# Patient Record
Sex: FEMALE | Race: WHITE | NOT HISPANIC OR LATINO | Employment: FULL TIME | URBAN - METROPOLITAN AREA
[De-identification: names, ages, dates, MRNs, and addresses within clinical notes are randomized per-mention and may not be internally consistent; named-entity substitution may affect disease eponyms.]

---

## 2017-01-16 ENCOUNTER — OFFICE VISIT - HEALTHEAST (OUTPATIENT)
Dept: FAMILY MEDICINE | Facility: CLINIC | Age: 32
End: 2017-01-16

## 2017-01-16 DIAGNOSIS — Z30.41 ENCOUNTER FOR SURVEILLANCE OF CONTRACEPTIVE PILLS: ICD-10-CM

## 2017-01-16 DIAGNOSIS — L73.9 FOLLICULITIS: ICD-10-CM

## 2017-01-16 DIAGNOSIS — B36.0 TINEA VERSICOLOR: ICD-10-CM

## 2017-01-16 ASSESSMENT — MIFFLIN-ST. JEOR: SCORE: 1550.2

## 2017-02-14 ENCOUNTER — COMMUNICATION - HEALTHEAST (OUTPATIENT)
Dept: FAMILY MEDICINE | Facility: CLINIC | Age: 32
End: 2017-02-14

## 2017-02-14 DIAGNOSIS — F41.9 ANXIETY: ICD-10-CM

## 2017-02-16 ENCOUNTER — OFFICE VISIT - HEALTHEAST (OUTPATIENT)
Dept: FAMILY MEDICINE | Facility: CLINIC | Age: 32
End: 2017-02-16

## 2017-02-16 DIAGNOSIS — R59.0 LYMPHADENOPATHY, AXILLARY: ICD-10-CM

## 2017-02-16 ASSESSMENT — MIFFLIN-ST. JEOR: SCORE: 1512.55

## 2017-03-30 ENCOUNTER — COMMUNICATION - HEALTHEAST (OUTPATIENT)
Dept: FAMILY MEDICINE | Facility: CLINIC | Age: 32
End: 2017-03-30

## 2017-03-30 DIAGNOSIS — F41.9 ANXIETY: ICD-10-CM

## 2017-04-10 ENCOUNTER — OFFICE VISIT - HEALTHEAST (OUTPATIENT)
Dept: FAMILY MEDICINE | Facility: CLINIC | Age: 32
End: 2017-04-10

## 2017-04-10 DIAGNOSIS — M26.609 TEMPOROMANDIBULAR DYSFUNCTION SYNDROME: ICD-10-CM

## 2017-04-10 DIAGNOSIS — G47.9 RESTLESS SLEEPER: ICD-10-CM

## 2017-04-18 ENCOUNTER — OFFICE VISIT - HEALTHEAST (OUTPATIENT)
Dept: PHYSICAL THERAPY | Facility: REHABILITATION | Age: 32
End: 2017-04-18

## 2017-04-18 DIAGNOSIS — M26.609 TMJ (TEMPOROMANDIBULAR JOINT DISORDER): ICD-10-CM

## 2017-04-18 DIAGNOSIS — R29.3 POOR POSTURE: ICD-10-CM

## 2017-04-18 DIAGNOSIS — E66.3 OVERWEIGHT (BMI 25.0-29.9): ICD-10-CM

## 2017-04-18 DIAGNOSIS — F32.89 OTHER DEPRESSION: ICD-10-CM

## 2017-04-28 ENCOUNTER — OFFICE VISIT - HEALTHEAST (OUTPATIENT)
Dept: PHYSICAL THERAPY | Facility: REHABILITATION | Age: 32
End: 2017-04-28

## 2017-04-28 DIAGNOSIS — R29.3 POOR POSTURE: ICD-10-CM

## 2017-04-28 DIAGNOSIS — M26.609 TMJ (TEMPOROMANDIBULAR JOINT DISORDER): ICD-10-CM

## 2017-05-12 ENCOUNTER — OFFICE VISIT - HEALTHEAST (OUTPATIENT)
Dept: FAMILY MEDICINE | Facility: CLINIC | Age: 32
End: 2017-05-12

## 2017-05-12 DIAGNOSIS — M54.2 NECK AND SHOULDER PAIN: ICD-10-CM

## 2017-05-12 DIAGNOSIS — M25.519 NECK AND SHOULDER PAIN: ICD-10-CM

## 2017-05-16 ENCOUNTER — OFFICE VISIT - HEALTHEAST (OUTPATIENT)
Dept: PHYSICAL THERAPY | Facility: REHABILITATION | Age: 32
End: 2017-05-16

## 2017-05-16 DIAGNOSIS — M26.609 TMJ (TEMPOROMANDIBULAR JOINT DISORDER): ICD-10-CM

## 2017-05-16 DIAGNOSIS — R29.3 POOR POSTURE: ICD-10-CM

## 2017-05-18 ENCOUNTER — COMMUNICATION - HEALTHEAST (OUTPATIENT)
Dept: FAMILY MEDICINE | Facility: CLINIC | Age: 32
End: 2017-05-18

## 2017-05-18 DIAGNOSIS — F41.9 ANXIETY: ICD-10-CM

## 2017-07-19 ENCOUNTER — OFFICE VISIT - HEALTHEAST (OUTPATIENT)
Dept: FAMILY MEDICINE | Facility: CLINIC | Age: 32
End: 2017-07-19

## 2017-07-19 DIAGNOSIS — G43.909 MIGRAINES: ICD-10-CM

## 2017-07-19 DIAGNOSIS — J45.30 RAD (REACTIVE AIRWAY DISEASE), MILD PERSISTENT, UNCOMPLICATED: ICD-10-CM

## 2017-07-19 ASSESSMENT — MIFFLIN-ST. JEOR: SCORE: 1542.04

## 2017-07-31 ENCOUNTER — COMMUNICATION - HEALTHEAST (OUTPATIENT)
Dept: FAMILY MEDICINE | Facility: CLINIC | Age: 32
End: 2017-07-31

## 2017-07-31 DIAGNOSIS — G43.909 MIGRAINES: ICD-10-CM

## 2017-10-11 ENCOUNTER — COMMUNICATION - HEALTHEAST (OUTPATIENT)
Dept: FAMILY MEDICINE | Facility: CLINIC | Age: 32
End: 2017-10-11

## 2017-10-11 DIAGNOSIS — F32.89 OTHER DEPRESSION: ICD-10-CM

## 2017-10-13 ENCOUNTER — OFFICE VISIT - HEALTHEAST (OUTPATIENT)
Dept: FAMILY MEDICINE | Facility: CLINIC | Age: 32
End: 2017-10-13

## 2017-10-13 DIAGNOSIS — J01.90 ACUTE SINUSITIS: ICD-10-CM

## 2017-10-13 DIAGNOSIS — J30.9 ALLERGIC RHINITIS: ICD-10-CM

## 2017-10-13 ASSESSMENT — MIFFLIN-ST. JEOR: SCORE: 1560.18

## 2018-01-04 ENCOUNTER — OFFICE VISIT (OUTPATIENT)
Dept: FAMILY MEDICINE | Facility: CLINIC | Age: 33
End: 2018-01-04
Payer: COMMERCIAL

## 2018-01-04 VITALS
TEMPERATURE: 98.6 F | HEIGHT: 66 IN | WEIGHT: 193.1 LBS | RESPIRATION RATE: 16 BRPM | SYSTOLIC BLOOD PRESSURE: 110 MMHG | DIASTOLIC BLOOD PRESSURE: 76 MMHG | BODY MASS INDEX: 31.03 KG/M2 | HEART RATE: 70 BPM | OXYGEN SATURATION: 99 %

## 2018-01-04 DIAGNOSIS — F41.1 GENERALIZED ANXIETY DISORDER: ICD-10-CM

## 2018-01-04 DIAGNOSIS — F32.5 MAJOR DEPRESSIVE DISORDER WITH SINGLE EPISODE, IN FULL REMISSION (H): Primary | ICD-10-CM

## 2018-01-04 PROCEDURE — 99204 OFFICE O/P NEW MOD 45 MIN: CPT | Performed by: FAMILY MEDICINE

## 2018-01-04 RX ORDER — BUSPIRONE HYDROCHLORIDE 5 MG/1
TABLET ORAL
Qty: 120 TABLET | Refills: 0 | Status: SHIPPED | OUTPATIENT
Start: 2018-01-04 | End: 2018-01-31

## 2018-01-04 RX ORDER — ESCITALOPRAM OXALATE 20 MG/1
20 TABLET ORAL DAILY
Qty: 90 TABLET | Refills: 1 | Status: SHIPPED | OUTPATIENT
Start: 2018-01-04 | End: 2018-04-05

## 2018-01-04 RX ORDER — ESCITALOPRAM OXALATE 5 MG/5ML
20 SOLUTION ORAL DAILY
COMMUNITY
End: 2018-01-24

## 2018-01-04 ASSESSMENT — PAIN SCALES - GENERAL: PAINLEVEL: NO PAIN (0)

## 2018-01-04 NOTE — PATIENT INSTRUCTIONS
At [facility], we strive to deliver an exceptional experience to you, every time we see you.  If you receive a survey in the mail, please send us back your thoughts. We really do value your feedback.    Based on your medical history, these are the current health maintenance/preventive care services that you are due for (some may have been done at this visit.)  Health Maintenance Due   Topic Date Due     TETANUS IMMUNIZATION (SYSTEM ASSIGNED)  11/06/2001     DEPRESSION ACTION PLAN Q1 YR  11/06/2001     PHQ-9 Q6 MONTHS  11/06/2001     PAP SCREENING Q3 YR (SYSTEM ASSIGNED)  11/06/2004     INFLUENZA VACCINE (SYSTEM ASSIGNED)  09/01/2017         Suggested websites for health information:  Www.F?rsat Bu F?rsat.org : Up to date and easily searchable information on multiple topics.  Www.medlineplus.gov : medication info, interactive tutorials, watch real surgeries online  Www.familydoctor.org : good info from the Academy of Family Physicians  Www.cdc.gov : public health info, travel advisories, epidemics (H1N1)  Www.aap.org : children's health info, normal development, vaccinations  Www.health.[facility].[place].us : [place] dept of health, public health issues in [place], N1N1    Your care team:     Family Medicine   DARREN Pitt MD Emily Bunt, APRN CNP   S. MD Ciera Ribera MD Angela Wermerskirchen, MD         Clinic hours: Monday - Wednesday 7 am-7 pm   Thursdays and Fridays 7 am-5 pm.     [facility]: Monday - Friday 11 am-9 pm,   Saturday and Sunday 9 am-5 pm.    [facility]: Monday -Thursday 8 am-8 pm; Friday 8 am-6 pm; Saturday and Sunday 9 am-5 pm.     [facility]: Monday - Thursday 8 am - 7 pm; Friday 8 am - 6 pm    Clinic: [phone]   [facility]: [phone]   [facility]: [phone]

## 2018-01-04 NOTE — MR AVS SNAPSHOT
After Visit Summary   1/4/2018    Myrna Brothers    MRN: [id number]           Patient Information     Date Of Birth          1985        Visit Information        Provider Department      1/4/2018 4:00 PM Gayle Noble MD [facility]        Today's Diagnoses     Major depressive disorder with single episode, in full remission (H)    -  1    Generalized anxiety disorder          Care Instructions    At [facility], we strive to deliver an exceptional experience to you, every time we see you.  If you receive a survey in the mail, please send us back your thoughts. We really do value your feedback.    Based on your medical history, these are the current health maintenance/preventive care services that you are due for (some may have been done at this visit.)  Health Maintenance Due   Topic Date Due     TETANUS IMMUNIZATION (SYSTEM ASSIGNED)  11/06/2001     DEPRESSION ACTION PLAN Q1 YR  11/06/2001     PHQ-9 Q6 MONTHS  11/06/2001     PAP SCREENING Q3 YR (SYSTEM ASSIGNED)  11/06/2004     INFLUENZA VACCINE (SYSTEM ASSIGNED)  09/01/2017         Suggested websites for health information:  Www.[employer] : Up to date and easily searchable information on multiple topics.  Www.medlineplus.gov : medication info, interactive tutorials, watch real surgeries online  Www.familydoctor.org : good info from the Academy of Family Physicians  Www.cdc.gov : public health info, travel advisories, epidemics (H1N1)  Www.aap.org : children's health info, normal development, vaccinations  Www.health.state[place] : [place] dept of health, public health issues in [place], N1N1    Your care team:     Family Medicine   DARREN Pitt MD Emily Bunt, APRN MADI   S. MD Ciera Ribera MD Angela Wermerskirchen, MD         Clinic hours: Monday - Wednesday 7 am-7 pm   Thursdays and Fridays 7 am-5 pm.     [facility]: Monday -  "Friday 11 am-9 pm,   Saturday and  9 am-5 pm.    [facility]: Monday -Thursday 8 am-8 pm; Friday 8 am-6 pm; Saturday and  9 am-5 pm.     [facility]: Monday - Thursday 8 am - 7 pm; Friday 8 am - 6 pm    Clinic: [phone]   [facility]: [phone]   [facility]: [phone]              Follow-ups after your visit        Who to contact     If you have questions or need follow up information about today's clinic visit or your schedule please contact [facility] directly at [phone].  Normal or non-critical lab and imaging results will be communicated to you by MyChart, letter or phone within 4 business days after the clinic has received the results. If you do not hear from us within 7 days, please contact the clinic through MyChart or phone. If you have a critical or abnormal lab result, we will notify you by phone as soon as possible.  Submit refill requests through IntroMaps or call your pharmacy and they will forward the refill request to us. Please allow 3 business days for your refill to be completed.          Additional Information About Your Visit        IntroMaps Information     IntroMaps lets you send messages to your doctor, view your test results, renew your prescriptions, schedule appointments and more. To sign up, go to [email or web address] . Click on \"Log in\" on the left side of the screen, which will take you to the Welcome page. Then click on \"Sign up Now\" on the right side of the page.     You will be asked to enter the access code listed below, as well as some personal information. Please follow the directions to create your username and password.     Your access code is: [email or web address]  Expires: 2018  5:00 PM     Your access code will  in 90 days. If you need help or a new code, please call your [facility] or [phone].        Care EveryWhere ID     This is your Care EveryWhere ID. This " "could be used by other organizations to access your [place] medical records  [id number]        Your Vitals Were     Pulse Temperature Respirations Height Last Period Pulse Oximetry    70 98.6  F (37  C) (Oral) 16 1.664 m (5' 5.5\") 12/06/2017 99%    Breastfeeding? BMI (Body Mass Index)                No 31.64 kg/m2           Blood Pressure from Last 3 Encounters:   01/04/18 110/76    Weight from Last 3 Encounters:   01/04/18 87.6 kg (193 lb 1.6 oz)              Today, you had the following     No orders found for display         Today's Medication Changes          These changes are accurate as of: 1/4/18  5:00 PM.  If you have any questions, ask your nurse or doctor.               Start taking these medicines.        Dose/Directions    busPIRone 5 MG tablet   Commonly known as:  BUSPAR   Used for:  Generalized anxiety disorder   Started by:  Gayle Noble MD        Start at 5 mg twice daily for 3 days, then 7.5 mg (1.5 tabs) twice daily for 3 days, then 10 mg (2 tabs) twice daily thereafter.   Quantity:  120 tablet   Refills:  0         These medicines have changed or have updated prescriptions.        Dose/Directions    * escitalopram 5 MG/5ML solution   Commonly known as:  LEXAPRO   This may have changed:  Another medication with the same name was added. Make sure you understand how and when to take each.   Changed by:  Gayle Noble MD        Dose:  20 mg   Take 20 mg by mouth daily   Refills:  0       * escitalopram 20 MG tablet   Commonly known as:  LEXAPRO   This may have changed:  You were already taking a medication with the same name, and this prescription was added. Make sure you understand how and when to take each.   Used for:  Major depressive disorder with single episode, in full remission (H), Generalized anxiety disorder   Changed by:  Gayle Noble MD        Dose:  20 mg   Take 1 tablet (20 mg) by mouth daily   Quantity:  90 tablet   Refills:  " 1       * Notice:  This list has 2 medication(s) that are the same as other medications prescribed for you. Read the directions carefully, and ask your doctor or other care provider to review them with you.         Where to get your medicines      These medications were sent to [facility]/pharmacy #[id number] - [place], [place] - [address] [place] [facility]  [address] [facility] [address]     Phone:  [phone]     busPIRone 5 MG tablet    escitalopram 20 MG tablet                Primary Care Provider Office Phone # Fax #    [facility] [phone] [address] [address]        Equal Access to Services     MANNY OCONNOR : Aneesh kathleeno Sochaparro, waaxda luqadaha, qaybta kaalmada adeegyada, raquel miller. So [facility] [phone].    ATENCIÓN: Si habla español, tiene a flowers disposición servicios gratuitos de asistencia lingüística. Llame al [phone].    We comply with applicable federal civil rights laws and [place] laws. We do not discriminate on the basis of race, color, national origin, age, disability, sex, sexual orientation, or gender identity.            Thank you!     Thank you for choosing [facility]  for your care. Our goal is always to provide you with excellent care. Hearing back from our patients is one way we can continue to improve our services. Please take a few minutes to complete the written survey that you may receive in the mail after your visit with us. Thank you!             Your Updated Medication List - Protect others around you: Learn how to safely use, store and throw away your medicines at www.disposemymeds.org.          This list is accurate as of: 1/4/18  5:00 PM.  Always use your most recent med list.                   Brand Name Dispense Instructions for use Diagnosis    busPIRone 5 MG tablet    BUSPAR    120 tablet    Start at 5 mg twice daily for 3  days, then 7.5 mg (1.5 tabs) twice daily for 3 days, then 10 mg (2 tabs) twice daily thereafter.    Generalized anxiety disorder       * escitalopram 5 MG/5ML solution    LEXAPRO     Take 20 mg by mouth daily        * escitalopram 20 MG tablet    LEXAPRO    90 tablet    Take 1 tablet (20 mg) by mouth daily    Major depressive disorder with single episode, in full remission (H), Generalized anxiety disorder       PROPRANOLOL HCL PO      Take 10 mg by mouth as needed for high blood pressure        * Notice:  This list has 2 medication(s) that are the same as other medications prescribed for you. Read the directions carefully, and ask your doctor or other care provider to review them with you.

## 2018-01-04 NOTE — PROGRESS NOTES
SUBJECTIVE:   Myrna Brothers is a 32 year old female who presents to clinic today for the following health issues:      New Patient: [facility]      Depression and Anxiety Follow-Up    Status since last visit: Worsened anxiety    Other associated symptoms:dizziness    Complicating factors:     Significant life event: No     Current substance abuse: None    Previously treated at [facility] for 2 years and having more anxiety.      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    Previously treated with     No flowsheet data found.  No flowsheet data found.    PHQ-9  English  PHQ-9   Any Language  GAD7  Suicide Assessment Five-step Evaluation and Treatment (SAFE-T)      Problem list and histories reviewed & adjusted, as indicated.  Additional history: as documented    Patient Active Problem List   Diagnosis     Major depressive disorder with single episode, in full remission (H)     Generalized anxiety disorder     History reviewed. No pertinent surgical history.    Social History   Substance Use Topics     Smoking status: Never Smoker     Smokeless tobacco: Never Used     Alcohol use Yes      Comment: occasionally     Family History   Problem Relation Age of Onset     Breast Cancer Mother      DIABETES Father      HEART DISEASE Father              Reviewed and updated as needed this visit by clinical staffTobacco  Allergies  Meds  Problems  Med Hx  Surg Hx  Fam Hx  Soc Hx        Reviewed and updated as needed this visit by Provider  Tobacco  Allergies  Problems  Med Hx  Surg Hx  Fam Hx  Soc Hx        ROS:  Constitutional, HEENT, cardiovascular, pulmonary, GI, , musculoskeletal, neuro, skin, endocrine and psych systems are negative, except as otherwise noted.      OBJECTIVE:   /76 (BP Location: Right arm, Patient Position: Sitting, Cuff Size: Adult Regular)  Pulse 70  Temp  "98.6  F (37  C) (Oral)  Resp 16  Ht 1.664 m (5' 5.5\")  Wt 87.6 kg (193 lb 1.6 oz)  LMP 12/06/2017  SpO2 99%  Breastfeeding? No  BMI 31.64 kg/m2  Body mass index is 31.64 kg/(m^2).  GENERAL: healthy, alert and no distress  EYES: Eyes grossly normal to inspection, PERRL and conjunctivae and sclerae normal  NECK: no adenopathy, no asymmetry, masses, or scars and thyroid normal to palpation  RESP: lungs clear to auscultation - no rales, rhonchi or wheezes  CV: regular rate and rhythm, normal S1 S2, no S3 or S4, no murmur, click or rub, no peripheral edema and peripheral pulses strong  ABDOMEN: soft, nontender, no hepatosplenomegaly, no masses and bowel sounds normal  MS: no gross musculoskeletal defects noted, no edema  NEURO: Normal strength and tone, mentation intact and speech normal  PSYCH: mentation appears normal, affect normal/bright and eye blinking tics    Diagnostic Test Results:  none     ASSESSMENT/PLAN:     1. Major depressive disorder with single episode, in full remission (H)  Stable - continue lexapro  - escitalopram (LEXAPRO) 20 MG tablet; Take 1 tablet (20 mg) by mouth daily  Dispense: 90 tablet; Refill: 1    2. Generalized anxiety disorder  Not controlled - add buspar.  - escitalopram (LEXAPRO) 20 MG tablet; Take 1 tablet (20 mg) by mouth daily  Dispense: 90 tablet; Refill: 1  - busPIRone (BUSPAR) 5 MG tablet; Start at 5 mg twice daily for 3 days, then 7.5 mg (1.5 tabs) twice daily for 3 days, then 10 mg (2 tabs) twice daily thereafter.  Dispense: 120 tablet; Refill: 0    The uses and side effects, including black box warnings as appropriate, were discussed in detail.  All patient questions were answered.  The patient was instructed to call immediately if any side effects developed.     FUTURE APPOINTMENTS:       - Follow-up visit in 1 month.    Gayle Wellington MD  [facility]"

## 2018-01-04 NOTE — NURSING NOTE
"Chief Complaint   Patient presents with     New Patient     establish care     Recheck Medication     depression       Initial /76 (BP Location: Right arm, Patient Position: Sitting, Cuff Size: Adult Regular)  Pulse 70  Temp 98.6  F (37  C) (Oral)  Resp 16  Ht 1.664 m (5' 5.5\")  Wt 87.6 kg (193 lb 1.6 oz)  LMP 12/06/2017  SpO2 99%  Breastfeeding? No  BMI 31.64 kg/m2 Estimated body mass index is 31.64 kg/(m^2) as calculated from the following:    Height as of this encounter: 1.664 m (5' 5.5\").    Weight as of this encounter: 87.6 kg (193 lb 1.6 oz).  Medication Reconciliation: randolph Garrett      "

## 2018-01-07 ENCOUNTER — COMMUNICATION - HEALTHEAST (OUTPATIENT)
Dept: FAMILY MEDICINE | Facility: CLINIC | Age: 33
End: 2018-01-07

## 2018-01-07 DIAGNOSIS — F32.89 OTHER DEPRESSION: ICD-10-CM

## 2018-01-24 ENCOUNTER — OFFICE VISIT (OUTPATIENT)
Dept: FAMILY MEDICINE | Facility: CLINIC | Age: 33
End: 2018-01-24
Payer: COMMERCIAL

## 2018-01-24 VITALS
DIASTOLIC BLOOD PRESSURE: 70 MMHG | RESPIRATION RATE: 12 BRPM | WEIGHT: 196 LBS | HEART RATE: 122 BPM | TEMPERATURE: 99 F | BODY MASS INDEX: 31.5 KG/M2 | OXYGEN SATURATION: 95 % | SYSTOLIC BLOOD PRESSURE: 122 MMHG | HEIGHT: 66 IN

## 2018-01-24 DIAGNOSIS — J06.9 UPPER RESPIRATORY TRACT INFECTION, UNSPECIFIED TYPE: Primary | ICD-10-CM

## 2018-01-24 PROCEDURE — 99213 OFFICE O/P EST LOW 20 MIN: CPT | Performed by: FAMILY MEDICINE

## 2018-01-24 RX ORDER — AMOXICILLIN 875 MG
875 TABLET ORAL 2 TIMES DAILY
Qty: 20 TABLET | Refills: 0 | Status: SHIPPED | OUTPATIENT
Start: 2018-01-24 | End: 2018-02-27

## 2018-01-24 NOTE — PROGRESS NOTES
"  SUBJECTIVE:   Myrna Brothers is a 32 year old female who presents to clinic today for the following health issues:      Acute Illness   Acute illness concerns: URI  Onset: Saturday    Fever: no    Chills/Sweats: no    Headache (location?): YES    Sinus Pressure:YES    Conjunctivitis:  no    Ear Pain: yes bilateral    Rhinorrhea: YES    Congestion: YES    Sore Throat: YES     Cough: YES    Wheeze: YES    Decreased Appetite: no    Nausea: no    Vomiting: no    Diarrhea:  no    Dysuria/Freq.: no    Fatigue/Achiness: YES    Sick/Strep Exposure: YES     Therapies Tried and outcome: dayquil, nyquil and acetametaphin but it is not helping too much    SUBJECTIVE:  Here today for the above symptoms began about 4 days ago.  Patient works for [employer] and is around public quite a bit.  Does not know if specific exposures there have been a lot of folks sick at work.  Has had a low-grade temperature started today.  Primarily it has been a lot of sinus pressure, headache, postnasal drip, cough.  No GI symptoms.  Is prone to sinus infections about once a year per the patient.    Review of systems otherwise negative.  Past medical, family, and social history reviewed and updated in chart.    OBJECTIVE:  /70 (BP Location: Right arm, Patient Position: Sitting, Cuff Size: Adult Regular)  Pulse 122  Temp 99  F (37.2  C) (Oral)  Resp 12  Ht 1.664 m (5' 5.5\")  Wt 88.9 kg (196 lb)  LMP 12/06/2017  SpO2 95%  BMI 32.12 kg/m2  Alert, pleasant, upbeat, and in no apparent discomfort.  S1 and S2 normal, no murmurs, clicks, gallops or rubs. Regular rate and rhythm. Chest is clear; no wheezes or rales. No edema or JVD.  I note only benign skin findings. No unusual rashes or suspicious skin lesions noted. Nails appear normal.   The abdomen is soft without tenderness, guarding, mass, rebound or organomegaly. Bowel sounds are normal. No CVA tenderness or inguinal adenopathy noted.  Past labs reviewed with the " patient.     ASSESSMENT / PLAN:  (J06.9) Upper respiratory tract infection, unspecified type  (primary encounter diagnosis)  Comment: We discussed the possibility of this being the flu, which could certainly be tested for.  That being said she is beyond the treatment window and the answer would not necessarily change the outcome of the overall exposure.  I provided a prescription for some amoxicillin to deal with underlying sinus infection.  Few days off of work.  Plan: amoxicillin (AMOXIL) 875 MG tablet            Follow up as needed   S. Jp Rogers MD    (Chart documentation completed in part with Dragon voice-recognition software.  Even though reviewed some grammatical, spelling, and word errors may remain.)

## 2018-01-24 NOTE — MR AVS SNAPSHOT
"              After Visit Summary   2018    Myrna Brothers    MRN: [id number]           Patient Information     Date Of Birth          1985        Visit Information        Provider Department      2018 11:20 AM Alexa Rogers MD [facility]        Today's Diagnoses     Upper respiratory tract infection, unspecified type    -  1       Follow-ups after your visit        Follow-up notes from your care team     Return if symptoms worsen or fail to improve.      Who to contact     If you have questions or need follow up information about today's clinic visit or your schedule please contact [facility] directly at [phone].  Normal or non-critical lab and imaging results will be communicated to you by MyChart, letter or phone within 4 business days after the clinic has received the results. If you do not hear from us within 7 days, please contact the clinic through MyChart or phone. If you have a critical or abnormal lab result, we will notify you by phone as soon as possible.  Submit refill requests through Tiller or call your pharmacy and they will forward the refill request to us. Please allow 3 business days for your refill to be completed.          Additional Information About Your Visit        MyChart Information     Tiller lets you send messages to your doctor, view your test results, renew your prescriptions, schedule appointments and more. To sign up, go to [email or web address] . Click on \"Log in\" on the left side of the screen, which will take you to the Welcome page. Then click on \"Sign up Now\" on the right side of the page.     You will be asked to enter the access code listed below, as well as some personal information. Please follow the directions to create your username and password.     Your access code is: [email or web address]  Expires: 2018  5:00 PM     Your access code will  in 90 days. If you need help or a new code, please call your " "[facility] or [phone].        Care EveryWhere ID     This is your Care EveryWhere ID. This could be used by other organizations to access your [place] medical records  [id number]        Your Vitals Were     Pulse Temperature Respirations Height Last Period Pulse Oximetry    122 99  F (37.2  C) (Oral) 12 1.664 m (5' 5.5\") 12/06/2017 95%    BMI (Body Mass Index)                   32.12 kg/m2            Blood Pressure from Last 3 Encounters:   01/24/18 122/70   01/04/18 110/76    Weight from Last 3 Encounters:   01/24/18 88.9 kg (196 lb)   01/04/18 87.6 kg (193 lb 1.6 oz)              Today, you had the following     No orders found for display         Today's Medication Changes          These changes are accurate as of 1/24/18 11:40 AM.  If you have any questions, ask your nurse or doctor.               Start taking these medicines.        Dose/Directions    amoxicillin 875 MG tablet   Commonly known as:  AMOXIL   Used for:  Upper respiratory tract infection, unspecified type   Started by:  Alexa Rogers MD        Dose:  875 mg   Take 1 tablet (875 mg) by mouth 2 times daily   Quantity:  20 tablet   Refills:  0            Where to get your medicines      These medications were sent to [facility]/pharmacy #[id number] [address] AT [place] [address]     Phone:  [phone]     amoxicillin 875 MG tablet                Primary Care Provider Office Phone # Fax #    [facility] [phone]       [address] [place] [address]        Equal Access to Services     NOHELIA OCONNOR : Aneesh Bergman, mae braxton, raquel bangura. So [facility] [phone].    ATENCIÓN: Si habla español, tiene a flowers disposición servicios gratuitos de asistencia lingüística. Llame al [phone].    We comply with applicable federal civil rights " laws and [place] laws. We do not discriminate on the basis of race, color, national origin, age, disability, sex, sexual orientation, or gender identity.            Thank you!     Thank you for choosing [facility]  for your care. Our goal is always to provide you with excellent care. Hearing back from our patients is one way we can continue to improve our services. Please take a few minutes to complete the written survey that you may receive in the mail after your visit with us. Thank you!             Your Updated Medication List - Protect others around you: Learn how to safely use, store and throw away your medicines at www.disposemymeds.org.          This list is accurate as of 1/24/18 11:40 AM.  Always use your most recent med list.                   Brand Name Dispense Instructions for use Diagnosis    amoxicillin 875 MG tablet    AMOXIL    20 tablet    Take 1 tablet (875 mg) by mouth 2 times daily    Upper respiratory tract infection, unspecified type       busPIRone 5 MG tablet    BUSPAR    120 tablet    Start at 5 mg twice daily for 3 days, then 7.5 mg (1.5 tabs) twice daily for 3 days, then 10 mg (2 tabs) twice daily thereafter.    Generalized anxiety disorder       escitalopram 20 MG tablet    LEXAPRO    90 tablet    Take 1 tablet (20 mg) by mouth daily    Major depressive disorder with single episode, in full remission (H), Generalized anxiety disorder       PROPRANOLOL HCL PO      Take 10 mg by mouth as needed for high blood pressure

## 2018-01-24 NOTE — LETTER
January 24, 2018        Myrna Brothers  [address]  [facility] [address]          To whom it may concern:    RE: Myrna Brtohers    Patient was seen and treated today at our clinic.  Off work 1/24 - 1/25/17 due to illness.    May return to work early if symptoms improve.    Please contact me for questions or concerns.      Sincerely,        Alexa Rogers MD

## 2018-01-25 ENCOUNTER — TELEPHONE (OUTPATIENT)
Dept: FAMILY MEDICINE | Facility: CLINIC | Age: 33
End: 2018-01-25

## 2018-01-25 DIAGNOSIS — R05.9 COUGH: Primary | ICD-10-CM

## 2018-01-25 RX ORDER — CODEINE PHOSPHATE AND GUAIFENESIN 10; 100 MG/5ML; MG/5ML
1-2 SOLUTION ORAL EVERY 4 HOURS PRN
Qty: 118 ML | Refills: 0 | Status: SHIPPED | OUTPATIENT
Start: 2018-01-25 | End: 2018-02-27

## 2018-01-25 NOTE — TELEPHONE ENCOUNTER
.Reason for Call:  Other prescription    Detailed comments:Patient called and would like to talk to nurse as soon as possible. She need something for her cough and over the counter med is not helping. Please call patient in regards to this message, she's waiting for the call.    Phone Number Patient can be reached at: Cell number on file:    Telephone Information:   Mobile [phone]       Best Time: any    Can we leave a detailed message on this number? YES    Call taken on 1/25/2018 at 3:31 PM by Vince Torres

## 2018-01-25 NOTE — TELEPHONE ENCOUNTER
The cough syrup over the counter isn't helping patient  Can you call in something to help  Send to [facility]    Call patient to advise [phone]    Ok to delegate

## 2018-01-26 NOTE — TELEPHONE ENCOUNTER
Called patient and she is noting that there is an issue with how the message has been handled. She was given the supervisor number to give that information to.    Patient notes that she has a cough and is losing her voice. She would like something prescription for cough. She is to sign on Saturday and needs her voice.    Please advise TERESA Simms RN, [facility]

## 2018-01-31 DIAGNOSIS — F41.1 GENERALIZED ANXIETY DISORDER: ICD-10-CM

## 2018-01-31 NOTE — TELEPHONE ENCOUNTER
"Requested Prescriptions   Pending Prescriptions Disp Refills     busPIRone (BUSPAR) 5 MG tablet [Pharmacy Med Name: BUSPIRONE HCL 5 MG TABLET]    Last Written Prescription Date:  1/4/18  Last Fill Quantity: 120,  # refills: 0   Last Office Visit with [facility], [facility] or [facility] prescribing provider:  1/24/18   Future Office Visit:      120 tablet 0     Sig: START 5MG 2X DAILY FOR 3 DAYS, THEN 1 & 1/2 TAB 2X DAILY FOR 3 DAYS THEN 2 TAS 2X DAILY    Atypical Antidepressants Protocol Failed    1/31/2018  9:03 AM       Failed - Patient has PHQ-9 score less than 5 in past 6 months.    The PHQ-9 criteria is meant to fail. It requires a PHQ-9 score review         Passed - Recent or future visit with authorizing provider's specialty    Patient had office visit in the last year or has a visit in the next 30 days with authorizing provider.  See \"Patient Info\" tab in inbasket, or \"Choose Columns\" in Meds & Orders section of the refill encounter.            Passed - Patient is age 18 or older       Passed - No active pregnancy on record       Passed - No positive pregnancy test in past 12 mos       Passed - Recent (6 mo) or future visit with authorizing provider's specialty    Patient had office visit in the last 6 months or has a visit in the next 30 days with authorizing provider.  See \"Patient Info\" tab in inbasket, or \"Choose Columns\" in Meds & Orders section of the refill encounter.                  Faisal Faarax  [facility]  "

## 2018-02-05 NOTE — TELEPHONE ENCOUNTER
"Requested Prescriptions   Pending Prescriptions Disp Refills     busPIRone (BUSPAR) 5 MG tablet [Pharmacy Med Name: BUSPIRONE HCL 5 MG TABLET] 120 tablet 0     Sig: START 5MG 2X DAILY FOR 3 DAYS, THEN 1 & 1/2 TAB 2X DAILY FOR 3 DAYS THEN 2 TAS 2X DAILY    Atypical Antidepressants Protocol Failed    1/31/2018  9:06 AM       Failed - Patient has PHQ-9 score less than 5 in past 6 months.    The PHQ-9 criteria is meant to fail. It requires a PHQ-9 score review         Passed - Recent or future visit with authorizing provider's specialty    Patient had office visit in the last year or has a visit in the next 30 days with authorizing provider.  See \"Patient Info\" tab in inbasket, or \"Choose Columns\" in Meds & Orders section of the refill encounter.            Passed - Patient is age 18 or older       Passed - No active pregnancy on record       Passed - No positive pregnancy test in past 12 mos       Passed - Recent (6 mo) or future visit with authorizing provider's specialty    Patient had office visit in the last 6 months or has a visit in the next 30 days with authorizing provider.  See \"Patient Info\" tab in inbasket, or \"Choose Columns\" in Meds & Orders section of the refill encounter.            No flowsheet data found.  Last OV: 1/24/18  Routing refill request to provider for review/approval because:  No PHQ-9 on file for patient    Miracle Deluca RN, BSN          "

## 2018-02-06 RX ORDER — BUSPIRONE HYDROCHLORIDE 5 MG/1
TABLET ORAL
Qty: 60 TABLET | Refills: 0 | Status: SHIPPED | OUTPATIENT
Start: 2018-02-06 | End: 2018-02-27

## 2018-02-09 DIAGNOSIS — F41.1 GENERALIZED ANXIETY DISORDER: ICD-10-CM

## 2018-02-09 NOTE — TELEPHONE ENCOUNTER
"Requested Prescriptions   Pending Prescriptions Disp Refills     busPIRone (BUSPAR) 5 MG tablet [Pharmacy Med Name: BUSPIRONE HCL 5 MG TABLET]  Last Written Prescription Date:  02/06/18  Last Fill Quantity: 60,  # refills: 0   Last Office Visit with [facility], [facility] or [facility] prescribing provider:  01/24/18   Future Office Visit:    120 tablet 0     Sig: START 5MG 2X DAILY FOR 3 DAYS, THEN 1 & 1/2 TAB 2X DAILY FOR 3 DAYS THEN 2 TAS 2X DAILY    Atypical Antidepressants Protocol Failed    2/9/2018 10:05 AM       Failed - Patient has PHQ-9 score less than 5 in past 6 months.    The PHQ-9 criteria is meant to fail. It requires a PHQ-9 score review         Passed - Recent or future visit with authorizing provider's specialty    Patient had office visit in the last year or has a visit in the next 30 days with authorizing provider.  See \"Patient Info\" tab in inbasket, or \"Choose Columns\" in Meds & Orders section of the refill encounter.            Passed - Patient is age 18 or older       Passed - No active pregnancy on record       Passed - No positive pregnancy test in past 12 mos       Passed - Recent (6 mo) or future visit with authorizing provider's specialty    Patient had office visit in the last 6 months or has a visit in the next 30 days with authorizing provider.  See \"Patient Info\" tab in inbasket, or \"Choose Columns\" in Meds & Orders section of the refill encounter.                "

## 2018-02-14 RX ORDER — BUSPIRONE HYDROCHLORIDE 5 MG/1
TABLET ORAL
Qty: 120 TABLET | Refills: 0 | OUTPATIENT
Start: 2018-02-14

## 2018-02-14 NOTE — TELEPHONE ENCOUNTER
Medication refilled on 2/6/18 by Dr. Janette Wellington. This request is denied.    Jordana Mendez RN  [facility]

## 2018-02-20 DIAGNOSIS — F41.1 GENERALIZED ANXIETY DISORDER: ICD-10-CM

## 2018-02-20 NOTE — TELEPHONE ENCOUNTER
"Requested Prescriptions   Pending Prescriptions Disp Refills     busPIRone (BUSPAR) 5 MG tablet [Pharmacy Med Name: BUSPIRONE HCL 5 MG TABLET]    Last Written Prescription Date:  18  Last Fill Quantity: 60,  # refills: 0   Last Office Visit with [facility], [facility] or [facility] prescribing provider:  18   Future Office Visit:      60 tablet 0     Si TAB TWICE A DAY X3DAY THEN 1 AND 1/2 TWICE A DAY X3DAY THEN 2 TABS TWICE A DAY    Atypical Antidepressants Protocol Passed    2018 10:24 AM       Passed - Recent or future visit with authorizing provider's specialty    Patient had office visit in the last year or has a visit in the next 30 days with authorizing provider.  See \"Patient Info\" tab in inbasket, or \"Choose Columns\" in Meds & Orders section of the refill encounter.            Passed - Patient is age 18 or older       Passed - No active pregnancy on record       Passed - No positive pregnancy test in past 12 mos              Faisal Faarax  [facility]  "

## 2018-02-22 RX ORDER — BUSPIRONE HYDROCHLORIDE 5 MG/1
TABLET ORAL
Qty: 60 TABLET | Refills: 0 | OUTPATIENT
Start: 2018-02-22

## 2018-02-22 NOTE — TELEPHONE ENCOUNTER
"Requested Prescriptions   Pending Prescriptions Disp Refills     busPIRone (BUSPAR) 5 MG tablet [Pharmacy Med Name: BUSPIRONE HCL 5 MG TABLET] 60 tablet 0     Si TAB TWICE A DAY X3DAY THEN 1 AND 1/2 TWICE A DAY X3DAY THEN 2 TABS TWICE A DAY    Atypical Antidepressants Protocol Passed    2018 10:25 AM       Passed - Recent or future visit with authorizing provider's specialty    Patient had office visit in the last year or has a visit in the next 30 days with authorizing provider.  See \"Patient Info\" tab in inbasket, or \"Choose Columns\" in Meds & Orders section of the refill encounter.            Passed - Patient is age 18 or older       Passed - No active pregnancy on record       Passed - No positive pregnancy test in past 12 mos        Routing refill request to provider for review/approval because:  Patient needs to be seen because:  Per OV note on 18, patient was to follow up in 4 weeks.    Miracle Deluca RN, BSN    "

## 2018-02-23 ENCOUNTER — TELEPHONE (OUTPATIENT)
Dept: FAMILY MEDICINE | Facility: CLINIC | Age: 33
End: 2018-02-23

## 2018-02-23 NOTE — TELEPHONE ENCOUNTER
Called and spoke to patient and explained that she was to follow  Up in 1 month. Patient understands and I helped her schedule an  Appointment with [facility] for 2/26/18 at 5:00 pm.  Jovana Flowers MA/  For Teams Debra

## 2018-02-23 NOTE — TELEPHONE ENCOUNTER
Patient needs a recheck in clinic before refill can be granted. She was to follow up 4 weeks after her OV on 1/24/18. Please call patient to schedule recheck.     Miracle Deluca RN, BSN

## 2018-02-23 NOTE — TELEPHONE ENCOUNTER
Patient states she was declined her prescription for anxiety medication. She is wondering why that I and would like a call back,    Outreach ,  Rachel Fernandez

## 2018-02-27 ENCOUNTER — OFFICE VISIT (OUTPATIENT)
Dept: FAMILY MEDICINE | Facility: CLINIC | Age: 33
End: 2018-02-27
Payer: COMMERCIAL

## 2018-02-27 VITALS
SYSTOLIC BLOOD PRESSURE: 110 MMHG | BODY MASS INDEX: 31.34 KG/M2 | HEART RATE: 82 BPM | HEIGHT: 66 IN | TEMPERATURE: 98.6 F | DIASTOLIC BLOOD PRESSURE: 70 MMHG | WEIGHT: 195 LBS | OXYGEN SATURATION: 100 % | RESPIRATION RATE: 17 BRPM

## 2018-02-27 DIAGNOSIS — F32.5 MAJOR DEPRESSIVE DISORDER WITH SINGLE EPISODE, IN FULL REMISSION (H): Primary | ICD-10-CM

## 2018-02-27 DIAGNOSIS — F41.1 GENERALIZED ANXIETY DISORDER: ICD-10-CM

## 2018-02-27 PROCEDURE — 99213 OFFICE O/P EST LOW 20 MIN: CPT | Performed by: FAMILY MEDICINE

## 2018-02-27 RX ORDER — BUSPIRONE HYDROCHLORIDE 5 MG/1
5 TABLET ORAL 2 TIMES DAILY
Qty: 180 TABLET | Refills: 1 | Status: SHIPPED | OUTPATIENT
Start: 2018-02-27 | End: 2018-07-13

## 2018-02-27 ASSESSMENT — PAIN SCALES - GENERAL: PAINLEVEL: NO PAIN (0)

## 2018-02-27 NOTE — LETTER
My Depression Action Plan  Name: Myrna Brothers   Date of Birth 1985  Date: 2/27/2018    My doctor: [facility]   My clinic: [address] [address] [phone]          GREEN    ZONE   Good Control    What it looks like:     Things are going generally well. You have normal up s and down s. You may even feel depressed from time to time, but bad moods usually last less than a day.   What you need to do:  1. Continue to care for yourself (see self care plan)  2. Check your depression survival kit and update it as needed  3. Follow your physician s recommendations including any medication.  4. Do not stop taking medication unless you consult with your physician first.           YELLOW         ZONE Getting Worse    What it looks like:     Depression is starting to interfere with your life.     It may be hard to get out of bed; you may be starting to isolate yourself from others.    Symptoms of depression are starting to last most all day and this has happened for several days.     You may have suicidal thoughts but they are not constant.   What you need to do:     1. Call your care team, your response to treatment will improve if you keep your care team informed of your progress. Yellow periods are signs an adjustment may need to be made.     2. Continue your self-care, even if you have to fake it!    3. Talk to someone in your support network    4. Open up your depression survival kit           RED    ZONE Medical Alert - Get Help    What it looks like:     Depression is seriously interfering with your life.     You may experience these or other symptoms: You can t get out of bed most days, can t work or engage in other necessary activities, you have trouble taking care of basic hygiene, or basic responsibilities, thoughts of suicide or death that will not go away, self-injurious behavior.     What you need to do:  1. Call  your care team and request a same-day appointment. If they are not available (weekends or after hours) call your local crisis line, emergency room or 911.      Electronically signed, February 27, 2018    Depression Self Care Plan / Survival Kit    Self-Care for Depression  Here s the deal. Your body and mind are really not as separate as most people think.  What you do and think affects how you feel and how you feel influences what you do and think. This means if you do things that people who feel good do, it will help you feel better.  Sometimes this is all it takes.  There is also a place for medication and therapy depending on how severe your depression is, so be sure to consult with your medical provider and/ or Behavioral Health Consultant if your symptoms are worsening or not improving.     In order to better manage my stress, I will:    Exercise  Get some form of exercise, every day. This will help reduce pain and release endorphins, the  feel good  chemicals in your brain. This is almost as good as taking antidepressants!  This is not the same as joining a gym and then never going! (they count on that by the way ) It can be as simple as just going for a walk or doing some gardening, anything that will get you moving.      Hygiene   Maintain good hygiene (Get out of bed in the morning, Make your bed, Brush your teeth, Take a shower, and Get dressed like you were going to work, even if you are unemployed).  If your clothes don't fit try to get ones that do.    Diet  I will strive to eat foods that are good for me, drink plenty of water, and avoid excessive sugar, caffeine, alcohol, and other mood-altering substances.  Some foods that are helpful in depression are: complex carbohydrates, B vitamins, flaxseed, fish or fish oil, fresh fruits and vegetables.    Psychotherapy  I agree to participate in Individual Therapy (if recommended).    Medication  If prescribed medications, I agree to take them.  Missing  doses can result in serious side effects.  I understand that drinking alcohol, or other illicit drug use, may cause potential side effects.  I will not stop my medication abruptly without first discussing it with my provider.    Staying Connected With Others  I will stay in touch with my friends, family members, and my primary care provider/team.    Use your imagination  Be creative.  We all have a creative side; it doesn t matter if it s oil painting, sand castles, or mud pies! This will also kick up the endorphins.    Witness Beauty  (AKA stop and smell the roses) Take a look outside, even in mid-winter. Notice colors, textures. Watch the squirrels and birds.     Service to others  Be of service to others.  There is always someone else in need.  By helping others we can  get out of ourselves  and remember the really important things.  This also provides opportunities for practicing all the other parts of the program.    Humor  Laugh and be silly!  Adjust your TV habits for less news and crime-drama and more comedy.    Control your stress  Try breathing deep, massage therapy, biofeedback, and meditation. Find time to relax each day.     My support system    Clinic Contact:  Phone number:    Contact 1:  Phone number:    Contact 2:  Phone number:    Shinto/:  Phone number:    Therapist:  Phone number:    Local crisis center:    Phone number:    Other community support:  Phone number:

## 2018-02-27 NOTE — PROGRESS NOTES
"  SUBJECTIVE:   Myrna Brothers is a 32 year old female who presents to clinic today for the following health issues:      Depression and Anxiety Follow-Up    Status since last visit: Improved     Other associated symptoms:None    Complicating factors:     Significant life event: No     Current substance abuse: None    No flowsheet data found.  No flowsheet data found.    PHQ-9  English  PHQ-9   Any Language  OREN-7  Suicide Assessment Five-step Evaluation and Treatment (SAFE-T)        Amount of exercise or physical activity: None    Problems taking medications regularly: No    Medication side effects: none    Diet: regular (no restrictions)    SUBJECTIVE:  Here today for follow-up of depression and anxiety.  Is currently on Lexapro 20 mg daily as a baseline and this does seem to be working well.  Last month met with Dr. Janette Wellington and they added BuSpar to help control some of her anxiety symptoms.  Has increased this to 5 mg twice daily and is happy with the results.  No significant side effects.  Should like to continue.    Review of systems otherwise negative.  Past medical, family, and social history reviewed and updated in chart.    OBJECTIVE:  /70 (BP Location: Right arm, Patient Position: Chair, Cuff Size: Adult Large)  Pulse 82  Temp 98.6  F (37  C) (Oral)  Resp 17  Ht 1.664 m (5' 5.5\")  Wt 88.5 kg (195 lb)  LMP 02/03/2018 (Exact Date)  SpO2 100%  Breastfeeding? No  BMI 31.96 kg/m2  Psych: Alert and oriented times 3; coherent speech, normal   rate and volume, able to articulate logical thoughts, able   to abstract reason, no tangential thoughts, no hallucinations   or delusions  Her affect is upbeat and appropriate  S1 and S2 normal, no murmurs, clicks, gallops or rubs. Regular rate and rhythm. Chest is clear; no wheezes or rales. No edema or JVD.  Past labs reviewed with the patient.     ASSESSMENT / PLAN:  (F32.5) Major depressive disorder with single episode, in full remission (H)  " (primary encounter diagnosis)  Comment:   Plan: Continue Lexapro as prescribed    (F41.1) Generalized anxiety disorder  Comment: Continue BuSpar as prescribed  Plan: busPIRone (BUSPAR) 5 MG tablet            Follow up 6 months  SUZAN Rogers MD    (Chart documentation completed in part with Dragon voice-recognition software.  Even though reviewed some grammatical, spelling, and word errors may remain.)

## 2018-02-27 NOTE — NURSING NOTE
"Chief Complaint   Patient presents with     Depression       Initial /70 (BP Location: Right arm, Patient Position: Chair, Cuff Size: Adult Large)  Pulse 82  Temp 98.6  F (37  C) (Oral)  Resp 17  Ht 1.664 m (5' 5.5\")  Wt 88.5 kg (195 lb)  LMP 02/03/2018 (Exact Date)  SpO2 100%  Breastfeeding? No  BMI 31.96 kg/m2 Estimated body mass index is 31.96 kg/(m^2) as calculated from the following:    Height as of this encounter: 1.664 m (5' 5.5\").    Weight as of this encounter: 88.5 kg (195 lb).  Medication Reconciliation: complete     Triny Buchanan MA       "

## 2018-02-27 NOTE — MR AVS SNAPSHOT
After Visit Summary   2/27/2018    Myrna Brothers    MRN: [id number]           Patient Information     Date Of Birth          1985        Visit Information        Provider Department      2/27/2018 4:20 PM Alexa Rogers MD [facility]        Today's Diagnoses     Major depressive disorder with single episode, in full remission (H)    -  1    Generalized anxiety disorder          Care Instructions    At [facility], we strive to deliver an exceptional experience to you, every time we see you.  If you receive a survey in the mail, please send us back your thoughts. We really do value your feedback.      Suggested websites for health information:  Www.[employer] : Up to date and easily searchable information on multiple topics.  Www.medlineplus.gov : medication info, interactive tutorials, watch real surgeries online  Www.familydoctor.org : good info from the Academy of Family Physicians  Www.cdc.gov : public health info, travel advisories, epidemics (H1N1)  Www.aap.org : children's health info, normal development, vaccinations  Www.health.[facility].[place].us : [place] dept of health, public health issues in [place], N1N1    Your care team:     Family Medicine   DARREN Pitt MD Emily Bunt, DONY [facility]   S. MD Ciera Ribera MD Angela Wermerskirchen, MD         Clinic hours: Monday - Wednesday 7 am-7 pm   Thursdays and Fridays 7 am-5 pm.     [facility]: Monday - Friday 11 am-9 pm,   Saturday and Sunday 9 am-5 pm.    [facility]: Monday -Thursday 8 am-8 pm; Friday 8 am-6 pm; Saturday and Sunday 9 am-5 pm.     [facility]: Monday - Thursday 8 am - 7 pm; Friday 8 am - 6 pm    Clinic: [phone]   [facility]: [phone]   [facility]: [phone]                  Follow-ups after your visit        Follow-up notes from your care team      "Return in about 6 months (around 2018).      Who to contact     If you have questions or need follow up information about today's clinic visit or your schedule please contact [facility] directly at [phone].  Normal or non-critical lab and imaging results will be communicated to you by MyChart, letter or phone within 4 business days after the clinic has received the results. If you do not hear from us within 7 days, please contact the clinic through [employer] or phone. If you have a critical or abnormal lab result, we will notify you by phone as soon as possible.  Submit refill requests through [employer] or call your pharmacy and they will forward the refill request to us. Please allow 3 business days for your refill to be completed.          Additional Information About Your Visit        [employer] Information     [employer] lets you send messages to your doctor, view your test results, renew your prescriptions, schedule appointments and more. To sign up, go to [email or web address] . Click on \"Log in\" on the left side of the screen, which will take you to the Welcome page. Then click on \"Sign up Now\" on the right side of the page.     You will be asked to enter the access code listed below, as well as some personal information. Please follow the directions to create your username and password.     Your access code is: [email or web address]  Expires: 2018  5:00 PM     Your access code will  in 90 days. If you need help or a new code, please call your [facility] or [phone].        Care EveryWhere ID     This is your Care EveryWhere ID. This could be used by other organizations to access your [place] medical records  [id number]        Your Vitals Were     Pulse Temperature Respirations Height Last Period Pulse Oximetry    82 98.6  F (37  C) (Oral) 17 1.664 m (5' 5.5\") 2018 (Exact Date) 100%    Breastfeeding? BMI (Body Mass Index)                No 31.96 kg/m2           Blood " Pressure from Last 3 Encounters:   02/27/18 110/70   01/24/18 122/70   01/04/18 110/76    Weight from Last 3 Encounters:   02/27/18 88.5 kg (195 lb)   01/24/18 88.9 kg (196 lb)   01/04/18 87.6 kg (193 lb 1.6 oz)              We Performed the Following     DEPRESSION ACTION PLAN (DAP)          Today's Medication Changes          These changes are accurate as of 2/27/18  5:08 PM.  If you have any questions, ask your nurse or doctor.               These medicines have changed or have updated prescriptions.        Dose/Directions    busPIRone 5 MG tablet   Commonly known as:  BUSPAR   This may have changed:  See the new instructions.   Used for:  Generalized anxiety disorder   Changed by:  Alexa Rogers MD        Dose:  5 mg   Take 1 tablet (5 mg) by mouth 2 times daily   Quantity:  180 tablet   Refills:  1            Where to get your medicines      These medications were sent to [facility]/pharmacy #[id number] - [place] [address]     Phone:  [phone]     busPIRone 5 MG tablet                Primary Care Provider Office Phone # Fax #    [facility] [phone] [address] [place], [place] [id number]        Equal Access to Services     MANNY OCONNOR : Hadii tuan ku hadasho Soomaali, waaxda luqadaha, qaybta kaalmada adeegyada, raquel miller. So [facility] [phone].    ATENCIÓN: Si habla español, tiene a flowers disposición servicios gratuitos de asistencia lingüística. Cory al [phone].    We comply with applicable federal civil rights laws and [place] laws. We do not discriminate on the basis of race, color, national origin, age, disability, sex, sexual orientation, or gender identity.            Thank you!     Thank you for choosing [facility]  for your care. Our goal is always to provide you with excellent care. Hearing back from our  patients is one way we can continue to improve our services. Please take a few minutes to complete the written survey that you may receive in the mail after your visit with us. Thank you!             Your Updated Medication List - Protect others around you: Learn how to safely use, store and throw away your medicines at www.disposemymeds.org.          This list is accurate as of 2/27/18  5:08 PM.  Always use your most recent med list.                   Brand Name Dispense Instructions for use Diagnosis    busPIRone 5 MG tablet    BUSPAR    180 tablet    Take 1 tablet (5 mg) by mouth 2 times daily    Generalized anxiety disorder       escitalopram 20 MG tablet    LEXAPRO    90 tablet    Take 1 tablet (20 mg) by mouth daily    Major depressive disorder with single episode, in full remission (H), Generalized anxiety disorder       PROPRANOLOL HCL PO      Take 10 mg by mouth as needed for high blood pressure

## 2018-02-27 NOTE — PATIENT INSTRUCTIONS
At [facility], we strive to deliver an exceptional experience to you, every time we see you.  If you receive a survey in the mail, please send us back your thoughts. We really do value your feedback.      Suggested websites for health information:  Www.[facility].org : Up to date and easily searchable information on multiple topics.  Www.medlineplus.gov : medication info, interactive tutorials, watch real surgeries online  Www.familydoctor.org : good info from the Academy of Family Physicians  Www.cdc.gov : public health info, travel advisories, epidemics (H1N1)  Www.aap.org : children's health info, normal development, vaccinations  Www.health.[facility].[place].us : [place] dept of health, public health issues in [place], N1N1    Your care team:     Family Medicine   DARREN Pitt MD Emily Bunt, DONY BARRAZA   S. MD Ciera Ribera MD Angela Wermerskirchen, MD         Clinic hours: Monday - Wednesday 7 am-7 pm   Thursdays and Fridays 7 am-5 pm.     [facility]: Monday - Friday 11 am-9 pm,   Saturday and Sunday 9 am-5 pm.    [facility]: Monday -Thursday 8 am-8 pm; Friday 8 am-6 pm; Saturday and Sunday 9 am-5 pm.     [facility]: Monday Thursday 8 am   7 pm; Friday 8 am   6 pm    Clinic: [phone]   [facility]: [phone]   [facility]: [phone]

## 2018-02-28 ASSESSMENT — ANXIETY QUESTIONNAIRES
1. FEELING NERVOUS, ANXIOUS, OR ON EDGE: SEVERAL DAYS
IF YOU CHECKED OFF ANY PROBLEMS ON THIS QUESTIONNAIRE, HOW DIFFICULT HAVE THESE PROBLEMS MADE IT FOR YOU TO DO YOUR WORK, TAKE CARE OF THINGS AT HOME, OR GET ALONG WITH OTHER PEOPLE: SOMEWHAT DIFFICULT
5. BEING SO RESTLESS THAT IT IS HARD TO SIT STILL: NOT AT ALL
3. WORRYING TOO MUCH ABOUT DIFFERENT THINGS: SEVERAL DAYS
7. FEELING AFRAID AS IF SOMETHING AWFUL MIGHT HAPPEN: SEVERAL DAYS
2. NOT BEING ABLE TO STOP OR CONTROL WORRYING: NOT AT ALL
6. BECOMING EASILY ANNOYED OR IRRITABLE: SEVERAL DAYS
GAD7 TOTAL SCORE: 5

## 2018-02-28 ASSESSMENT — PATIENT HEALTH QUESTIONNAIRE - PHQ9: 5. POOR APPETITE OR OVEREATING: SEVERAL DAYS

## 2018-03-01 ASSESSMENT — ANXIETY QUESTIONNAIRES: GAD7 TOTAL SCORE: 5

## 2018-03-01 ASSESSMENT — PATIENT HEALTH QUESTIONNAIRE - PHQ9: SUM OF ALL RESPONSES TO PHQ QUESTIONS 1-9: 5

## 2018-03-05 ENCOUNTER — OFFICE VISIT (OUTPATIENT)
Dept: FAMILY MEDICINE | Facility: CLINIC | Age: 33
End: 2018-03-05
Payer: COMMERCIAL

## 2018-03-05 VITALS
OXYGEN SATURATION: 97 % | RESPIRATION RATE: 18 BRPM | HEIGHT: 66 IN | DIASTOLIC BLOOD PRESSURE: 68 MMHG | SYSTOLIC BLOOD PRESSURE: 112 MMHG | TEMPERATURE: 99.2 F | HEART RATE: 78 BPM | BODY MASS INDEX: 31.5 KG/M2 | WEIGHT: 196 LBS

## 2018-03-05 DIAGNOSIS — Z32.01 PREGNANCY TEST POSITIVE: Primary | ICD-10-CM

## 2018-03-05 DIAGNOSIS — N91.2 AMENORRHEA: ICD-10-CM

## 2018-03-05 DIAGNOSIS — E66.9 OBESITY WITHOUT SERIOUS COMORBIDITY, UNSPECIFIED CLASSIFICATION, UNSPECIFIED OBESITY TYPE: ICD-10-CM

## 2018-03-05 LAB — BETA HCG QUAL IFA URINE: POSITIVE

## 2018-03-05 PROCEDURE — 99213 OFFICE O/P EST LOW 20 MIN: CPT | Performed by: NURSE PRACTITIONER

## 2018-03-05 PROCEDURE — 84703 CHORIONIC GONADOTROPIN ASSAY: CPT | Performed by: NURSE PRACTITIONER

## 2018-03-05 ASSESSMENT — PAIN SCALES - GENERAL: PAINLEVEL: NO PAIN (0)

## 2018-03-05 NOTE — PATIENT INSTRUCTIONS
Daily prenatal and extra supplements include:   -Iron - 27 mg  ?Calcium - at least 250 mg (elemental calcium 1000 mg per day)  ?Folate - at least 0.4 mg (0.6 mg (400-600 mcg) in the second and third trimesters)  ?Iodine - 150 mcg-usually in prenatal  ?Vitamin D - 200 to 600 international units (exact amount is controversial)-usually in prenatal  Diet:  -No jane-side up eggs-must be well cooked, deli meats must be heated through and warm to hot but not advised to eat daily, only pasturized cheeses, careful with 'moldly' cheeses-goat/blue cheeses. Baked brie okay.   -No extra calories in 1 st trimester, increase calories in 2nd-3rd trimesters to 350-450 calories  -eat healthy choices, try to avoid too many sweets  -NO smoking and ZERO alcohol consumption is safe  -fish: 2-3x/week. Avoid kiersten mackeral, shark. Sushi is OK but make sure it is cooked.     Activity:   Okay to continue normal activity and exercise, avoid very rigorous activity.  Your OB will let you know if you need to cut back on exercise depending on how your pregnancy is going.  Keep HR around 150.   If your losing weight your OB will monitor baby to ensure he/she is still growing good.     Medications:  Discussed with OB and PCP what is appropriate for you to continue taking or start taking if needed.      At [facility], we strive to deliver an exceptional experience to you, every time we see you.  If you receive a survey in the mail, please send us back your thoughts. We really do value your feedback.    Suggested websites for health information:  Www.[employer].org : Up to date and easily searchable information on multiple topics.  Www.medlineplus.gov : medication info, interactive tutorials, watch real surgeries online  Www.familydoctor.org : good info from the Academy of Family Physicians  Www.cdc.gov : public health info, travel advisories, epidemics (H1N1)  Www.aap.org : children's health info, normal development,  vaccinations  Www.health.state[place] : [place] dept of health, public health issues in [place], N1N1    Your care team:     Family Medicine   DARREN Pitt MD Emily Bunt, APRN CNP S. Matthew Hockett, MD Pamela Kolacz, MD Angela Wermerskirchen, MD         Clinic hours: Monday - Wednesday 7 am-7 pm   Thursdays and Fridays 7 am-5 pm.     [facility]: Monday - Friday 11 am-9 pm,   Saturday and Sunday 9 am-5 pm.    [facility]: Monday -Thursday 8 am-8 pm; Friday 8 am-6 pm; Saturday and Sunday 9 am-5 pm.     [facility]: Monday - Thursday 8 am - 7 pm; Friday 8 am - 6 pm    Clinic: [phone]   [facility]: [phone]   [facility]: [phone]

## 2018-03-05 NOTE — NURSING NOTE
"Chief Complaint   Patient presents with     Confirmation Of Pregnancy       Initial /68 (BP Location: Right arm, Patient Position: Chair, Cuff Size: Adult Large)  Pulse 78  Temp 99.2  F (37.3  C) (Oral)  Resp 18  Ht 1.664 m (5' 5.5\")  Wt 88.9 kg (196 lb)  LMP 02/03/2018 (Exact Date)  SpO2 97%  Breastfeeding? No  BMI 32.12 kg/m2 Estimated body mass index is 32.12 kg/(m^2) as calculated from the following:    Height as of this encounter: 1.664 m (5' 5.5\").    Weight as of this encounter: 88.9 kg (196 lb).  Medication Reconciliation: complete   ]  Triny Buchanan MA       "

## 2018-03-05 NOTE — PROGRESS NOTES
SUBJECTIVE:   Myrna Brothers is a 32 year old female who presents to clinic today for the following health issues:      Confirmation of Pregnancy  -LMP 2-3-18  -fatigue, nausea, sensitivity to smells, slight dizziness at times, back pain(normally has but in different places now).   -Some cramping, no spotting.     Does not exercise consistently. Got the flu in January, has not worked out much since then. Before then going more routinely at the gym.     Her family has large history of miscarriages and still births.   Taking a prenatal and fish oil.    Drinks coffee coolers-one medium from [employer] daily.   Wondering about losing weight during pregnancy: ensure your eating healthy and intaking enough calories, but you do not need to increase calories during the first trimester. OB will ensure baby is growing well if you do end up losing some weight. Exercise is good for you and baby, avoid very vigorous activity. Monitor HR-stay around 150 beats per min.   Claritin is okay to take, Flonase if taking once in a while is okay also      Problem list and histories reviewed & adjusted, as indicated.  Additional history: as documented    Patient Active Problem List   Diagnosis     Major depressive disorder with single episode, in full remission (H)     Generalized anxiety disorder     History reviewed. No pertinent surgical history.    Social History   Substance Use Topics     Smoking status: Never Smoker     Smokeless tobacco: Never Used     Alcohol use Yes      Comment: occasionally     Family History   Problem Relation Age of Onset     Breast Cancer Mother      DIABETES Father      HEART DISEASE Father          Current Outpatient Prescriptions   Medication Sig Dispense Refill     busPIRone (BUSPAR) 5 MG tablet Take 1 tablet (5 mg) by mouth 2 times daily 180 tablet 1     PROPRANOLOL HCL PO Take 10 mg by mouth as needed for high blood pressure       escitalopram (LEXAPRO) 20 MG tablet Take 1 tablet (20 mg) by  "mouth daily 90 tablet 1     Allergies   Allergen Reactions     Xylocaine [Lidocaine] Anaphylaxis       Reviewed and updated as needed this visit by clinical staff  Tobacco  Allergies  Meds  Problems  Med Hx  Surg Hx  Fam Hx  Soc Hx        Reviewed and updated as needed this visit by Provider  Allergies  Meds  Problems         ROS:  Constitutional, HEENT, cardiovascular, pulmonary, gi - nausea, and gu systems are negative, except as otherwise noted.    OBJECTIVE:     /68 (BP Location: Right arm, Patient Position: Chair, Cuff Size: Adult Large)  Pulse 78  Temp 99.2  F (37.3  C) (Oral)  Resp 18  Ht 1.664 m (5' 5.5\")  Wt 88.9 kg (196 lb)  LMP 02/03/2018 (Exact Date)  SpO2 97%  Breastfeeding? No  BMI 32.12 kg/m2  Body mass index is 32.12 kg/(m^2).  GENERAL: healthy, alert and no distress  RESP: lungs clear to auscultation - no rales, rhonchi or wheezes  CV: regular rate and rhythm, normal S1 S2, no S3 or S4, no murmur, click or rub  MS: no gross musculoskeletal defects noted, no edema    Diagnostic Test Results:  Results for orders placed or performed in visit on 03/05/18 (from the past 24 hour(s))   Beta HCG Qual, Urine - [facility] and [place] ([id number])   Result Value Ref Range    Beta HCG Qual IFA Urine Positive (A) NEG^Negative          ASSESSMENT/PLAN:     1. Pregnancy test positive  Your medications are safe to stay on. If you wish to wean down the lexapro and trial increasing the buspar we can. Return for another visit to do this.     2. Amenorrhea  As above  - Beta HCG Qual, Urine - [facility] and [place] ([id number])    3. Obesity without serious comorbidity, unspecified classification, unspecified obesity type  Increase exercise, improve nutrition  - NUTRITION REFERRAL        FUTURE APPOINTMENTS:       - Follow-up visit in 3 months for depression    DONY Ornelas, NP-C  [facility]"

## 2018-03-05 NOTE — MR AVS SNAPSHOT
After Visit Summary   3/5/2018    Myrna Brothers    MRN: [id number]           Patient Information     Date Of Birth          1985        Visit Information        Provider Department      3/5/2018 2:20 PM Mary Beth Chaves NP [facility]        Today's Diagnoses     Pregnancy test positive    -  1    Amenorrhea        Obesity without serious comorbidity, unspecified classification, unspecified obesity type          Care Instructions      Daily prenatal and extra supplements include:   -Iron - 27 mg  ?Calcium - at least 250 mg (elemental calcium 1000 mg per day)  ?Folate - at least 0.4 mg (0.6 mg (400-600 mcg) in the second and third trimesters)  ?Iodine - 150 mcg-usually in prenatal  ?Vitamin D - 200 to 600 international units (exact amount is controversial)-usually in prenatal  Diet:  -No jane-side up eggs-must be well cooked, deli meats must be heated through and warm to hot but not advised to eat daily, only pasturized cheeses, careful with 'moldly' cheeses-goat/blue cheeses. Baked brie okay.   -No extra calories in 1 st trimester, increase calories in 2nd-3rd trimesters to 350-450 calories  -eat healthy choices, try to avoid too many sweets  -NO smoking and ZERO alcohol consumption is safe  -fish: 2-3x/week. Avoid kiersten mackeral, shark. Sushi is OK but make sure it is cooked.     Activity:   Okay to continue normal activity and exercise, avoid very rigorous activity.  Your OB will let you know if you need to cut back on exercise depending on how your pregnancy is going.  Keep HR around 150.   If your losing weight your OB will monitor baby to ensure he/she is still growing good.     Medications:  Discussed with OB and PCP what is appropriate for you to continue taking or start taking if needed.      At [facility], we strive to deliver an exceptional experience to you, every time we see you.  If you receive a survey in the mail, please send us back your thoughts.  We really do value your feedback.    Suggested websites for health information:  Www.[employer].org : Up to date and easily searchable information on multiple topics.  Www.medlineplus.gov : medication info, interactive tutorials, watch real surgeries online  Www.familydoctor.org : good info from the Academy of Family Physicians  Www.cdc.gov : public health info, travel advisories, epidemics (H1N1)  Www.aap.org : children's health info, normal development, vaccinations  Www.health.[facility].[place].us : [place] dept of health, public health issues in [place], N1N1    Your care team:     Family Medicine   DARREN Pitt MD Emily Bunt, APRN CNP   S. MD Ciera Ribera MD Angela Wermerskirchen, MD         Clinic hours: Monday - Wednesday 7 am-7 pm   Thursdays and Fridays 7 am-5 pm.     [facility]: Monday - Friday 11 am-9 pm,   Saturday and Sunday 9 am-5 pm.    [facility]: Monday -Thursday 8 am-8 pm; Friday 8 am-6 pm; Saturday and Sunday 9 am-5 pm.     [facility]: Monday - Thursday 8 am - 7 pm; Friday 8 am - 6 pm    Clinic: [phone]   [facility]: [phone]   [facility]: [phone]                    Follow-ups after your visit        Additional Services     NUTRITION REFERRAL       Your provider has referred you to: [facility]: [facility] [phone]   [email or web address]    Please be aware that coverage of these services is subject to the terms and limitations of your health insurance plan.  Call member services at your health plan with any benefit or coverage questions.      Please bring the following with you to your appointment:    (1) This referral request  (2) Any documents given to you regarding this referral  (3) Any specific questions you have about diet and/or food choices            OB/GYN REFERRAL       Your provider has referred  "you to:  [facility]: [facility] [phone]   [email or web address]/    Please be aware that coverage of these services is subject to the terms and limitations of your health insurance plan.  Call member services at your health plan with any benefit or coverage questions.      Please bring the following with you to your appointment:    (1) Any X-Rays, CTs or MRIs which have been performed.  Contact the facility where they were done to arrange for  prior to your scheduled appointment.   (2) List of current medications   (3) This referral request   (4) Any documents/labs given to you for this referral                  Who to contact     If you have questions or need follow up information about today's clinic visit or your schedule please contact [facility] directly at [phone].  Normal or non-critical lab and imaging results will be communicated to you by MyChart, letter or phone within 4 business days after the clinic has received the results. If you do not hear from us within 7 days, please contact the clinic through MyChart or phone. If you have a critical or abnormal lab result, we will notify you by phone as soon as possible.  Submit refill requests through [employer] or call your pharmacy and they will forward the refill request to us. Please allow 3 business days for your refill to be completed.          Additional Information About Your Visit        MyChart Information     [employer] lets you send messages to your doctor, view your test results, renew your prescriptions, schedule appointments and more. To sign up, go to [email or web address] . Click on \"Log in\" on the left side of the screen, which will take you to the Welcome page. Then click on \"Sign up Now\" on the right side of the page.     You will be asked to enter the access code listed below, as well as some personal information. Please follow the directions to create your " "username and password.     Your access code is: [email or web address]  Expires: 2018  5:00 PM     Your access code will  in 90 days. If you need help or a new code, please call your [facility] or [phone].        Care EveryWhere ID     This is your Care EveryWhere ID. This could be used by other organizations to access your [place] medical records  [id number]        Your Vitals Were     Pulse Temperature Respirations Height Last Period Pulse Oximetry    78 99.2  F (37.3  C) (Oral) 18 1.664 m (5' 5.5\") 2018 (Exact Date) 97%    Breastfeeding? BMI (Body Mass Index)                No 32.12 kg/m2           Blood Pressure from Last 3 Encounters:   18 112/68   18 110/70   18 122/70    Weight from Last 3 Encounters:   18 88.9 kg (196 lb)   18 88.5 kg (195 lb)   18 88.9 kg (196 lb)              We Performed the Following     Beta HCG Qual, Urine - [facility] and [place] ([id number])     NUTRITION REFERRAL     OB/GYN REFERRAL        Primary Care Provider Office Phone # Fax #    [facility] [phone] [address] [facility] [address]        Equal Access to Services     MANNY OCONNOR : Hadii tuan ku hadasho Soomaali, waaxda luqadaha, qaybta kaalmada trueyarobert, raquel miller. So [facility] [phone].    ATENCIÓN: Si habla español, tiene a flowers disposición servicios gratuitos de asistencia lingüística. Cory al [phone].    We comply with applicable federal civil rights laws and [place] laws. We do not discriminate on the basis of race, color, national origin, age, disability, sex, sexual orientation, or gender identity.            Thank you!     Thank you for choosing [facility]  for your care. Our goal is always to provide you with excellent care. Hearing back from our patients is one way we can continue to improve our services. Please take a few minutes to complete the written survey that you " may receive in the mail after your visit with us. Thank you!             Your Updated Medication List - Protect others around you: Learn how to safely use, store and throw away your medicines at www.disposemymeds.org.          This list is accurate as of 3/5/18  3:14 PM.  Always use your most recent med list.                   Brand Name Dispense Instructions for use Diagnosis    busPIRone 5 MG tablet    BUSPAR    180 tablet    Take 1 tablet (5 mg) by mouth 2 times daily    Generalized anxiety disorder       escitalopram 20 MG tablet    LEXAPRO    90 tablet    Take 1 tablet (20 mg) by mouth daily    Major depressive disorder with single episode, in full remission (H), Generalized anxiety disorder       PROPRANOLOL HCL PO      Take 10 mg by mouth as needed for high blood pressure

## 2018-03-29 ENCOUNTER — TELEPHONE (OUTPATIENT)
Dept: OBGYN | Facility: CLINIC | Age: 33
End: 2018-03-29

## 2018-03-29 DIAGNOSIS — Z36.87 UNSURE OF LMP (LAST MENSTRUAL PERIOD) AS REASON FOR ULTRASOUND SCAN: Primary | ICD-10-CM

## 2018-03-29 NOTE — TELEPHONE ENCOUNTER
Patient is coming in on 3/30/18 for a first OB visit.  Patient will only be 7w 6d.  Left message for patient as we usually don't do 1st ob's until you are 10-12 weeks pregnant.  I am wondering if patient is having any problems or concerns.  Claudia Mendez, [facility]  March 29, 2018 5:27 PM

## 2018-03-30 ENCOUNTER — PRENATAL OFFICE VISIT (OUTPATIENT)
Dept: OBGYN | Facility: CLINIC | Age: 33
End: 2018-03-30
Attending: NURSE PRACTITIONER
Payer: COMMERCIAL

## 2018-03-30 ENCOUNTER — RADIANT APPOINTMENT (OUTPATIENT)
Dept: ULTRASOUND IMAGING | Facility: CLINIC | Age: 33
End: 2018-03-30
Attending: OBSTETRICS & GYNECOLOGY
Payer: COMMERCIAL

## 2018-03-30 VITALS
HEART RATE: 72 BPM | WEIGHT: 194.2 LBS | OXYGEN SATURATION: 97 % | DIASTOLIC BLOOD PRESSURE: 65 MMHG | SYSTOLIC BLOOD PRESSURE: 99 MMHG | BODY MASS INDEX: 31.21 KG/M2 | HEIGHT: 66 IN

## 2018-03-30 DIAGNOSIS — Z34.00 SUPERVISION OF NORMAL FIRST PREGNANCY, ANTEPARTUM: Primary | ICD-10-CM

## 2018-03-30 DIAGNOSIS — Z36.87 UNSURE OF LMP (LAST MENSTRUAL PERIOD) AS REASON FOR ULTRASOUND SCAN: ICD-10-CM

## 2018-03-30 DIAGNOSIS — Z00.00 ROUTINE GENERAL MEDICAL EXAMINATION AT A HEALTH CARE FACILITY: ICD-10-CM

## 2018-03-30 LAB
BASOPHILS # BLD AUTO: 0.1 10E9/L (ref 0–0.2)
BASOPHILS NFR BLD AUTO: 0.9 %
DIFFERENTIAL METHOD BLD: NORMAL
EOSINOPHIL # BLD AUTO: 0.1 10E9/L (ref 0–0.7)
EOSINOPHIL NFR BLD AUTO: 1.4 %
ERYTHROCYTE [DISTWIDTH] IN BLOOD BY AUTOMATED COUNT: 13.2 % (ref 10–15)
HCT VFR BLD AUTO: 42.2 % (ref 35–47)
HGB BLD-MCNC: 13.6 G/DL (ref 11.7–15.7)
IMM GRANULOCYTES # BLD: 0 10E9/L (ref 0–0.4)
IMM GRANULOCYTES NFR BLD: 0.2 %
LYMPHOCYTES # BLD AUTO: 2.7 10E9/L (ref 0.8–5.3)
LYMPHOCYTES NFR BLD AUTO: 29.6 %
MCH RBC QN AUTO: 29.8 PG (ref 26.5–33)
MCHC RBC AUTO-ENTMCNC: 32.2 G/DL (ref 31.5–36.5)
MCV RBC AUTO: 92 FL (ref 78–100)
MONOCYTES # BLD AUTO: 0.7 10E9/L (ref 0–1.3)
MONOCYTES NFR BLD AUTO: 7.1 %
NEUTROPHILS # BLD AUTO: 5.6 10E9/L (ref 1.6–8.3)
NEUTROPHILS NFR BLD AUTO: 60.8 %
PLATELET # BLD AUTO: 290 10E9/L (ref 150–450)
RBC # BLD AUTO: 4.57 10E12/L (ref 3.8–5.2)
WBC # BLD AUTO: 9.1 10E9/L (ref 4–11)

## 2018-03-30 PROCEDURE — 87591 N.GONORRHOEAE DNA AMP PROB: CPT | Performed by: OBSTETRICS & GYNECOLOGY

## 2018-03-30 PROCEDURE — 86850 RBC ANTIBODY SCREEN: CPT | Performed by: OBSTETRICS & GYNECOLOGY

## 2018-03-30 PROCEDURE — G0145 SCR C/V CYTO,THINLAYER,RESCR: HCPCS | Performed by: OBSTETRICS & GYNECOLOGY

## 2018-03-30 PROCEDURE — 76817 TRANSVAGINAL US OBSTETRIC: CPT | Performed by: RADIOLOGY

## 2018-03-30 PROCEDURE — 86762 RUBELLA ANTIBODY: CPT | Performed by: OBSTETRICS & GYNECOLOGY

## 2018-03-30 PROCEDURE — 76801 OB US < 14 WKS SINGLE FETUS: CPT | Mod: 59 | Performed by: RADIOLOGY

## 2018-03-30 PROCEDURE — 86780 TREPONEMA PALLIDUM: CPT | Performed by: OBSTETRICS & GYNECOLOGY

## 2018-03-30 PROCEDURE — 86900 BLOOD TYPING SEROLOGIC ABO: CPT | Performed by: OBSTETRICS & GYNECOLOGY

## 2018-03-30 PROCEDURE — 87340 HEPATITIS B SURFACE AG IA: CPT | Performed by: OBSTETRICS & GYNECOLOGY

## 2018-03-30 PROCEDURE — 86901 BLOOD TYPING SEROLOGIC RH(D): CPT | Performed by: OBSTETRICS & GYNECOLOGY

## 2018-03-30 PROCEDURE — 36415 COLL VENOUS BLD VENIPUNCTURE: CPT | Performed by: OBSTETRICS & GYNECOLOGY

## 2018-03-30 PROCEDURE — 87086 URINE CULTURE/COLONY COUNT: CPT | Performed by: OBSTETRICS & GYNECOLOGY

## 2018-03-30 PROCEDURE — 87491 CHLMYD TRACH DNA AMP PROBE: CPT | Performed by: OBSTETRICS & GYNECOLOGY

## 2018-03-30 PROCEDURE — 87624 HPV HI-RISK TYP POOLED RSLT: CPT | Performed by: OBSTETRICS & GYNECOLOGY

## 2018-03-30 PROCEDURE — 85025 COMPLETE CBC W/AUTO DIFF WBC: CPT | Performed by: OBSTETRICS & GYNECOLOGY

## 2018-03-30 PROCEDURE — 87389 HIV-1 AG W/HIV-1&-2 AB AG IA: CPT | Performed by: OBSTETRICS & GYNECOLOGY

## 2018-03-30 PROCEDURE — 99207 ZZC FIRST OB VISIT: CPT | Performed by: OBSTETRICS & GYNECOLOGY

## 2018-03-30 NOTE — MR AVS SNAPSHOT
After Visit Summary   3/30/2018    Myrna Brothers    MRN: [id number]           Patient Information     Date Of Birth          1985        Visit Information        Provider Department      3/30/2018 2:00 PM Ashley Bernardo DO [facility]        Today's Diagnoses     Supervision of normal first pregnancy, antepartum    -  1      Care Instructions                                                         If you have any questions regarding your visit, Please contact your care team.    Women s Health CLINIC HOURS TELEPHONE NUMBER   Ashley Bernardo DO.    [facility]AT[facility]DE [facility] -    [place] [place]ira       Monday, Wednesday, [facility]  8:30a.m-5:00 p.m   [facility]  [address] [place] [address] [phone] ask for [facility]    Imaging [phone]       Urgent Care locations:    [facility] Saturday and Sunday   9 am - 5 pm    Monday-Friday   12 pm - 8 pm  Saturday and Sunday   9 am - 5 pm   [phone]     [facility] Labor and Delivery:  [phone]    If you need a medication refill, please contact your pharmacy. Please allow 3 business days for your refill to be completed.  As always, Thank you for trusting us with your healthcare needs!                Follow-ups after your visit        Your next 10 appointments already scheduled     Apr 27, 2018  4:30 PM CDT   ESTABLISHED PRENATAL with Ashley Bernardo DO   [facility] ([facility])    [address] [address] [place] [id number] [phone]              Who to contact     If you have questions or need follow up information about today's clinic visit or your schedule please contact [facility] directly at [phone].  Normal or non-critical lab and imaging results will be communicated to you by MyChart, letter or phone within 4  "business days after the clinic has received the results. If you do not hear from us within 7 days, please contact the clinic through [employer] or phone. If you have a critical or abnormal lab result, we will notify you by phone as soon as possible.  Submit refill requests through [employer] or call your pharmacy and they will forward the refill request to us. Please allow 3 business days for your refill to be completed.          Additional Information About Your Visit        [employer] Information     [employer] lets you send messages to your doctor, view your test results, renew your prescriptions, schedule appointments and more. To sign up, go to [email or web address] . Click on \"Log in\" on the left side of the screen, which will take you to the Welcome page. Then click on \"Sign up Now\" on the right side of the page.     You will be asked to enter the access code listed below, as well as some personal information. Please follow the directions to create your username and password.     Your access code is: [email or web address]  Expires: 2018  6:00 PM     Your access code will  in 90 days. If you need help or a new code, please call your Mobile clinic or [phone].        Care EveryWhere ID     This is your Care EveryWhere ID. This could be used by other organizations to access your [facility] medical records  [id number]        Your Vitals Were     Pulse Height Last Period Pulse Oximetry Breastfeeding? BMI (Body Mass Index)    72 5' 5.5\" (1.664 m) 2018 (Exact Date) 97% No 31.83 kg/m2       Blood Pressure from Last 3 Encounters:   18 99/65   18 112/68   18 110/70    Weight from Last 3 Encounters:   18 194 lb 3.2 oz (88.1 kg)   18 196 lb (88.9 kg)   18 195 lb (88.5 kg)              We Performed the Following     ABO/Rh type and screen     Anti Treponema     CBC with platelets differential     Chlamydia trachomatis PCR     Hepatitis B surface antigen     HIV Antigen Antibody Combo "     Neisseria gonorrhoeae PCR     Rubella Antibody IgG Quantitative     Urine Culture Aerobic Bacterial        Primary Care Provider Office Phone # Fax #    [facility] [phone] [address] [facility] [address]        Equal Access to Services     MANNY OCONNOR : Hadii tuan holt hevero Soomaali, waaxda luqadaha, qaybta kaalmada adeegyada, raquel mervinin haysauln shannaoscar cary mary lou miller. So [facility] [phone].    ATENCIÓN: Si habla español, tiene a flowers disposición servicios gratuitos de asistencia lingüística. Llame al [phone].    We comply with applicable federal civil rights laws and [place] laws. We do not discriminate on the basis of race, color, national origin, age, disability, sex, sexual orientation, or gender identity.            Thank you!     Thank you for choosing [facility]  for your care. Our goal is always to provide you with excellent care. Hearing back from our patients is one way we can continue to improve our services. Please take a few minutes to complete the written survey that you may receive in the mail after your visit with us. Thank you!             Your Updated Medication List - Protect others around you: Learn how to safely use, store and throw away your medicines at www.disposemymeds.org.          This list is accurate as of 3/30/18  2:45 PM.  Always use your most recent med list.                   Brand Name Dispense Instructions for use Diagnosis    busPIRone 5 MG tablet    BUSPAR    180 tablet    Take 1 tablet (5 mg) by mouth 2 times daily    Generalized anxiety disorder       escitalopram 20 MG tablet    LEXAPRO    90 tablet    Take 1 tablet (20 mg) by mouth daily    Major depressive disorder with single episode, in full remission (H), Generalized anxiety disorder       FISH OIL PO           IRON SUPPLEMENT PO           PRENATAL VITAMIN PO           PROPRANOLOL HCL PO      Take 10 mg by mouth as needed for high blood pressure

## 2018-03-30 NOTE — PATIENT INSTRUCTIONS
If you have any questions regarding your visit, Please contact your care team.    Women s Health CLINIC HOURS TELEPHONE NUMBER   Ashley Bernardo DO.    BABATUNDE Taylor -    DAO Vieira       Monday, Wednesday, Thursday and Friday, [place]  8:30a.m-5:00 p.m   [facility]  [address] [place] [address]  [phone] ask for [facility]    Imaging [phone]       Urgent Care locations:    [facility] Saturday and Sunday   9 am - 5 pm    Monday-Friday   12 pm - 8 pm  Saturday and Sunday   9 am - 5 pm   [phone]     [facility] Labor and Delivery:  [phone]    If you need a medication refill, please contact your pharmacy. Please allow 3 business days for your refill to be completed.  As always, Thank you for trusting us with your healthcare needs!

## 2018-03-30 NOTE — TELEPHONE ENCOUNTER
I spoke to patients spouse as patient is in meetings.  He was saying that they were told to be seen early because of family history of miscarriages and stillbirths.  They were also under the impression that they were getting a ultrasound.  Per Dr. Bernardo, OK to order an ultrasound.  Ultrasound ordered.  Claudia Mendez CMA  March 30, 2018 9:54 AM

## 2018-03-30 NOTE — PROGRESS NOTES
"Myrna is a 32 year old female, , who is here today for her first OB visit at 7 2/7 weeks gestation and is new to the [place] [facility] dept.  She has had a positive home pregnancy test.  She had her US today with results pending.  She lost 2 lbs but denies dieting or hyperemesis.  Due to AMA, the option of a MFM referral was provided and she would like to discuss her risk with the genetic counselor.  She is taking a PNV daily po and her social hx is negative for nicotine, etoh, or illicit drug abuse.  We discussed the need to discontinue her use of Propranolol and Lexapro but can continue her Buspar.  However, she needs to discuss alternative medication options with FP or Mental Health due to her anxiety issues.  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    ROS: Ten point review of systems was reviewed and negative except the above.    Gyn Hx:      Past Medical History:   Diagnosis Date     Generalized anxiety disorder 2018     Hyperlipidemia      Major depressive disorder with single episode, in full remission (H) 2018     Past Surgical History:   Procedure Laterality Date     HEAD & NECK SURGERY      wisdom teeth     Patient Active Problem List   Diagnosis     Major depressive disorder with single episode, in full remission (H)     Generalized anxiety disorder       ALL/Meds: Her medication and allergy histories were reviewed and are documented in their appropriate chart areas.    SH: Reviewed and documented in the appropriate area of the chart.    FH: Her family history was reviewed and documented in its appropriate chart area.    PE: BP 99/65  Pulse 72  Ht 5' 5.5\" (1.664 m)  Wt 194 lb 3.2 oz (88.1 kg)  LMP 2018 (Exact Date)  SpO2 97%  Breastfeeding? No  BMI 31.83 kg/m2  Body mass index is 31.83 kg/(m^2).    Urine HCG was +  BP 99/65  Pulse 72  Ht 5' 5.5\" (1.664 m)  Wt 194 lb 3.2 oz (88.1 kg)  LMP 2018 (Exact Date)  SpO2 97%  Breastfeeding? No  BMI 31.83 " kg/m2  Hrt - RRR without murmur  Lungs - CTAB  Neck - supple without palpable mass  Breasts - negative  Abd - gravid, nontender, BS x 4, unable to hear fhts with the doppler today due to early dates  Pelvic - normal external female genitalia, normal vaginal mucosa, closed cervix without motion tenderness, gravid nontender uterus with size consistent with dates, no adnexal mass or tenderness  Ext - negative      A/P:   - I discussed with her nutrition and medication concerns related to pregnancy.  We discussed folic acid supplementation.  We reviewed prenatal care.  She is given the opportunity to ask questions and have them answered.  Refer to the genetic counselor in the [facility] office due to AMA  Submit pap and GC/Chlamydia cultures  Check US to verify dates and viability  Refer to FP/Mental Health since Propranolol and Lexpro use during pregnancy is not advised (category C).  Handouts on her meds were provided to the pt and she voiced understanding.  She only uses the Propranolol prn for anxiety caused by vocal performances.      30 minutes were spent face to face with the patient today discussing her history, diagnosis, and follow-up plan as noted above.  Over 50% of the visit was spent in counseling and coordination of care.    Total Visit Time: 30 minutes.    Orders Placed This Encounter   Procedures     Hepatitis B surface antigen     Rubella Antibody IgG Quantitative     Anti Treponema     CBC with platelets differential     HIV Antigen Antibody Combo     Pap imaged thin layer screen with HPV - recommended age 30 - 65 years (select HPV order below)     HPV High Risk Types DNA Cervical     ABO/Rh type and screen

## 2018-03-31 ENCOUNTER — MEDICAL CORRESPONDENCE (OUTPATIENT)
Dept: HEALTH INFORMATION MANAGEMENT | Facility: CLINIC | Age: 33
End: 2018-03-31

## 2018-03-31 LAB
BACTERIA SPEC CULT: NO GROWTH
SPECIMEN SOURCE: NORMAL
T PALLIDUM IGG+IGM SER QL: NEGATIVE

## 2018-04-01 LAB
C TRACH DNA SPEC QL NAA+PROBE: NEGATIVE
N GONORRHOEA DNA SPEC QL NAA+PROBE: NEGATIVE
SPECIMEN SOURCE: NORMAL
SPECIMEN SOURCE: NORMAL

## 2018-04-02 LAB
ABO + RH BLD: NORMAL
ABO + RH BLD: NORMAL
BLD GP AB SCN SERPL QL: NORMAL
BLOOD BANK CMNT PATIENT-IMP: NORMAL
HBV SURFACE AG SERPL QL IA: NONREACTIVE
HIV 1+2 AB+HIV1 P24 AG SERPL QL IA: NONREACTIVE
RUBV IGG SERPL IA-ACNC: 44 IU/ML
SPECIMEN EXP DATE BLD: NORMAL

## 2018-04-03 LAB
COPATH REPORT: NORMAL
PAP: NORMAL

## 2018-04-04 LAB
FINAL DIAGNOSIS: NORMAL
HPV HR 12 DNA CVX QL NAA+PROBE: NEGATIVE
HPV16 DNA SPEC QL NAA+PROBE: NEGATIVE
HPV18 DNA SPEC QL NAA+PROBE: NEGATIVE
SPECIMEN DESCRIPTION: NORMAL
SPECIMEN SOURCE CVX/VAG CYTO: NORMAL

## 2018-04-05 ENCOUNTER — OFFICE VISIT (OUTPATIENT)
Dept: PEDIATRICS | Facility: CLINIC | Age: 33
End: 2018-04-05
Payer: COMMERCIAL

## 2018-04-05 VITALS
HEART RATE: 88 BPM | SYSTOLIC BLOOD PRESSURE: 108 MMHG | DIASTOLIC BLOOD PRESSURE: 66 MMHG | TEMPERATURE: 98.2 F | BODY MASS INDEX: 31.42 KG/M2 | WEIGHT: 191.7 LBS | OXYGEN SATURATION: 95 %

## 2018-04-05 DIAGNOSIS — F32.5 MAJOR DEPRESSIVE DISORDER WITH SINGLE EPISODE, IN FULL REMISSION (H): Primary | ICD-10-CM

## 2018-04-05 DIAGNOSIS — F41.1 GENERALIZED ANXIETY DISORDER: ICD-10-CM

## 2018-04-05 LAB — TSH SERPL DL<=0.005 MIU/L-ACNC: 1.21 MU/L (ref 0.4–4)

## 2018-04-05 PROCEDURE — 99214 OFFICE O/P EST MOD 30 MIN: CPT | Performed by: NURSE PRACTITIONER

## 2018-04-05 PROCEDURE — 84443 ASSAY THYROID STIM HORMONE: CPT | Performed by: NURSE PRACTITIONER

## 2018-04-05 PROCEDURE — 36415 COLL VENOUS BLD VENIPUNCTURE: CPT | Performed by: NURSE PRACTITIONER

## 2018-04-05 NOTE — MR AVS SNAPSHOT
After Visit Summary   4/5/2018    Myrna Brothers    MRN: [id number]           Patient Information     Date Of Birth          1985        Visit Information        Provider Department      4/5/2018 2:50 PM Mery Bronson APRN [facility]        Today's Diagnoses     Major depressive disorder with single episode, in full remission (H)    -  1    Generalized anxiety disorder          Care Instructions    PLAN:   1.   Symptomatic therapy suggested: please start on zoloft once a day .  2.  Orders Placed This Encounter   Medications     sertraline (ZOLOFT) 50 MG tablet     Sig: Take 1/2 tablet (25 mg) for 1-2 weeks, then increase to 1 tablet orally daily     Dispense:  30 tablet     Refill:  0     3. Patient needs to follow up in if no improvement,or sooner if worsening of symptoms or other symptoms develop.  Follow up in 2 weeks.  Initiated consultation with KVNG Webb, Behavioral Health Clinician for mental health counseling.   Will follow up and/or notify patient of  results via My Chart to determine further need for followup      Next 5 appointments (look out 90 days)     Apr 27, 2018  4:30 PM CDT   ESTABLISHED PRENATAL with Ashley Bernardo DO   [facility]    [address] [place] [address] [phone]                It was a pleasure seeing you today at the [facility]. Thank you for allowing us to care for you today. We truly hope we provided you with the excellent service you deserve. Please let us know if there is anything else we can do for you so we can be sure you are leaving completley satisfied with your care experience.       General information about your clinic   Clinic Hours Lab Hours (Appointments are required)   Mon-Thurs: 7:30 AM - 7 PM Mon-Thurs: 7:30 AM - 7 PM   Fri: 7:30 AM - 5 PM Fri: 7:30 AM - 5 PM        After Hours Nurse Advise &  Appts:  [place] Nurse Advisors: [phone]  [place] On Call: to make appointments anytime: [phone] On Call Physician: call [phone] and answering service will page the on call physician.        For urgent appointments, please call [phone] and ask for the triage nurse or your care team clinic nurse.  How to contact my care team:  MyChart: [email or web address]   Phone: [phone]   Fax: [phone]       [facility] [place] [facility]:   Phone: [phone]  Hours: 8:00 AM - 6:00 PM  Medication Refills:  Call your pharmacy and they will forward the refill to us. Please allow 3 business days for your refills to be completed.       Normal or non-critical lab and imaging results will be communicated to you by MyChart, letter or phone within 7 days.  If you do not hear from us within 10 days, please call the clinic. If you have a critical or abnormal lab result, we will notify you by phone as soon as possible.       We now have PWIC (Pediatric Walk in Care)  Monday-Friday from 7:30-4. Simply walk in and be seen for your urgent needs like cough, fever, rash, diarrhea or vomiting, pink eye, UTI. No appointments needed. Ask one of the team for more information      -Your Care Team:    Dr. Augie Ochoa - Internal Medicine/Pediatrics   Dr. Anne Marie Nieves - Family Medicine  Dr. Imelda Taylor - Pediatrics  Dr. Isa Ibarra - Pediatrics  Mery Bronson CNP - Family Practice Nurse Practitioner                         Follow-ups after your visit        Your next 10 appointments already scheduled     Apr 17, 2018  2:30 PM CDT   New Visit with Anne Marie Nieves MD   [facility]    [id number] [place] [address] [facility] [address] [phone]            Apr 27, 2018  4:30 PM CDT   ESTABLISHED PRENATAL with Ashley Bernardo DO   [facility]    [address] [facility] [address] [phone]              Who to  contact     If you have questions or need follow up information about today's clinic visit or your schedule please contact [facility] directly at [phone].  Normal or non-critical lab and imaging results will be communicated to you by [employer], letter or phone within 4 business days after the clinic has received the results. If you do not hear from us within 7 days, please contact the clinic through [employer] or phone. If you have a critical or abnormal lab result, we will notify you by phone as soon as possible.  Submit refill requests through "[employer] or call your pharmacy and they will forward the refill request to us. Please allow 3 business days for your refill to be completed.          Additional Information About Your Visit        "[employer] Information     "[employer] gives you secure access to your electronic health record. If you see a primary care provider, you can also send messages to your care team and make appointments. If you have questions, please call your primary care clinic.  If you do not have a primary care provider, please call [phone] and they will assist you.      "[employer] is an electronic gateway that provides easy, online access to your medical records. With "[employer] you can request a clinic appointment, read your test results, renew a prescription or communicate with your care team.     To access your existing account, please contact your [facility] or call [phone] for assistance.        Care EveryWhere ID     This is your Care EveryWhere ID. This could be used by other organizations to access your [place] medical records  [id number]        Your Vitals Were     Pulse Temperature Last Period Pulse Oximetry BMI (Body Mass Index)       88 98.2  F (36.8  C) (Temporal) 02/03/2018 (Exact Date) 95% 31.42 kg/m2        Blood Pressure from Last 3 Encounters:   04/05/18 108/66   03/30/18 99/65   03/05/18 112/68    Weight from Last 3 Encounters:    04/05/18 191 lb 11.2 oz (87 kg)   03/30/18 194 lb 3.2 oz (88.1 kg)   03/05/18 196 lb (88.9 kg)              We Performed the Following     TSH with free T4 reflex          Today's Medication Changes          These changes are accurate as of 4/5/18  3:28 PM.  If you have any questions, ask your nurse or doctor.               Start taking these medicines.        Dose/Directions    sertraline 50 MG tablet   Commonly known as:  ZOLOFT   Used for:  Generalized anxiety disorder, Major depressive disorder with single episode, in full remission (H)   Started by:  Mery Bronson APRN CNP        Take 1/2 tablet (25 mg) for 1-2 weeks, then increase to 1 tablet orally daily   Quantity:  30 tablet   Refills:  0         Stop taking these medicines if you haven't already. Please contact your care team if you have questions.     escitalopram 20 MG tablet   Commonly known as:  LEXAPRO   Stopped by:  Mery Bronson APRN CNP                Where to get your medicines      These medications were sent to [facility]/pharmacy #[id number] - [address] AT [place] [address]     Phone:  [phone]     sertraline 50 MG tablet                Primary Care Provider Office Phone # Fax #    [facility] [phone] [address] [place] [id number]        Equal Access to Services     [facility]: Hadii tuan kathleeno Sochaparro, waaxda luqadaha, qaybta kaalmada adeegyada, raquel miller. So [facility] [phone].    ATENCIÓN: Si habla español, tiene a flowers disposición servicios gratuitos de asistencia lingüística. Cory al [phone].    We comply with applicable federal civil rights laws and [place] laws. We do not discriminate on the basis of race, color, national origin, age, disability, sex, sexual orientation, or gender identity.            Thank you!     Thank you for choosing [facility]  for your care. Our goal is always to provide you with excellent care. Hearing back from our patients is one way we can continue to improve our services. Please take a few minutes to complete the written survey that you may receive in the mail after your visit with us. Thank you!             Your Updated Medication List - Protect others around you: Learn how to safely use, store and throw away your medicines at www.disposemymeds.org.          This list is accurate as of 4/5/18  3:28 PM.  Always use your most recent med list.                   Brand Name Dispense Instructions for use Diagnosis    busPIRone 5 MG tablet    BUSPAR    180 tablet    Take 1 tablet (5 mg) by mouth 2 times daily    Generalized anxiety disorder       FISH OIL PO           IRON SUPPLEMENT PO           PRENATAL VITAMIN PO           PROPRANOLOL HCL PO      Take 10 mg by mouth as needed for high blood pressure        sertraline 50 MG tablet    ZOLOFT    30 tablet    Take 1/2 tablet (25 mg) for 1-2 weeks, then increase to 1 tablet orally daily    Generalized anxiety disorder, Major depressive disorder with single episode, in full remission (H)

## 2018-04-05 NOTE — PROGRESS NOTES
Marjorie Brothers,    Attached are your test results.  -TSH (thyroid stimulating hormone) level is normal which indicates normal thyroid function.   Please contact us if you have any questions.    Mery Bronson, CNP

## 2018-04-05 NOTE — PATIENT INSTRUCTIONS
PLAN:   1.   Symptomatic therapy suggested: please start on zoloft once a day .  2.  Orders Placed This Encounter   Medications     sertraline (ZOLOFT) 50 MG tablet     Sig: Take 1/2 tablet (25 mg) for 1-2 weeks, then increase to 1 tablet orally daily     Dispense:  30 tablet     Refill:  0     3. Patient needs to follow up in if no improvement,or sooner if worsening of symptoms or other symptoms develop.  Follow up in 2 weeks.  Initiated consultation with KVNG Webb, Behavioral Health Clinician for mental health counseling.   Will follow up and/or notify patient of  results via My Chart to determine further need for followup      Next 5 appointments (look out 90 days)     Apr 27, 2018  4:30 PM CDT   ESTABLISHED PRENATAL with Ashley Bernardo DO   [facility]    [address] [place] [address] [phone]                It was a pleasure seeing you today at the [facility]. Thank you for allowing us to care for you today. We truly hope we provided you with the excellent service you deserve. Please let us know if there is anything else we can do for you so we can be sure you are leaving completley satisfied with your care experience.       General information about your clinic   Clinic Hours Lab Hours (Appointments are required)   Mon-Thurs: 7:30 AM - 7 PM Mon-Thurs: 7:30 AM - 7 PM   Fri: 7:30 AM - 5 PM Fri: 7:30 AM - 5 PM        After Hours Nurse Advise & Appts:  [place] Nurse Advisors: [phone]  [place] On Call: to make appointments anytime: [phone] On Call Physician: call [phone] and answering service will page the on call physician.        For urgent appointments, please call [phone] and ask for the triage nurse or your care team clinic nurse.  How to contact my care team:  MyChart: [email or web address]   Phone: [phone]   Fax: [phone]       [facility] [place] [facility]:   Phone:  [phone]  Hours: 8:00 AM - 6:00 PM  Medication Refills:  Call your pharmacy and they will forward the refill to us. Please allow 3 business days for your refills to be completed.       Normal or non-critical lab and imaging results will be communicated to you by MyChart, letter or phone within 7 days.  If you do not hear from us within 10 days, please call the clinic. If you have a critical or abnormal lab result, we will notify you by phone as soon as possible.       We now have PWIC (Pediatric Walk in Care)  Monday-Friday from 7:30-4. Simply walk in and be seen for your urgent needs like cough, fever, rash, diarrhea or vomiting, pink eye, UTI. No appointments needed. Ask one of the team for more information      -Your Care Team:    Dr. Augie Ochoa - Internal Medicine/Pediatrics   Dr. Anne Marie Nieves - Family Medicine  Dr. Imelda Taylor - Pediatrics  Dr. Isa Ibarra - Pediatrics  Mery Bronson CNP - Family Practice Nurse Practitioner

## 2018-04-05 NOTE — PROGRESS NOTES
SUBJECTIVE:   Myrna Brothers is a 32 year old female who presents to clinic today for the following health issues:    New Patient/Transfer of Care    Myrna is in clinic today to establish care and to get a new Rx for her depression medications that are safe for being pregnant.      Was originally diagnosed with depression in college and then the anxiety started about 4 or 5 years   Has seen a counselor in the past and had been for year. Last time seen was about last spring.   Her counselor in out of town.  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  No alcohol or recreational drugs   Sleep is OK     Problem list and histories reviewed & adjusted, as indicated.  Additional history: as documented    Patient Active Problem List   Diagnosis     Major depressive disorder with single episode, in full remission (H)     Generalized anxiety disorder     Cervical cancer screening     Past Surgical History:   Procedure Laterality Date     HEAD & NECK SURGERY      wisdom teeth       Social History   Substance Use Topics     Smoking status: Never Smoker     Smokeless tobacco: Never Used     Alcohol use Yes      Comment: occasionally:  none with pregnancy     Family History   Problem Relation Age of Onset     Breast Cancer Mother      Stillborn Mother      had a still born     Other - See Comments Mother      2 miscarriage     DIABETES Father      HEART DISEASE Father      Anxiety Disorder Maternal Grandmother      Alzheimer Disease Maternal Grandmother      Asthma Maternal Grandfather      Prostate Cancer Maternal Grandfather      Hypertension Paternal Grandmother      Leukemia Paternal Grandfather          Current Outpatient Prescriptions   Medication Sig Dispense Refill     Prenatal Vit-Fe Fumarate-FA (PRENATAL VITAMIN PO)        Ferrous Sulfate (IRON SUPPLEMENT PO)        Omega-3 Fatty Acids (FISH OIL PO)        busPIRone (BUSPAR) 5 MG tablet Take 1 tablet (5 mg) by  mouth 2 times daily 180 tablet 1     PROPRANOLOL HCL PO Take 10 mg by mouth as needed for high blood pressure       escitalopram (LEXAPRO) 20 MG tablet Take 1 tablet (20 mg) by mouth daily (Patient not taking: Reported on 4/5/2018) 90 tablet 1     Allergies   Allergen Reactions     Xylocaine [Lidocaine] Anaphylaxis     BP Readings from Last 3 Encounters:   04/05/18 108/66   03/30/18 99/65   03/05/18 112/68    Wt Readings from Last 3 Encounters:   04/05/18 191 lb 11.2 oz (87 kg)   03/30/18 194 lb 3.2 oz (88.1 kg)   03/05/18 196 lb (88.9 kg)            Wt Readings from Last 4 Encounters:   04/05/18 191 lb 11.2 oz (87 kg)   03/30/18 194 lb 3.2 oz (88.1 kg)   03/05/18 196 lb (88.9 kg)   02/27/18 195 lb (88.5 kg)       Labs reviewed in EPIC    Reviewed and updated as needed this visit by clinical staff  Allergies  Meds       Reviewed and updated as needed this visit by Provider         ROS:  Constitutional, HEENT, cardiovascular, pulmonary, gi and gu systems are negative, except as otherwise noted.    OBJECTIVE:     /66 (BP Location: Right arm, Patient Position: Chair, Cuff Size: Adult Regular)  Pulse 88  Temp 98.2  F (36.8  C) (Temporal)  Wt 191 lb 11.2 oz (87 kg)  LMP 02/03/2018 (Exact Date)  SpO2 95%  BMI 31.42 kg/m2  Body mass index is 31.42 kg/(m^2).  GENERAL APPEARANCE: alert, active and no distress  NECK: no adenopathy and thyroid normal to palpation  RESP: lungs clear to auscultation - no rales, rhonchi or wheezes  CV: regular rates and rhythm and no murmur, click or rub  MS: extremities normal- no gross deformities noted  NEURO: Normal strength and tone, mentation intact and speech normal  PSYCH: mentation appears normal, patient appearance--, anxious, fatigued and worried  MENTAL STATUS EXAM:  Appearance/Behavior: No apparent distress, Casually groomed and Dressed appropriately for weather  Speech: Normal  Mood/Affect: anxiety  Insight: Adequate    Diagnostic Test Results:  Results for orders  placed or performed in visit on 04/05/18   TSH with free T4 reflex   Result Value Ref Range    TSH 1.21 0.40 - 4.00 mU/L       ASSESSMENT/PLAN:     Myrna was seen today for establish care and recheck medication.    Diagnoses and all orders for this visit:    Major depressive disorder with single episode, in full remission (H)  -     sertraline (ZOLOFT) 50 MG tablet; Take 1/2 tablet (25 mg) for 1-2 weeks, then increase to 1 tablet orally daily  -     TSH with free T4 reflex    Generalized anxiety disorder  -      sertraline (ZOLOFT) 50 MG tablet; Take 1/2 tablet (25 mg) for 1-2 weeks, then increase to 1 tablet orally daily  -     TSH with free T4 reflex    PLAN:   Initiate medication with Zoloft  25 mg q day  Reviewed concept of depression as function of biochemical imbalance of neurotransmitters/rationale for treatment.  Risks and benefits of medication(s) reviewed with patient.  Questions answered.  Counseling advised  Followup appointment in 2 week(s)  Patient instructed to call for significant side effects medications or problems  Patient advised immediate presentation to hospital for suicidal thought, etc.    Patient needs to follow up in if no improvement,or sooner if worsening of symptoms or other symptoms develop.  Follow up in 2 weeks.  Initiated consultation with Stephanie Calabrese [facility], Behavioral Health Clinician for mental health counseling.   Will follow up and/or notify patient of  results via My Chart to determine further need for followup  25 min spent in direct face to face time with this pt, greater than 50% in counseling and coordination of care.    See Patient Instructions    DONY Mendiola [facility]

## 2018-04-05 NOTE — NURSING NOTE
"Chief Complaint   Patient presents with     Establish Care     Recheck Medication     Need new depression medication that is safe for pregnant women       Initial /66 (BP Location: Right arm, Patient Position: Chair, Cuff Size: Adult Regular)  Pulse 88  Temp 98.2  F (36.8  C) (Temporal)  Wt 191 lb 11.2 oz (87 kg)  LMP 02/03/2018 (Exact Date)  SpO2 95%  BMI 31.42 kg/m2 Estimated body mass index is 31.42 kg/(m^2) as calculated from the following:    Height as of 3/30/18: 5' 5.5\" (1.664 m).    Weight as of this encounter: 191 lb 11.2 oz (87 kg).  Medication Reconciliation: complete     Nilsa Soliman CMA      "

## 2018-04-06 ENCOUNTER — TELEPHONE (OUTPATIENT)
Dept: PSYCHOLOGY | Facility: CLINIC | Age: 33
End: 2018-04-06

## 2018-04-06 NOTE — TELEPHONE ENCOUNTER
[facility] attempted to complete phone outreach. Patient referred to [facility] by Mary Bronson on 4/5.  Patient unable to schedule until 4/23 due to preference for evening time slot. [facility] left voicemail, and encouraged patient to call back to discuss earlier options for appointment.    Stephanie Calabrese, [facility]  Behavioral Health Clinician

## 2018-04-12 ENCOUNTER — OFFICE VISIT (OUTPATIENT)
Dept: PSYCHOLOGY | Facility: CLINIC | Age: 33
End: 2018-04-12
Payer: COMMERCIAL

## 2018-04-12 DIAGNOSIS — F32.5 MAJOR DEPRESSIVE DISORDER WITH SINGLE EPISODE, IN FULL REMISSION (H): Primary | ICD-10-CM

## 2018-04-12 DIAGNOSIS — F41.1 GENERALIZED ANXIETY DISORDER: ICD-10-CM

## 2018-04-12 PROCEDURE — 90791 PSYCH DIAGNOSTIC EVALUATION: CPT | Performed by: SOCIAL WORKER
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
## 2018-04-12 NOTE — LETTER
April 12, 2018      RE: yMrna Brothers  [address]  [place] [address]       To whom it may concern:    Myrna Brothers was seen in our clinic today.     Sincerely,    Mery Bronson, NP, APRN CNP

## 2018-04-16 ENCOUNTER — OFFICE VISIT (OUTPATIENT)
Dept: PEDIATRICS | Facility: CLINIC | Age: 33
End: 2018-04-16
Payer: COMMERCIAL

## 2018-04-16 VITALS
BODY MASS INDEX: 31.01 KG/M2 | WEIGHT: 189.2 LBS | HEART RATE: 105 BPM | DIASTOLIC BLOOD PRESSURE: 78 MMHG | SYSTOLIC BLOOD PRESSURE: 114 MMHG | OXYGEN SATURATION: 96 % | TEMPERATURE: 97.7 F

## 2018-04-16 DIAGNOSIS — F41.1 GENERALIZED ANXIETY DISORDER: ICD-10-CM

## 2018-04-16 DIAGNOSIS — F32.5 MAJOR DEPRESSIVE DISORDER WITH SINGLE EPISODE, IN FULL REMISSION (H): Primary | ICD-10-CM

## 2018-04-16 PROCEDURE — 99213 OFFICE O/P EST LOW 20 MIN: CPT | Performed by: NURSE PRACTITIONER

## 2018-04-16 ASSESSMENT — ANXIETY QUESTIONNAIRES
3. WORRYING TOO MUCH ABOUT DIFFERENT THINGS: NEARLY EVERY DAY
7. FEELING AFRAID AS IF SOMETHING AWFUL MIGHT HAPPEN: NEARLY EVERY DAY
2. NOT BEING ABLE TO STOP OR CONTROL WORRYING: NEARLY EVERY DAY
6. BECOMING EASILY ANNOYED OR IRRITABLE: MORE THAN HALF THE DAYS
GAD7 TOTAL SCORE: 18
5. BEING SO RESTLESS THAT IT IS HARD TO SIT STILL: SEVERAL DAYS
1. FEELING NERVOUS, ANXIOUS, OR ON EDGE: NEARLY EVERY DAY
IF YOU CHECKED OFF ANY PROBLEMS ON THIS QUESTIONNAIRE, HOW DIFFICULT HAVE THESE PROBLEMS MADE IT FOR YOU TO DO YOUR WORK, TAKE CARE OF THINGS AT HOME, OR GET ALONG WITH OTHER PEOPLE: SOMEWHAT DIFFICULT

## 2018-04-16 ASSESSMENT — PATIENT HEALTH QUESTIONNAIRE - PHQ9: 5. POOR APPETITE OR OVEREATING: NEARLY EVERY DAY

## 2018-04-16 NOTE — NURSING NOTE
"Chief Complaint   Patient presents with     Depression       Initial /78 (BP Location: Right arm, Patient Position: Chair, Cuff Size: Adult Regular)  Pulse 105  Temp 97.7  F (36.5  C) (Temporal)  Wt 189 lb 3.2 oz (85.8 kg)  LMP 02/03/2018 (Exact Date)  SpO2 96%  BMI 31.01 kg/m2 Estimated body mass index is 31.01 kg/(m^2) as calculated from the following:    Height as of 3/30/18: 5' 5.5\" (1.664 m).    Weight as of this encounter: 189 lb 3.2 oz (85.8 kg).  Medication Reconciliation: complete     Nilsa Soliman, BABATUNDE      "

## 2018-04-16 NOTE — PATIENT INSTRUCTIONS
PLAN:   1.   Symptomatic therapy suggested: Continue current medications.    2. Patient needs to follow up in if no improvement,or sooner if worsening of symptoms or other symptoms develop.  Continue with counseling  Follow up in 1 month.      It was a pleasure seeing you today at the [facility]. Thank you for allowing us to care for you today. We truly hope we provided you with the excellent service you deserve. Please let us know if there is anything else we can do for you so we can be sure you are leaving completley satisfied with your care experience.       General information about your clinic   Clinic Hours Lab Hours (Appointments are required)   Mon-Thurs: 7:30 AM - 7 PM Mon-Thurs: 7:30 AM - 7 PM   Fri: 7:30 AM - 5 PM Fri: 7:30 AM - 5 PM        After Hours Nurse Advise & Appts:  [facility]: [phone]  [facility] On Call: to make appointments anytime: [phone] On Call Physician: call [phone] and answering service will page the on call physician.        For urgent appointments, please call [phone] and ask for the triage nurse or your care team clinic nurse.  How to contact my care team:  MyChart: [email or web address]   Phone: [phone]   Fax: [phone]       [facility]:   Phone: [phone]  Hours: 8:00 AM - 6:00 PM  Medication Refills:  Call your pharmacy and they will forward the refill to us. Please allow 3 business days for your refills to be completed.       Normal or non-critical lab and imaging results will be communicated to you by MyChart, letter or phone within 7 days.  If you do not hear from us within 10 days, please call the clinic. If you have a critical or abnormal lab result, we will notify you by phone as soon as possible.       We now have PWIC (Pediatric Walk in Care)  Monday-Friday from 7:30-4. Simply walk in and be seen for your urgent needs like cough, fever, rash, diarrhea or vomiting, pink eye, UTI. No  appointments needed. Ask one of the team for more information      -Your Care Team:    Dr. Augie Ochoa - Internal Medicine/Pediatrics   Dr. Anne Marie Nieves - Family Medicine  Dr. Imelda Taylor - Pediatrics  Dr. Isa Ibarra - Pediatrics  Mery Bronson CNP - Family Practice Nurse Practitioner

## 2018-04-16 NOTE — MR AVS SNAPSHOT
After Visit Summary   4/16/2018    Myrna Brothers    MRN: [id number]           Patient Information     Date Of Birth          1985        Visit Information        Provider Department      4/16/2018 6:10 PM Mery Bronson APRN CNP [facility]        Today's Diagnoses     Major depressive disorder with single episode, in full remission (H)    -  1    Generalized anxiety disorder          Care Instructions    PLAN:   1.   Symptomatic therapy suggested: Continue current medications.    2. Patient needs to follow up in if no improvement,or sooner if worsening of symptoms or other symptoms develop.  Continue with counseling  Follow up in 1 month.      It was a pleasure seeing you today at the [facility]. Thank you for allowing us to care for you today. We truly hope we provided you with the excellent service you deserve. Please let us know if there is anything else we can do for you so we can be sure you are leaving completley satisfied with your care experience.       General information about your clinic   Clinic Hours Lab Hours (Appointments are required)   Mon-Thurs: 7:30 AM - 7 PM Mon-Thurs: 7:30 AM - 7 PM   Fri: 7:30 AM - 5 PM Fri: 7:30 AM - 5 PM        After Hours Nurse Advise & Appts:  [facility]: [phone]  [facility] On Call: to make appointments anytime: [phone] On Call Physician: call [phone] and answering service will page the on call physician.        For urgent appointments, please call [phone] and ask for the triage nurse or your care team clinic nurse.  How to contact my care team:  MyChart: [email or web address]   Phone: [phone]   Fax: [phone]       [facility]:   Phone: [phone]  Hours: 8:00 AM - 6:00 PM  Medication Refills:  Call your pharmacy and they will forward the refill to us. Please allow 3 business days for your refills to be completed.       Normal or  non-critical lab and imaging results will be communicated to you by MyChart, letter or phone within 7 days.  If you do not hear from us within 10 days, please call the clinic. If you have a critical or abnormal lab result, we will notify you by phone as soon as possible.       We now have PWIC (Pediatric Walk in Care)  Monday-Friday from 7:30-4. Simply walk in and be seen for your urgent needs like cough, fever, rash, diarrhea or vomiting, pink eye, UTI. No appointments needed. Ask one of the team for more information      -Your Care Team:    Dr. Augie Ochoa - Internal Medicine/Pediatrics   Dr. Anne Marie Nieves - Family Medicine  Dr. Imelda Taylor - Pediatrics  Dr. Isa Ibarra - Pediatrics  Mery Bronson CNP - Family Practice Nurse Practitioner                         Follow-ups after your visit        Your next 10 appointments already scheduled     Apr 16, 2018  6:10 PM CDT   [facility]    [address] [place] [id number] [phone]            Apr 23, 2018  5:00 PM CDT   [facility] KVNG Bennett   [facility]    [address] [place] [id number] [phone]            Apr 27, 2018  4:30 PM CDT   ESTABLISHED PRENATAL with Ashley Bernardo,    [facility]    [address] [place] [id number]   [phone]            May 21, 2018  4:00 PM CDT   New Visit with Miroslava Parekh LP   [facility]    [address] [place] [phone] [phone]            Jun 04, 2018  4:00 PM CDT   Return Visit with Miroslava Parekh LP   [facility]    [address] [place] [id number] [phone]              Who to contact     If you have questions  or need follow up information about today's clinic visit or your schedule please contact [facility] directly at [phone].  Normal or non-critical lab and imaging results will be communicated to you by [employer], letter or phone within 4 business days after the clinic has received the results. If you do not hear from us within 7 days, please contact the clinic through [employer] or phone. If you have a critical or abnormal lab result, we will notify you by phone as soon as possible.  Submit refill requests through [employer] or call your pharmacy and they will forward the refill request to us. Please allow 3 business days for your refill to be completed.          Additional Information About Your Visit        [employer] Information     [employer] gives you secure access to your electronic health record. If you see a primary care provider, you can also send messages to your care team and make appointments. If you have questions, please call your primary care clinic.  If you do not have a primary care provider, please call [phone] and they will assist you.      [employer] is an electronic gateway that provides easy, online access to your medical records. With [employer], you can request a clinic appointment, read your test results, renew a prescription or communicate with your care team.     To access your existing account, please contact your [facility] or call [phone] for assistance.        Care EveryWhere ID     This is your Care EveryWhere ID. This could be used by other organizations to access your [place] medical records  [id number]        Your Vitals Were     Pulse Temperature Last Period Pulse Oximetry BMI (Body Mass Index)       105 97.7  F (36.5  C) (Temporal) 02/03/2018 (Exact Date) 96% 31.01 kg/m2        Blood Pressure from Last 3 Encounters:   04/16/18 114/78   04/05/18 108/66   03/30/18 99/65    Weight from Last 3 Encounters:   04/16/18 189 lb 3.2 oz (85.8 kg)    04/05/18 191 lb 11.2 oz (87 kg)   03/30/18 194 lb 3.2 oz (88.1 kg)              Today, you had the following     No orders found for display         Today's Medication Changes          These changes are accurate as of 4/16/18  6:07 PM.  If you have any questions, ask your nurse or doctor.               These medicines have changed or have updated prescriptions.        Dose/Directions    sertraline 50 MG tablet   Commonly known as:  ZOLOFT   This may have changed:    - how much to take  - how to take this  - when to take this  - additional instructions   Used for:  Generalized anxiety disorder, Major depressive disorder with single episode, in full remission (H)   Changed by:  Mery Bronson APRN CNP        Dose:  50 mg   Take 1 tablet (50 mg) by mouth daily   Quantity:  30 tablet   Refills:  1            Where to get your medicines      These medications were sent to [facility]/pharmacy #[id number] - [address], [place] AT [address], [facility] [address]     Phone:  [phone]     sertraline 50 MG tablet                Primary Care Provider Office Phone # Fax #    [facility] [phone] [address] [place] [id number]        Equal Access to Services     MANNY OCONNOR : Hadii tuan ku hadasho Soomaali, waaxda luqadaha, qaybta kaalmada adeegyada, waxay calin miller. So [facility] [phone].    ATENCIÓN: Si habla español, tiene a flowers disposición servicios gratuitos de asistencia lingüística. Llame al [phone].    We comply with applicable federal civil rights laws and [place] laws. We do not discriminate on the basis of race, color, national origin, age, disability, sex, sexual orientation, or gender identity.            Thank you!     Thank you for choosing [facility]  for your care. Our goal is always to provide you with excellent care. Hearing back from our patients  is one way we can continue to improve our services. Please take a few minutes to complete the written survey that you may receive in the mail after your visit with us. Thank you!             Your Updated Medication List - Protect others around you: Learn how to safely use, store and throw away your medicines at www.disposemymeds.org.          This list is accurate as of 4/16/18  6:07 PM.  Always use your most recent med list.                   Brand Name Dispense Instructions for use Diagnosis    busPIRone 5 MG tablet    BUSPAR    180 tablet    Take 1 tablet (5 mg) by mouth 2 times daily    Generalized anxiety disorder       FISH OIL PO           IRON SUPPLEMENT PO           PRENATAL VITAMIN PO           PROPRANOLOL HCL PO      Take 10 mg by mouth as needed for high blood pressure        sertraline 50 MG tablet    ZOLOFT    30 tablet    Take 1 tablet (50 mg) by mouth daily    Generalized anxiety disorder, Major depressive disorder with single episode, in full remission (H)

## 2018-04-16 NOTE — Clinical Note
Andrea Bernardo Weight down some Will have Myrna come in for a weight check in a week  No vomiting. Some nausea but has been manageable Thanks Mary

## 2018-04-16 NOTE — PROGRESS NOTES
SUBJECTIVE:   Myrna Brothers is a 32 year old female who presents to clinic today for the following health issues:      Depression Followup    Status since last visit: Stable seems to be doing ok    See PHQ-9 for current symptoms.  Other associated symptoms: None    Complicating factors:   Significant life event:  No   Current substance abuse:  None  Anxiety or Panic symptoms:  Yes-  The last few weeks    PHQ-9 2/28/2018   Total Score 5   Q9: Suicide Ideation Several days     In the past two weeks have you had thoughts of suicide or self-harm?  No.    Do you have concerns about your personal safety or the safety of others?   No  PHQ-9  English  PHQ-9   Any Language  Suicide Assessment Five-step Evaluation and Treatment (SAFE-T)    Amount of exercise or physical activity: very little     Problems taking medications regularly: No    Medication side effects: none    Diet: regular (no restrictions) and trying to eat more healthy       Problem list and histories reviewed & adjusted, as indicated.  Additional history: as documented    Patient Active Problem List   Diagnosis     Major depressive disorder with single episode, in full remission (H)     Generalized anxiety disorder     Cervical cancer screening     Past Surgical History:   Procedure Laterality Date     HEAD & NECK SURGERY      wisdom teeth       Social History   Substance Use Topics     Smoking status: Never Smoker     Smokeless tobacco: Never Used     Alcohol use Yes      Comment: occasionally:  none with pregnancy     Family History   Problem Relation Age of Onset     Breast Cancer Mother      Stillborn Mother      had a still born     Other - See Comments Mother      2 miscarriage     DIABETES Father      HEART DISEASE Father      Anxiety Disorder Maternal Grandmother      Alzheimer Disease Maternal Grandmother      Asthma Maternal Grandfather      Prostate Cancer Maternal Grandfather      Hypertension Paternal Grandmother      Leukemia Paternal  Grandfather          Current Outpatient Prescriptions   Medication Sig Dispense Refill     sertraline (ZOLOFT) 50 MG tablet Take 1/2 tablet (25 mg) for 1-2 weeks, then increase to 1 tablet orally daily 30 tablet 0     Prenatal Vit-Fe Fumarate-FA (PRENATAL VITAMIN PO)        Ferrous Sulfate (IRON SUPPLEMENT PO)        Omega-3 Fatty Acids (FISH OIL PO)        busPIRone (BUSPAR) 5 MG tablet Take 1 tablet (5 mg) by mouth 2 times daily 180 tablet 1     PROPRANOLOL HCL PO Take 10 mg by mouth as needed for high blood pressure       Allergies   Allergen Reactions     Xylocaine [Lidocaine] Anaphylaxis     Labs reviewed in EPIC    Reviewed and updated as needed this visit by clinical staff       Reviewed and updated as needed this visit by Provider         ROS:  Constitutional, HEENT, cardiovascular, pulmonary, gi and gu systems are negative, except as otherwise noted.    OBJECTIVE:     /78 (BP Location: Right arm, Patient Position: Chair, Cuff Size: Adult Regular)  Pulse 105  Temp 97.7  F (36.5  C) (Temporal)  Wt 189 lb 3.2 oz (85.8 kg)  LMP 02/03/2018 (Exact Date)  SpO2 96%  BMI 31.01 kg/m2  Body mass index is 31.01 kg/(m^2).   Wt Readings from Last 4 Encounters:   04/16/18 189 lb 3.2 oz (85.8 kg)   04/05/18 191 lb 11.2 oz (87 kg)   03/30/18 194 lb 3.2 oz (88.1 kg)   03/05/18 196 lb (88.9 kg)       GENERAL APPEARANCE: alert, active and no distress  RESP: lungs clear to auscultation - no rales, rhonchi or wheezes  CV: regular rates and rhythm  MS: extremities normal- no gross deformities noted  NEURO: Normal strength and tone, mentation intact and speech normal  PSYCH: mentation appears normal and affect normal/bright  MENTAL STATUS EXAM:  Appearance/Behavior: No apparent distress, Casually groomed and Dressed appropriately for weather  Speech: Normal  Mood/Affect: normal affect  Insight: Adequate    Diagnostic Test Results:  none     ASSESSMENT/PLAN:     Myrna was seen today for depression.    Diagnoses and  all orders for this visit:    Major depressive disorder with single episode, in full remission (H)  -     sertraline (ZOLOFT) 50 MG tablet; Take 1 tablet (50 mg) by mouth daily    Generalized anxiety disorder  -     sertraline (ZOLOFT) 50 MG tablet; Take 1 tablet (50 mg) by mouth daily    PLAN:   1.   Symptomatic therapy suggested: Continue current medications.  Reviewed concept of depression as function of biochemical imbalance of neurotransmitters/rationale for treatment.  Risks and benefits of medication(s) reviewed with patient.  Questions answered.  Followup appointment in 1 month(s)  Patient instructed to call for significant side effects medications or problems  Patient advised immediate presentation to hospital for suicidal thought, etc.      2. Patient needs to follow up in if no improvement,or sooner if worsening of symptoms or other symptoms develop.  Continue with counseling  Follow up in 1 month.      See Patient Instructions    DONY Mendiola [facility]

## 2018-04-17 ASSESSMENT — PATIENT HEALTH QUESTIONNAIRE - PHQ9: SUM OF ALL RESPONSES TO PHQ QUESTIONS 1-9: 11

## 2018-04-17 ASSESSMENT — ANXIETY QUESTIONNAIRES: GAD7 TOTAL SCORE: 18

## 2018-04-23 ENCOUNTER — OFFICE VISIT (OUTPATIENT)
Dept: PSYCHOLOGY | Facility: CLINIC | Age: 33
End: 2018-04-23
Payer: COMMERCIAL

## 2018-04-23 DIAGNOSIS — F41.1 GENERALIZED ANXIETY DISORDER: ICD-10-CM

## 2018-04-23 DIAGNOSIS — F33.1 MODERATE EPISODE OF RECURRENT MAJOR DEPRESSIVE DISORDER (H): Primary | ICD-10-CM

## 2018-04-23 PROCEDURE — 90832 PSYTX W PT 30 MINUTES: CPT | Performed by: SOCIAL WORKER

## 2018-04-23 NOTE — PROGRESS NOTES
"[facility]  2018      Behavioral Health Clinician Progress Note    Patient Name: Myrna Brothers           Service Type:  Individual      Service Location:   Face to Face in Clinic     Session Start Time: 5: 20 pm  Session End Time: 5: 50 pm       Session Length: 16 - 37      Attendees: Patient    Visit Activities (Refresh list every visit): [facility] Only    Diagnostic Assessment Date: 18  Treatment Plan Review Date: 18  See Flowsheets for today's PHQ-9 and OREN-7 results  Previous PHQ-9:   PHQ-9 SCORE 2018   Total Score 5 11     Previous OREN-7:   OREN-7 SCORE 2018   Total Score 5 18       LUKASZ LEVEL:  No flowsheet data found.    DATA  Extended Session (60+ minutes): No  Interactive Complexity: No  Crisis: No  [facility] Patient: No    Treatment Objective(s) Addressed in This Session:  Target Behavior(s): disease management/lifestyle changes , mental health management    Depressed Mood: Decrease frequency and intensity of feeling down, depressed, hopeless  Identify negative self-talk and behaviors: challenge core beliefs, myths, and actions  Anxiety: will experience a reduction in anxiety, will develop more effective coping skills to manage anxiety symptoms, will develop healthy cognitive patterns and beliefs and will increase ability to function adaptively    Current Stressors / Issues:  Patient reported that she is feeling \"better and calmer\". She stated that she feels as though she is starting to feel full benefit of medication change and increased dose.  Patient stated that she and her  are continuing to discuss follow up with a medication management provider who specializes in the  population, and reported that no decisions have been made yet. Patient stated that she has made the decision to quit her job, and stated that she will give her 2 weeks notice tomorrow. She stated that she has found that the stress of the job " "outweighs what she enjoys about her work.  Patient reported that she has received guidance from her  and his mother, and stated that she feels 90% confident/ready that this is the best decision for her. She stated that she has already started to apply for new jobs, and feels calmer as she feels that she is taking back \"control\".  Patient expressed concern about feelings of guilt and feeling selfish for making a decision to quit her job at this time. She was receptive to further processing these feelings of guilt and selfishness due to the \"extra weight of responsibility\" that is now on her family, and strategies to reduce these feelings as the continue to have negative impacts on her mental health. Patient reported that she has noted decreased moodiness and decreased mood lability. She stated that she feels an increased ability to cope with difficult situation, but stated that she noted a return in irritability on Sunday night when she anticipated her return to work. Patient reported a decrease in frequency of SI, and denied active SI.    Patient and [facility] reviewed previously learned relaxation techniques. Patient reported that she has previously enjoyed aroma therapy, but is now worried that some of the scents may not be safe during pregnancy. Patient to address this concern during next prenatal appointment.  Patient stated that she is currently watching television and trying to go on walks to relax. Patient stated that she has not previously considered the role of music in relaxation despite her strong connection with music and participation in choir. Patient reported that she feels that it may be helpful since playing the piano is relaxing for her.  Guided imagery also introduced, and patient stated that she imagines that utilizing this technique while imagining herself at [place] may be helpful.     Progress on Treatment Objective(s) / Homework:  Satisfactory progress - PREPARATION (Decided to change " - considering how); Intervened by negotiating a change plan and determining options / strategies for behavior change, identifying triggers, exploring social supports, and working towards setting a date to begin behavior change    Motivational Interviewing    MI Intervention: Expressed Empathy/Understanding, Supported Autonomy, Collaboration, Evocation, Permission to raise concern or advise, Open-ended questions, Change talk (evoked) and Reframe     Change Talk Expressed by the Patient: Ability to change Reasons to change Need to change    Provider Response to Change Talk: E - Evoked more info from patient about behavior change, A - Affirmed patient's thoughts, decisions, or attempts at behavior change, R - Reflected patient's change talk and S - Summarized patient's change talk statements    Also provided psychoeducation about behavioral health condition, symptoms, and treatment options    Care Plan review completed: Yes    Medication Review:  No changes to current psychiatric medication(s)    Medication Compliance:  Yes    Changes in Health Issues:   None reported    Chemical Use Review:   Substance Use: Chemical use reviewed, no active concerns identified      Tobacco Use: No current tobacco use.      Assessment: Current Emotional / Mental Status (status of significant symptoms):  Risk status (Self / Other harm or suicidal ideation)  Patient has had a history of suicidal ideation: in correlation with stress, sent text on 4/13 about wanting to die, but no active SI. Per patient and , patient has had history of passive SI for more than 10 years and denies a history of suicide attempts, self-injurious behavior, homicidal ideation, homicidal behavior and and other safety concerns  Patient denies current fears or concerns for personal safety.  Patient denies current or recent suicidal ideation or behaviors.  Patient denies current or recent homicidal ideation or behaviors.  Patient denies current or recent self  injurious behavior or ideation.  Patient denies other safety concerns.  A safety and risk management plan has not been developed at this time, however patient was encouraged to call [place] [phone] should there be a change in any of these risk factors.    Appearance:   Appropriate   Eye Contact:   Good   Psychomotor Behavior: Normal   Attitude:   Cooperative   Orientation:   All  Speech   Rate / Production: Normal    Volume:  Normal   Mood:    Normal  Affect:    Appropriate   Thought Content:  Clear   Thought Form:  Coherent  Logical   Insight:    Good     Diagnoses:  1. Moderate episode of recurrent major depressive disorder (H)    2. Generalized anxiety disorder        Collateral Reports Completed:  Not Applicable    Plan: (Homework, other):  Patient was given information about behavioral services and encouraged to schedule a follow up appointment with the clinic [facility] in 2 weeks.  She was also given Cognitive Behavioral Therapy skills to practice when experiencing anxiety and depression.  CD Recommendations: No indications of CD issues.  Stephanie Calabrese, [facility] Hospital[facility]IV[facility], [facility]      ______________________________________________________________________    Integrated Primary Care Behavioral Health Treatment Plan    Patient's Name: Myrna Brothers  YOB: 1985    Date: 4/23/18    DSM-V Diagnoses: 296.32 (F33.1) Major Depressive Disorder, Recurrent Episode, Moderate _ or 300.02 (F41.1) Generalized Anxiety Disorder  Psychosocial / Contextual Factors: first trimester of pregnancy, work related stress  WHODAS: TBD    Referral / Collaboration:  Patient referred to [facility], will transfer 5/21 to Miroslava Parekh.  Appointments with [facility] have been scheduled until able to establish care with [facility] therapist.    Anticipated number of session or this episode of care: 2-4      MeasurableTreatment Goal(s) related to diagnosis / functional impairment(s)  Goal 1: Patient will reduce feelings of depression as  evidenced by decreased score on PHQ-9.    I will know I've met my goal when I feel less down and emotional.      Objective #A (Patient Action)    Patient will Decrease frequency and intensity of feeling down, depressed, hopeless.  Status: New - Date: 4/23/18     Intervention(s)  [facility] will assign homework to assist with behavioral activation/activity scheduling.    Objective #B  Patient will Identify negative self-talk and behaviors: challenge core beliefs, myths, and actions.  Status: New - Date: 4/23/18     Intervention(s)  [facility] will teach about automatic negative thoughts, irrational core beliefs, and cognitive restructuring techniques.        Goal 2: Patient will reduce feelings of anxiety as evidenced by decreased score on GAD7.    I will know I've met my goal when I feel less anxious and worried.      Objective #A (Patient Action)    Status: New - Date: 4/23/18     Patient will identify three distraction and diversion activities and use those activities to decrease level of anxiety  .    Intervention(s)  [facility] will assign homework to practice distress tolerance and mindfulness techniques between session.    Objective #B  Patient will use cognitive strategies identified in therapy to challenge anxious thoughts.    Status: New - Date: 4/23/18     Intervention(s)  [facility] will teach CBT techniques related to cognitive restructuring.    Objective #C  Patient will use relaxation strategies 1-2 times per day to reduce the physical symptoms of anxiety.  Status: New - Date: 4/23/18     Intervention(s)  [facility] will role-play relaxation techniques.    Patient has reviewed and agreed to the above plan.      KVNG Webb  April 23, 2018

## 2018-04-24 PROBLEM — F33.1 MODERATE EPISODE OF RECURRENT MAJOR DEPRESSIVE DISORDER (H): Status: ACTIVE | Noted: 2018-01-04

## 2018-04-27 ENCOUNTER — PRENATAL OFFICE VISIT (OUTPATIENT)
Dept: OBGYN | Facility: CLINIC | Age: 33
End: 2018-04-27
Payer: COMMERCIAL

## 2018-04-27 VITALS
DIASTOLIC BLOOD PRESSURE: 60 MMHG | HEART RATE: 73 BPM | OXYGEN SATURATION: 96 % | SYSTOLIC BLOOD PRESSURE: 107 MMHG | BODY MASS INDEX: 30.94 KG/M2 | WEIGHT: 188.8 LBS

## 2018-04-27 DIAGNOSIS — Z34.01 SUPERVISION OF NORMAL FIRST PREGNANCY IN FIRST TRIMESTER: Primary | ICD-10-CM

## 2018-04-27 PROCEDURE — 99207 ZZC PRENATAL VISIT: CPT | Performed by: OBSTETRICS & GYNECOLOGY

## 2018-04-27 RX ORDER — ESCITALOPRAM OXALATE 20 MG/1
TABLET ORAL
Refills: 0 | COMMUNITY
Start: 2018-01-04 | End: 2018-04-27

## 2018-04-27 NOTE — PATIENT INSTRUCTIONS
If you have any questions regarding your visit, Please contact your care team.    Women s Health CLINIC HOURS TELEPHONE NUMBER   Ashley Bernardo DO.    BABATUNDE Taylor -    DAO Vieira       Monday, Wednesday, Thursday and Friday, [place]  8:30a.m-5:00 p.m   [facility]  [address] [place] [address]  [phone] ask for [facility]    Imaging [phone]       Urgent Care locations:    [facility] Saturday and Sunday   9 am - 5 pm    Monday-Friday   12 pm - 8 pm  Saturday and Sunday   9 am - 5 pm   [phone]     [facility] Labor and Delivery:  [phone]    If you need a medication refill, please contact your pharmacy. Please allow 3 business days for your refill to be completed.  As always, Thank you for trusting us with your healthcare needs!

## 2018-04-27 NOTE — MR AVS SNAPSHOT
After Visit Summary   4/27/2018    Myrna Brothers    MRN: [id number]           Patient Information     Date Of Birth          1985        Visit Information        Provider Department      4/27/2018 4:30 PM Ashley Bernardo DO [facility]        Care Instructions                                                         If you have any questions regarding your visit, Please contact your care team.    [facility] CLINIC HOURS TELEPHONE NUMBER   Ashley Bernardo DO.    BABATUNDE Taylor -    DAO Vieira       [facility], [facility]  8:30a.m-5:00 p.m   [facility]  [address]  [place], [place] [address] [phone] ask for [facility]    Imaging [phone]       Urgent Care locations:    [facility] Saturday and Sunday   9 am - 5 pm    Monday-Friday   12 pm - 8 pm  Saturday and Sunday   9 am - 5 pm   [phone]     [facility] Labor and Delivery:  [phone]    If you need a medication refill, please contact your pharmacy. Please allow 3 business days for your refill to be completed.  As always, Thank you for trusting us with your healthcare needs!                Follow-ups after your visit        Your next 10 appointments already scheduled     May 07, 2018  6:00 PM CDT   Return Visit with Stephanie Calabrese [facility] ([facility])    [address] [facility] [address] [phone]            May 21, 2018  4:00 PM CDT   New Visit with Miroslava Parekh LP   [facility] ([facility])    [address] [facility] [address] [phone]            May 25, 2018  9:00 AM CDT   ESTABLISHED PRENATAL with Ashley Bernardo DO   [facility] ([facility])    [address] [facility] [address]    [phone]            Jun 04, 2018  4:00 PM CDT   Return Visit with Miroslava Parekh LP   [facility] ([facility])    [address] [facility] [address] [phone]              Who to contact     If you have questions or need follow up information about today's clinic visit or your schedule please contact [facility] directly at [phone].  Normal or non-critical lab and imaging results will be communicated to you by Tribzihart, letter or phone within 4 business days after the clinic has received the results. If you do not hear from us within 7 days, please contact the clinic through PlaySquaret or phone. If you have a critical or abnormal lab result, we will notify you by phone as soon as possible.  Submit refill requests through IGG or call your pharmacy and they will forward the refill request to us. Please allow 3 business days for your refill to be completed.          Additional Information About Your Visit        TribziharMineWhat Information     IGG gives you secure access to your electronic health record. If you see a primary care provider, you can also send messages to your care team and make appointments. If you have questions, please call your primary care clinic.  If you do not have a primary care provider, please call [phone] and they will assist you.        Care EveryWhere ID     This is your Care EveryWhere ID. This could be used by other organizations to access your [place] medical records  [id number]        Your Vitals Were     Pulse Last Period Pulse Oximetry Breastfeeding? BMI (Body Mass Index)       73 02/03/2018 (Exact Date) 96% No 30.94 kg/m2        Blood Pressure from Last 3 Encounters:   04/27/18 107/60   04/16/18 114/78   04/05/18 108/66    Weight from Last 3 Encounters:   04/27/18 188 lb 12.8 oz (85.6 kg)   04/16/18 189 lb 3.2 oz (85.8 kg)   04/05/18 191 lb 11.2 oz (87 kg)              Today, you had the following      No orders found for display         Today's Medication Changes          These changes are accurate as of 4/27/18  4:40 PM.  If you have any questions, ask your nurse or doctor.               Stop taking these medicines if you haven't already. Please contact your care team if you have questions.     escitalopram 20 MG tablet   Commonly known as:  LEXAPRO   Stopped by:  Ashley Bernardo,                     Primary Care Provider Office Phone # Fax #    [facility] [phone] [address] [facility] [address]        Equal Access to Services     [facility]: Hadii aad ku hadasho Soomaali, waaxda luqadaha, qaybta kaalmada adeegyarobert, raquel barreto . So [facility] [phone].    ATENCIÓN: Si habla español, tiene a flowers disposición servicios gratuitos de asistencia lingüística. [facility] [phone].    We comply with applicable federal civil rights laws and [place] laws. We do not discriminate on the basis of race, color, national origin, age, disability, sex, sexual orientation, or gender identity.            Thank you!     Thank you for choosing [facility]  for your care. Our goal is always to provide you with excellent care. Hearing back from our patients is one way we can continue to improve our services. Please take a few minutes to complete the written survey that you may receive in the mail after your visit with us. Thank you!             Your Updated Medication List - Protect others around you: Learn how to safely use, store and throw away your medicines at www.disposemymeds.org.          This list is accurate as of 4/27/18  4:40 PM.  Always use your most recent med list.                   Brand Name Dispense Instructions for use Diagnosis    busPIRone 5 MG tablet    BUSPAR    180 tablet    Take 1 tablet (5 mg) by mouth 2 times daily    Generalized anxiety disorder       FISH OIL PO           IRON SUPPLEMENT PO            PRENATAL VITAMIN PO           PROPRANOLOL HCL PO      Take 10 mg by mouth as needed for high blood pressure        sertraline 50 MG tablet    ZOLOFT    30 tablet    Take 1 tablet (50 mg) by mouth daily    Generalized anxiety disorder, Major depressive disorder with single episode, in full remission (H)

## 2018-04-27 NOTE — PROGRESS NOTES
She has not felt fetal movement yet but it is too early.  She lost 6 lbs since her last visit but feels that this is due to clothing change.  She denies dieting or hyperemesis.  She stopped taking the Propranolol and Lexapro.  She had questions regarding which soft cheeses that she could eat so spoke to Myriam Rogers, the dietician and was provided handouts on this.  She has an appt with the genetic counselor so wanted to know what would be discussed.

## 2018-05-08 ENCOUNTER — TRANSFERRED RECORDS (OUTPATIENT)
Dept: HEALTH INFORMATION MANAGEMENT | Facility: CLINIC | Age: 33
End: 2018-05-08

## 2018-05-25 ENCOUNTER — OFFICE VISIT (OUTPATIENT)
Dept: PEDIATRICS | Facility: CLINIC | Age: 33
End: 2018-05-25
Payer: COMMERCIAL

## 2018-05-25 ENCOUNTER — OFFICE VISIT (OUTPATIENT)
Dept: PSYCHOLOGY | Facility: CLINIC | Age: 33
End: 2018-05-25
Payer: COMMERCIAL

## 2018-05-25 ENCOUNTER — PRENATAL OFFICE VISIT (OUTPATIENT)
Dept: OBGYN | Facility: CLINIC | Age: 33
End: 2018-05-25
Payer: COMMERCIAL

## 2018-05-25 VITALS
SYSTOLIC BLOOD PRESSURE: 106 MMHG | DIASTOLIC BLOOD PRESSURE: 63 MMHG | OXYGEN SATURATION: 96 % | HEART RATE: 80 BPM | WEIGHT: 185.2 LBS | BODY MASS INDEX: 30.35 KG/M2

## 2018-05-25 VITALS
TEMPERATURE: 97.7 F | SYSTOLIC BLOOD PRESSURE: 100 MMHG | WEIGHT: 186 LBS | BODY MASS INDEX: 30.48 KG/M2 | HEART RATE: 81 BPM | DIASTOLIC BLOOD PRESSURE: 64 MMHG | OXYGEN SATURATION: 97 %

## 2018-05-25 DIAGNOSIS — Z34.02 SUPERVISION OF NORMAL FIRST PREGNANCY IN SECOND TRIMESTER: Primary | ICD-10-CM

## 2018-05-25 DIAGNOSIS — F33.1 MODERATE EPISODE OF RECURRENT MAJOR DEPRESSIVE DISORDER (H): Primary | ICD-10-CM

## 2018-05-25 DIAGNOSIS — F41.1 GENERALIZED ANXIETY DISORDER: ICD-10-CM

## 2018-05-25 DIAGNOSIS — R63.4 WEIGHT LOSS: Primary | ICD-10-CM

## 2018-05-25 DIAGNOSIS — R19.7 DIARRHEA, UNSPECIFIED TYPE: ICD-10-CM

## 2018-05-25 DIAGNOSIS — O09.522 ELDERLY MULTIGRAVIDA IN SECOND TRIMESTER: ICD-10-CM

## 2018-05-25 LAB
ALBUMIN SERPL-MCNC: 3.2 G/DL (ref 3.4–5)
ALP SERPL-CCNC: 84 U/L (ref 40–150)
ALT SERPL W P-5'-P-CCNC: 38 U/L (ref 0–50)
ANION GAP SERPL CALCULATED.3IONS-SCNC: 7 MMOL/L (ref 3–14)
AST SERPL W P-5'-P-CCNC: 20 U/L (ref 0–45)
BASOPHILS # BLD AUTO: 0 10E9/L (ref 0–0.2)
BASOPHILS NFR BLD AUTO: 0.4 %
BILIRUB SERPL-MCNC: 0.3 MG/DL (ref 0.2–1.3)
BUN SERPL-MCNC: 6 MG/DL (ref 7–30)
CALCIUM SERPL-MCNC: 9 MG/DL (ref 8.5–10.1)
CHLORIDE SERPL-SCNC: 104 MMOL/L (ref 94–109)
CO2 SERPL-SCNC: 26 MMOL/L (ref 20–32)
CREAT SERPL-MCNC: 0.5 MG/DL (ref 0.52–1.04)
DIFFERENTIAL METHOD BLD: NORMAL
EOSINOPHIL # BLD AUTO: 0.1 10E9/L (ref 0–0.7)
EOSINOPHIL NFR BLD AUTO: 1.2 %
ERYTHROCYTE [DISTWIDTH] IN BLOOD BY AUTOMATED COUNT: 12.5 % (ref 10–15)
GFR SERPL CREATININE-BSD FRML MDRD: >90 ML/MIN/1.7M2
GLUCOSE SERPL-MCNC: 106 MG/DL (ref 70–99)
HCT VFR BLD AUTO: 40.6 % (ref 35–47)
HGB BLD-MCNC: 13.4 G/DL (ref 11.7–15.7)
IMM GRANULOCYTES # BLD: 0 10E9/L (ref 0–0.4)
IMM GRANULOCYTES NFR BLD: 0.3 %
LYMPHOCYTES # BLD AUTO: 2.1 10E9/L (ref 0.8–5.3)
LYMPHOCYTES NFR BLD AUTO: 21.6 %
MCH RBC QN AUTO: 29.8 PG (ref 26.5–33)
MCHC RBC AUTO-ENTMCNC: 33 G/DL (ref 31.5–36.5)
MCV RBC AUTO: 90 FL (ref 78–100)
MONOCYTES # BLD AUTO: 0.5 10E9/L (ref 0–1.3)
MONOCYTES NFR BLD AUTO: 5.6 %
NEUTROPHILS # BLD AUTO: 6.7 10E9/L (ref 1.6–8.3)
NEUTROPHILS NFR BLD AUTO: 70.9 %
PLATELET # BLD AUTO: 283 10E9/L (ref 150–450)
POTASSIUM SERPL-SCNC: 4 MMOL/L (ref 3.4–5.3)
PROT SERPL-MCNC: 7.4 G/DL (ref 6.8–8.8)
RBC # BLD AUTO: 4.5 10E12/L (ref 3.8–5.2)
SODIUM SERPL-SCNC: 137 MMOL/L (ref 133–144)
TSH SERPL DL<=0.005 MIU/L-ACNC: 0.63 MU/L (ref 0.4–4)
WBC # BLD AUTO: 9.5 10E9/L (ref 4–11)

## 2018-05-25 PROCEDURE — 99207 ZZC PRENATAL VISIT: CPT | Performed by: OBSTETRICS & GYNECOLOGY

## 2018-05-25 PROCEDURE — 80053 COMPREHEN METABOLIC PANEL: CPT | Performed by: FAMILY MEDICINE

## 2018-05-25 PROCEDURE — 85025 COMPLETE CBC W/AUTO DIFF WBC: CPT | Performed by: FAMILY MEDICINE

## 2018-05-25 PROCEDURE — 36415 COLL VENOUS BLD VENIPUNCTURE: CPT | Performed by: FAMILY MEDICINE

## 2018-05-25 PROCEDURE — 99207 ZZC NO CHARGE LOS: CPT | Performed by: SOCIAL WORKER

## 2018-05-25 PROCEDURE — 99214 OFFICE O/P EST MOD 30 MIN: CPT | Performed by: FAMILY MEDICINE

## 2018-05-25 PROCEDURE — 84443 ASSAY THYROID STIM HORMONE: CPT | Performed by: FAMILY MEDICINE

## 2018-05-25 NOTE — PATIENT INSTRUCTIONS
If you have any questions regarding your visit, Please contact your care team.    Women s Health CLINIC HOURS TELEPHONE NUMBER   Ashley Beranrdo DO.    BABATUNDE Taylor -    DAO Vieira       Monday, Wednesday, Thursday and Friday, [place]  8:30a.m-5:00 p.m   [facility]  [address] [place] [address]  [phone] ask for [facility]    Imaging [phone]       Urgent Care locations:    [facility] Saturday and Sunday   9 am - 5 pm    Monday-Friday   12 pm - 8 pm  Saturday and Sunday   9 am - 5 pm   [phone]     [facility] Labor and Delivery:  [phone]    If you need a medication refill, please contact your pharmacy. Please allow 3 business days for your refill to be completed.  As always, Thank you for trusting us with your healthcare needs!

## 2018-05-25 NOTE — MR AVS SNAPSHOT
After Visit Summary   5/25/2018    Myrna Brothers    MRN: [id number]           Patient Information     Date Of Birth          1985        Visit Information        Provider Department      5/25/2018 1:30 PM Daljit Cortés MD [facility]        Today's Diagnoses     Weight loss    -  1    Diarrhea, unspecified type          Care Instructions    Tips to Increase the Protein in Your Diet  You may need more protein in your diet to help you heal from an illness, surgery or wound. Extra protein can also help you gain weight. Here are some ideas for adding high-protein foods to your meals.  Meat and fish    Add chopped cooked meat to vegetables, salads, casseroles, soups, sauces and biscuit dough.    Use in omelets, soufflés, quiches, sandwich fillings and chicken or turkey stuffing.    Wrap in pie crust or biscuit dough to make a turnover.    Add to stuffed baked potatoes.    Make a dip with diced meat or flaked fish mixed with sour cream and spices.  Chopped, hard-cooked eggs    Add to salads.    Use for snacks and sandwich filling.  High-protein milk  To make high-protein milk, mix 1 quart whole milk with 1 cup powdered milk.    Add to cream soups, mashed potatoes, scrambled eggs, cereals and dried eggnog mix.    Use as an ingredient in puddings, custards, hot chocolate, milk shakes and pancakes.  Powdered milk    If you don't have any high-protein milk on hand, you can use powdered milk. Add 3 tablespoons to:  ? gravies, sauces, cream soups, mayonnaise  ? casseroles, meat patties, meatloaf, tuna salad, deviled ham  ? scalloped or mashed potatoes, creamed spinach  ? scrambled eggs, egg salad  ? cereals  ? yogurt, milk drinks, ice cream, frozen desserts, puddings, custards.    Add 4 to 6 tablespoons powdered milk to make:  ? cream sauces  ? breads, muffins, pancakes, waffles, cookies, cakes  ? cream pies, frostings, cake fillings  ? fruit cobblers, bread or rice pudding,  gelatin desserts.    For high-protein eggnog, add 3 to 6 tablespoons powdered milk to prepared eggnog.  Hard or soft cheese    Melt on sandwiches, breads, tortillas, hamburgers, hot dogs, other meats, vegetables, eggs and pies.    Grate into soups, chili, sauces, casseroles, vegetables, potatoes, rice, noodles or meatloaf.    Eat with toast or crackers, or melt for gabe dip.  Cottage cheese or ricotta cheese    Mix with or scoop on top of fruits and vegetables.    Add to casseroles, lasagna, spaghetti, noodles and egg dishes (omelets, scrambled eggs, soufflés).    Use in gelatin, pudding-type desserts, cheesecake and pancake batter.    Use to stuff crepes, pasta shells or manicotti.  Fruit yogurt    Blend with fruits for a fruit smoothie.    Use as a dip for fruits and vegetables.    Scoop on top of pancakes or waffles.  Tofu    Blend silken tofu with fruits and juices for a smoothie.    Add chunks of firm tofu to soups and stews, or crumble into meatloaf.    Blend dried onion soup mix into soft or silken tofu for dip.    Use pureed silken tofu for part of the mayonnaise, sour cream, cream cheese or ricotta cheese called for in recipes.  Beans    Use cooked beans or peas in soups, casseroles, pasta, tacos and burritos.  Nuts and seeds  Note: Avoid in children under age 3.    Use in casseroles, breads, muffins, pancakes, cookies and waffles.    Sprinkle on fruits, cereals, ice cream, yogurt, vegetables and salads.    Mix with raisins, dried fruits and chocolate chips for a snack.  Nut butters  Note: Avoid in children under age 3.    Spread on sandwiches, toast, muffins, crackers, waffles, pancakes and fruit slices.    Use as a dip for raw vegetables.    Blend with milk drinks, or swirl through ice cream, yogurt or hot cereal.  Nutrition supplements (nutrition bars, drinks and powders)    Add powders to milk drinks and desserts.    Mix with ice cream, milk and fruit for a high-protein milk shake.  Children under age  3 should avoid seeds, nuts, nut butters and hard pieces of fruit.  For informational purposes only. Not to replace the advice of your health care provider.  Copyright   2005 [facility]. All rights reserved. [employer] 440737 - REV 02/16.  Tips to Increase the Calories in Your Diet  You may need more calories in your diet to help you heal from an illness, surgery or wound. Extra calories can also help you gain weight. Here are some ideas for adding high-calorie foods to your meals.  Butter, margarine and oil    Add to cream soups, sauces and gravies.    Use in hot cereals, rice, noodles, potatoes and cooked vegetables.    Mix with herbs and seasonings, then add to cooked meat, hamburger, fish and egg dishes.    Melt and use as a dip for breads and seafood (shrimp, scallops, crab and lobster).    Spread butter or margarine on bread, then add sandwich spread or peanut butter.  Whipped cream, half-and-half and whole milk    Mix into cereals, mashed potatoes, pasta, rice and egg dishes.    Use to make sauces, cream soups, batters, puddings, custards, milk shakes and hot chocolate (with marshmallows).    Use sweetened whipped cream on hot chocolate, gelatin desserts, fruits, puddings, pancakes and waffles.    Mix unsweetened whipped cream with mashed potatoes or vegetable purees.  Cream cheese    Spread on muffins, bagels, breads, crackers and fruit slices.    Roll into balls and coat with granola, wheat germ or chopped nuts.    Mix with vegetables.  Sour cream    Use as a topping for baked potatoes, muffins, cakes, fruits, breads and gelatin desserts.    Add to soups, chili, macaroni and cheese, potatoes and vegetables.    Use as a dip for fresh fruits and vegetables.  Salad dressings and mayonnaise    Add to fish, meats, vegetable salads, egg salads and baked potatoes.    Spread on crackers or sandwiches.    Use as a dip for vegetables, pizza crusts, breads and chicken fingers.  Honey, jam, syrup and  sugar    Add to breads, cereals, milk drinks, ice cream, yogurt and fruit desserts.    Use as a glaze for meats.  Granola and wheat germ    Mix with dried fruits and nuts for a snack.    Add to vegetables, yogurt, ice cream, fruits, cereals, custards and puddings.    Use in pudding recipes in place of rice or bread.    Use in breads, muffins and cookie batter.  Dried fruits  Note: Avoid in children under age 3.    Bake in turnovers and pies.    Mix with granola or nuts for a snack.    Add to stuffing, cookies, muffins, breads, cakes, rice and grain dishes, puddings and cereals.    Combine with cooked vegetables such as yams, sweet potatoes, carrots and squash.  Nuts and seeds  Note: Avoid in children under age 3.    Use in casseroles, breads, muffins, pancakes, cookies and waffles.    Sprinkle on fruits, cereals, ice cream, yogurt, vegetables and salads.    Mix with raisins, dried fruits and chocolate chips for a snack.  Children under age 3 should avoid seeds, nuts, nut butters and hard pieces of fruit.  Nut butters  Note: Avoid in children under age 3.    Spread on sandwiches, toast, muffins, crackers, waffles, pancakes and fruit slices.    Use as a dip for raw vegetables.    Blend with milk shakes and nutrition drinks.    Swirl through ice cream or yogurt.    Mix into hot cereals.  Ice cream and frozen yogurt    Add to carbonated (fizzy) drinks such as ginger ale or root beer.    Blend with milk drinks for a shake or fruits for a smoothie.    Add to cereals, fruits, gelatin desserts and pies.    Napoleon between cookies, cristiana crackers or cake slices.    Scoop on top of waffles or pancakes.  Nutrition supplements (nutrition bars, drinks and powders)    Add powders to milk drinks and desserts.    Mix with ice cream, milk and fruit for a high-protein milk shake.    For informational purposes only. Not to replace the advice of your health care provider.  Copyright   2005 [facility]. All rights  reserved. [employer] 422763 - REV 01/16.            Follow-ups after your visit        Additional Services     NUTRITION REFERRAL       Your provider has referred you to: [facility]: [facility] [phone]   [email or web address]    Please be aware that coverage of these services is subject to the terms and limitations of your health insurance plan.  Call member services at your health plan with any benefit or coverage questions.      Please bring the following with you to your appointment:    (1) This referral request  (2) Any documents given to you regarding this referral  (3) Any specific questions you have about diet and/or food choices                  Your next 10 appointments already scheduled     Jun 04, 2018  4:00 PM CDT   New Visit with Miroslava Parekh LP   [facility] ([facility])    [address] [place] [address] [phone]            Jun 22, 2018  4:30 PM CDT   ESTABLISHED PRENATAL with Ashley Bernardo DO   [facility] ([facility])    [address] [place] [address] [phone]              Future tests that were ordered for you today     Open Future Orders        Priority Expected Expires Ordered    NUTRITION REFERRAL ASAP 5/30/2018 6/22/2018 5/25/2018    US Abdomen Complete Routine  5/25/2019 5/25/2018    Fat Stool Qual Random Collection Routine  6/24/2018 5/25/2018    Giardia antigen Routine  5/25/2019 5/25/2018    H Pylori antigen, stool Routine  6/24/2018 5/25/2018    US OB > 14 Weeks Complete Single Routine  5/25/2019 5/25/2018            Who to contact     If you have questions or need follow up information about today's clinic visit or your schedule please contact [facility] directly at [phone].  Normal or non-critical lab and imaging results will be communicated to you by MyChart, letter or phone within 4  business days after the clinic has received the results. If you do not hear from us within 7 days, please contact the clinic through GlamBox or phone. If you have a critical or abnormal lab result, we will notify you by phone as soon as possible.  Submit refill requests through GlamBox or call your pharmacy and they will forward the refill request to us. Please allow 3 business days for your refill to be completed.          Additional Information About Your Visit        GlamBox Information     GlamBox gives you secure access to your electronic health record. If you see a primary care provider, you can also send messages to your care team and make appointments. If you have questions, please call your primary care clinic.  If you do not have a primary care provider, please call [phone] and they will assist you.      GlamBox is an electronic gateway that provides easy, online access to your medical records. With GlamBox, you can request a clinic appointment, read your test results, renew a prescription or communicate with your care team.     To access your existing account, please contact your [facility] or call [phone] for assistance.        Care EveryWhere ID     This is your Care EveryWhere ID. This could be used by other organizations to access your [place] medical records  [id number]        Your Vitals Were     Pulse Temperature Last Period Pulse Oximetry BMI (Body Mass Index)       81 97.7  F (36.5  C) (Temporal) 02/03/2018 (Exact Date) 97% 30.48 kg/m2        Blood Pressure from Last 3 Encounters:   05/25/18 100/64   05/25/18 106/63   04/27/18 107/60    Weight from Last 3 Encounters:   05/25/18 186 lb (84.4 kg)   05/25/18 185 lb 3.2 oz (84 kg)   04/27/18 188 lb 12.8 oz (85.6 kg)              We Performed the Following     CBC with platelets and differential     Comprehensive metabolic panel (BMP + Alb, Alk Phos, ALT, AST, Total. Bili, TP)     TSH with free T4 reflex         Primary Care Provider Office Phone # Fax #    [facility] [phone] [address] [facility] [address]        Equal Access to Services     MANNY OCONNOR : Hadii aad ku hadjeremiah [place], waaxda luqadaha, qaybta kaalmada rodri, raquel hines shannaoscar cary mary lou miller. So [facility] [place] [phone].    ATENCIÓN: Si habla español, tiene a flowers disposición servicios gratuitos de asistencia lingüística. Llame al [phone].    We comply with applicable federal civil rights laws and [place] laws. We do not discriminate on the basis of race, color, national origin, age, disability, sex, sexual orientation, or gender identity.            Thank you!     Thank you for choosing [facility]  for your care. Our goal is always to provide you with excellent care. Hearing back from our patients is one way we can continue to improve our services. Please take a few minutes to complete the written survey that you may receive in the mail after your visit with us. Thank you!             Your Updated Medication List - Protect others around you: Learn how to safely use, store and throw away your medicines at www.disposemymeds.org.          This list is accurate as of 5/25/18  2:43 PM.  Always use your most recent med list.                   Brand Name Dispense Instructions for use Diagnosis    busPIRone 5 MG tablet    BUSPAR    180 tablet    Take 1 tablet (5 mg) by mouth 2 times daily    Generalized anxiety disorder       FISH OIL PO           IRON SUPPLEMENT PO           PRENATAL VITAMIN PO           PROPRANOLOL HCL PO      Take 10 mg by mouth as needed for high blood pressure        sertraline 50 MG tablet    ZOLOFT    30 tablet    Take 1 tablet (50 mg) by mouth daily    Generalized anxiety disorder, Major depressive disorder with single episode, in full remission (H)       TYLENOL PO      Take 325 mg by mouth

## 2018-05-25 NOTE — MR AVS SNAPSHOT
After Visit Summary   5/25/2018    Myrna Brothers    MRN: [id number]           Patient Information     Date Of Birth          1985        Visit Information        Provider Department      5/25/2018 9:00 AM Ashley Bernardo DO [facility]        Care Instructions                                                         If you have any questions regarding your visit, Please contact your care team.    [facility] CLINIC HOURS TELEPHONE NUMBER   Ashley Bernardo DO.    BABATUNDE Taylor -    DAO Vieira       Monday, Wednesday, [facility] and [facility]  8:30a.m-5:00 p.m   [facility]  [address] [place] [address]  [phone] ask for [facility]    Imaging Qtszafeldd-[phone]       Urgent Care locations:    [facility] Saturday and Sunday   9 am - 5 pm    Monday-Friday   12 pm - 8 pm  Saturday and Sunday   9 am - 5 pm   [phone]     [facility] Labor and Delivery:  [phone]    If you need a medication refill, please contact your pharmacy. Please allow 3 business days for your refill to be completed.  As always, Thank you for trusting us with your healthcare needs!                Follow-ups after your visit        Your next 10 appointments already scheduled     May 25, 2018  9:00 AM CDT   ESTABLISHED PRENATAL with Ashley Bernardo DO   [facility] ([facility])    [address] [facility] [id number] [phone]            Jun 04, 2018  4:00 PM CDT   New Visit with Miroslava Parekh LP   [facility] ([facility])    [address] [facility] [id number] [phone]            Jun 22, 2018  4:30 PM CDT   ESTABLISHED PRENATAL with Ashley Bernardo DO   [facility] ([facility])    [address] [facility]  [address] [phone]              Who to contact     If you have questions or need follow up information about today's clinic visit or your schedule please contact [facility] directly at [phone].  Normal or non-critical lab and imaging results will be communicated to you by MyChart, letter or phone within 4 business days after the clinic has received the results. If you do not hear from us within 7 days, please contact the clinic through [employer] or phone. If you have a critical or abnormal lab result, we will notify you by phone as soon as possible.  Submit refill requests through [employer] or call your pharmacy and they will forward the refill request to us. Please allow 3 business days for your refill to be completed.          Additional Information About Your Visit        [employer] Kiosk Information     [employer] gives you secure access to your electronic health record. If you see a primary care provider, you can also send messages to your care team and make appointments. If you have questions, please call your primary care clinic.  If you do not have a primary care provider, please call [phone] and they will assist you.        Care EveryWhere ID     This is your Care EveryWhere ID. This could be used by other organizations to access your [place] medical records  [id number]        Your Vitals Were     Pulse Last Period Pulse Oximetry Breastfeeding? BMI (Body Mass Index)       80 02/03/2018 (Exact Date) 96% No 30.35 kg/m2        Blood Pressure from Last 3 Encounters:   05/25/18 106/63   04/27/18 107/60   04/16/18 114/78    Weight from Last 3 Encounters:   05/25/18 185 lb 3.2 oz (84 kg)   04/27/18 188 lb 12.8 oz (85.6 kg)   04/16/18 189 lb 3.2 oz (85.8 kg)              Today, you had the following     No orders found for display       Primary Care Provider Office Phone # Fax #    [facility] [phone] [address] [facility] [address]        Equal  Access to Services     [facility] : Hadii aad ku hadmanishivan Teresachaparro, wamiltonda luqadaha, qaybta karonitraquel gamble. So [facility] [phone].    ATENCIÓN: Si habla español, tiene a flowers disposición servicios gratuitos de asistencia lingüística. Llame al [phone].    We comply with applicable federal civil rights laws and [place] laws. We do not discriminate on the basis of race, color, national origin, age, disability, sex, sexual orientation, or gender identity.            Thank you!     Thank you for choosing [facility]  for your care. Our goal is always to provide you with excellent care. Hearing back from our patients is one way we can continue to improve our services. Please take a few minutes to complete the written survey that you may receive in the mail after your visit with us. Thank you!             Your Updated Medication List - Protect others around you: Learn how to safely use, store and throw away your medicines at www.disposemymeds.org.          This list is accurate as of 5/25/18  8:56 AM.  Always use your most recent med list.                   Brand Name Dispense Instructions for use Diagnosis    busPIRone 5 MG tablet    BUSPAR    180 tablet    Take 1 tablet (5 mg) by mouth 2 times daily    Generalized anxiety disorder       FISH OIL PO           IRON SUPPLEMENT PO           PRENATAL VITAMIN PO           PROPRANOLOL HCL PO      Take 10 mg by mouth as needed for high blood pressure        sertraline 50 MG tablet    ZOLOFT    30 tablet    Take 1 tablet (50 mg) by mouth daily    Generalized anxiety disorder, Major depressive disorder with single episode, in full remission (H)       TYLENOL PO      Take 325 mg by mouth

## 2018-05-25 NOTE — PATIENT INSTRUCTIONS
Tips to Increase the Protein in Your Diet  You may need more protein in your diet to help you heal from an illness, surgery or wound. Extra protein can also help you gain weight. Here are some ideas for adding high-protein foods to your meals.  Meat and fish    Add chopped cooked meat to vegetables, salads, casseroles, soups, sauces and biscuit dough.    Use in omelets, soufflés, quiches, sandwich fillings and chicken or turkey stuffing.    Wrap in pie crust or biscuit dough to make a turnover.    Add to stuffed baked potatoes.    Make a dip with diced meat or flaked fish mixed with sour cream and spices.  Chopped, hard-cooked eggs    Add to salads.    Use for snacks and sandwich filling.  High-protein milk  To make high-protein milk, mix 1 quart whole milk with 1 cup powdered milk.    Add to cream soups, mashed potatoes, scrambled eggs, cereals and dried eggnog mix.    Use as an ingredient in puddings, custards, hot chocolate, milk shakes and pancakes.  Powdered milk    If you don't have any high-protein milk on hand, you can use powdered milk. Add 3 tablespoons to:  ? gravies, sauces, cream soups, mayonnaise  ? casseroles, meat patties, meatloaf, tuna salad, deviled ham  ? scalloped or mashed potatoes, creamed spinach  ? scrambled eggs, egg salad  ? cereals  ? yogurt, milk drinks, ice cream, frozen desserts, puddings, custards.    Add 4 to 6 tablespoons powdered milk to make:  ? cream sauces  ? breads, muffins, pancakes, waffles, cookies, cakes  ? cream pies, frostings, cake fillings  ? fruit cobblers, bread or rice pudding, gelatin desserts.    For high-protein eggnog, add 3 to 6 tablespoons powdered milk to prepared eggnog.  Hard or soft cheese    Melt on sandwiches, breads, tortillas, hamburgers, hot dogs, other meats, vegetables, eggs and pies.    Grate into soups, chili, sauces, casseroles, vegetables, potatoes, rice, noodles or meatloaf.    Eat with toast or crackers, or melt for gabe dip.  Cottage cheese  or ricotta cheese    Mix with or scoop on top of fruits and vegetables.    Add to casseroles, lasagna, spaghetti, noodles and egg dishes (omelets, scrambled eggs, soufflés).    Use in gelatin, pudding-type desserts, cheesecake and pancake batter.    Use to stuff crepes, pasta shells or manicotti.  Fruit yogurt    Blend with fruits for a fruit smoothie.    Use as a dip for fruits and vegetables.    Scoop on top of pancakes or waffles.  Tofu    Blend silken tofu with fruits and juices for a smoothie.    Add chunks of firm tofu to soups and stews, or crumble into meatloaf.    Blend dried onion soup mix into soft or silken tofu for dip.    Use pureed silken tofu for part of the mayonnaise, sour cream, cream cheese or ricotta cheese called for in recipes.  Beans    Use cooked beans or peas in soups, casseroles, pasta, tacos and burritos.  Nuts and seeds  Note: Avoid in children under age 3.    Use in casseroles, breads, muffins, pancakes, cookies and waffles.    Sprinkle on fruits, cereals, ice cream, yogurt, vegetables and salads.    Mix with raisins, dried fruits and chocolate chips for a snack.  Nut butters  Note: Avoid in children under age 3.    Spread on sandwiches, toast, muffins, crackers, waffles, pancakes and fruit slices.    Use as a dip for raw vegetables.    Blend with milk drinks, or swirl through ice cream, yogurt or hot cereal.  Nutrition supplements (nutrition bars, drinks and powders)    Add powders to milk drinks and desserts.    Mix with ice cream, milk and fruit for a high-protein milk shake.  Children under age 3 should avoid seeds, nuts, nut butters and hard pieces of fruit.  For informational purposes only. Not to replace the advice of your health care provider.  Copyright   2005 [facility]. All rights reserved. [employer] 262249 - REV 02/16.  Tips to Increase the Calories in Your Diet  You may need more calories in your diet to help you heal from an illness, surgery or wound. Extra  calories can also help you gain weight. Here are some ideas for adding high-calorie foods to your meals.  Butter, margarine and oil    Add to cream soups, sauces and gravies.    Use in hot cereals, rice, noodles, potatoes and cooked vegetables.    Mix with herbs and seasonings, then add to cooked meat, hamburger, fish and egg dishes.    Melt and use as a dip for breads and seafood (shrimp, scallops, crab and lobster).    Spread butter or margarine on bread, then add sandwich spread or peanut butter.  Whipped cream, half-and-half and whole milk    Mix into cereals, mashed potatoes, pasta, rice and egg dishes.    Use to make sauces, cream soups, batters, puddings, custards, milk shakes and hot chocolate (with marshmallows).    Use sweetened whipped cream on hot chocolate, gelatin desserts, fruits, puddings, pancakes and waffles.    Mix unsweetened whipped cream with mashed potatoes or vegetable purees.  Cream cheese    Spread on muffins, bagels, breads, crackers and fruit slices.    Roll into balls and coat with granola, wheat germ or chopped nuts.    Mix with vegetables.  Sour cream    Use as a topping for baked potatoes, muffins, cakes, fruits, breads and gelatin desserts.    Add to soups, chili, macaroni and cheese, potatoes and vegetables.    Use as a dip for fresh fruits and vegetables.  Salad dressings and mayonnaise    Add to fish, meats, vegetable salads, egg salads and baked potatoes.    Spread on crackers or sandwiches.    Use as a dip for vegetables, pizza crusts, breads and chicken fingers.  Honey, jam, syrup and sugar    Add to breads, cereals, milk drinks, ice cream, yogurt and fruit desserts.    Use as a glaze for meats.  Granola and wheat germ    Mix with dried fruits and nuts for a snack.    Add to vegetables, yogurt, ice cream, fruits, cereals, custards and puddings.    Use in pudding recipes in place of rice or bread.    Use in breads, muffins and cookie batter.  Dried fruits  Note: Avoid in  children under age 3.    Bake in turnovers and pies.    Mix with granola or nuts for a snack.    Add to stuffing, cookies, muffins, breads, cakes, rice and grain dishes, puddings and cereals.    Combine with cooked vegetables such as yams, sweet potatoes, carrots and squash.  Nuts and seeds  Note: Avoid in children under age 3.    Use in casseroles, breads, muffins, pancakes, cookies and waffles.    Sprinkle on fruits, cereals, ice cream, yogurt, vegetables and salads.    Mix with raisins, dried fruits and chocolate chips for a snack.  Children under age 3 should avoid seeds, nuts, nut butters and hard pieces of fruit.  Nut butters  Note: Avoid in children under age 3.    Spread on sandwiches, toast, muffins, crackers, waffles, pancakes and fruit slices.    Use as a dip for raw vegetables.    Blend with milk shakes and nutrition drinks.    Swirl through ice cream or yogurt.    Mix into hot cereals.  Ice cream and frozen yogurt    Add to carbonated (fizzy) drinks such as ginger ale or root beer.    Blend with milk drinks for a shake or fruits for a smoothie.    Add to cereals, fruits, gelatin desserts and pies.    Hamlet between cookies, cristiana crackers or cake slices.    Scoop on top of waffles or pancakes.  Nutrition supplements (nutrition bars, drinks and powders)    Add powders to milk drinks and desserts.    Mix with ice cream, milk and fruit for a high-protein milk shake.    For informational purposes only. Not to replace the advice of your health care provider.  Copyright   2005 [employer]. All rights reserved. [employer] 260096 - REV 01/16.

## 2018-05-25 NOTE — PROGRESS NOTES
"[facility]  May 25, 2018      Behavioral Health Clinician Progress Note    Patient Name: Myrna Brothers           Service Type:  Individual      Service Location:   Face to Face in Clinic     Session Start Time:  2: 35 pm  Session End Time:  2:50 pm       Session Length: 16 - 37      Attendees: Patient    Visit Activities (Refresh list every visit): [facility] Only    Diagnostic Assessment Date: 4/12/18  Treatment Plan Review Date: 4/23/18  See Flowsheets for today's PHQ-9 and OREN-7 results  Previous PHQ-9:   PHQ-9 SCORE 2/28/2018 4/16/2018   Total Score 5 11     Previous OREN-7:   OREN-7 SCORE 2/28/2018 4/16/2018   Total Score 5 18       LUKASZ LEVEL:  No flowsheet data found.    DATA  Extended Session (60+ minutes): No  Interactive Complexity: No  Crisis: No  Merged with [facility] Patient: No    Treatment Objective(s) Addressed in This Session:  Target Behavior(s): disease management/lifestyle changes , mental health management    Depressed Mood: Decrease frequency and intensity of feeling down, depressed, hopeless  Identify negative self-talk and behaviors: challenge core beliefs, myths, and actions  Anxiety: will experience a reduction in anxiety, will develop more effective coping skills to manage anxiety symptoms, will develop healthy cognitive patterns and beliefs and will increase ability to function adaptively    Current Stressors / Issues:  [facility] briefly spoke with patient before and after appointment with Dr. Cortés. Patient stated that overall her depression and anxiety is \"okay\", denied worsening symptoms. She stated that since [facility] visit, her  was informed that he was going to be laid off. She stated that it was a time of high stress, but reported that he has had some promising job interviews. Patient reported that as a result of his job loss, she returned to her employer and they were able to find her a job within the company, but without needing to interact/report with the previous person that " "was the source of stress. Patient anticipates this upcoming week to be stressful as she prepares to transition, but reported that overall, she is looking forward to the job change.  Patient confirmed intention and motivation to follow up with [facility].    Patient at clinic due to unintended weight loss. Patient stated that she is concerned about weight loss, and reported that she is unsure of reasons why weight loss may be occurring. She stated that pre-pregnancy, her relationship with food was \"emotional\", but reported that she has been putting forth effort to reduce emotional eating.  Per patient, instead of a sugary drink in the morning, she is now eating fruit. She shared that she thinks that she is eating sufficient calories, but reported that during times of stress at work, she was not eating lunch. Patient reported that in recent weeks she has been trying to eat consistent meals to ensure sufficient caloric intake. MD recommended dietary consult, and patient expressed interest in the referral.    Patient denied additional questions, concerns, or needs at this time.    Progress on Treatment Objective(s) / Homework:  Satisfactory progress - PREPARATION (Decided to change - considering how); Intervened by negotiating a change plan and determining options / strategies for behavior change, identifying triggers, exploring social supports, and working towards setting a date to begin behavior change    Motivational Interviewing    MI Intervention: Expressed Empathy/Understanding, Supported Autonomy, Collaboration, Evocation, Permission to raise concern or advise, Open-ended questions, Change talk (evoked) and Reframe     Change Talk Expressed by the Patient: Ability to change Reasons to change Need to change    Provider Response to Change Talk: E - Evoked more info from patient about behavior change, A - Affirmed patient's thoughts, decisions, or attempts at behavior change, R - Reflected patient's change talk and S - " Summarized patient's change talk statements    Also provided psychoeducation about behavioral health condition, symptoms, and treatment options    Care Plan review completed: Yes    Medication Review:  No changes to current psychiatric medication(s)    Medication Compliance:  Yes    Changes in Health Issues:   None reported    Chemical Use Review:   Substance Use: Chemical use reviewed, no active concerns identified      Tobacco Use: No current tobacco use.      Assessment: Current Emotional / Mental Status (status of significant symptoms):  Risk status (Self / Other harm or suicidal ideation)  Patient has had a history of suicidal ideation: in correlation with stress, sent text on 4/13 about wanting to die, but no active SI. Per patient and , patient has had history of passive SI for more than 10 years and denies a history of suicide attempts, self-injurious behavior, homicidal ideation, homicidal behavior and and other safety concerns  Patient denies current fears or concerns for personal safety.  Patient denies current or recent suicidal ideation or behaviors.  Patient denies current or recent homicidal ideation or behaviors.  Patient denies current or recent self injurious behavior or ideation.  Patient denies other safety concerns.  A safety and risk management plan has not been developed at this time, however patient was encouraged to call [facility] should there be a change in any of these risk factors.    Appearance:   Appropriate   Eye Contact:   Good   Psychomotor Behavior: Normal   Attitude:   Cooperative   Orientation:   All  Speech   Rate / Production: Normal    Volume:  Normal   Mood:    Normal  Affect:    Appropriate   Thought Content:  Clear   Thought Form:  Coherent  Logical   Insight:    Good     Diagnoses:  1. Moderate episode of recurrent major depressive disorder (H)    2. Generalized anxiety disorder        Collateral Reports Completed:  Not Applicable    Plan: (Homework,  other):  Patient was given information about behavioral services and encouraged to schedule a follow up appointment with the clinic [facility] as needed.  She was also given Cognitive Behavioral Therapy skills to practice when experiencing anxiety and depression.  CD Recommendations: No indications of CD issues.  Stephanie Calabrese, [facility], [facility]      ______________________________________________________________________    Integrated Primary Care Behavioral Health Treatment Plan    Patient's Name: Myrna Brothers  YOB: 1985    Date: 4/23/18    DSM-V Diagnoses: 296.32 (F33.1) Major Depressive Disorder, Recurrent Episode, Moderate _ or 300.02 (F41.1) Generalized Anxiety Disorder  Psychosocial / Contextual Factors: first trimester of pregnancy, work related stress  WHODAS: TBD    Referral / Collaboration:  Patient referred to [facility], will transfer 5/21 to Miroslava Parekh.  Appointments with [facility] have been scheduled until able to establish care with [facility] therapist.    Anticipated number of session or this episode of care: 2-4      MeasurableTreatment Goal(s) related to diagnosis / functional impairment(s)  Goal 1: Patient will reduce feelings of depression as evidenced by decreased score on PHQ-9.    I will know I've met my goal when I feel less down and emotional.      Objective #A (Patient Action)    Patient will Decrease frequency and intensity of feeling down, depressed, hopeless.  Status: New - Date: 4/23/18     Intervention(s)  [facility] will assign homework to assist with behavioral activation/activity scheduling.    Objective #B  Patient will Identify negative self-talk and behaviors: challenge core beliefs, myths, and actions.  Status: New - Date: 4/23/18     Intervention(s)  [facility] will teach about automatic negative thoughts, irrational core beliefs, and cognitive restructuring techniques.        Goal 2: Patient will reduce feelings of anxiety as evidenced by decreased score on GAD7.    I will know  I've met my goal when I feel less anxious and worried.      Objective #A (Patient Action)    Status: New - Date: 4/23/18     Patient will identify three distraction and diversion activities and use those activities to decrease level of anxiety  .    Intervention(s)  [facility] will assign homework to practice distress tolerance and mindfulness techniques between session.    Objective #B  Patient will use cognitive strategies identified in therapy to challenge anxious thoughts.    Status: New - Date: 4/23/18     Intervention(s)  [facility] will teach CBT techniques related to cognitive restructuring.    Objective #C  Patient will use relaxation strategies 1-2 times per day to reduce the physical symptoms of anxiety.  Status: New - Date: 4/23/18     Intervention(s)  [facility] will role-play relaxation techniques.    Patient has reviewed and agreed to the above plan.      KVNG Webb  April 23, 2018

## 2018-05-25 NOTE — PROGRESS NOTES
"  SUBJECTIVE:   Myrna Brothers is a 32 year old female who presents to clinic today for the following health issues:      11 lb weight loss since March, 15 weeks pregnant. Also abd discomfort. Feels like muscles across stomach are being pulled. Diarrhea daily. She reports generalized abdominal \" cramping\" which extends into her back  and feels muscular.She reports 1-2 loose stools/day without change in color of BM  Here at the direction of her OB provider to get evaluated for her issues of weight loss and abdominal discomfort. Discussed with Mary Bronson and Stephanie Calabrese who have seen her for her psychologic issues going back to early April  Reviewed notes and note that she Was originally diagnosed with depression in college and then the anxiety started about 4 or 5 years   Has seen a counselor in the past and had been for year. Last time seen was about last spring.   No thoughts of hurting self or others  Recent left her position at  [employer]    No alcohol or recreational drugs   Sleep is OK    Problem list and histories reviewed & adjusted, as indicated.  Additional history: as documented    Patient Active Problem List   Diagnosis     Moderate episode of recurrent major depressive disorder (H)     Generalized anxiety disorder     Cervical cancer screening     Past Surgical History:   Procedure Laterality Date     HEAD & NECK SURGERY      wisdom teeth       Social History   Substance Use Topics     Smoking status: Never Smoker     Smokeless tobacco: Never Used     Alcohol use Yes      Comment: occasionally:  none with pregnancy     Family History   Problem Relation Age of Onset     Breast Cancer Mother      Stillborn Mother      had a still born     Other - See Comments Mother      2 miscarriage     DIABETES Father      HEART DISEASE Father      Anxiety Disorder Maternal Grandmother      Alzheimer Disease Maternal Grandmother      Asthma Maternal Grandfather      Prostate Cancer Maternal " Grandfather      Hypertension Paternal Grandmother      Leukemia Paternal Grandfather          Current Outpatient Prescriptions   Medication Sig Dispense Refill     busPIRone (BUSPAR) 5 MG tablet Take 1 tablet (5 mg) by mouth 2 times daily 180 tablet 1     Ferrous Sulfate (IRON SUPPLEMENT PO)        Omega-3 Fatty Acids (FISH OIL PO)        Prenatal Vit-Fe Fumarate-FA (PRENATAL VITAMIN PO)        sertraline (ZOLOFT) 50 MG tablet Take 1 tablet (50 mg) by mouth daily 30 tablet 1     Acetaminophen (TYLENOL PO) Take 325 mg by mouth       PROPRANOLOL HCL PO Take 10 mg by mouth as needed for high blood pressure       Allergies   Allergen Reactions     Xylocaine [Lidocaine] Anaphylaxis     BP Readings from Last 3 Encounters:   05/25/18 100/64   05/25/18 106/63   04/27/18 107/60    Wt Readings from Last 3 Encounters:   05/25/18 186 lb (84.4 kg)   05/25/18 185 lb 3.2 oz (84 kg)   04/27/18 188 lb 12.8 oz (85.6 kg)                    Reviewed and updated as needed this visit by clinical staff       Reviewed and updated as needed this visit by Provider         ROS:  Constitutional, HEENT, cardiovascular, pulmonary, GI, , musculoskeletal, neuro, skin, endocrine and psych systems are negative, except as otherwise noted.    OBJECTIVE:     /64  Pulse 81  Temp 97.7  F (36.5  C) (Temporal)  Wt 186 lb (84.4 kg)  LMP 02/03/2018 (Exact Date)  SpO2 97%  BMI 30.48 kg/m2  Body mass index is 30.48 kg/(m^2).  04/16/18 189 lb 3.2 oz (85.8 kg)   04/05/18 191 lb 11.2 oz (87 kg)   03/30/18 194 lb 3.2 oz (88.1 kg)   03/05/18 196 lb (88.9 kg)     Weight today is down another 3 lbs since last recorded weight.   The physical exam is generally normal. Patient appears well, alert and oriented x 3, pleasant, cooperative. Vitals are as noted by nurse. Neck supple and free of adenopathy, or masses. No thyromegaly. GENEVIEVE. . Abdomen is soft, no tenderness, masses or organomegaly. Extremities are normal. Peripheral pulses are normal. Screening  neurological exam is normal without focal findings. Skin is normal without suspicious lesions noted.  Diagnostic Test Results:  Abdominal ultrasound, CMP, Thyroid panel, multiple stool tests- all pending.   CBC - pending  Pt to be notified of results and follow up with primary care.   ASSESSMENT/PLAN:         ICD-10-CM    1. Weight loss R63.4 US Abdomen Complete     Comprehensive metabolic panel (BMP + Alb, Alk Phos, ALT, AST, Total. Bili, TP)     CBC with platelets and differential     TSH with free T4 reflex     Fat Stool Qual Random Collection     Giardia antigen     H Pylori antigen, stool   2. Diarrhea, unspecified type R19.7 US Abdomen Complete     Fat Stool Qual Random Collection     Giardia antigen     H Pylori antigen, stool       See Patient Instructions    Daljit Cortés MD  [facility]

## 2018-05-25 NOTE — PROGRESS NOTES
"She has not felt fetal movement yet but denies any vaginal bleeding or uterine contractions.  She is concerned about her weight loss - 9 lbs to-date, 3 lbs since her last prenatal visit.  She feels that she is eating normally and denies nausea or emesis issues.  She has noted stomach \"tightness\" and episodes of diarrhea so will refer her to FP for further evaluation.  She met with the genetic counselor due to AMA and is happy that her tests were normal.  She would like a 20-week screening OB US here.  "

## 2018-05-26 DIAGNOSIS — R19.7 DIARRHEA, UNSPECIFIED TYPE: ICD-10-CM

## 2018-05-26 DIAGNOSIS — R63.4 WEIGHT LOSS: ICD-10-CM

## 2018-05-26 PROCEDURE — 82705 FATS/LIPIDS FECES QUAL: CPT | Mod: 90 | Performed by: FAMILY MEDICINE

## 2018-05-26 PROCEDURE — 87329 GIARDIA AG IA: CPT | Performed by: FAMILY MEDICINE

## 2018-05-26 PROCEDURE — 99000 SPECIMEN HANDLING OFFICE-LAB: CPT | Performed by: FAMILY MEDICINE

## 2018-05-26 PROCEDURE — 87338 HPYLORI STOOL AG IA: CPT | Performed by: FAMILY MEDICINE

## 2018-05-27 LAB
G LAMBLIA AG STL QL IA: NORMAL
SPECIMEN SOURCE: NORMAL

## 2018-05-28 LAB
FAT STL QL: NORMAL
H PYLORI AG STL QL IA: NORMAL
NEUTRAL FAT STL QL: NORMAL
SPECIMEN SOURCE: NORMAL

## 2018-05-29 ENCOUNTER — RADIANT APPOINTMENT (OUTPATIENT)
Dept: ULTRASOUND IMAGING | Facility: CLINIC | Age: 33
End: 2018-05-29
Attending: FAMILY MEDICINE
Payer: COMMERCIAL

## 2018-05-29 DIAGNOSIS — R19.7 DIARRHEA, UNSPECIFIED TYPE: ICD-10-CM

## 2018-05-29 DIAGNOSIS — R63.4 WEIGHT LOSS: ICD-10-CM

## 2018-05-29 PROCEDURE — 76700 US EXAM ABDOM COMPLETE: CPT | Performed by: RADIOLOGY

## 2018-06-06 ENCOUNTER — OFFICE VISIT (OUTPATIENT)
Dept: NUTRITION | Facility: CLINIC | Age: 33
End: 2018-06-06
Attending: FAMILY MEDICINE
Payer: COMMERCIAL

## 2018-06-06 DIAGNOSIS — Z33.1 PREGNANT STATE, INCIDENTAL: Primary | ICD-10-CM

## 2018-06-06 DIAGNOSIS — R63.4 WEIGHT LOSS: ICD-10-CM

## 2018-06-06 PROCEDURE — 97802 MEDICAL NUTRITION INDIV IN: CPT | Performed by: DIETITIAN, REGISTERED

## 2018-06-07 NOTE — PROGRESS NOTES
REPORT OF MEDICAL NUTRITION THERAPY    Patient Name: Myrna Brothers  MRN: [id number],  Age: 32 year old,  Gender: female    Encounter Date: 6/6/2018,  Encounter Time:30 minute Initial    Provider: Preethi Rogers, ABRAHAM, LD, CDE    Referral received to provide Medical Nutrition Therapy for patient for treatment of pregnancy with weight loss.    NUTRITION HISTORY:  Myrna is here to assess nutritional adequacy in setting of weight loss in first half of pregnancy.  She reports having no present problem from morning sickness or food aversions although she has not been interested in eating meat.  Weight loss of 9 pounds has occurred since start of pregnancy.  She is now 17 weeks pregnant.    She has made changes to improve the healthfulness of her food choices and that has resulted in reduced calorie intake.    Usual intake consists of:  Breakfast:On the run, light choice  Snack:  Lunch:Lean Cuisine or other light frozen meal  Snack:few snacks  Dinner: Home cooked meal,   Snack:[place] water, apple  She has not been drinking milk or having dairy due to concern about lactose intolerance and diarrhea.  She has reduced protein intake due to meat avoidance.    DATA:    Wt Readings from Last 5 Encounters:   05/25/18 84.4 kg (186 lb)   05/25/18 84 kg (185 lb 3.2 oz)   04/27/18 85.6 kg (188 lb 12.8 oz)   04/16/18 85.8 kg (189 lb 3.2 oz)   04/05/18 87 kg (191 lb 11.2 oz)         LABS:  Hemoglobin   Date Value Ref Range Status   05/25/2018 13.4 11.7 - 15.7 g/dL Final   ]    MEDICATIONS:  Current Outpatient Prescriptions   Medication     Acetaminophen (TYLENOL PO)     busPIRone (BUSPAR) 5 MG tablet     Ferrous Sulfate (IRON SUPPLEMENT PO)     Omega-3 Fatty Acids (FISH OIL PO)     Prenatal Vit-Fe Fumarate-FA (PRENATAL VITAMIN PO)     PROPRANOLOL HCL PO     sertraline (ZOLOFT) 50 MG tablet     No current facility-administered medications for this visit.        ASSESSMENT:      Myrna has had weight loss related to decreased  caloric intake with pregnancy per her report.  She is at risk of protein deficiency due to avoidance of meat and dairy.  She is very motivated to increase total and protein intake due improve nutrition.    INTERVENTION:    Goals for nutrition in pregnancy reviewed with patient.  Plan for improved adequacy developed with her.  Sample menu with meals and snacks developed.  Plan for increasing nutritional content of meals discussed.  Protein sources to achieve adequacy within her tolerances were reviewed.    PLAN:  Myrna will:  1.Add nutrition in the morning for breakfast in take along products.  Try egg bake, smoothie, and lactose free milk and peanut butter sandwich as options.  2. Add food to lunch:  Have cottage cheese and fruit with frozen meal, add vegetables.  3. Add healthy snacks: Apples with peanut butter, string cheese, yogurt and cheese and crackers  4. Dinner: Keep balanced plate model.  Have black beans, cottage cheese, eggs or other cheese when not including meat.      FOLLOW-UP  Patient will keep return in 2 weeks following her OB appointment.      [address] [address]  [phone]  [phone]    Date: 6/7/2018  Time: 2:02 PM

## 2018-06-09 ENCOUNTER — TELEPHONE (OUTPATIENT)
Dept: PEDIATRICS | Facility: CLINIC | Age: 33
End: 2018-06-09

## 2018-06-09 ENCOUNTER — NURSE TRIAGE (OUTPATIENT)
Dept: NURSING | Facility: CLINIC | Age: 33
End: 2018-06-09

## 2018-06-09 DIAGNOSIS — F41.1 GENERALIZED ANXIETY DISORDER: ICD-10-CM

## 2018-06-09 DIAGNOSIS — F32.5 MAJOR DEPRESSIVE DISORDER WITH SINGLE EPISODE, IN FULL REMISSION (H): ICD-10-CM

## 2018-06-09 NOTE — TELEPHONE ENCOUNTER
Patient requesting refill of Zoloft; completely out of medication.  Request refill be sent to [facility] #[id number]; FNA E-Prescribed.

## 2018-06-09 NOTE — TELEPHONE ENCOUNTER
Patient requesting refill of Zoloft; completely out of medication.  Request refill be sent to [facility] #[id number]; FNA E-Prescribed and informed patient to call 1 week prior to being out of medication when refill needed.

## 2018-06-22 ENCOUNTER — OFFICE VISIT (OUTPATIENT)
Dept: NUTRITION | Facility: CLINIC | Age: 33
End: 2018-06-22
Payer: COMMERCIAL

## 2018-06-22 ENCOUNTER — PRENATAL OFFICE VISIT (OUTPATIENT)
Dept: OBGYN | Facility: CLINIC | Age: 33
End: 2018-06-22
Payer: COMMERCIAL

## 2018-06-22 VITALS
WEIGHT: 188.2 LBS | BODY MASS INDEX: 30.84 KG/M2 | DIASTOLIC BLOOD PRESSURE: 62 MMHG | HEART RATE: 76 BPM | OXYGEN SATURATION: 94 % | SYSTOLIC BLOOD PRESSURE: 106 MMHG

## 2018-06-22 DIAGNOSIS — Z33.1 PREGNANT STATE, INCIDENTAL: Primary | ICD-10-CM

## 2018-06-22 DIAGNOSIS — Z34.82 ENCOUNTER FOR SUPERVISION OF OTHER NORMAL PREGNANCY, SECOND TRIMESTER: Primary | ICD-10-CM

## 2018-06-22 PROCEDURE — 99207 ZZC PRENATAL VISIT: CPT | Performed by: OBSTETRICS & GYNECOLOGY

## 2018-06-22 NOTE — MR AVS SNAPSHOT
After Visit Summary   6/22/2018    Myrna Brothers    MRN: [id number]           Patient Information     Date Of Birth          1985        Visit Information        Provider Department      6/22/2018 4:00 PM Ashley Bernardo DO [facility]        Care Instructions                                                         If you have any questions regarding your visit, Please contact your care team.    [facility] CLINIC HOURS TELEPHONE NUMBER   Ashley Bernardo DO.    BABATUNDE Taylor -    DAO Vieira       Monday, Wednesday, [facility]  8:30a.m-5:00 p.m   [facility]  [address]  [place], [place] [address] [phone] ask for [facility]    Imaging Ikajqijdju-[phone]       Urgent Care locations:    [facility] Saturday and Sunday   9 am - 5 pm    Monday-Friday   12 pm - 8 pm  Saturday and Sunday   9 am - 5 pm   [phone]     [facility] Labor and Delivery:  [phone]    If you need a medication refill, please contact your pharmacy. Please allow 3 business days for your refill to be completed.  As always, Thank you for trusting us with your healthcare needs!                Follow-ups after your visit        Your next 10 appointments already scheduled     Jun 22, 2018  4:30 PM CDT   Return Visit with Preethi Rogers   [facility]    [address] [facility] [id number] [phone]            Jun 26, 2018  4:40 PM CDT   US OB > 14 WEEKS COMPLETE SINGLE with MGUS1, MG US TECH   [facility])    [address] [facility] [address] [phone]           Please bring a list of your medicines (including vitamins, minerals and over-the-counter drugs). Also, tell your doctor about any allergies you may have. Wear comfortable clothes and  leave your valuables at home.  If you re less than 20 weeks drink four 8-ounce glasses of fluid an hour before your exam. If you need to empty your bladder before your exam, try to release only a little urine. Then, drink another glass of fluid.  You may have up to two family members in the exam room. If you bring a small child, an adult must be there to care for him or her.  Please call the Imaging Department at your exam site with any questions.            Jul 19, 2018  4:15 PM CDT   ESTABLISHED PRENATAL with Ashley Bernardo, DO   [facility] ([facility])    [address] [place] [address] [phone]              Who to contact     If you have questions or need follow up information about today's clinic visit or your schedule please contact [facility] directly at [phone].  Normal or non-critical lab and imaging results will be communicated to you by MyChart, letter or phone within 4 business days after the clinic has received the results. If you do not hear from us within 7 days, please contact the clinic through GigMastershart or phone. If you have a critical or abnormal lab result, we will notify you by phone as soon as possible.  Submit refill requests through SvitStyle or call your pharmacy and they will forward the refill request to us. Please allow 3 business days for your refill to be completed.          Additional Information About Your Visit        [email or web address] Information     S[employer]Style gives you secure access to your electronic health record. If you see a primary care provider, you can also send messages to your care team and make appointments. If you have questions, please call your primary care clinic.  If you do not have a primary care provider, please call [phone] and they will assist you.        Care EveryWhere ID     This is your Care EveryWhere ID. This could be used by other organizations to access your [facility]  records  [id number]        Your Vitals Were     Pulse Last Period Pulse Oximetry Breastfeeding? BMI (Body Mass Index)       76 02/03/2018 (Exact Date) 94% No 30.84 kg/m2        Blood Pressure from Last 3 Encounters:   06/22/18 106/62   05/25/18 100/64   05/25/18 106/63    Weight from Last 3 Encounters:   06/22/18 188 lb 3.2 oz (85.4 kg)   05/25/18 186 lb (84.4 kg)   05/25/18 185 lb 3.2 oz (84 kg)              Today, you had the following     No orders found for display       Primary Care Provider Office Phone # Fax #    [facility] [phone] [address] [facility] [address]        Equal Access to Services     MANNY OCONNOR : Aneesh kathleeno Sochaparro, waaxda luqadaha, qaybta kaalmada adeegyada, raquel barreto . So [facility] [phone].    ATENCIÓN: Si habla español, tiene a flowers disposición servicios gratuitos de asistencia lingüística. Llame al [phone].    We comply with applicable federal civil rights laws and [place] laws. We do not discriminate on the basis of race, color, national origin, age, disability, sex, sexual orientation, or gender identity.            Thank you!     Thank you for choosing [facility]  for your care. Our goal is always to provide you with excellent care. Hearing back from our patients is one way we can continue to improve our services. Please take a few minutes to complete the written survey that you may receive in the mail after your visit with us. Thank you!             Your Updated Medication List - Protect others around you: Learn how to safely use, store and throw away your medicines at www.disposemymeds.org.          This list is accurate as of 6/22/18  4:24 PM.  Always use your most recent med list.                   Brand Name Dispense Instructions for use Diagnosis    busPIRone 5 MG tablet    BUSPAR    180 tablet    Take 1 tablet (5 mg) by mouth 2 times daily    Generalized anxiety  disorder       FISH OIL PO           IRON SUPPLEMENT PO           PRENATAL VITAMIN PO           PROPRANOLOL HCL PO      Take 10 mg by mouth as needed for high blood pressure        sertraline 50 MG tablet    ZOLOFT    30 tablet    Take 1 tablet (50 mg) by mouth daily    Generalized anxiety disorder, Major depressive disorder with single episode, in full remission (H)       TYLENOL PO      Take 325 mg by mouth

## 2018-06-22 NOTE — PROGRESS NOTES
"She reports feeling reassuring daily fetal activity and will continue to record.  She denies any fluid leakage or regular uterine contractions.  She gained 3 lbs since her last visit and feels better with no nausea.  She had the Verify with MFM and so does not need a MQS.  Her 20-week OB US is scheduled for next Tuesday.  She is meeting with the dietician today and is to mention that she has \"gallbladder sludge.\"  The pt c/o pubic symphysis discomfort so a pregnancy belt/girdle was advised.  "

## 2018-06-22 NOTE — PATIENT INSTRUCTIONS
If you have any questions regarding your visit, Please contact your care team.    Women s Health CLINIC HOURS TELEPHONE NUMBER   Ashley Bernardo DO.    BABATUNDE Taylor -    DAO Vieira       Monday, Wednesday, Thursday and Friday, [place]  8:30a.m-5:00 p.m   [facility]  [address] [place] [address]  [phone] ask for [facility]    Imaging Skfsvzluvg-[phone]       Urgent Care locations:    [facility] Saturday and Sunday   9 am - 5 pm    Monday-Friday   12 pm - 8 pm  Saturday and Sunday   9 am - 5 pm   [phone]     [facility] Labor and Delivery:  [phone]    If you need a medication refill, please contact your pharmacy. Please allow 3 business days for your refill to be completed.  As always, Thank you for trusting us with your healthcare needs!

## 2018-06-26 ENCOUNTER — RADIANT APPOINTMENT (OUTPATIENT)
Dept: ULTRASOUND IMAGING | Facility: CLINIC | Age: 33
End: 2018-06-26
Attending: OBSTETRICS & GYNECOLOGY
Payer: COMMERCIAL

## 2018-06-26 DIAGNOSIS — Z34.02 SUPERVISION OF NORMAL FIRST PREGNANCY IN SECOND TRIMESTER: ICD-10-CM

## 2018-06-26 DIAGNOSIS — O09.522 ELDERLY MULTIGRAVIDA IN SECOND TRIMESTER: ICD-10-CM

## 2018-06-26 PROCEDURE — 76805 OB US >/= 14 WKS SNGL FETUS: CPT | Performed by: STUDENT IN AN ORGANIZED HEALTH CARE EDUCATION/TRAINING PROGRAM

## 2018-06-27 DIAGNOSIS — Z34.02 SUPERVISION OF NORMAL FIRST PREGNANCY IN SECOND TRIMESTER: Primary | ICD-10-CM

## 2018-07-06 DIAGNOSIS — F32.5 MAJOR DEPRESSIVE DISORDER WITH SINGLE EPISODE, IN FULL REMISSION (H): ICD-10-CM

## 2018-07-06 DIAGNOSIS — F41.1 GENERALIZED ANXIETY DISORDER: ICD-10-CM

## 2018-07-06 NOTE — TELEPHONE ENCOUNTER
Routing refill request to provider for review/approval because:  Patient is currently pregnant  PHQ9 score>4.  To provider to authorize per RN refill protocol.    PHQ-9 score:    PHQ-9 SCORE 4/16/2018   Total Score 11         sertraline (ZOLOFT) 50 MG tablet 30 tablet 0 6/9/2018  No      Sig - Route: Take 1 tablet (50 mg) by mouth daily - Oral       Last office visit:  5/25/18 Dr. Cortés                             4/16/18  Mary Bronson      Next 5 appointments (look out 90 days)     Jul 13, 2018  4:30 PM CDT   Return Visit with DONY Mendiola CNP   [facility] ([facility])    [address] [place] [id number] [phone]            Jul 19, 2018  4:15 PM CDT   ESTABLISHED PRENATAL with Ashley Bernardo DO   [facility] ([facility])    [address] [place] [id number] [phone]                Kathi Santana RN,   [facility]

## 2018-07-07 NOTE — TELEPHONE ENCOUNTER
PHQ-9 SCORE 2/28/2018 4/16/2018   Total Score 5 11       OREN-7 SCORE 2/28/2018 4/16/2018   Total Score 5 18       Refill completed.

## 2018-07-09 ENCOUNTER — RADIANT APPOINTMENT (OUTPATIENT)
Dept: ULTRASOUND IMAGING | Facility: CLINIC | Age: 33
End: 2018-07-09
Attending: OBSTETRICS & GYNECOLOGY
Payer: COMMERCIAL

## 2018-07-09 DIAGNOSIS — Z34.02 SUPERVISION OF NORMAL FIRST PREGNANCY IN SECOND TRIMESTER: ICD-10-CM

## 2018-07-09 PROCEDURE — 76816 OB US FOLLOW-UP PER FETUS: CPT | Performed by: RADIOLOGY

## 2018-07-13 ENCOUNTER — OFFICE VISIT (OUTPATIENT)
Dept: PEDIATRICS | Facility: CLINIC | Age: 33
End: 2018-07-13
Payer: COMMERCIAL

## 2018-07-13 VITALS
OXYGEN SATURATION: 95 % | HEART RATE: 82 BPM | WEIGHT: 191.3 LBS | TEMPERATURE: 97.7 F | DIASTOLIC BLOOD PRESSURE: 50 MMHG | SYSTOLIC BLOOD PRESSURE: 100 MMHG | BODY MASS INDEX: 31.35 KG/M2

## 2018-07-13 DIAGNOSIS — F41.1 GENERALIZED ANXIETY DISORDER: ICD-10-CM

## 2018-07-13 DIAGNOSIS — F33.1 MODERATE EPISODE OF RECURRENT MAJOR DEPRESSIVE DISORDER (H): Primary | ICD-10-CM

## 2018-07-13 PROCEDURE — 99213 OFFICE O/P EST LOW 20 MIN: CPT | Performed by: NURSE PRACTITIONER

## 2018-07-13 RX ORDER — BUSPIRONE HYDROCHLORIDE 5 MG/1
5 TABLET ORAL 2 TIMES DAILY
Qty: 180 TABLET | Refills: 1 | Status: SHIPPED | OUTPATIENT
Start: 2018-07-13 | End: 2019-07-22

## 2018-07-13 ASSESSMENT — ANXIETY QUESTIONNAIRES
3. WORRYING TOO MUCH ABOUT DIFFERENT THINGS: NOT AT ALL
1. FEELING NERVOUS, ANXIOUS, OR ON EDGE: NOT AT ALL
5. BEING SO RESTLESS THAT IT IS HARD TO SIT STILL: NOT AT ALL
2. NOT BEING ABLE TO STOP OR CONTROL WORRYING: NOT AT ALL
7. FEELING AFRAID AS IF SOMETHING AWFUL MIGHT HAPPEN: NOT AT ALL
6. BECOMING EASILY ANNOYED OR IRRITABLE: SEVERAL DAYS
GAD7 TOTAL SCORE: 1
IF YOU CHECKED OFF ANY PROBLEMS ON THIS QUESTIONNAIRE, HOW DIFFICULT HAVE THESE PROBLEMS MADE IT FOR YOU TO DO YOUR WORK, TAKE CARE OF THINGS AT HOME, OR GET ALONG WITH OTHER PEOPLE: NOT DIFFICULT AT ALL

## 2018-07-13 ASSESSMENT — PATIENT HEALTH QUESTIONNAIRE - PHQ9: 5. POOR APPETITE OR OVEREATING: NOT AT ALL

## 2018-07-13 NOTE — PROGRESS NOTES
SUBJECTIVE:   Myrna Brothers is a 32 year old female who presents to clinic today for the following health issues:    1. Sharp vaginal pain x 3-4 months, has been worst in the last month currently 22w2d pregnant. Denies excessive discharge or bleeding.      Depression and Anxiety Follow-Up    Status since last visit: Improved     Other associated symptoms:None    Complicating factors:     Significant life event: Yes-  Currently pregnant      Current substance abuse: None    PHQ-9 2/28/2018 4/16/2018   Total Score 5 11   Q9: Suicide Ideation Several days Several days     OREN-7 SCORE 2/28/2018 4/16/2018   Total Score 5 18   just got a new job in June for the same company and is much happier in this new position   Now  also has a new job     In the past two weeks have you had thoughts of suicide or self-harm?  No.    Do you have concerns about your personal safety or the safety of others?   No  PHQ-9  English  PHQ-9   Any Language  OREN-7  Suicide Assessment Five-step Evaluation and Treatment (SAFE-T)    Amount of exercise or physical activity: None    Problems taking medications regularly: No    Medication side effects: none    Diet: regular (no restrictions)    Problem list and histories reviewed & adjusted, as indicated.  Additional history: as documented    Patient Active Problem List   Diagnosis     Moderate episode of recurrent major depressive disorder (H)     Generalized anxiety disorder     Cervical cancer screening     Past Surgical History:   Procedure Laterality Date     HEAD & NECK SURGERY      wisdom teeth       Social History   Substance Use Topics     Smoking status: Never Smoker     Smokeless tobacco: Never Used     Alcohol use Yes      Comment: occasionally:  none with pregnancy     Family History   Problem Relation Age of Onset     Breast Cancer Mother      Stillborn Mother      had a still born     Other - See Comments Mother      2 miscarriage     Diabetes Father      HEART DISEASE  Father      Anxiety Disorder Maternal Grandmother      Alzheimer Disease Maternal Grandmother      Asthma Maternal Grandfather      Prostate Cancer Maternal Grandfather      Hypertension Paternal Grandmother      Leukemia Paternal Grandfather          Current Outpatient Prescriptions   Medication Sig Dispense Refill     Acetaminophen (TYLENOL PO) Take 325 mg by mouth       busPIRone (BUSPAR) 5 MG tablet Take 1 tablet (5 mg) by mouth 2 times daily 180 tablet 1     Ferrous Sulfate (IRON SUPPLEMENT PO)        Omega-3 Fatty Acids (FISH OIL PO)        Prenatal Vit-Fe Fumarate-FA (PRENATAL VITAMIN PO)        PROPRANOLOL HCL PO Take 10 mg by mouth as needed for high blood pressure       sertraline (ZOLOFT) 50 MG tablet TAKE 1 TABLET BY MOUTH EVERY DAY 90 tablet 1     Allergies   Allergen Reactions     Xylocaine [Lidocaine] Anaphylaxis     Labs reviewed in EPIC    Reviewed and updated as needed this visit by clinical staff  Tobacco  Allergies  Meds  Med Hx  Surg Hx  Fam Hx  Soc Hx      Reviewed and updated as needed this visit by Provider         ROS:  Constitutional, HEENT, cardiovascular, pulmonary, gi and gu systems are negative, except as otherwise noted.    OBJECTIVE:     /50 (BP Location: Right arm, Patient Position: Sitting, Cuff Size: Adult Regular)  Pulse 82  Temp 97.7  F (36.5  C) (Temporal)  Wt 191 lb 4.8 oz (86.8 kg)  LMP 02/03/2018 (Exact Date)  SpO2 95%  BMI 31.35 kg/m2  Body mass index is 31.35 kg/(m^2).  GENERAL APPEARANCE: alert, active and no distress  RESP: lungs clear to auscultation - no rales, rhonchi or wheezes  CV: regular rates and rhythm and no murmur, click or rub  MS: extremities normal- no gross deformities noted  SKIN: no suspicious lesions or rashes  PSYCH: mentation appears normal and affect normal/bright  MENTAL STATUS EXAM:  Appearance/Behavior: No apparent distress, Casually groomed and Dressed appropriately for weather  Speech: Normal  Mood/Affect: normal  affect  Insight: Adequate    Diagnostic Test Results:  none     ASSESSMENT/PLAN:       Myrna was seen today for depression, anxiety and vaginal problem.    Diagnoses and all orders for this visit:    Moderate episode of recurrent major depressive disorder (H)    Generalized anxiety disorder  -     busPIRone (BUSPAR) 5 MG tablet; Take 1 tablet (5 mg) by mouth 2 times daily      PLAN:   1. Risks and benefits of medication(s) reviewed with patient.  Questions answered.  Patient instructed to call for significant side effects medications or problems  Patient advised immediate presentation to hospital for suicidal thought, etc.  The current medical regimen is effective;  continue present plan and medications.  Symptomatic therapy suggested: Continue current medications.  2.  Orders Placed This Encounter   Medications     busPIRone (BUSPAR) 5 MG tablet     Sig: Take 1 tablet (5 mg) by mouth 2 times daily     Dispense:  180 tablet     Refill:  1     3. Patient needs to follow up in if no improvement,or sooner if worsening of symptoms or other symptoms develop.  Follow up in 2 months.    See Patient Instructions    DONY Mendiola CNP  [facility]

## 2018-07-13 NOTE — PATIENT INSTRUCTIONS
PLAN:   1.   Symptomatic therapy suggested: Continue current medications.  2.  Orders Placed This Encounter   Medications     busPIRone (BUSPAR) 5 MG tablet     Sig: Take 1 tablet (5 mg) by mouth 2 times daily     Dispense:  180 tablet     Refill:  1     3. Patient needs to follow up in if no improvement,or sooner if worsening of symptoms or other symptoms develop.  Follow up in 2 months.    It was a pleasure seeing you today at the [facility]. Thank you for allowing us to care for you today. We truly hope we provided you with the excellent service you deserve. Please let us know if there is anything else we can do for you so we can be sure you are leaving completley satisfied with your care experience.       General information about your clinic   Clinic Hours Lab Hours (Appointments are required)   Mon-Thurs: 7:30 AM - 7 PM Mon-Thurs: 7:30 AM - 7 PM   Fri: 7:30 AM - 5 PM Fri: 7:30 AM - 5 PM        After Hours Nurse Advise & Appts:  [facility] Nurse Advisors: [phone]  [facility] [facility] On Call: to make appointments anytime: [phone] On Call Physician: call [phone] and answering service will page the on call physician.        For urgent appointments, please call [phone] and ask for the triage nurse or your care team clinic nurse.  How to contact my care team:  Eddie[facility]: [email or web address]   Phone: [phone]   Fax: [phone]       [facility]:   Phone: [phone]  Hours: 8:00 AM - 6:00 PM  Medication Refills:  Call your pharmacy and they will forward the refill to us. Please allow 3 business days for your refills to be completed.       Normal or non-critical lab and imaging results will be communicated to you by MyChart, letter or phone within 7 days.  If you do not hear from us within 10 days, please call the clinic. If you have a critical or abnormal lab result, we will notify you by phone as soon as possible.       We now have PWIC (Pediatric Walk in  Care)  Monday-Friday from 7:30-4. Simply walk in and be seen for your urgent needs like cough, fever, rash, diarrhea or vomiting, pink eye, UTI. No appointments needed. Ask one of the team for more information      -Your Care Team:    Dr. Augie Ochoa - Internal Medicine/Pediatrics   Dr. Anne Marie Nieves - Family Medicine  Dr. Imelda Taylor - Pediatrics  Dr. Isa Ibarra - Pediatrics  Mery Bronson CNP - Family Practice Nurse Practitioner

## 2018-07-13 NOTE — NURSING NOTE
"Chief Complaint   Patient presents with     Depression     Anxiety     Vaginal Problem     vaginal pain x 3-4 months, has been worst in the last month currently 22w2d pregnant        Initial /50 (BP Location: Right arm, Patient Position: Sitting, Cuff Size: Adult Regular)  Pulse 82  Temp 97.7  F (36.5  C) (Temporal)  Wt 191 lb 4.8 oz (86.8 kg)  LMP 02/03/2018 (Exact Date)  SpO2 95%  BMI 31.35 kg/m2 Estimated body mass index is 31.35 kg/(m^2) as calculated from the following:    Height as of 3/30/18: 5' 5.5\" (1.664 m).    Weight as of this encounter: 191 lb 4.8 oz (86.8 kg).  Medication Reconciliation: complete      DAHLIA Ruffin      "

## 2018-07-13 NOTE — MR AVS SNAPSHOT
After Visit Summary   7/13/2018    Myrna Brothers    MRN: [id number]           Patient Information     Date Of Birth          1985        Visit Information        Provider Department      7/13/2018 4:30 PM Mery Bronson APRN CNP [facility]        Today's Diagnoses     Moderate episode of recurrent major depressive disorder (H)    -  1    Generalized anxiety disorder          Care Instructions    PLAN:   1.   Symptomatic therapy suggested: Continue current medications.  2.  Orders Placed This Encounter   Medications     busPIRone (BUSPAR) 5 MG tablet     Sig: Take 1 tablet (5 mg) by mouth 2 times daily     Dispense:  180 tablet     Refill:  1     3. Patient needs to follow up in if no improvement,or sooner if worsening of symptoms or other symptoms develop.  Follow up in 2 months.    It was a pleasure seeing you today at the [facility]. Thank you for allowing us to care for you today. We truly hope we provided you with the excellent service you deserve. Please let us know if there is anything else we can do for you so we can be sure you are leaving completley satisfied with your care experience.       General information about your clinic   Clinic Hours Lab Hours (Appointments are required)   Mon-Thurs: 7:30 AM - 7 PM Mon-Thurs: 7:30 AM - 7 PM   Fri: 7:30 AM - 5 PM Fri: 7:30 AM - 5 PM        After Hours Nurse Advise & Appts:  [facility] Nurse Advisors: [phone]  [facility] On Call: to make appointments anytime: [phone] On Call Physician: call [phone] and answering service will page the on call physician.        For urgent appointments, please call [phone] and ask for the triage nurse or your care team clinic nurse.  How to contact my care team:  MyChart: [email or web address]   Phone: [phone]   Fax: [phone]       [facility]:   Phone: [phone]  Hours: 8:00 AM - 6:00 PM  Medication Refills:  Call  your pharmacy and they will forward the refill to us. Please allow 3 business days for your refills to be completed.       Normal or non-critical lab and imaging results will be communicated to you by MyChart, letter or phone within 7 days.  If you do not hear from us within 10 days, please call the clinic. If you have a critical or abnormal lab result, we will notify you by phone as soon as possible.       We now have PWIC (Pediatric Walk in Care)  Monday-Friday from 7:30-4. Simply walk in and be seen for your urgent needs like cough, fever, rash, diarrhea or vomiting, pink eye, UTI. No appointments needed. Ask one of the team for more information      -Your Care Team:    Dr. Augie Ochoa - Internal Medicine/Pediatrics   Dr. Anne Marie Nieves - Family Medicine  Dr. Imelda Taylor - Pediatrics  Dr. Isa Ibarra - Pediatrics  Mery Bronson CNP - Family Practice Nurse Practitioner                         Follow-ups after your visit        Your next 10 appointments already scheduled     Jul 19, 2018  4:15 PM CDT   ESTABLISHED PRENATAL with Ashley Bernardo,    [facility])    [id number] [place] [address] [phone]              Who to contact     If you have questions or need follow up information about today's clinic visit or your schedule please contact [facility] directly at [phone].  Normal or non-critical lab and imaging results will be communicated to you by MyChart, letter or phone within 4 business days after the clinic has received the results. If you do not hear from us within 7 days, please contact the clinic through MyChart or phone. If you have a critical or abnormal lab result, we will notify you by phone as soon as possible.  Submit refill requests through [employer] or call your pharmacy and they will forward the refill request to us. Please allow 3 business days for your refill to be completed.          Additional  Information About Your Visit        Cozy Cloudhart Information     Deanslist gives you secure access to your electronic health record. If you see a primary care provider, you can also send messages to your care team and make appointments. If you have questions, please call your primary care clinic.  If you do not have a primary care provider, please call [phone] and they will assist you.      Deanslist is an electronic gateway that provides easy, online access to your medical records. With Deanslist, you can request a clinic appointment, read your test results, renew a prescription or communicate with your care team.     To access your existing account, please contact your [facility] or call [phone] for assistance.        Care EveryWhere ID     This is your Care EveryWhere ID. This could be used by other organizations to access your [place] medical records  [id number]        Your Vitals Were     Pulse Temperature Last Period Pulse Oximetry BMI (Body Mass Index)       82 97.7  F (36.5  C) (Temporal) 02/03/2018 (Exact Date) 95% 31.35 kg/m2        Blood Pressure from Last 3 Encounters:   07/13/18 100/50   06/22/18 106/62   05/25/18 100/64    Weight from Last 3 Encounters:   07/13/18 191 lb 4.8 oz (86.8 kg)   06/22/18 188 lb 3.2 oz (85.4 kg)   05/25/18 186 lb (84.4 kg)              Today, you had the following     No orders found for display         Where to get your medicines      These medications were sent to [id number] - [place], [place] - [address] [place] [facility] [address]     Phone:  [phone]     busPIRone 5 MG tablet          Primary Care Provider Office Phone # Fax #    [facility] [phone] [address] [facility] [address]        Equal Access to Services     MANNY OCONNOR : Aneesh Bergman, mae braxton, raquel bangura  donny oterooscar gloriahiram lasanjeevranulfo ah. So [facility] [phone].    ATENCIÓN: Si habla rogelio, tiene a flowers disposición servicios gratuitos de asistencia lingüística. Cory al [phone].    We comply with applicable federal civil rights laws and [place] laws. We do not discriminate on the basis of race, color, national origin, age, disability, sex, sexual orientation, or gender identity.            Thank you!     Thank you for choosing [facility]  for your care. Our goal is always to provide you with excellent care. Hearing back from our patients is one way we can continue to improve our services. Please take a few minutes to complete the written survey that you may receive in the mail after your visit with us. Thank you!             Your Updated Medication List - Protect others around you: Learn how to safely use, store and throw away your medicines at www.disposemymeds.org.          This list is accurate as of 7/13/18  4:59 PM.  Always use your most recent med list.                   Brand Name Dispense Instructions for use Diagnosis    busPIRone 5 MG tablet    BUSPAR    180 tablet    Take 1 tablet (5 mg) by mouth 2 times daily    Generalized anxiety disorder       FISH OIL PO           IRON SUPPLEMENT PO           PRENATAL VITAMIN PO           PROPRANOLOL HCL PO      Take 10 mg by mouth as needed for high blood pressure        sertraline 50 MG tablet    ZOLOFT    90 tablet    TAKE 1 TABLET BY MOUTH EVERY DAY    Generalized anxiety disorder, Major depressive disorder with single episode, in full remission (H)       TYLENOL PO      Take 325 mg by mouth

## 2018-07-14 ASSESSMENT — ANXIETY QUESTIONNAIRES: GAD7 TOTAL SCORE: 1

## 2018-07-14 ASSESSMENT — PATIENT HEALTH QUESTIONNAIRE - PHQ9: SUM OF ALL RESPONSES TO PHQ QUESTIONS 1-9: 1

## 2018-07-19 ENCOUNTER — PRENATAL OFFICE VISIT (OUTPATIENT)
Dept: OBGYN | Facility: CLINIC | Age: 33
End: 2018-07-19
Payer: COMMERCIAL

## 2018-07-19 VITALS
DIASTOLIC BLOOD PRESSURE: 65 MMHG | BODY MASS INDEX: 31.94 KG/M2 | SYSTOLIC BLOOD PRESSURE: 99 MMHG | OXYGEN SATURATION: 98 % | WEIGHT: 194.9 LBS | HEART RATE: 74 BPM

## 2018-07-19 DIAGNOSIS — Z34.00 SUPERVISION OF NORMAL FIRST PREGNANCY, ANTEPARTUM: Primary | ICD-10-CM

## 2018-07-19 PROCEDURE — 99207 ZZC PRENATAL VISIT: CPT | Performed by: OBSTETRICS & GYNECOLOGY

## 2018-07-19 NOTE — MR AVS SNAPSHOT
After Visit Summary   7/19/2018    Myrna Brothers    MRN: [id number]           Patient Information     Date Of Birth          1985        Visit Information        Provider Department      7/19/2018 4:15 PM Ashley Bernardo DO [place]        Today's Diagnoses     Supervision of normal first pregnancy, antepartum    -  1      Care Instructions                                                         If you have any questions regarding your visit, Please contact your care team.    Women s Health CLINIC HOURS TELEPHONE NUMBER   Ashley Bernardo DO.    BABATUNDE Taylor -    [place] [place]       Monday, Wednesday, [facility]  8:30a.m-5:00 p.m   [facility]  [address]  [place], [place] [address] [phone] ask for [facility]    Imaging [phone]       Urgent Care locations:    [facility] Saturday and Sunday   9 am - 5 pm    Monday-Friday   12 pm - 8 pm  Saturday and Sunday   9 am - 5 pm   [phone]     [facility] Labor and Delivery:  [phone]    If you need a medication refill, please contact your pharmacy. Please allow 3 business days for your refill to be completed.  As always, Thank you for trusting us with your healthcare needs!                Follow-ups after your visit        Your next 10 appointments already scheduled     Aug 16, 2018  4:00 PM CDT   LAB with LAB FIRST FLOOR Critical access hospital ([facility])    [address] [facility] [place] [id number] [phone]           Please do not eat 10-12 hours before your appointment if you are coming in fasting for labs on lipids, cholesterol, or glucose (sugar). This does not apply to pregnant women. Water, hot tea and black coffee (with nothing added) are okay. Do not drink other fluids, diet soda or chew gum.            Aug 16, 2018  4:15 PM  CDT   ESTABLISHED PRENATAL with Ashley Bernardo,    [facility]    [address] [place] [address] [phone]              Future tests that were ordered for you today     Open Future Orders        Priority Expected Expires Ordered    Glucose tolerance gest screen 1 hour Routine  9/19/2018 7/19/2018    OB hemoglobin Routine  9/19/2018 7/19/2018            Who to contact     If you have questions or need follow up information about today's clinic visit or your schedule please contact [facility] directly at [phone].  Normal or non-critical lab and imaging results will be communicated to you by [email or web address], letter or phone within 4 business days after the clinic has received the results. If you do not hear from us within 7 days, please contact the clinic through [email or web address] or phone. If you have a critical or abnormal lab result, we will notify you by phone as soon as possible.  Submit refill requests through [employer] or call your pharmacy and they will forward the refill request to us. Please allow 3 business days for your refill to be completed.          Additional Information About Your Visit        [email or web address] Information     Ponfac gives you secure access to your electronic health record. If you see a primary care provider, you can also send messages to your care team and make appointments. If you have questions, please call your primary care clinic.  If you do not have a primary care provider, please call [phone] and they will assist you.        Care EveryWhere ID     This is your Care EveryWhere ID. This could be used by other organizations to access your [place] medical records  [id number]        Your Vitals Were     Pulse Last Period Pulse Oximetry Breastfeeding? BMI (Body Mass Index)       74 02/03/2018 (Exact Date) 98% No 31.94 kg/m2        Blood Pressure from Last 3 Encounters:   07/19/18 99/65   07/13/18 100/50    06/22/18 106/62    Weight from Last 3 Encounters:   07/19/18 194 lb 14.4 oz (88.4 kg)   07/13/18 191 lb 4.8 oz (86.8 kg)   06/22/18 188 lb 3.2 oz (85.4 kg)               Primary Care Provider Office Phone # Fax #    [facility] [phone] [address] [facility] [address]        Equal Access to Services     MANNY OCONNOR : Hadii aad ku hadasho Soomaali, waaxda luqadaha, qaybta kaalmada adeegyada, waxay idiin hayaan adeeg kharash laernestina miller. So [facility] [phone].    ATENCIÓN: Si habla español, tiene a flowers disposición servicios gratuitos de asistencia lingüística. [facility] [phone].    We comply with applicable federal civil rights laws and [place] laws. We do not discriminate on the basis of race, color, national origin, age, disability, sex, sexual orientation, or gender identity.            Thank you!     Thank you for choosing [facility]  for your care. Our goal is always to provide you with excellent care. Hearing back from our patients is one way we can continue to improve our services. Please take a few minutes to complete the written survey that you may receive in the mail after your visit with us. Thank you!             Your Updated Medication List - Protect others around you: Learn how to safely use, store and throw away your medicines at www.disposemymeds.org.          This list is accurate as of 7/19/18  4:31 PM.  Always use your most recent med list.                   Brand Name Dispense Instructions for use Diagnosis    busPIRone 5 MG tablet    BUSPAR    180 tablet    Take 1 tablet (5 mg) by mouth 2 times daily    Generalized anxiety disorder       FISH OIL PO           IRON SUPPLEMENT PO           PRENATAL VITAMIN PO           PROPRANOLOL HCL PO      Take 10 mg by mouth as needed for high blood pressure        sertraline 50 MG tablet    ZOLOFT    90 tablet    TAKE 1 TABLET BY MOUTH EVERY DAY    Generalized anxiety disorder, Major  depressive disorder with single episode, in full remission (H)       TYLENOL PO      Take 325 mg by mouth

## 2018-07-19 NOTE — PATIENT INSTRUCTIONS
If you have any questions regarding your visit, Please contact your care team.    Women s Health CLINIC HOURS TELEPHONE NUMBER   Ashley Bernardo DO.    BABATUNDE Taylor -    DAO Vieira       Monday, Wednesday, Thursday and Friday, [place]  8:30a.m-5:00 p.m   [facility]  [address] [place] [address]  [phone] ask for [facility]    Imaging [phone]       Urgent Care locations:    [facility] Saturday and Sunday   9 am - 5 pm    Monday-Friday   12 pm - 8 pm  Saturday and Sunday   9 am - 5 pm   [phone]     [facility] Labor and Delivery:  [phone]    If you need a medication refill, please contact your pharmacy. Please allow 3 business days for your refill to be completed.  As always, Thank you for trusting us with your healthcare needs!

## 2018-07-19 NOTE — PROGRESS NOTES
She reports feeling reassuring daily fetal activity and will continue to record.  She denies any fluid leakage or regular uterine contractions.  She gained 6 lbs since her last visit and did meet with the dietician so has instruction on healthy eating.  She was provided information on the Tdap vaccine and will decide at her next visit.  She does not feel that the maternity belt is helping much to relieve her pain but will continue to use it.

## 2018-07-20 NOTE — PROGRESS NOTES
Patient stopped in for brief follow-up after OB visit.  We reviewed the good progress she has made with improving intake and appropriate weight gain since initial nutrition visit.  In addition, she has questions regarding gallbladder sludge.  We discussed the benefit of avoiding high fat or greasy food to minimize problems while not over restricting healthy fat or calories.  We discussed menu options, written guidelines provided.    Summary of initial appointment goals and plan:  Goals for nutrition in pregnancy reviewed with patient.  Plan for improved adequacy developed with her.  Sample menu with meals and snacks developed.  Plan for increasing nutritional content of meals discussed.  Protein sources to achieve adequacy within her tolerances were reviewed.     PLAN:  Myrna will:  1.Add nutrition in the morning for breakfast in take along products.  Try egg bake, smoothie, and lactose free milk and peanut butter sandwich as options.  2. Add food to lunch:  Have cottage cheese and fruit with frozen meal, add vegetables.  3. Add healthy snacks: Apples with peanut butter, string cheese, yogurt and cheese and crackers  4. Dinner: Keep balanced plate model. Include adequate protein choices.

## 2018-08-16 ENCOUNTER — PRENATAL OFFICE VISIT (OUTPATIENT)
Dept: OBGYN | Facility: CLINIC | Age: 33
End: 2018-08-16
Payer: COMMERCIAL

## 2018-08-16 VITALS
BODY MASS INDEX: 32.19 KG/M2 | SYSTOLIC BLOOD PRESSURE: 107 MMHG | DIASTOLIC BLOOD PRESSURE: 63 MMHG | OXYGEN SATURATION: 99 % | HEART RATE: 94 BPM | WEIGHT: 196.4 LBS

## 2018-08-16 DIAGNOSIS — Z13.1 SCREENING FOR DIABETES MELLITUS: ICD-10-CM

## 2018-08-16 DIAGNOSIS — Z34.02 SUPERVISION OF NORMAL FIRST PREGNANCY IN SECOND TRIMESTER: Primary | ICD-10-CM

## 2018-08-16 DIAGNOSIS — Z34.00 SUPERVISION OF NORMAL FIRST PREGNANCY, ANTEPARTUM: ICD-10-CM

## 2018-08-16 LAB
GLUCOSE 1H P 50 G GLC PO SERPL-MCNC: 169 MG/DL (ref 60–129)
HGB BLD-MCNC: 11.4 G/DL (ref 11.7–15.7)

## 2018-08-16 PROCEDURE — 99207 ZZC PRENATAL VISIT: CPT | Performed by: OBSTETRICS & GYNECOLOGY

## 2018-08-16 PROCEDURE — 82950 GLUCOSE TEST: CPT | Performed by: OBSTETRICS & GYNECOLOGY

## 2018-08-16 PROCEDURE — 00000218 ZZHCL STATISTIC OBHBG - HEMOGLOBIN: Performed by: OBSTETRICS & GYNECOLOGY

## 2018-08-16 PROCEDURE — 36415 COLL VENOUS BLD VENIPUNCTURE: CPT | Performed by: OBSTETRICS & GYNECOLOGY

## 2018-08-16 NOTE — MR AVS SNAPSHOT
After Visit Summary   8/16/2018    Myrna Brothers    MRN: [id number]           Patient Information     Date Of Birth          1985        Visit Information        Provider Department      8/16/2018 4:15 PM Ashley Bernardo DO [facility] [place]         Follow-ups after your visit        Your next 10 appointments already scheduled     Sep 07, 2018  3:15 PM CDT   ESTABLISHED PRENATAL with Ashleykhadra Bernardo DO   [facility] [place] ([facility] [place])    [address] [place] [address] [phone]              Who to contact     If you have questions or need follow up information about today's clinic visit or your schedule please contact [facility] directly at [phone].  Normal or non-critical lab and imaging results will be communicated to you by MyChart, letter or phone within 4 business days after the clinic has received the results. If you do not hear from us within 7 days, please contact the clinic through Driver Hirehart or phone. If you have a critical or abnormal lab result, we will notify you by phone as soon as possible.  Submit refill requests through [employer] or call your pharmacy and they will forward the refill request to us. Please allow 3 business days for your refill to be completed.          Additional Information About Your Visit        MyChart Information     [employer] gives you secure access to your electronic health record. If you see a primary care provider, you can also send messages to your care team and make appointments. If you have questions, please call your primary care clinic.  If you do not have a primary care provider, please call [phone] and they will assist you.        Care EveryWhere ID     This is your Care EveryWhere ID. This could be used by other organizations to access your [place] medical records  [id number]        Your Vitals Were     Pulse Last Period Pulse Oximetry BMI (Body  Mass Index)          94 02/03/2018 (Exact Date) 99% 32.19 kg/m2         Blood Pressure from Last 3 Encounters:   08/16/18 107/63   07/19/18 99/65   07/13/18 100/50    Weight from Last 3 Encounters:   08/16/18 196 lb 6.4 oz (89.1 kg)   07/19/18 194 lb 14.4 oz (88.4 kg)   07/13/18 191 lb 4.8 oz (86.8 kg)              Today, you had the following     No orders found for display       Primary Care Provider Office Phone # Fax #    [facility] [phone] [address] [facility] [address]        Equal Access to Services     MANNY OCONNOR : Aneesh Bergman, wamiltonda fox, qaybta kaalmada adeoscaryarobert, raquel miller. So [facility] [phone].    ATENCIÓN: Si habla español, tiene a flowers disposición servicios gratuitos de asistencia lingüística. Llame al [phone].    We comply with applicable federal civil rights laws and [place] laws. We do not discriminate on the basis of race, color, national origin, age, disability, sex, sexual orientation, or gender identity.            Thank you!     Thank you for choosing [facility]  for your care. Our goal is always to provide you with excellent care. Hearing back from our patients is one way we can continue to improve our services. Please take a few minutes to complete the written survey that you may receive in the mail after your visit with us. Thank you!             Your Updated Medication List - Protect others around you: Learn how to safely use, store and throw away your medicines at www.disposemymeds.org.          This list is accurate as of 8/16/18  4:54 PM.  Always use your most recent med list.                   Brand Name Dispense Instructions for use Diagnosis    busPIRone 5 MG tablet    BUSPAR    180 tablet    Take 1 tablet (5 mg) by mouth 2 times daily    Generalized anxiety disorder       FISH OIL PO           IRON SUPPLEMENT PO           PRENATAL VITAMIN PO            PROPRANOLOL HCL PO      Take 10 mg by mouth as needed for high blood pressure        sertraline 50 MG tablet    ZOLOFT    90 tablet    TAKE 1 TABLET BY MOUTH EVERY DAY    Generalized anxiety disorder, Major depressive disorder with single episode, in full remission (H)       TYLENOL PO      Take 325 mg by mouth

## 2018-08-16 NOTE — PROGRESS NOTES
She reports feeling reassuring daily fetal activity and will continue to record.  She denies any fluid leakage or regular uterine contractions.  She gained 2 lbs since her last visit and will have her diabetic screen and hgb drawn today.  She is not a rhogam candidate since her blood type is A+.

## 2018-08-22 ENCOUNTER — PRENATAL OFFICE VISIT (OUTPATIENT)
Dept: OBGYN | Facility: CLINIC | Age: 33
End: 2018-08-22
Payer: COMMERCIAL

## 2018-08-22 VITALS
BODY MASS INDEX: 32.48 KG/M2 | HEART RATE: 75 BPM | OXYGEN SATURATION: 100 % | DIASTOLIC BLOOD PRESSURE: 65 MMHG | SYSTOLIC BLOOD PRESSURE: 104 MMHG | WEIGHT: 198.2 LBS

## 2018-08-22 DIAGNOSIS — Z13.1 SCREENING FOR DIABETES MELLITUS: ICD-10-CM

## 2018-08-22 DIAGNOSIS — O24.410 DIET CONTROLLED GESTATIONAL DIABETES MELLITUS (GDM), ANTEPARTUM: Primary | ICD-10-CM

## 2018-08-22 LAB
GLUCOSE 1H P 100 G GLC PO SERPL-MCNC: ABNORMAL MG/DL (ref 60–179)
GLUCOSE 2H P 100 G GLC PO SERPL-MCNC: ABNORMAL MG/DL (ref 60–154)
GLUCOSE 3H P 100 G GLC PO SERPL-MCNC: ABNORMAL MG/DL (ref 60–139)
GLUCOSE BLDC GLUCOMTR-MCNC: 131 MG/DL (ref 70–99)
GLUCOSE P FAST SERPL-MCNC: 135 MG/DL (ref 60–94)

## 2018-08-22 PROCEDURE — 99207 ZZC PRENATAL VISIT: CPT | Performed by: NURSE PRACTITIONER

## 2018-08-22 NOTE — MR AVS SNAPSHOT
After Visit Summary   8/22/2018    Myrna Brothers    MRN: [id number]           Patient Information     Date Of Birth          1985        Visit Information        Provider Department      8/22/2018 8:50 AM Silvia Henriquez APRN [facility]        Today's Diagnoses     Diet controlled gestational diabetes mellitus (GDM), antepartum    -  1       Follow-ups after your visit        Additional Services     DIABETES EDUCATOR REFERRAL       DIABETES SELF MANAGEMENT TRAINING (DSMT)      Your provider has referred you to Diabetes Education: FMG: Diabetes Education - All [facility] [phone]   [email or web address]     If an urgent visit is needed or A1C is above 12, Care Team to call the Diabetes  Education Team at [phone] or send an In Basket message to the Diabetes Education Pool (P DIAB ED-PATIENT CARE).    A  will call you to make your appointment. If it has been more than 3 business days since your referral was placed, please call the above phone number to schedule.    Type of training and number of hours: New Diagnosis: Initial group DSMT - 10 hours.      Diabetes Type: Gestational Diabetes        Diabetes Education Topics: Comprehensive Knowledge Assessment and Instruction, Knowledge: Healthy Eating, Being Active, Monitoring Blood Sugar and Preconception/Pregnancy Management or GDM and Blood glucose meter instruction     Special Educational Needs Requiring Individual DSMT: None      Please be aware that coverage of these services is subject to the terms and limitations of your health insurance plan.  Call member services at your health plan to determine Diabetes Self-Management Training (Codes  and ) and Medical Nutrition Therapy (Codes 61860 and 59337) benefits and ask which blood glucose monitor brands are covered by your plan.  Please bring the following with you to your  appointment:    (1)  List of current medications   (2)  List of Blood Glucose Monitor brands that are covered by your insurance plan  (3)  Blood Glucose Monitor and log book  (4)   Food records for the 3 days prior to your visit    The Certified Diabetes Educator may make diabetes medication adjustments per the CDE Protocol and Collaborative Practice Agreement.                  Your next 10 appointments already scheduled     Sep 07, 2018  3:15 PM CDT   ESTABLISHED PRENATAL with Ashley Bernardo DO   [facility] ([facility])    [address] [place] [address] [phone]              Who to contact     If you have questions or need follow up information about today's clinic visit or your schedule please contact [facility] directly at [phone].  Normal or non-critical lab and imaging results will be communicated to you by MyChart, letter or phone within 4 business days after the clinic has received the results. If you do not hear from us within 7 days, please contact the clinic through HomeLighthart or phone. If you have a critical or abnormal lab result, we will notify you by phone as soon as possible.  Submit refill requests through Carnad or call your pharmacy and they will forward the refill request to us. Please allow 3 business days for your refill to be completed.          Additional Information About Your Visit        HomeLighthart Information     Carnad gives you secure access to your electronic health record. If you see a primary care provider, you can also send messages to your care team and make appointments. If you have questions, please call your primary care clinic.  If you do not have a primary care provider, please call [phone] and they will assist you.        Care EveryWhere ID     This is your Care EveryWhere ID. This could be used by other organizations to access your [facility] medical records  [id number]        Your Vitals  Were     Pulse Last Period Pulse Oximetry Breastfeeding? BMI (Body Mass Index)       75 02/03/2018 (Exact Date) 100% No 32.48 kg/m2        Blood Pressure from Last 3 Encounters:   08/22/18 104/65   08/16/18 107/63   07/19/18 99/65    Weight from Last 3 Encounters:   08/22/18 198 lb 3.2 oz (89.9 kg)   08/16/18 196 lb 6.4 oz (89.1 kg)   07/19/18 194 lb 14.4 oz (88.4 kg)              We Performed the Following     DIABETES EDUCATOR REFERRAL        Primary Care Provider Office Phone # Fax #    [facility] [phone] [address] [address]        Equal Access to Services     MANNY OCONNOR : Aneesh kathleeno Sochaparro, waaxda luqadaha, qaybta kaalmada adeegyada, raquel miller. So [facility] [phone].    ATENCIÓN: Si habla español, tiene a flowers disposición servicios gratuitos de asistencia lingüística. [facility] [phone].    We comply with applicable federal civil rights laws and [place] laws. We do not discriminate on the basis of race, color, national origin, age, disability, sex, sexual orientation, or gender identity.            Thank you!     Thank you for choosing [facility]  for your [facility]. Our goal is always to provide you with excellent care. Hearing back from our patients is one way we can continue to improve our services. Please take a few minutes to complete the written survey that you may receive in the mail after your visit with us. Thank you!             Your Updated Medication List - Protect others around you: Learn how to safely use, store and throw away your medicines at www.disposemymeds.org.          This list is accurate as of 8/22/18 10:01 AM.  Always use your most recent med list.                   Brand Name Dispense Instructions for use Diagnosis    busPIRone 5 MG tablet    BUSPAR    180 tablet    Take 1 tablet (5 mg) by mouth 2 times daily    Generalized anxiety disorder       FISH OIL PO            IRON SUPPLEMENT PO           PRENATAL VITAMIN PO           PROPRANOLOL HCL PO      Take 10 mg by mouth as needed for high blood pressure        sertraline 50 MG tablet    ZOLOFT    90 tablet    TAKE 1 TABLET BY MOUTH EVERY DAY    Generalized anxiety disorder, Major depressive disorder with single episode, in full remission (H)       TYLENOL PO      Take 325 mg by mouth

## 2018-08-22 NOTE — PROGRESS NOTES
Patient presents for problem prenatal visit. Prenatal flowsheet reviewed and updated as needed.  Denies vaginal bleeding, loss of fluid, contractions or cramping.  She was at the  clinic this morning doing her 3 hour GTT after her 1 hour returned at 169. Fasting BS was in the 130s so lab called to ask if patient should continue the test or if they should stop it. On call MD was paged and test was stopped. Patient had a lot of concerns and questions, so he recommended an appointment sooner than her next scheduled.  Patient and her  present to discuss results and next steps. We discussed GDM. Referral entered for Diabetes Education. Discussed what to expect at that visit, will learn how to monitor BS, be set up with monitor, discussed testing for ketones. Will receive diet education and recommendation for carbohydrate and protein intake.     Patient strongly reassured the GDM is not something she caused and there was nothing she could have done to prevent this. Discussed HPL hormone and how that can impact glucose, insulin. Discussed risk for development of DM in future. Patient's biggest concerns were she should have tried even harder to be healthier and again reassurance provided and also impact on baby. Discussed all her questions at length. Discussed monitoring in pregnancy. Patient and  feel reassured at this time.    Advice as per Anticipatory Guidance/Checklist updated.  PE: See OB vitals    Plan Tdap on return to clinic.    Questions asked and answered. Keep next scheduled visit with Dr. Bernardo.    Silvia NAPOLES CNP

## 2018-08-23 ENCOUNTER — ALLIED HEALTH/NURSE VISIT (OUTPATIENT)
Dept: EDUCATION SERVICES | Facility: CLINIC | Age: 33
End: 2018-08-23
Payer: COMMERCIAL

## 2018-08-23 DIAGNOSIS — O24.419 GESTATIONAL DIABETES: Primary | ICD-10-CM

## 2018-08-23 PROCEDURE — G0109 DIAB MANAGE TRN IND/GROUP: HCPCS

## 2018-08-23 PROCEDURE — 99207 ZZC DROP WITH A PROCEDURE: CPT

## 2018-08-23 RX ORDER — BLOOD-GLUCOSE METER
1 EACH MISCELLANEOUS ONCE
Qty: 1 KIT | Refills: 0 | COMMUNITY
Start: 2018-08-23 | End: 2018-08-23

## 2018-08-23 NOTE — PROGRESS NOTES
Diabetes Self-Management Education & Support    Gestational Diabetes Self-Management Education & Support    SUBJECTIVE/OBJECTIVE:  Presents for education related to gestational diabetes.    Accompanied by: Self  Diabetes management related comments/concerns: health of baby    Cultural Influences/Ethnic Background:  American    Estimated Date of Delivery: 2018    1 hour OGTT  Lab Results   Component Value Date    GLU1 169 (H) 2018       3 hour OGTT    Fasting  Lab Results   Component Value Date     (H) 2018       1 hour  Lab Results   Component Value Date    GL1 Canceled, Test credited 2018       2 hour  Lab Results   Component Value Date    GL2 Canceled, Test credited 2018       3 hour  Lab Results   Component Value Date    GL3 Canceled, Test credited 2018       Lifestyle and Health Behaviors:  Pre-pregnancy weight (lbs): 185  Weight gain of 11# so far.   Exercise:: Yes  Days per week of moderate to strenuous exercise (like a brisk walk): 7  On average, minutes per day of exercise at this level: 20  How intense was your typical exercise? : Light (like stretching or slow walking)  Exercise Minutes per Week: 140  Barrier to exercise: None  Meals include: Breakfast, Lunch, Dinner  Beverages: Water, Soda  Cultural/Christian diet restrictions?: No  Biggest challenges to healthy eating: None  Pre-elba vitamin?: Yes  Supplements?: No  Experiencing nausea?: No    Healthy Coping:  Emotional response to diabetes: Ready to learn, Concern for health and well-being  Informal Support system:: Spouse  Stage of change: PREPARATION (Decided to change - considering how)    Current Management:  Taking medications for gestational diabetes?: No  Difficulty affording diabetes management supplies?: No    ASSESSMENT:  Needs assistance with meal plan and BG monitoring    INTERVENTION:  Patient was instructed on Contour Next EZ meter and was able to provide an accurate return demonstration.  Patient's blood glucose reading today was 166 mg/dL.    Educational topics covered today:  GDM diagnosis, pathophysiology, Risks and Complications of GDM, Means of controlling GDM, Using a Blood Glucose Monitor, Blood Glucose Goals, Logging and Interpreting Glucose Results, Ketone Testing, When to Call a Diabetes Educator or OB Provider, Healthy Eating During Pregnancy, Counting Carbohydrates, Meal Planning for GDM, and Physical Activity    Educational materials provided today:   Martha Understanding Gestational Diabetes  GDM Log Book  Sharps Disposal  Care After Delivery  Contour Next EZ meter kit    Pt verbalized understanding of concepts discussed and recommendations provided today.     PLAN:  Check glucose 4 times daily, before breakfast and 1 hour after each meal.     Check Ketones daily for one week, if negative, reduce testing to once a week.     Physical activity recommended: as able.    Meal plan: 2-3 carbs at breakfast, 3-4 carbs at lunch, 3-4 carbs at supper, 1-2 carbs at 3 snacks a day.  Follow consistent CHO meal plan, eat CHO and protein/fat at all meals/snacks.    Call/e-mail/MyChart message diabetes educator if 3 or more blood sugars are above the goal in 1 week, if ketones are positive, or with questions/concerns.    Isa Adams, ABRAHAM  Time Spent: 120 minutes  Encounter Type: Group class    Any diabetes medication dose changes were made via the CDE Protocol and Collaborative Practice Agreement with the patient's OB/GYN provider. A copy of this encounter was shared with the provider.

## 2018-08-23 NOTE — MR AVS SNAPSHOT
After Visit Summary   8/23/2018    Myrna Brothers    MRN: [id number]           Patient Information     Date Of Birth          1985        Visit Information        Provider Department      8/23/2018 1:00 PM CS GDM [facility]        Today's Diagnoses     Gestational diabetes    -  1       Follow-ups after your visit        Your next 10 appointments already scheduled     Aug 29, 2018  1:30 PM CDT   Gestational Diabetes Education with CS ENDO DIABETES ED RESOURCE   [facility] ([facility]    [address]  [facility] [id number] [phone]            Sep 07, 2018  3:15 PM CDT   ESTABLISHED PRENATAL with Ashley Bernardo,    [facility] ([facility]    [address] [address] [place] [address] [phone]              Who to contact     If you have questions or need follow up information about today's clinic visit or your schedule please contact [facility] MACKENZIE SOARESA directly at [phone].  Normal or non-critical lab and imaging results will be communicated to you by [email or web address], letter or phone within 4 business days after the clinic has received the results. If you do not hear from us within 7 days, please contact the clinic through [email or web address] or phone. If you have a critical or abnormal lab result, we will notify you by phone as soon as possible.  Submit refill requests through Ward[employer] Housekeeper or call your pharmacy and they will forward the refill request to us. Please allow 3 business days for your refill to be completed.          Additional Information About Your Visit        MyChart Information     W[employer]keeper gives you secure access to your electronic health record. If you see a primary care provider, you can also send messages to your care team and make appointments. If you have questions, please call your primary care clinic.  If you do not have a primary care  provider, please call [phone] and they will assist you.        Care EveryWhere ID     This is your Care EveryWhere ID. This could be used by other organizations to access your [place] medical records  [id number]        Your Vitals Were     Last Period                   02/03/2018 (Exact Date)            Blood Pressure from Last 3 Encounters:   08/22/18 104/65   08/16/18 107/63   07/19/18 99/65    Weight from Last 3 Encounters:   08/22/18 89.9 kg (198 lb 3.2 oz)   08/16/18 89.1 kg (196 lb 6.4 oz)   07/19/18 88.4 kg (194 lb 14.4 oz)              We Performed the Following     DIABETES EDUCATION - Group []           Today's Medication Changes          These changes are accurate as of 8/23/18  3:35 PM.  If you have any questions, ask your nurse or doctor.               Start taking these medicines.        Dose/Directions    acetone (Urine) test Strp   Used for:  Gestational diabetes        Test daily. When negative for 1week, reduce testing to once weekly.   Quantity:  25 each   Refills:  1       blood glucose monitoring lancets   Used for:  Gestational diabetes        Use to test blood sugar 4 times daily or as directed.   Quantity:  100 each   Refills:  3       blood glucose monitoring test strip   Commonly known as:  CONTOUR NEXT TEST   Used for:  Gestational diabetes        Use to test blood sugar 4 times daily or as directed.   Quantity:  150 each   Refills:  3       CONTOUR NEXT EZ w/Device Kit   Used for:  Gestational diabetes        Dose:  1 kit   1 kit once for 1 dose   Quantity:  1 kit   Refills:  0            Where to get your medicines      These medications were sent to [facility]/pharmacy #[id number] - [facility] [address], [place] [address], [facility] [address]     Phone:  [phone]     acetone (Urine) test Strp    blood glucose monitoring lancets    blood glucose monitoring test strip         Some of these will need a paper prescription and  others can be bought over the counter.  Ask your nurse if you have questions.     You don't need a prescription for these medications     CONTOUR NEXT EZ w/Device Kit                Primary Care Provider Office Phone # Fax #    [facility]a [facility] [phone] [address] [address]        Equal Access to Services     MANNY OCONNOR : Hadii aad ku hadasho Soomaali, waaxda luqadaha, qaybta kaalmada adeegyada, waxay idiin hayaan adeeg kharash laernestina ah. So [facility] [phone].    ATENCIÓN: Si habla español, tiene a flowers disposición servicios gratuitos de asistencia lingüística. Llame al [phone].    We comply with applicable federal civil rights laws and [place] laws. We do not discriminate on the basis of race, color, national origin, age, disability, sex, sexual orientation, or gender identity.            Thank you!     Thank you for choosing [facility]  for your care. Our goal is always to provide you with excellent care. Hearing back from our patients is one way we can continue to improve our services. Please take a few minutes to complete the written survey that you may receive in the mail after your visit with us. Thank you!             Your Updated Medication List - Protect others around you: Learn how to safely use, store and throw away your medicines at www.disposemymeds.org.          This list is accurate as of 8/23/18  3:35 PM.  Always use your most recent med list.                   Brand Name Dispense Instructions for use Diagnosis    acetone (Urine) test Strp     25 each    Test daily. When negative for 1week, reduce testing to once weekly.    Gestational diabetes       blood glucose monitoring lancets     100 each    Use to test blood sugar 4 times daily or as directed.    Gestational diabetes       blood glucose monitoring test strip    CONTOUR NEXT TEST    150 each    Use to test blood sugar 4 times daily or as directed.    Gestational  diabetes       busPIRone 5 MG tablet    BUSPAR    180 tablet    Take 1 tablet (5 mg) by mouth 2 times daily    Generalized anxiety disorder       CONTOUR NEXT EZ w/Device Kit     1 kit    1 kit once for 1 dose    Gestational diabetes       FISH OIL PO           IRON SUPPLEMENT PO           PRENATAL VITAMIN PO           PROPRANOLOL HCL PO      Take 10 mg by mouth as needed for high blood pressure        sertraline 50 MG tablet    ZOLOFT    90 tablet    TAKE 1 TABLET BY MOUTH EVERY DAY    Generalized anxiety disorder, Major depressive disorder with single episode, in full remission (H)       TYLENOL PO      Take 325 mg by mouth

## 2018-08-27 ENCOUNTER — TELEPHONE (OUTPATIENT)
Dept: EDUCATION SERVICES | Facility: CLINIC | Age: 33
End: 2018-08-27

## 2018-08-27 DIAGNOSIS — O24.414 INSULIN CONTROLLED GESTATIONAL DIABETES MELLITUS (GDM) IN THIRD TRIMESTER: Primary | ICD-10-CM

## 2018-08-27 NOTE — TELEPHONE ENCOUNTER
Gestational Diabetes Follow-up    Subjective/Objective:    Myrna FLAHERTY Zev sent in blood glucose log for review. Last date of communication was: 8/23/18.    Gestational diabetes is being managed with diet and activity    Taking diabetes medications: no    Estimated Date of Delivery: Nov 14, 2018  28w5d gestation    BG/Food Log:   Date              BB                 AB                 AL                 A dinner  8-23                                                           166                120  24                  123                107                101                132  25                  97                                        98                  161  26                  97                                        126                113  27                  110                145    Assessment:  Given patients blood sugar was elevated for her initial fasting test and continues to be elevated this is more of an indication insulin will be needed to keep blood sugars in target.  Recommend starting insulin.  Moderate ketones indicate patient is not meeting carbohydrate needs.    Wt Readings from Last 3 Encounters:   08/22/18 89.9 kg (198 lb 3.2 oz)   08/16/18 89.1 kg (196 lb 6.4 oz)   07/19/18 88.4 kg (194 lb 14.4 oz)     0.1u/kg to 0.2 units/kg x 89.9kg= 9-18 units recommended starting dose.    Ketones: moderate.   Fasting blood glucoses: 0% in target.  After breakfast: 50% in target.  After lunch: 75% in target.  After dinner: 75% in target.    CDE called patient: Has been keeping track with a food record.  Full review at follow up visit.  Patient reports she has been keeping food intake within the carbohydrate limits    8/23- dinner- chicken, black beans, corn, 4 slices apple    Breakfasts- 2 pc whole wheat bread with margarine    No bedtime snacks due to elevated blood sugars OR 1/2 apple     Plan/Response:  Discussed importance of bedtime snack with patient and encouraged her to try non-fruit starch  options for the next 2 nights to see how her body responds especially given the moderate ketones.  Discussed likely need for insulin given her elevated fasting test prior to food changes      Recommend that patient begin NPH up to 18 units at bedtime, depending on how fasting reading are in response to bedtime snacks.    Follow up 8/29/18 for insulin education and GDM follow up visit to review food records.    Silvia Reyes MS, RD, LD, CDE      Any diabetes medication dose changes were made via the CDE Protocol and Collaborative Practice Agreement with the patient's OB/GYN provider. A copy of this encounter was shared with the provider.    E-mail to patient:  Hi,      Please see the list below for snack options.  Give them a try for the next 2 nights and see how your fasting readings respond.  Likely you ll still need insulin so I am going to order it from your OBGYN so that it is ready to start Wednesday.  It will be sent to the pharmacy, but don t pick it up until after your appointment with Isa.  Let us know if insurance doesn t cover it as she will provide a coupon.      Silvia Reyes MS, RD, LD, CDE    Here are some ideas for breakfast or bedtime snack. Pick a carbohydrate from the right and a protein from the left. If you have carbohydrates 30 grams of total carbohydrate and 3-5 grams of fiber that is even better.   Fruits are not listed as they can cause the blood sugar to raise blood sugar quickly and be used up early in the night. Fruits are better at meals, or morning or afternoon snack.     Protein/Fat list (about 14 grams of protein or 2 fat servings) Carbohydrate list (about 30 grams of carbohydrate)   2 slices of cheese English Muffin   2 TBS Peanut butter/Ebro butter/Sun butter 10 crackers     cup Cottage cheese 2% 2 pieces of toast   2 oz any cooked meat - less than deck of cards size 1 hamburger or hot dog bun   1.5 oz of soy nuts 1 whole wheat cosme   Avocado or guacamole 6 cristiana cracker squares      cup tuna - can add mayonnaise 1 cup full fat ice cream - no candy or sauce   2 eggs - boiled, poached, fried, scrambled, omelet 30 chips   1 veggie alex (might have carbohydrates also) Greek yogurt - 30 grams of carbohydrate   2 TBS Coconut 2 - 6 inch tortillas corn or flour   12 shrimp - not breaded 2 toaster waffles - no syrup   2-1oz meatballs   bagel   2 Tbs cream cheese - plain, veggie, salmon - no fruit or honey. 6 cups popcorn - unsweetened     cup of nuts Granola bar of 3o grams of carbohydrate   10 olives 1 cup of unsweetened lentils or beans.    Tofu or Temph 4-5oz 1 cup potato salad     You can always add vegetables with dip, salad dressing or salsa also.

## 2018-08-27 NOTE — TELEPHONE ENCOUNTER
New GDM BG report; ketones moderate each day    Date BB AB AL A dinner  8-23   166 120  24 123 107 101 132  25 97  98 161  26 97  126 113  27 110 145

## 2018-08-29 ENCOUNTER — ALLIED HEALTH/NURSE VISIT (OUTPATIENT)
Dept: EDUCATION SERVICES | Facility: CLINIC | Age: 33
End: 2018-08-29
Payer: COMMERCIAL

## 2018-08-29 DIAGNOSIS — O24.414 INSULIN CONTROLLED GESTATIONAL DIABETES MELLITUS (GDM) IN THIRD TRIMESTER: Primary | ICD-10-CM

## 2018-08-29 PROCEDURE — G0108 DIAB MANAGE TRN  PER INDIV: HCPCS

## 2018-08-29 NOTE — PATIENT INSTRUCTIONS
"1. Check glucose 4 times daily, before breakfast daily and 1 hour after each meal, as recommended.    2. Check ketones daily or once a week after they have been negative for 7 days in a row. If ketones are positive, let your diabetes educator know and continue to check daily until they are negative for 7 days in a row.    3. Continue with recommended physical activity.    4. Continue to follow recommended meal plan: 2-3 carbs at breakfast, 3-4 carbs at lunch, 3-4 carbs at supper, 1-2 carbs at snacks.  Follow consistent CHO meal plan, eat CHO and protein/fat at all meals/snacks.    5. Follow-up with OB doctor as recommended.    6. Call/e-mail diabetes educator if 3 or more blood sugars are above the goal in 1 week or if ketones are positive.       AFTER YOU DELIVER:  - Continue with healthy eating and physical activity to get back to your pre-pregnancy weight.   - Have a follow-up 2-hour Glucose Tolerance Test at your 6-week post-partum check-up.   - Have your fasting blood sugar checked once a year.  - Plan ahead for future pregnancies - eat healthy, keep active, work with your doctor to check for gestational diabetes early on in the pregnancy and check blood sugars as recommended by your doctor.     Taking Insulin:    1. Take NPH (Humulin-N or Novolin-N) - 18 units at bedtime.     - Do a 2 unit \"prime\" before each injection, be sure a stream of insulin comes out of the needle before you give your injection.    - After you inject, hold the needle under the skin to the count of 10 to be sure all of the insulin goes in.     - Rotate injection sites, keeping at least 1 inch apart from last injection site and 2 inches away from belly button or surgical scars.    2. Pen - Use a new pen needle for each injection. Always remove pen needle from the insulin pen after use and do not store insulin pens with the needle on the pen.     3. Store insulin you are not using in the refrigerator (do not freeze). Take new insulin out " of the refrigerator a few hours prior to use to bring to room temperature.     4. Once opened NPH Pens (Humulin N or Novolin N) can be kept at room temperature for 14 days after opened.. Do not use the opened insulin after this time has passed, even if there is still medicine inside.     5. Always carry your blood sugar meter and a sugar source like glucose tablets with you in case of a low blood sugar. Treat a low blood sugar (less than 70) with 15 grams of carbohydrate (1 carb choice). Wait 15 minutes, recheck blood sugar. If blood sugar is still below 70, repeat the treatment.    6. Call your doctor or diabetes educator if you begin having low blood sugars or if you have questions or concerns.     7. Follow-up: Follow-up diabetes education appointment scheduled on Tuesday 9/4 via email.    [facility]:  For Your Diabetes Education or Nutrition Appointments Call:  [phone]   For Nutrition Related Questions Call:   Phone: [phone]  E-mail: [email or web address]  Fax: [phone]   If you need a medication refill please contact your pharmacy. Please allow 3 business days for your refills to be completed.

## 2018-08-29 NOTE — PROGRESS NOTES
Diabetes Self-Management Education & Support    Gestational Diabetes Self-Management Education & Support Follow Up    SUBJECTIVE/OBJECTIVE:  Presents for education related to gestational diabetes.    Accompanied by: Spouse  Diabetes management related comments/concerns: health of baby    Cultural Influences/Ethnic Background:  American    Estimated Date of Delivery: 2018    1 hour OGTT  Lab Results   Component Value Date    GLU1 169 (H) 2018       3 hour OGTT    Fasting  Lab Results   Component Value Date     (H) 2018       1 hour  Lab Results   Component Value Date    GL1 Canceled, Test credited 2018       2 hour  Lab Results   Component Value Date    GL2 Canceled, Test credited 2018       3 hour  Lab Results   Component Value Date    GL3 Canceled, Test credited 2018       Lifestyle and Health Behaviors:  Pre-pregnancy weight (lbs): 185  Exercise:: Yes  Days per week of moderate to strenuous exercise (like a brisk walk): 5  On average, minutes per day of exercise at this level: 20 (after dinner)  How intense was your typical exercise? : Light (like stretching or slow walking)  Exercise Minutes per Week: 100  Barrier to exercise: None  Meals include: Breakfast, Lunch, Dinner (breakfast: couple pieces WW bread with margarine OR breakfast sandwich. AM snack is sliced apples (1/2 big apple). Lunch chicken salad sandwich  and 1/2 apple. PM snack: 1/2 apple. Dinner: varies but measuring carbs and including more vegges. HS: 1/2 apple  Beverages: Water  Cultural/Jehovah's witness diet restrictions?: No  Biggest challenges to healthy eating: None  Pre-elba vitamin?: Yes  Supplements?: No  Experiencing nausea?: No    Healthy Coping:  Emotional response to diabetes: Ready to learn, Concern for health and well-being  Informal Support system:: Spouse  Stage of change: PREPARATION (Decided to change - considering how)    Current Management:  Taking medications for gestational diabetes?: no  but insulin ordered  Difficulty affording diabetes management supplies?: No    Date Ketones Fasting Post Breakfast Post Lunch Post Supper   8/29 mod 104 89     8/28 mod 99   107   8/27 mod 110 145 128 128       ASSESSMENT:  Ketones: mod.   Fasting blood glucoses: 0% in target.  After breakfast: 50% in target.  After lunch: 100% in target.  After dinner: 100% in target.    She is following the meal plan but continues to have elevated fasting blood sugars in the morning. Insulin ordered but she has not started it yet as she needs to be taught how to use it. Has tried some other snack ideas for bedtime but numbers are still high in the morning.     INTERVENTION:  Pt to start NPH insulin tonight.     Educational topics covered today:  What to expect after delivery, Future testing for Type 2 diabetes (2 hour OGTT at 6 week post-partum check-up and annual fasting blood glucose level), Risk of GDM and planning ahead for future pregnancies, Recommended lifestyle interventions for reducing the risk of Type 2 Diabetes, When to Call a Diabetes Educator or OB Provider    Insulin injection technique taught using an insulin pen as well as Vial and Syringe for NPH - 18 units at bedtime. Patient verbalized understanding and was able to perform an accurate return demonstration of injection technique.  Discussed mixing insulin, storage, sharps, new needle each use, site selection, action of insulin and hypoglycemia signs, symptoms and treatment.  Answered her questions on insulin. Reassured her and her  that insulin is the safest option in pregnancy and that they are doing everything they can do with diet at this point.     Educational materials provided today:   Martha Preventing Diabetes  Hypoglycemia Signs and Treatment  Yuki voucher    Pt verbalized understanding of concepts discussed and recommendations provided today.     PLAN:  Begin NPH 0-0-0-18    Check glucose 4 times daily, before breakfast and 1 hour after each  meal.     Check Ketones daily for one week, if negative, reduce testing to once a week.     Physical activity recommended: as able.    Meal plan: 2-3 carbs at breakfast, 3-4 carbs at lunch, 3-4 carbs at supper, 1-2 carbs at 3 snacks a day.  Follow consistent CHO meal plan, eat CHO and protein/fat at all meals/snacks.    Call/e-mail/MyChart message diabetes educator if 3 or more blood sugars are above the goal in 1 week, if ketones are positive, or with questions/concerns.    Pt to e-mail us her blood sugars next Tuesday 9/4 for review.     ABRAHAM Gama CDE    Time Spent: 60 minutes    Any diabetes medication dose changes were made via the CDE Protocol and Collaborative Practice Agreement with the patient's OB/GYN provider. A copy of this encounter was shared with the provider.

## 2018-08-29 NOTE — Clinical Note
FYI, pt's fasting blood sugars remain elevated with snack changes so will start the 18 unit(s) NPH at bedtime as ordered.  Isa Adams, RD LD CDE

## 2018-08-29 NOTE — MR AVS SNAPSHOT
"              After Visit Summary   8/29/2018    Myrna Brothers    MRN: [id number]           Patient Information     Date Of Birth          1985        Visit Information        Provider Department      8/29/2018 1:30 PM CS [facility] DIABETES ED RESOURCE [place] [facility] [place]        Today's Diagnoses     Insulin controlled gestational diabetes mellitus (GDM) in third trimester    -  1      Care Instructions    1. Check glucose 4 times daily, before breakfast daily and 1 hour after each meal, as recommended.    2. Check ketones daily or once a week after they have been negative for 7 days in a row. If ketones are positive, let your diabetes educator know and continue to check daily until they are negative for 7 days in a row.    3. Continue with recommended physical activity.    4. Continue to follow recommended meal plan: 2-3 carbs at breakfast, 3-4 carbs at lunch, 3-4 carbs at supper, 1-2 carbs at snacks.  Follow consistent CHO meal plan, eat CHO and protein/fat at all meals/snacks.    5. Follow-up with OB doctor as recommended.    6. Call/e-mail diabetes educator if 3 or more blood sugars are above the goal in 1 week or if ketones are positive.       AFTER YOU DELIVER:  - Continue with healthy eating and physical activity to get back to your pre-pregnancy weight.   - Have a follow-up 2-hour Glucose Tolerance Test at your 6-week post-partum check-up.   - Have your fasting blood sugar checked once a year.  - Plan ahead for future pregnancies - eat healthy, keep active, work with your doctor to check for gestational diabetes early on in the pregnancy and check blood sugars as recommended by your doctor.     Taking Insulin:    1. Take NPH (Humulin-N or Novolin-N) - 18 units at bedtime.     - Do a 2 unit \"prime\" before each injection, be sure a stream of insulin comes out of the needle before you give your injection.    - After you inject, hold the needle under the skin to the count of 10 to be sure all " of the insulin goes in.     - Rotate injection sites, keeping at least 1 inch apart from last injection site and 2 inches away from belly button or surgical scars.    2. Pen - Use a new pen needle for each injection. Always remove pen needle from the insulin pen after use and do not store insulin pens with the needle on the pen.     3. Store insulin you are not using in the refrigerator (do not freeze). Take new insulin out of the refrigerator a few hours prior to use to bring to room temperature.     4. Once opened NPH Pens (Humulin N or Novolin N) can be kept at room temperature for 14 days after opened.. Do not use the opened insulin after this time has passed, even if there is still medicine inside.     5. Always carry your blood sugar meter and a sugar source like glucose tablets with you in case of a low blood sugar. Treat a low blood sugar (less than 70) with 15 grams of carbohydrate (1 carb choice). Wait 15 minutes, recheck blood sugar. If blood sugar is still below 70, repeat the treatment.    6. Call your doctor or diabetes educator if you begin having low blood sugars or if you have questions or concerns.     7. Follow-up: Follow-up diabetes education appointment scheduled on Tuesday 9/4 via email.    [facility] for the [facility]:  For Your Diabetes Education or Nutrition Appointments Call:  [phone]   For Nutrition Related Questions Call:   Phone: [phone]  E-mail: [email or web address]  Fax: [phone]   If you need a medication refill please contact your pharmacy. Please allow 3 business days for your refills to be completed.           Follow-ups after your visit        Your next 10 appointments already scheduled     Sep 07, 2018  3:15 PM CDT   ESTABLISHED PRENATAL with Ashley Bernardo DO   [facility] ([facility])    [address] [place] [id number] [phone]              Who to  contact     If you have questions or need follow up information about today's clinic visit or your schedule please contact [facility] [place] directly at [phone].  Normal or non-critical lab and imaging results will be communicated to you by MyChart, letter or phone within 4 business days after the clinic has received the results. If you do not hear from us within 7 days, please contact the clinic through MyChart or phone. If you have a critical or abnormal lab result, we will notify you by phone as soon as possible.  Submit refill requests through [employer] or call your pharmacy and they will forward the refill request to us. Please allow 3 business days for your refill to be completed.          Additional Information About Your Visit        [employer] Information     [employer] gives you secure access to your electronic health record. If you see a primary care provider, you can also send messages to your care team and make appointments. If you have questions, please call your primary care clinic.  If you do not have a primary care provider, please call [phone] and they will assist you.        Care EveryWhere ID     This is your Care EveryWhere ID. This could be used by other organizations to access your [facility] medical records  [id number]        Your Vitals Were     Last Period                   02/03/2018 (Exact Date)            Blood Pressure from Last 3 Encounters:   08/22/18 104/65   08/16/18 107/63   07/19/18 99/65    Weight from Last 3 Encounters:   08/22/18 89.9 kg (198 lb 3.2 oz)   08/16/18 89.1 kg (196 lb 6.4 oz)   07/19/18 88.4 kg (194 lb 14.4 oz)              Today, you had the following     No orders found for display       Primary Care Provider Office Phone # Fax #    [facility] [phone] [address]  [facility] [address]        Equal Access to Services     MANNY OCONNOR AH: Aneesh Bergman, mae braxton, gigi  raquel fitzpatrick cucoranulfo true barreto ah. [facility] [phone].    ATENCIÓN: Si lauren mercado, tiene a flowers disposición servicios gratuitos de asistencia lingüística. Cory al [phone].    We comply with applicable federal civil rights laws and [place] laws. We do not discriminate on the basis of race, color, national origin, age, disability, sex, sexual orientation, or gender identity.            Thank you!     Thank you for choosing [facility] [place]  for your care. Our goal is always to provide you with excellent care. Hearing back from our patients is one way we can continue to improve our services. Please take a few minutes to complete the written survey that you may receive in the mail after your visit with us. Thank you!             Your Updated Medication List - Protect others around you: Learn how to safely use, store and throw away your medicines at www.disposemymeds.org.          This list is accurate as of 8/29/18  2:25 PM.  Always use your most recent med list.                   Brand Name Dispense Instructions for use Diagnosis    acetone (Urine) test Strp     25 each    Test daily. When negative for 1week, reduce testing to once weekly.    Gestational diabetes       blood glucose monitoring lancets     100 each    Use to test blood sugar 4 times daily or as directed.    Gestational diabetes       blood glucose monitoring test strip    CONTOUR NEXT TEST    150 each    Use to test blood sugar 4 times daily or as directed.    Gestational diabetes       busPIRone 5 MG tablet    BUSPAR    180 tablet    Take 1 tablet (5 mg) by mouth 2 times daily    Generalized anxiety disorder       FISH OIL PO           insulin isophane human 100 UNIT/ML injection    HumuLIN N KWIKPEN    15 mL    Inject 18 Units Subcutaneous At Bedtime    Insulin controlled gestational diabetes mellitus (GDM) in third trimester       insulin pen needle 31G X 5 MM    B-D U/F    100 each    Use pen  needles daily as directed.    Insulin controlled gestational diabetes mellitus (GDM) in third trimester       IRON SUPPLEMENT PO           PRENATAL VITAMIN PO           PROPRANOLOL HCL PO      Take 10 mg by mouth as needed for high blood pressure        sertraline 50 MG tablet    ZOLOFT    90 tablet    TAKE 1 TABLET BY MOUTH EVERY DAY    Generalized anxiety disorder, Major depressive disorder with single episode, in full remission (H)       TYLENOL PO      Take 325 mg by mouth

## 2018-08-30 ENCOUNTER — PATIENT OUTREACH (OUTPATIENT)
Dept: EDUCATION SERVICES | Facility: CLINIC | Age: 33
End: 2018-08-30

## 2018-08-30 DIAGNOSIS — O24.414 INSULIN CONTROLLED GESTATIONAL DIABETES MELLITUS (GDM) IN THIRD TRIMESTER: Primary | ICD-10-CM

## 2018-08-30 NOTE — PROGRESS NOTES
Myrna sent in numbers on 8/29:        However, she was also seen in clinic for insulin start- NPH 18 units at HS to begin at bedtime 8/29.    CDE Reply:  Hi Myrna!  Just checking in, I see you sent in numbers yesterday (thank you!), but it looks like you also saw Isa for an insulin start?    If so, I'd say let's have you send in numbers tomorrow after breakfast to see how that 18 units is working, we can adjust before the weekend if needed. Sound ok?  Sheila    Patient reply:  Hi,  Yes we saw Isa yesterday for a follow up and she prescribed insulin, which I took last night.  My fasting was at 103 and after breakfast at two hours was 99.  Let me know if I should increase the dosage.  Thanks!    CDE reply:  Thank you for clarifying, Myrna ;)    Typically we like to wait 3 days to let the insulin show you what it can do...  So, I'd say that if either Friday or Saturday morning are also over 95, you can increase the dose to 21 units.   Then stick with that and send in a picture again on Tuesday- we'll be out of the office Monday ;)    Have a nice weekend!    Elida Corcoran RD, LD, CDE

## 2018-09-04 ENCOUNTER — PATIENT OUTREACH (OUTPATIENT)
Dept: EDUCATION SERVICES | Facility: CLINIC | Age: 33
End: 2018-09-04
Payer: COMMERCIAL

## 2018-09-04 NOTE — PROGRESS NOTES
Gestational Diabetes Follow-up    Subjective/Objective:    Myrna Brothers sent in blood glucose log for review. Last date of communication was: 8/30/18.    Gestational diabetes is being managed with diet, activity and medications    Taking diabetes medications:   yes:     Diabetes Medication(s)     Insulin Sig    insulin isophane human (HUMULIN N KWIKPEN) 100 UNIT/ML injection Inject 18 Units Subcutaneous At Bedtime          Estimated Date of Delivery: Nov 14, 2018    BG/Food Log:       Assessment:    Ketones: trace but improving.   Fasting blood glucoses: 33% in target.  After breakfast: 100% in target.  After lunch: 100% in target.  After dinner: 100% in target.    Plan/Response:  Follow-up in 3-4 days.  Increase NPH to 0-0-0-23 (10% increase)    Response sent to patient:  Andrea Dixonie,    Thank you for sending in your blood sugar readings again.  Your after meal numbers look great and your fasting blood sugar yesterday was great as well. Since 2/3 of your fasting blood sugars are still elevated the past few days, let's increase your insulin to 23 units per day (increase of 2 units).  Then if you can send in your numbers again on this Friday which is 9/7 that would be wonderful!    Please let us know if you have any questions and keep up the wonderful work!    Forgot to mention that your ketones are looking better. Continue to check those daily until negative for 1 week.     ABRAHAM Gama CDE      Any diabetes medication dose changes were made via the CDE Protocol and Collaborative Practice Agreement with the patient's OB/GYN provider. A copy of this encounter was shared with the provider.

## 2018-09-07 ENCOUNTER — PATIENT OUTREACH (OUTPATIENT)
Dept: EDUCATION SERVICES | Facility: CLINIC | Age: 33
End: 2018-09-07

## 2018-09-07 ENCOUNTER — PRENATAL OFFICE VISIT (OUTPATIENT)
Dept: OBGYN | Facility: CLINIC | Age: 33
End: 2018-09-07
Payer: COMMERCIAL

## 2018-09-07 VITALS
DIASTOLIC BLOOD PRESSURE: 70 MMHG | WEIGHT: 201.2 LBS | OXYGEN SATURATION: 96 % | SYSTOLIC BLOOD PRESSURE: 106 MMHG | HEART RATE: 89 BPM | TEMPERATURE: 98.6 F | BODY MASS INDEX: 32.97 KG/M2

## 2018-09-07 DIAGNOSIS — Z23 NEED FOR TDAP VACCINATION: ICD-10-CM

## 2018-09-07 DIAGNOSIS — O24.414 INSULIN CONTROLLED GESTATIONAL DIABETES MELLITUS (GDM) DURING PREGNANCY, ANTEPARTUM: Primary | ICD-10-CM

## 2018-09-07 PROCEDURE — 99207 ZZC PRENATAL VISIT: CPT | Performed by: OBSTETRICS & GYNECOLOGY

## 2018-09-07 PROCEDURE — 90471 IMMUNIZATION ADMIN: CPT | Performed by: OBSTETRICS & GYNECOLOGY

## 2018-09-07 PROCEDURE — 90715 TDAP VACCINE 7 YRS/> IM: CPT | Performed by: OBSTETRICS & GYNECOLOGY

## 2018-09-07 NOTE — PATIENT INSTRUCTIONS
If you have any questions regarding your visit, Please contact your care team.    Women s Health CLINIC HOURS TELEPHONE NUMBER   Ashley Bernardo DO.    BABATUNDE Taylor -    DAO Vieira       Monday, Wednesday, Thursday and Friday, [place]  8:30a.m-5:00 p.m   [facility]  [address] [place] [address]  [phone] ask for [facility]    Imaging [phone]       Urgent Care locations:    [facility] Saturday and Sunday   9 am - 5 pm    Monday-Friday   12 pm - 8 pm  Saturday and Sunday   9 am - 5 pm   [phone]     [facility] Labor and Delivery:  [phone]    If you need a medication refill, please contact your pharmacy. Please allow 3 business days for your refill to be completed.  As always, Thank you for trusting us with your healthcare needs!

## 2018-09-07 NOTE — NURSING NOTE
Screening Questionnaire for Adult Immunization    Are you sick today?   No   Do you have allergies to medications, food, a vaccine component or latex?   Yes   Have you ever had a serious reaction after receiving a vaccination?   No   Do you have a long-term health problem with heart disease, lung disease, asthma, kidney disease, metabolic disease (e.g. diabetes), anemia, or other blood disorder?   No   Do you have cancer, leukemia, HIV/AIDS, or any other immune system problem?   No   In the past 3 months, have you taken medications that affect  your immune system, such as prednisone, other steroids, or anticancer drugs; drugs for the treatment of rheumatoid arthritis, Crohn s disease, or psoriasis; or have you had radiation treatments?   No   Have you had a seizure, or a brain or other nervous system problem?   No   During the past year, have you received a transfusion of blood or blood     products, or been given immune (gamma) globulin or antiviral drug?   No   For women: Are you pregnant or is there a chance you could become        pregnant during the next month?   Yes   Have you received any vaccinations in the past 4 weeks?   No     Immunization questionnaire Established pregnancy patient. Allergies noted in chart.       Per orders of Dr. Bernardo, injection of Tdap given by Aubrey Ulloa. Patient instructed to remain in clinic for 15 minutes afterwards, and to report any adverse reaction to me immediately.       Screening performed by Aubrey Ulloa on 9/7/2018 at 4:30 PM.

## 2018-09-07 NOTE — PROGRESS NOTES
Gestational Diabetes Follow-up    Subjective/Objective:    Myrna Brothers sent in blood glucose log for review. Last date of communication was: 9/4/18.    Gestational diabetes is being managed with medications    Taking diabetes medications:   yes:     Diabetes Medication(s)     Insulin Sig    insulin isophane human (HUMULIN N KWIKPEN) 100 UNIT/ML injection Inject 18 Units Subcutaneous At Bedtime        Estimated Date of Delivery: Nov 14, 2018    BG/Food Log:       Assessment:  Since last contact on 9/4  Ketones: trace to moderate.   Fasting blood glucoses: 66% in target.  After breakfast: 100% in target.  After lunch: 100% in target.  After dinner: 66% in target.    Plan/Response:  Recommend increase to insulin - as recommended on 9/4 to NPH at HS 0-0-0-23.  Follow up in 3-4 days     Email response:   Andrea Singer,   Thanks so much for sending in numbers. That sinus infection certainly could be contributing to higher numbers! Hopefully you'll be feeling better soon. Isa had mentioned in her last email to increase your bedtime dose of insulin to 23 units. Looks like you tolerated that just fine the one day you did that. I would recommend you increase your dose every day to the 23 units as numbers in the morning seem like they're just teetering at that goal. We can see if that will help keep  all of your numbers in the morning less than 95. The rest of the day, generally things are looking good. Again, illness can definitely elevate numbers so as you get over this infection, hopefully those will level out. We'll just keep an eye on them for now. :)     Let's have you send in numbers again on Tuesday, 9/11 for review. Hopefully you'll have turned the corner with this illness by then. Keep up the great work & have a nice weekend!  Bhumi Phipps, ABRAHAM, LD     Any diabetes medication dose changes were made via the CDE Protocol and Collaborative Practice Agreement with the patient's OB/GYN provider. A copy of  this encounter was shared with the provider.

## 2018-09-07 NOTE — MR AVS SNAPSHOT
After Visit Summary   9/7/2018    Myrna Brothers    MRN: [id number]           Patient Information     Date Of Birth          1985        Visit Information        Provider Department      9/7/2018 3:15 PM Ashley Bernardo DO [facility]        Care Instructions                                                         If you have any questions regarding your visit, Please contact your care team.    Women s Health CLINIC HOURS TELEPHONE NUMBER   Ashley Bernardo DO.    BABATUNDE Taylor -    DAO Vieira       Monday, Wednesday, [facility]  8:30a.m-5:00 p.m   [facility]  [address] [place] [address]  [phone] ask for [facility]    Imaging [phone]       Urgent Care locations:    [facility] Saturday and Sunday   9 am - 5 pm    Monday-Friday   12 pm - 8 pm  Saturday and Sunday   9 am - 5 pm   [phone]     [facility] Labor and Delivery:  [phone]    If you need a medication refill, please contact your pharmacy. Please allow 3 business days for your refill to be completed.  As always, Thank you for trusting us with your healthcare needs!                Follow-ups after your visit        Your next 10 appointments already scheduled     Sep 20, 2018  2:45 PM CDT   ESTABLISHED PRENATAL with Ashley Bernardo DO   [facility] ([facility])    [address]  [facility] [id number] [phone]            Oct 04, 2018  4:15 PM CDT   ESTABLISHED PRENATAL with Ashley Bernardo DO   [facility] ([facility])    [address]  [facility] [id number] [phone]              Who to contact     If you have questions or need follow up information about today's clinic visit or your schedule please contact [facility] directly  at [phone].  Normal or non-critical lab and imaging results will be communicated to you by [email or web address], letter or phone within 4 business days after the clinic has received the results. If you do not hear from us within 7 days, please contact the clinic through Hardscore Gamest or phone. If you have a critical or abnormal lab result, we will notify you by phone as soon as possible.  Submit refill requests through Quisk or call your pharmacy and they will forward the refill request to us. Please allow 3 business days for your refill to be completed.          Additional Information About Your Visit        [email or web address] Information     Quisk gives you secure access to your electronic health record. If you see a primary care provider, you can also send messages to your care team and make appointments. If you have questions, please call your primary care clinic.  If you do not have a primary care provider, please call [phone] and they will assist you.        Care EveryWhere ID     This is your Care EveryWhere ID. This could be used by other organizations to access your [place] medical records  [id number]        Your Vitals Were     Pulse Last Period Pulse Oximetry Breastfeeding? BMI (Body Mass Index)       89 02/03/2018 (Exact Date) 96% No 32.97 kg/m2        Blood Pressure from Last 3 Encounters:   09/07/18 106/70   08/22/18 104/65   08/16/18 107/63    Weight from Last 3 Encounters:   09/07/18 201 lb 3.2 oz (91.3 kg)   08/22/18 198 lb 3.2 oz (89.9 kg)   08/16/18 196 lb 6.4 oz (89.1 kg)              Today, you had the following     No orders found for display         Today's Medication Changes          These changes are accurate as of 9/7/18  3:48 PM.  If you have any questions, ask your nurse or doctor.               These medicines have changed or have updated prescriptions.        Dose/Directions    insulin isophane human 100 UNIT/ML injection   Commonly known as:  HumuLIN N CYNTHIAIKPEN   This may have changed:  additional  instructions   Used for:  Insulin controlled gestational diabetes mellitus (GDM) in third trimester        Dose:  18 Units   Inject 18 Units Subcutaneous At Bedtime   Quantity:  15 mL   Refills:  3         Stop taking these medicines if you haven't already. Please contact your care team if you have questions.     FISH OIL PO   Stopped by:  Ashley Bernardo DO           PROPRANOLOL HCL PO   Stopped by:  Ashley Bernardo DO                    Primary Care Provider Office Phone # Fax #    [facility] [phone] [address]  [facility] [address]        Equal Access to Services     [facility]: Hadii aad ku hadasho Soomaali, waaxda luqadaha, qaybta kaalmada adeegyarobert, raquel barreto . So [facility] [phone].    ATENCIÓN: Si habla español, tiene a flowers disposición servicios gratuitos de asistencia lingüística. Llame al [phone].    We comply with applicable federal civil rights laws and [place] laws. We do not discriminate on the basis of race, color, national origin, age, disability, sex, sexual orientation, or gender identity.            Thank you!     Thank you for choosing [facility]  for your care. Our goal is always to provide you with excellent care. Hearing back from our patients is one way we can continue to improve our services. Please take a few minutes to complete the written survey that you may receive in the mail after your visit with us. Thank you!             Your Updated Medication List - Protect others around you: Learn how to safely use, store and throw away your medicines at www.disposemymeds.org.          This list is accurate as of 9/7/18  3:48 PM.  Always use your most recent med list.                   Brand Name Dispense Instructions for use Diagnosis    acetone (Urine) test Strp     25 each    Test daily. When negative for 1week, reduce testing to once weekly.    Gestational diabetes        blood glucose monitoring lancets     100 each    Use to test blood sugar 4 times daily or as directed.    Gestational diabetes       blood glucose monitoring test strip    CONTOUR NEXT TEST    150 each    Use to test blood sugar 4 times daily or as directed.    Gestational diabetes       busPIRone 5 MG tablet    BUSPAR    180 tablet    Take 1 tablet (5 mg) by mouth 2 times daily    Generalized anxiety disorder       insulin isophane human 100 UNIT/ML injection    HumuLIN N KWIKPEN    15 mL    Inject 18 Units Subcutaneous At Bedtime    Insulin controlled gestational diabetes mellitus (GDM) in third trimester       insulin pen needle 31G X 5 MM    B-D U/F    100 each    Use pen needles daily as directed.    Insulin controlled gestational diabetes mellitus (GDM) in third trimester       IRON SUPPLEMENT PO           PRENATAL VITAMIN PO           sertraline 50 MG tablet    ZOLOFT    90 tablet    TAKE 1 TABLET BY MOUTH EVERY DAY    Generalized anxiety disorder, Major depressive disorder with single episode, in full remission (H)       SUDAFED PO           TYLENOL PO      Take 325 mg by mouth

## 2018-09-07 NOTE — PROGRESS NOTES
Email response from patient:  Hello,   Just wanted to follow up with this morning s numbers.      Fasting- 95  After Breakfast- 113    Myrna Brothers   Diabetes Educator response:  Andrea Singer,   Thanks for sending those in too! I still think we re just right at the cusp of that goal <95. Let s still plan to increase to 23 units at bedtime and then see how things look next week, Tuesday 9/11.   Thanks again!  Bhumi Phipps, ABRAHAM, LD

## 2018-09-07 NOTE — PROGRESS NOTES
She reports feeling reassuring daily fetal activity and will continue to record.  She gained 3 lbs since her last visit and has numerous questions regarding insulin and gestational diabetes.  She understands that a referral to [facility] is being placed for serial growth US evaluations.  She would like her Tdap vaccine today and denies any fluid leakage or regular uterine contractions.

## 2018-09-07 NOTE — PROGRESS NOTES
Immunization History   Administered Date(s) Administered     HPV Quadrivalent 05/02/2008, 07/03/2008, 04/14/2009     Influenza (IIV3) PF 10/13/2009, 11/01/2010, 10/16/2014     Influenza Vaccine IM 3yrs+ 4 Valent IIV4 11/02/2016     Influenza Vaccine, 3 YRS +, IM (QUADRIVALENT W/PRESERVATIVES) 10/14/2015, 11/02/2016     TDAP Vaccine (Adacel) 02/03/2011, 09/07/2018

## 2018-09-08 ENCOUNTER — OFFICE VISIT (OUTPATIENT)
Dept: URGENT CARE | Facility: URGENT CARE | Age: 33
End: 2018-09-08
Payer: COMMERCIAL

## 2018-09-08 VITALS
TEMPERATURE: 98.8 F | BODY MASS INDEX: 32.64 KG/M2 | WEIGHT: 199.2 LBS | HEART RATE: 95 BPM | SYSTOLIC BLOOD PRESSURE: 119 MMHG | DIASTOLIC BLOOD PRESSURE: 75 MMHG | OXYGEN SATURATION: 96 %

## 2018-09-08 DIAGNOSIS — J45.909 REACTIVE AIRWAY DISEASE WITHOUT COMPLICATION, UNSPECIFIED ASTHMA SEVERITY, UNSPECIFIED WHETHER PERSISTENT: ICD-10-CM

## 2018-09-08 DIAGNOSIS — J06.9 UPPER RESPIRATORY TRACT INFECTION, UNSPECIFIED TYPE: Primary | ICD-10-CM

## 2018-09-08 PROCEDURE — 99214 OFFICE O/P EST MOD 30 MIN: CPT | Performed by: INTERNAL MEDICINE

## 2018-09-08 RX ORDER — FLUTICASONE PROPIONATE 220 UG/1
2 AEROSOL, METERED RESPIRATORY (INHALATION) 2 TIMES DAILY
Qty: 1 INHALER | Refills: 0 | Status: SHIPPED | OUTPATIENT
Start: 2018-09-08 | End: 2018-11-29

## 2018-09-08 NOTE — PROGRESS NOTES
SUBJECTIVE:  Myrna Brothers is an 32 year old female who presents for not feeling well.  Started to not feel well about five days ago.  Had sore throat, nasal congestion and feeling more fatigued.  Has developed a cough as well.  Feels achy all over.  Hurts when coughs some now.  Waking up last night because of nasal congestion.  Wheezing as well.  Some pressure in sinuses.   has had sinus and ear infection.  Has felt feverish but not check temp.  No recent travel.  Has been around some people with cold sxs recently at a wedding.  Took some sudafed and used a netti pot which didn't help.  Currently 7 months pregnant.  Tried an albuterol inhaler which didn't help for very long.  Achy all over sometimes.       has a past medical history of Generalized anxiety disorder (1/4/2018); Hyperlipidemia; and Major depressive disorder with single episode, in full remission (H) (1/4/2018). depression. Gestational diabetes.  Social History     Social History     Marital status:      Spouse name: N/A     Number of children: N/A     Years of education: N/A     Social History Main Topics     Smoking status: Never Smoker     Smokeless tobacco: Never Used     Alcohol use Yes      Comment: occasionally:  none with pregnancy     Drug use: No     Sexual activity: Yes     Partners: Male      Comment: pregnant     Other Topics Concern     None     Social History Narrative     Family History   Problem Relation Age of Onset     Breast Cancer Mother      Stillborn Mother      had a still born     Other - See Comments Mother      2 miscarriage     Diabetes Father      HEART DISEASE Father      Anxiety Disorder Maternal Grandmother      Alzheimer Disease Maternal Grandmother      Asthma Maternal Grandfather      Prostate Cancer Maternal Grandfather      Hypertension Paternal Grandmother      Leukemia Paternal Grandfather        ALLERGIES:  Xylocaine [lidocaine]    Current Outpatient Prescriptions   Medication      Acetaminophen (TYLENOL PO)     acetone, Urine, test STRP     blood glucose monitoring (ERNIE MICROLET) lancets     blood glucose monitoring (CONTOUR NEXT TEST) test strip     busPIRone (BUSPAR) 5 MG tablet     Ferrous Sulfate (IRON SUPPLEMENT PO)     insulin isophane human (HUMULIN N KWIKPEN) 100 UNIT/ML injection     insulin pen needle (B-D U/F) 31G X 5 MM     Prenatal Vit-Fe Fumarate-FA (PRENATAL VITAMIN PO)     Pseudoephedrine HCl (SUDAFED PO)     sertraline (ZOLOFT) 50 MG tablet     No current facility-administered medications for this visit.          ROS:  ROS is done and is negative for general/constitutional, eye, ENT, Respiratory, cardiovascular, GI, , Skin, musculoskeletal except as noted elsewhere.  All other review of systems negative except as noted elsewhere.      OBJECTIVE:  /75 (BP Location: Left arm, Patient Position: Chair, Cuff Size: Adult Regular)  Pulse 95  Temp 98.8  F (37.1  C) (Oral)  Wt 199 lb 3.2 oz (90.4 kg)  LMP 02/03/2018 (Exact Date)  SpO2 96%  BMI 32.64 kg/m2  GENERAL APPEARANCE: Alert, in no acute distress  EYES: normal  EARS: External ears normal. Canals clear. TMs with mild clear effusions  NOSE:moderately inflamed mucosa  OROPHARYNX:normal, mmm  NECK:No adenopathy,masses or thyromegaly  RESP: no crackles.  Intermittent end exp wheeze  CV:regular rate and rhythm and no murmurs, clicks, or gallops  ABDOMEN: Abdomen pregnant, soft, non-tender. BS normal. No masses, organomegaly  SKIN: no ulcers, lesions or rash  MUSCULOSKELETAL:Musculoskeletal normal      RESULTS  .  No results found for this or any previous visit (from the past 48 hour(s)).    ASSESSMENT/PLAN:    ASSESSMENT / PLAN:  (J06.9) Upper respiratory tract infection, unspecified type  (primary encounter diagnosis)  Comment: currently c/w viral etiology.  Does have mild intermittent wheezing.  Pt is pregnant so medication risks reviewed.  As is pregnant, will tx with inhaled steroid rather than oral at this time  as less risk for pregnancy.   Plan: fluticasone (FLOVENT HFA) 220 MCG/ACT Inhaler        Reviewed medication instructions and side effects. Follow up if experiences side effects.. I reviewed supportive care, acceptable otc meds, expected course, and signs of concern.  Follow up as needed or if she does not improve within 5 day(s) or if worsens in any way.  Reviewed red flag symptoms and is to go to the ER if experiences any of these.  Advised pt to continue to monitor sugars, as her sugars could increased with the inhaled steroids with her gestational diabetes.    (J45.909) Reactive airway disease without complication, unspecified asthma severity, unspecified whether persistent  Comment: currently c/w viral trigger.  As is pregnant, will tx with inhaled rather than oral steroids, but if sxs worsen, consider oral steroids.    Plan: fluticasone (FLOVENT HFA) 220 MCG/ACT Inhaler        As above. I reviewed supportive care, expected course, and signs of concern.  Follow up as needed or if she does not improve within 5 day(s) or if worsens in any way.  Reviewed red flag symptoms and is to go to the ER if experiences any of these.      See [facility] for orders, medications, letters, patient instructions    Jess Lira M.D.

## 2018-09-08 NOTE — MR AVS SNAPSHOT
After Visit Summary   9/8/2018    Myrna Brothers    MRN: [id number]           Patient Information     Date Of Birth          1985        Visit Information        Provider Department      9/8/2018 2:00 PM Jess Lira MD [facility]        Today's Diagnoses     Upper respiratory tract infection, unspecified type    -  1    Reactive airway disease without complication, unspecified asthma severity, unspecified whether persistent           Follow-ups after your visit        Follow-up notes from your care team     Return in about 5 days (around 9/13/2018), or if symptoms worsen or fail to improve, for follow up with primary doctor.      Your next 10 appointments already scheduled     Sep 20, 2018  2:45 PM CDT   ESTABLISHED PRENATAL with Ashley Montero DO [place]   [facility] ([address] [id number] [phone]            Oct 04, 2018  4:15 PM CDT   ESTABLISHED PRENATAL with Ashley Montero DO [place]   [facility] ([address] [id number] [phone]              Who to contact     If you have questions or need follow up information about today's clinic visit or your schedule please contact [facility] directly at [phone].  Normal or non-critical lab and imaging results will be communicated to you by MyChart, letter or phone within 4 business days after the clinic has received the results. If you do not hear from us within 7 days, please contact the clinic through MyChart or phone. If you have a critical or abnormal lab result, we will notify you by phone as soon as possible.  Submit refill requests through AlleyWatch or call your pharmacy and they will forward the refill request to us. Please allow 3 business days for your refill to be completed.          Additional Information About Your Visit         SeekSherpa Information     SeekSherpa gives you secure access to your electronic health record. If you see a primary care provider, you can also send messages to your care team and make appointments. If you have questions, please call your primary care clinic.  If you do not have a primary care provider, please call [phone] and they will assist you.        Care EveryWhere ID     This is your Care EveryWhere ID. This could be used by other organizations to access your [place] medical records  [id number]        Your Vitals Were     Pulse Temperature Last Period Pulse Oximetry BMI (Body Mass Index)       95 98.8  F (37.1  C) (Oral) 02/03/2018 (Exact Date) 96% 32.64 kg/m2        Blood Pressure from Last 3 Encounters:   09/08/18 119/75   09/07/18 106/70   08/22/18 104/65    Weight from Last 3 Encounters:   09/08/18 199 lb 3.2 oz (90.4 kg)   09/07/18 201 lb 3.2 oz (91.3 kg)   08/22/18 198 lb 3.2 oz (89.9 kg)              Today, you had the following     No orders found for display         Today's Medication Changes          These changes are accurate as of 9/8/18  2:37 PM.  If you have any questions, ask your nurse or doctor.               Start taking these medicines.        Dose/Directions    fluticasone 220 MCG/ACT Inhaler   Commonly known as:  FLOVENT HFA   Used for:  Upper respiratory tract infection, unspecified type, Reactive airway disease without complication, unspecified asthma severity, unspecified whether persistent   Started by:  Jess Lira MD        Dose:  2 puff   Inhale 2 puffs into the lungs 2 times daily for 7 days   Quantity:  1 Inhaler   Refills:  0         These medicines have changed or have updated prescriptions.        Dose/Directions    insulin isophane human 100 UNIT/ML injection   Commonly known as:  HumuLIN N KWIKPEN   This may have changed:  additional instructions   Used for:  Insulin controlled gestational diabetes mellitus (GDM) in third trimester        Dose:  18 Units   Inject  18 Units Subcutaneous At Bedtime   Quantity:  15 mL   Refills:  3            Where to get your medicines      These medications were sent to [facility]/pharmacy #[id number] - [facility] [address] [place] [facility]  [address] [facility] [address]     Phone:  [phone]     fluticasone 220 MCG/ACT Inhaler                Primary Care Provider Office Phone # Fax #    [facility] [phone] [address]  [facility] [address]        Equal Access to Services     [facility]: Hadii aad ku hadasho Soomaali, waaxda luqadaha, qaybta kaalmada adeegyada, waxay mervinin hayaan adeoscar barreto . So [facility] [phone].    ATENCIÓN: Si habla español, tiene a flowers disposición servicios gratuitos de asistencia lingüística. Llame al [phone].    We comply with applicable federal civil rights laws and [place] laws. We do not discriminate on the basis of race, color, national origin, age, disability, sex, sexual orientation, or gender identity.            Thank you!     Thank you for choosing [facility]  for your care. Our goal is always to provide you with excellent care. Hearing back from our patients is one way we can continue to improve our services. Please take a few minutes to complete the written survey that you may receive in the mail after your visit with us. Thank you!             Your Updated Medication List - Protect others around you: Learn how to safely use, store and throw away your medicines at www.disposemymeds.org.          This list is accurate as of 9/8/18  2:37 PM.  Always use your most recent med list.                   Brand Name Dispense Instructions for use Diagnosis    acetone (Urine) test Strp     25 each    Test daily. When negative for 1week, reduce testing to once weekly.    Gestational diabetes       blood glucose monitoring lancets     100 each    Use to test blood sugar 4 times daily or as  directed.    Gestational diabetes       blood glucose monitoring test strip    CONTOUR NEXT TEST    150 each    Use to test blood sugar 4 times daily or as directed.    Gestational diabetes       busPIRone 5 MG tablet    BUSPAR    180 tablet    Take 1 tablet (5 mg) by mouth 2 times daily    Generalized anxiety disorder       fluticasone 220 MCG/ACT Inhaler    FLOVENT HFA    1 Inhaler    Inhale 2 puffs into the lungs 2 times daily for 7 days    Upper respiratory tract infection, unspecified type, Reactive airway disease without complication, unspecified asthma severity, unspecified whether persistent       insulin isophane human 100 UNIT/ML injection    HumuLIN N KWIKPEN    15 mL    Inject 18 Units Subcutaneous At Bedtime    Insulin controlled gestational diabetes mellitus (GDM) in third trimester       insulin pen needle 31G X 5 MM    B-D U/F    100 each    Use pen needles daily as directed.    Insulin controlled gestational diabetes mellitus (GDM) in third trimester       IRON SUPPLEMENT PO           PRENATAL VITAMIN PO           sertraline 50 MG tablet    ZOLOFT    90 tablet    TAKE 1 TABLET BY MOUTH EVERY DAY    Generalized anxiety disorder, Major depressive disorder with single episode, in full remission (H)       SUDAFED PO           TYLENOL PO      Take 325 mg by mouth

## 2018-09-11 ENCOUNTER — PATIENT OUTREACH (OUTPATIENT)
Dept: EDUCATION SERVICES | Facility: CLINIC | Age: 33
End: 2018-09-11

## 2018-09-11 DIAGNOSIS — O24.414 INSULIN CONTROLLED GESTATIONAL DIABETES MELLITUS (GDM) IN THIRD TRIMESTER: Primary | ICD-10-CM

## 2018-09-11 DIAGNOSIS — O24.414 INSULIN CONTROLLED GESTATIONAL DIABETES MELLITUS (GDM) DURING PREGNANCY, ANTEPARTUM: Primary | ICD-10-CM

## 2018-09-11 RX ORDER — SYRINGE-NEEDLE,INSULIN,0.5 ML 27GX1/2"
SYRINGE, EMPTY DISPOSABLE MISCELLANEOUS
Qty: 100 EACH | Refills: 3 | Status: CANCELLED | OUTPATIENT
Start: 2018-09-11

## 2018-09-11 NOTE — PROGRESS NOTES
Gestational Diabetes Follow-up    Subjective/Objective:    Myrna Brothers sent in blood glucose log for review. Last date of communication was: 9/7/18.    Gestational diabetes is being managed with diet, activity and medications    Taking diabetes medications:   yes:     Diabetes Medication(s)     Insulin Sig    insulin isophane human (HUMULIN N KWIKPEN) 100 UNIT/ML injection Inject 18 Units Subcutaneous At Bedtime     Patient taking differently:  Inject 18 Units Subcutaneous At Bedtime Patient is now taking 23 units at bedtime          Estimated Date of Delivery: Nov 14, 2018    BG/Food Log:   Hello,  I had a question.  My insulin is pretty much gone.  I think I have enough for one more night.  My insurance doesn t cover the pen thing.  So, the refill needs to be just for the regular vile and syringe.  How do I get this prescribed?      Myrna     9/11/18 update: Just an FYI, my fasting was at 60 and after breakfast was 123.      Assessment:  Blood sugars in target with current dose of 0-0-0-23 Humulin.  Patient will need prescription for vial version of NPH insulin (humulin/Novolin).  Patient has likely cleared the infection and would benefit from decreasing insulin dose back down to 0-0-0-20    Ketones: trace.   Fasting blood glucoses: 100% in target.  After breakfast: 100% in target.  After lunch: 100% in target.  After dinner: 75% in target.    Plan/Response:  Decrease insulin dose NPH 0-0-0-23 --> 0-0-0-20.  Follow-up in 3-4 days.    Patient will need new prescription for syringes and vial.    Silvia Reyes MS, RD, LD, CDE      Any diabetes medication dose changes were made via the CDE Protocol and Collaborative Practice Agreement with the patient's OB/GYN provider. A copy of this encounter was shared with the provider.      E-mail reply to patient:  Andrea Norris,    Thank you so much for the update today!  We definitely don t want you in the 60s when you wake up.  I m wondering if you have since  cleared your sinus infection?  Infections can cause blood sugars to run high, so we were likely adjusting your doses to account for the infection.  Let s have you decrease your dose back to 20 units at bedtime and see how that goes.    For your refill we will have you switch to vial and syringe insulin.  It is the same medication just different delivery method.  It looks like Isa taught you the technique, but do you remember how to do it and feel comfortable with it?  If not we can bring you back in for a refresher.  I included a video below just in case that s enough to help you remember.  The new prescription should be available later today.    Can you send in your readings again on Friday?  If though tomorrow with 20 units at bedtime you are still waking up low I want you to send in again for an update.    This is a video link of how to use various diabetes injectable medications, please select your medication.  [email or web address]        Silvia Reyes MS, RD, LD, CDE      E-mail reply from patient:  Hello,  I do still have my sinus infection.  Yes, I m comfortable doing the vile and syringe.   Will you put in the request for the prescription?  I will send my readings again on Friday.  Thanks!    Myrna      E-mail reply to patient:  Andrea Norris,    Thanks for the update.  Hopefully the insulin decrease will bring your readings back into the 80-90 range.  I will enter in the prescription for your OB to sign off on.  If you don t get a pharmacy call by the end of the day I would call them and request a refill and that you know you need to change to the vial and syringe version.      Silvia Reyes MS, RD, LD, CDE

## 2018-09-12 DIAGNOSIS — O24.414 INSULIN CONTROLLED GESTATIONAL DIABETES MELLITUS (GDM) IN THIRD TRIMESTER: Primary | ICD-10-CM

## 2018-09-12 RX ORDER — SYRINGE-NEEDLE,INSULIN,0.5 ML 27GX1/2"
SYRINGE, EMPTY DISPOSABLE MISCELLANEOUS
Qty: 100 EACH | Refills: 1 | Status: SHIPPED | OUTPATIENT
Start: 2018-09-12 | End: 2018-11-15

## 2018-09-12 NOTE — TELEPHONE ENCOUNTER
[facility] Call Center    Phone Message    May a detailed message be left on voicemail: yes    Reason for Call: Medication Question or concern regarding medication   Prescription Clarification  Name of Medication: Insulin  Prescribing Provider: Dr. Bernardo   Pharmacy: [facility]    What on the order needs clarification? Patient states the current Rx insulin isophane human (HUMULIN N KWIKPEN) 100 UNIT/ML injection [[id number]] (Order [id number])    sent to pharmacy is not covered by her insurance and patient stated that the pharmacy was going to send over a new Rx request last night for Dr. Bernardo to review and sign.           Action Taken: Message routed to:  [facility] p [phone]

## 2018-09-12 NOTE — PROGRESS NOTES
E-mail from patient 9/11/18:  Hello,  I got a call from [facility] today and they said they got a request for a refill of the same thing as last time, instead of the prescription covered by my insurance.    I explained that I had requested the one my insurance covered with the syringe and vile; they said they would put in a request for my doctor to approve it.  I haven t heard anything back and didn t have enough insulin for tonight s dosage.  Can you please help ASAP, so I won t miss two days in a row?  Thanks!    Myrna STEWART reply to patient:   Andrea Norris,    I just called your phone and left a voicemail.  I am so sorry about the delay!  I did enter in the vial and syringe orders yesterday, but we have a new charting system and I guess the new way doesn t make it easy for the provider to see.  I re-entered in the orders today in a different way where it is guaranteed the doctor will see them and sign off on them.  If you don t hear from [facility] by 3pm or so  I would call the clinic back for the RN to get a verbal approval to sign the new version.  Unfortunately I can t sign the new medication my self as it is a different delivery method, otherwise I would have yesterday.     So sorry about the delay!  I am additionally having IT look into why this happened as it is not ok for orders this important to not get to our providers.  Please call our triage line if you have any further questions or concerns!  [phone].      Silvia Reyes MS, RD, LD, SURESHE      CDE re-entered vial and syringe order into current telephone encounter and re-routed note as high priority to provider.    Silvia Reyes MS, RD, LD, CDE  E-mail reply from patient:  Should I take a different dosage to compensate for missing yesterday?  I m guessing I m probably going to miss today too, judging by the lack of response I keep getting from my clinic.  So what should I take dosage wise on Friday since I missed last night and if I miss it again tonight  because the clinic can t get a response from my doctor?  Will being off insulin for several days have any repercussions on my baby?  I m getting very nervous about this.    Myrna     E-mail reply to patient:  Hi Myrna,    It looks like the vial prescription was just signed and confirmed received by the pharmacy at 12:05pm, so I would call the pharmacy to check.  You should be able to pick that up today and take the insulin tonight.      Since you were going low I would stick with 20 units, but send in readings again on Friday for a recommendation.  We might have to bump you back up to the 23 units.  I think baby should be ok since the main risk for baby is growing too big too fast.  With 2 days off insulin it likely hasn t grown rapidly it s more over time the additional sugar would lead to fast growth.  Sorry there isn t a definitive answer.    Will you let us know if you call the pharmacy and they don t have the prescription?      Thanks and so sorry about the stress and delay!      Silvia

## 2018-09-12 NOTE — TELEPHONE ENCOUNTER
Dr. Bernardo, please send Rx for vial and syringes to patient's Pharmacy. Thank you. Jocelyne Encinas RN on 9/12/2018 at 8:43 AM

## 2018-09-14 ENCOUNTER — OFFICE VISIT (OUTPATIENT)
Dept: PEDIATRICS | Facility: CLINIC | Age: 33
End: 2018-09-14
Payer: COMMERCIAL

## 2018-09-14 ENCOUNTER — PATIENT OUTREACH (OUTPATIENT)
Dept: EDUCATION SERVICES | Facility: CLINIC | Age: 33
End: 2018-09-14

## 2018-09-14 VITALS
SYSTOLIC BLOOD PRESSURE: 111 MMHG | TEMPERATURE: 97.7 F | BODY MASS INDEX: 32.15 KG/M2 | WEIGHT: 196.2 LBS | DIASTOLIC BLOOD PRESSURE: 60 MMHG | HEART RATE: 85 BPM | OXYGEN SATURATION: 95 %

## 2018-09-14 DIAGNOSIS — J01.00 ACUTE NON-RECURRENT MAXILLARY SINUSITIS: Primary | ICD-10-CM

## 2018-09-14 PROCEDURE — 99213 OFFICE O/P EST LOW 20 MIN: CPT | Performed by: NURSE PRACTITIONER

## 2018-09-14 RX ORDER — AMOXICILLIN 875 MG
875 TABLET ORAL 2 TIMES DAILY
Qty: 20 TABLET | Refills: 0 | Status: SHIPPED | OUTPATIENT
Start: 2018-09-14 | End: 2018-10-04

## 2018-09-14 NOTE — PROGRESS NOTES
SUBJECTIVE:   Myrna Brothers is a 32 year old female who presents to clinic today for the following health issues:    Acute Illness   Acute illness concerns: sinus infection, headaches, coughing, nausea  Onset: 10 days    Fever: no    Chills/Sweats: no    Headache (location?): YES    Sinus Pressure:YES- tender, post-nasal drainage and facial pain    Conjunctivitis:  no    Ear Pain: YES: bilateral    Rhinorrhea: YES    Congestion: YES    Sore Throat: YES     Cough: YES-productive of sputum, with shortness of breath    Wheeze: YES, last week    Decreased Appetite: YES- weight loss of 5 lbs    Nausea: YES    Vomiting: YES    Diarrhea:  YES    Dysuria/Freq.: no    Fatigue/Achiness: YES    Sick/Strep Exposure: YES-      Therapies Tried and outcome: albuterol, pseudophedrine, mucinex, nettipot, tylenol-was seen at  urgent care 9/8/18  Has been ill for about 10 days   Was in Urgent care and was noted to have viral uri and has been using sudafed, albuterol       Problem list and histories reviewed & adjusted, as indicated.  Additional history: as documented    Patient Active Problem List   Diagnosis     Moderate episode of recurrent major depressive disorder (H)     Generalized anxiety disorder     Cervical cancer screening     Insulin controlled gestational diabetes mellitus (GDM) in third trimester     Past Surgical History:   Procedure Laterality Date     HEAD & NECK SURGERY      wisdom teeth       Social History   Substance Use Topics     Smoking status: Never Smoker     Smokeless tobacco: Never Used     Alcohol use Yes      Comment: occasionally:  none with pregnancy     Family History   Problem Relation Age of Onset     Breast Cancer Mother      Stillborn Mother      had a still born     Other - See Comments Mother      2 miscarriage     Diabetes Father      HEART DISEASE Father      Anxiety Disorder Maternal Grandmother      Alzheimer Disease Maternal Grandmother      Asthma Maternal Grandfather       "Prostate Cancer Maternal Grandfather      Hypertension Paternal Grandmother      Leukemia Paternal Grandfather          Current Outpatient Prescriptions   Medication Sig Dispense Refill     Acetaminophen (TYLENOL PO) Take 325 mg by mouth       acetone, Urine, test STRP Test daily. When negative for 1week, reduce testing to once weekly. 25 each 1     blood glucose monitoring (ERNIE MICROLET) lancets Use to test blood sugar 4 times daily or as directed. 100 each 3     blood glucose monitoring (CONTOUR NEXT TEST) test strip Use to test blood sugar 4 times daily or as directed. 150 each 3     busPIRone (BUSPAR) 5 MG tablet Take 1 tablet (5 mg) by mouth 2 times daily 180 tablet 1     Ferrous Sulfate (IRON SUPPLEMENT PO)        fluticasone (FLOVENT HFA) 220 MCG/ACT Inhaler Inhale 2 puffs into the lungs 2 times daily for 7 days 1 Inhaler 0     insulin isophane human (HUMULIN N KWIKPEN) 100 UNIT/ML injection Inject 23 Units Subcutaneous At Bedtime 3 mL 3     insulin isophane human (HUMULIN N KWIKPEN) 100 UNIT/ML injection Inject 18 Units Subcutaneous At Bedtime (Patient taking differently: Inject 18 Units Subcutaneous At Bedtime Patient is now taking 23 units at bedtime) 15 mL 3     insulin NPH (HUMULIN N/NOVOLIN N) 100 UNIT/ML injection Inject 20 Units Subcutaneous At Bedtime 10 mL 3     insulin pen needle (B-D U/F) 31G X 5 MM Use pen needles daily as directed. 100 each 3     insulin syringe-needle U-100 (BD INSULIN SYRINGE ULTRAFINE) 31G X 5/16\" 0.5 ML Use one syringe daily or as directed. 100 each 1     Prenatal Vit-Fe Fumarate-FA (PRENATAL VITAMIN PO)        Pseudoephedrine HCl (SUDAFED PO)        sertraline (ZOLOFT) 50 MG tablet TAKE 1 TABLET BY MOUTH EVERY DAY 90 tablet 1     Allergies   Allergen Reactions     Xylocaine [Lidocaine] Anaphylaxis     BP Readings from Last 3 Encounters:   09/14/18 111/60   09/08/18 119/75   09/07/18 106/70    Wt Readings from Last 3 Encounters:   09/14/18 196 lb 3.2 oz (89 kg)   09/08/18 " 199 lb 3.2 oz (90.4 kg)   09/07/18 201 lb 3.2 oz (91.3 kg)                  Labs reviewed in EPIC    Reviewed and updated as needed this visit by clinical staff  Tobacco  Allergies  Meds  Med Hx  Surg Hx  Fam Hx  Soc Hx      Reviewed and updated as needed this visit by Provider         ROS:  CONSTITUTIONAL:NEGATIVE for fever, chills, change in weight  ENT/MOUTH: POSITIVE for earache bilateral, nasal congestion, postnasal drainage and sinus pressure and NEGATIVE for fever  RESP:POSITIVE for cough-productive, SOB/dyspnea and wheezing and NEGATIVE for hemoptysis  CV: NEGATIVE for chest pain/chest pressure  GI: POSITIVE for nausea and vomiting and NEGATIVE for jaundice and melena  MUSCULOSKELETAL: NEGATIVE for significant arthralgias or myalgia  HEME/ALLERGY/IMMUNE: POSITIVE  for allergies and NEGATIVE for anemia  Gestational diabetes numbers have been elevated with recent illness  Has had weight loss and has appointment with OB next week and ultrasound with maternal fetal medicine  Has changed her diet    OBJECTIVE:     /60 (BP Location: Right arm, Patient Position: Sitting, Cuff Size: Adult Regular)  Pulse 85  Temp 97.7  F (36.5  C) (Temporal)  Wt 196 lb 3.2 oz (89 kg)  LMP 02/03/2018 (Exact Date)  SpO2 95%  Breastfeeding? No  BMI 32.15 kg/m2  Body mass index is 32.15 kg/(m^2).   Wt Readings from Last 4 Encounters:   09/14/18 196 lb 3.2 oz (89 kg)   09/08/18 199 lb 3.2 oz (90.4 kg)   09/07/18 201 lb 3.2 oz (91.3 kg)   08/22/18 198 lb 3.2 oz (89.9 kg)       GENERAL: Patient is well nourished, well developed,in no apparent distress, non-toxic, in no respiratory distress and acyanotic, alert, cooperative and well hydrated  mildly ill but alert and responsive  EYES:  Right conjunctiva is not injected and without discharge.  Left conjunctiva is not injected and without discharge.  EARS: negative findings: external ears normal to inspection and palpation, no mastoid process tenderness, positive findings:  , Right TM: serous middle ear fluid, Left TM: serous middle ear fluid  NOSE: positive findings: mucosa erythematous and swollen,  Sinus maxillary tenderness on bilaterally.  THROAT: normal.  NECK: supple with no adenopathy,   CARDIAC:NORMAL - regular rate and rhythm without murmur.  RESP: normal respiratory rate and rhythm, lungs clear to auscultation  unlabored respirations, no intercostal retractions or accessory muscle use  ABD: Abdomen non-tender.  SKIN: Skin color, texture, turgor normal. No rashes or lesions.  MS: extremities normal- no gross deformities noted, gait normal and normal muscle tone    Diagnostic Test Results:  none     ASSESSMENT/PLAN:     Myrna was seen today for sinus problem.    Diagnoses and all orders for this visit:    Acute non-recurrent maxillary sinusitis  -     amoxicillin (AMOXIL) 875 MG tablet; Take 1 tablet (875 mg) by mouth 2 times daily      PLAN:   1.   Symptomatic therapy suggested: rest, increase fluids, use mist of vaporizer prn, OTC saline nasal spray as needed and call prn if symptoms persist or worsen.  2.  Orders Placed This Encounter   Medications     amoxicillin (AMOXIL) 875 MG tablet     Sig: Take 1 tablet (875 mg) by mouth 2 times daily     Dispense:  20 tablet     Refill:  0     3. Patient needs to follow up in if no improvement,or sooner if worsening of symptoms or other symptoms develop.  See Patient Instructions    DONY Mendiola CNP  [facility]

## 2018-09-14 NOTE — PATIENT INSTRUCTIONS
PLAN:   1.   Symptomatic therapy suggested: rest, increase fluids, use mist of vaporizer prn, OTC saline nasal spray as needed and call prn if symptoms persist or worsen.  2.  Orders Placed This Encounter   Medications     amoxicillin (AMOXIL) 875 MG tablet     Sig: Take 1 tablet (875 mg) by mouth 2 times daily     Dispense:  20 tablet     Refill:  0     3. Patient needs to follow up in if no improvement,or sooner if worsening of symptoms or other symptoms develop.    It was a pleasure seeing you today at the [facility]. Thank you for allowing us to care for you today. We truly hope we provided you with the excellent service you deserve. Please let us know if there is anything else we can do for you so we can be sure you are leaving completley satisfied with your care experience.       General information about your clinic   Clinic Hours Lab Hours (Appointments are required)   Mon-Thurs: 7:30 AM - 7 PM Mon-Thurs: 7:30 AM - 7 PM   Fri: 7:30 AM - 5 PM Fri: 7:30 AM - 5 PM        After Hours Nurse Advise & Appts:  [facility]: [phone]  [facility] On Call: to make appointments anytime: [phone] On Call Physician: call [phone] and answering service will page the on call physician.        For urgent appointments, please call [phone] and ask for the triage nurse or your care team clinic nurse.  How to contact my care team:  [email or web address]: [email or web address]   Phone: [phone]   Fax: [phone]       [facility]:   Phone: [phone]  Hours: 8:00 AM - 6:00 PM  Medication Refills:  Call your pharmacy and they will forward the refill to us. Please allow 3 business days for your refills to be completed.       Normal or non-critical lab and imaging results will be communicated to you by MyChart, letter or phone within 7 days.  If you do not hear from us within 10 days, please call the clinic. If you have a critical or abnormal lab result, we will notify you  by phone as soon as possible.       We now have PWIC (Pediatric Walk in Care)  Monday-Friday from 7:30-4. Simply walk in and be seen for your urgent needs like cough, fever, rash, diarrhea or vomiting, pink eye, UTI. No appointments needed. Ask one of the team for more information      -Your Care Team:    Dr. Augie Ochoa - Internal Medicine/Pediatrics   Dr. Anne Marie Nieves - Family Medicine  Dr. Imelda Taylor - Pediatrics  Dr. Isa Ibarra - Pediatrics  Mery Bronson CNP - Family Practice Nurse Practitioner

## 2018-09-14 NOTE — NURSING NOTE
"Chief Complaint   Patient presents with     Sinus Problem     sinus infection x 10 days       Initial /60 (BP Location: Right arm, Patient Position: Sitting, Cuff Size: Adult Regular)  Pulse 85  Temp 97.7  F (36.5  C) (Temporal)  Wt 196 lb 3.2 oz (89 kg)  LMP 02/03/2018 (Exact Date)  SpO2 95%  Breastfeeding? No  BMI 32.15 kg/m2 Estimated body mass index is 32.15 kg/(m^2) as calculated from the following:    Height as of 3/30/18: 5' 5.5\" (1.664 m).    Weight as of this encounter: 196 lb 3.2 oz (89 kg).  Medication Reconciliation: complete      DAHLIA Ruffin      "

## 2018-09-14 NOTE — MR AVS SNAPSHOT
After Visit Summary   9/14/2018    Myrna Brothers    MRN: [id number]           Patient Information     Date Of Birth          1985        Visit Information        Provider Department      9/14/2018 4:10 PM Mery Bronson APRN CNP [facility]        Today's Diagnoses     Acute non-recurrent maxillary sinusitis    -  1      Care Instructions    PLAN:   1.   Symptomatic therapy suggested: rest, increase fluids, use mist of vaporizer prn, OTC saline nasal spray as needed and call prn if symptoms persist or worsen.  2.  Orders Placed This Encounter   Medications     amoxicillin (AMOXIL) 875 MG tablet     Sig: Take 1 tablet (875 mg) by mouth 2 times daily     Dispense:  20 tablet     Refill:  0     3. Patient needs to follow up in if no improvement,or sooner if worsening of symptoms or other symptoms develop.    It was a pleasure seeing you today at the [facility]. Thank you for allowing us to care for you today. We truly hope we provided you with the excellent service you deserve. Please let us know if there is anything else we can do for you so we can be sure you are leaving completley satisfied with your care experience.       General information about your clinic   Clinic Hours Lab Hours (Appointments are required)   Mon-Thurs: 7:30 AM - 7 PM Mon-Thurs: 7:30 AM - 7 PM   Fri: 7:30 AM - 5 PM Fri: 7:30 AM - 5 PM        After Hours Nurse Advise & Appts:  [facility] Nurse Advisors: [phone]  [facility] On Call: to make appointments anytime: [phone] On Call Physician: call [phone] and answering service will page the on call physician.        For urgent appointments, please call [phone] and ask for the triage nurse or your care team clinic nurse.  How to contact my care team:  MyChart: [email or web address]   Phone: [phone]   Fax: [phone]       [facility]:   Phone: [phone]  Hours: 8:00 AM - 6:00 PM   Medication Refills:  Call your pharmacy and they will forward the refill to us. Please allow 3 business days for your refills to be completed.       Normal or non-critical lab and imaging results will be communicated to you by MyChart, letter or phone within 7 days.  If you do not hear from us within 10 days, please call the clinic. If you have a critical or abnormal lab result, we will notify you by phone as soon as possible.       We now have PWIC (Pediatric Walk in Care)  Monday-Friday from 7:30-4. Simply walk in and be seen for your urgent needs like cough, fever, rash, diarrhea or vomiting, pink eye, UTI. No appointments needed. Ask one of the team for more information      -Your Care Team:    Dr. Augie Ochoa - Internal Medicine/Pediatrics   Dr. Anne Marie Nieves - Family Medicine  Dr. Imelda Taylor - Pediatrics  Dr. Isa Ibarra - Pediatrics  Mery Bronson CNP - Family Practice Nurse Practitioner                         Follow-ups after your visit        Your next 10 appointments already scheduled     Sep 20, 2018  2:45 PM CDT   ESTABLISHED PRENATAL with Ashley Bernardo DO   [facility] ([facility])    [address] [place] [address] [id number] [phone]            Oct 04, 2018  4:15 PM CDT   ESTABLISHED PRENATAL with Ashley Bernardo DO   [facility] ([facility])    [address] [place] [address] [id number] [phone]              Who to contact     If you have questions or need follow up information about today's clinic visit or your schedule please contact [facility] directly at [phone].  Normal or non-critical lab and imaging results will be communicated to you by MyChart, letter or phone within 4 business days after the clinic has received the results. If you do not hear from us within 7 days, please contact the clinic through MyChart or phone. If you have a critical or abnormal lab  result, we will notify you by phone as soon as possible.  Submit refill requests through [employer] or call your pharmacy and they will forward the refill request to us. Please allow 3 business days for your refill to be completed.          Additional Information About Your Visit        [employer] Information     [employer] gives you secure access to your electronic health record. If you see a primary care provider, you can also send messages to your care team and make appointments. If you have questions, please call your primary care clinic.  If you do not have a primary care provider, please call [phone] and they will assist you.      [employer] is an electronic gateway that provides easy, online access to your medical records. With [employer], you can request a clinic appointment, read your test results, renew a prescription or communicate with your care team.     To access your existing account, please contact your [facility] or call [phone] for assistance.        Care EveryWhere ID     This is your Care EveryWhere ID. This could be used by other organizations to access your [place] medical records  [id number]        Your Vitals Were     Pulse Temperature Last Period Pulse Oximetry Breastfeeding? BMI (Body Mass Index)    85 97.7  F (36.5  C) (Temporal) 02/03/2018 (Exact Date) 95% No 32.15 kg/m2       Blood Pressure from Last 3 Encounters:   09/14/18 111/60   09/08/18 119/75   09/07/18 106/70    Weight from Last 3 Encounters:   09/14/18 196 lb 3.2 oz (89 kg)   09/08/18 199 lb 3.2 oz (90.4 kg)   09/07/18 201 lb 3.2 oz (91.3 kg)              Today, you had the following     No orders found for display         Today's Medication Changes          These changes are accurate as of 9/14/18  4:47 PM.  If you have any questions, ask your nurse or doctor.               Start taking these medicines.        Dose/Directions    amoxicillin 875 MG tablet   Commonly known as:  AMOXIL   Used for:   Acute non-recurrent maxillary sinusitis   Started by:  Mery Bronson APRN CNP        Dose:  875 mg   Take 1 tablet (875 mg) by mouth 2 times daily   Quantity:  20 tablet   Refills:  0         These medicines have changed or have updated prescriptions.        Dose/Directions    * insulin isophane human 100 UNIT/ML injection   Commonly known as:  HumuLIN N KWIKPEN   This may have changed:  additional instructions   Used for:  Insulin controlled gestational diabetes mellitus (GDM) in third trimester        Dose:  18 Units   Inject 18 Units Subcutaneous At Bedtime   Quantity:  15 mL   Refills:  3       * insulin isophane human 100 UNIT/ML injection   Commonly known as:  HumuLIN N KWIKPEN   This may have changed:  Another medication with the same name was changed. Make sure you understand how and when to take each.   Used for:  Insulin controlled gestational diabetes mellitus (GDM) during pregnancy, antepartum        Dose:  23 Units   Inject 23 Units Subcutaneous At Bedtime   Quantity:  3 mL   Refills:  3       * insulin  UNIT/ML injection   Commonly known as:  HumuLIN N/NovoLIN N   This may have changed:  Another medication with the same name was changed. Make sure you understand how and when to take each.   Used for:  Insulin controlled gestational diabetes mellitus (GDM) in third trimester        Dose:  20 Units   Inject 20 Units Subcutaneous At Bedtime   Quantity:  10 mL   Refills:  3       * Notice:  This list has 3 medication(s) that are the same as other medications prescribed for you. Read the directions carefully, and ask your doctor or other care provider to review them with you.         Where to get your medicines      These medications were sent to [id number] - [place], [place] - [address], [place] AT [place] [address] [address], [facility] [address]     Phone:  [phone]     amoxicillin 875 MG tablet                Primary Care Provider Office Phone #  Fax #    Mery Bronson, APRN [facility] [phone]       [address]  [place] [address]        Equal Access to Services     MANNY OCONNOR : Hadii tuan ku hevero Sokbali, waaxda luqadaha, qaybta kaalmada adeegyada, raquel cary laGirmaterrance miller. So [facility] [place] [facility] [phone].    ATENCIÓN: Si habla español, tiene a flowers disposición servicios gratuitos de asistencia lingüística. Llame al [phone].    We comply with applicable federal civil rights laws and [place] laws. We do not discriminate on the basis of race, color, national origin, age, disability, sex, sexual orientation, or gender identity.            Thank you!     Thank you for choosing [facility]  for your care. Our goal is always to provide you with excellent care. Hearing back from our patients is one way we can continue to improve our services. Please take a few minutes to complete the written survey that you may receive in the mail after your visit with us. Thank you!             Your Updated Medication List - Protect others around you: Learn how to safely use, store and throw away your medicines at www.disposemymeds.org.          This list is accurate as of 9/14/18  4:47 PM.  Always use your most recent med list.                   Brand Name Dispense Instructions for use Diagnosis    acetone (Urine) test Strp     25 each    Test daily. When negative for 1week, reduce testing to once weekly.    Gestational diabetes       amoxicillin 875 MG tablet    AMOXIL    20 tablet    Take 1 tablet (875 mg) by mouth 2 times daily    Acute non-recurrent maxillary sinusitis       blood glucose monitoring lancets     100 each    Use to test blood sugar 4 times daily or as directed.    Gestational diabetes       blood glucose monitoring test strip    CONTOUR NEXT TEST    150 each    Use to test blood sugar 4 times daily or as directed.    Gestational diabetes       busPIRone 5 MG tablet    BUSPAR    180 tablet     "Take 1 tablet (5 mg) by mouth 2 times daily    Generalized anxiety disorder       fluticasone 220 MCG/ACT Inhaler    FLOVENT HFA    1 Inhaler    Inhale 2 puffs into the lungs 2 times daily for 7 days    Upper respiratory tract infection, unspecified type, Reactive airway disease without complication, unspecified asthma severity, unspecified whether persistent       * insulin isophane human 100 UNIT/ML injection    HumuLIN N KWIKPEN    15 mL    Inject 18 Units Subcutaneous At Bedtime    Insulin controlled gestational diabetes mellitus (GDM) in third trimester       * insulin isophane human 100 UNIT/ML injection    HumuLIN N KWIKPEN    3 mL    Inject 23 Units Subcutaneous At Bedtime    Insulin controlled gestational diabetes mellitus (GDM) during pregnancy, antepartum       * insulin  UNIT/ML injection    HumuLIN N/NovoLIN N    10 mL    Inject 20 Units Subcutaneous At Bedtime    Insulin controlled gestational diabetes mellitus (GDM) in third trimester       insulin pen needle 31G X 5 MM    B-D U/F    100 each    Use pen needles daily as directed.    Insulin controlled gestational diabetes mellitus (GDM) in third trimester       insulin syringe-needle U-100 31G X 5/16\" 0.5 ML    BD insulin syringe ultrafine    100 each    Use one syringe daily or as directed.    Insulin controlled gestational diabetes mellitus (GDM) in third trimester       IRON SUPPLEMENT PO           PRENATAL VITAMIN PO           sertraline 50 MG tablet    ZOLOFT    90 tablet    TAKE 1 TABLET BY MOUTH EVERY DAY    Generalized anxiety disorder, Major depressive disorder with single episode, in full remission (H)       SUDAFED PO           TYLENOL PO      Take 325 mg by mouth        * Notice:  This list has 3 medication(s) that are the same as other medications prescribed for you. Read the directions carefully, and ask your doctor or other care provider to review them with you.      "

## 2018-09-14 NOTE — PROGRESS NOTES
Gestational Diabetes Follow-up    Subjective/Objective:    Myrna Brothers sent in blood glucose log for review. Last date of communication was: 9/11/18.    Gestational diabetes is being managed with medications    Taking diabetes medications:   yes:     Diabetes Medication(s)     Insulin Sig    insulin isophane human (HUMULIN N KWIKPEN) 100 UNIT/ML injection Inject 23 Units Subcutaneous At Bedtime    insulin isophane human (HUMULIN N KWIKPEN) 100 UNIT/ML injection Inject 18 Units Subcutaneous At Bedtime     Patient taking differently:  Inject 18 Units Subcutaneous At Bedtime Patient is now taking 23 units at bedtime    insulin NPH (HUMULIN N/NOVOLIN N) 100 UNIT/ML injection Inject 20 Units Subcutaneous At Bedtime          Estimated Date of Delivery: Nov 14, 2018    BG/Food Log:   Hello,   My numbers have been mostly in line, except for dinner tonight.  I did go over by 2 grams of carbs from 2 whole wheat buns, but I wouldn t have thought it would spike it that much- I was 22 points over and everything else I ate was vegetables (lettuce and beets).  I do still have a sinus infection, so maybe that s part of it.  Thoughts?    Hello,  Following up with this mornings numbers, as well.  My fasting was 86 and after breakfast was 155, followed by 89 at 2 hours.  I still have a sinus infection and do not feel better today.  As it s been 10 days since I ve caught this and it s not getting better, I scheduled a doctor s appointment this afternoon, as it s starting to interfere with my blood sugar numbers.  Let me know your thoughts on everything and how we should proceed.  Thanks!         Assessment:    Ketones: trace.   Fasting blood glucoses: 100% in target.  After breakfast: 60% in target.  After lunch: 100% in target.  After dinner: 75% in target.    Plan/Response:  Follow-up on Wednesday    Andrea Norris,    Thanks for the updates! How frustrating for you to have had this sinus infection for so long-- that is never  fun, especially when you are pregnant! I will say that the decrease to 20 units of NPH has been working well for you, so certainly no changes are needed today as far as that is concerned. Your infection may be contributing to your post meal elevations slightly, and I am encouraged that they are coming down nicely by the 2 hour gonzalez most of the time. For now, you might have better numbers eating on the lower carb range at meals, and maybe increasing to 2 carbs at your between meal snacks for now at least, until you are feeling better. Let's have you email us again next week on Wednesday, or certainly sooner if those post meal numbers are being stubborn!    Xi Walker RD LD CDE      Any diabetes medication dose changes were made via the CDE Protocol and Collaborative Practice Agreement with the patient's OB/GYN provider. A copy of this encounter was shared with the provider.

## 2018-09-19 ENCOUNTER — TRANSFERRED RECORDS (OUTPATIENT)
Dept: HEALTH INFORMATION MANAGEMENT | Facility: CLINIC | Age: 33
End: 2018-09-19

## 2018-09-19 ENCOUNTER — PATIENT OUTREACH (OUTPATIENT)
Dept: EDUCATION SERVICES | Facility: CLINIC | Age: 33
End: 2018-09-19

## 2018-09-19 NOTE — PROGRESS NOTES
Gestational Diabetes Follow-up    Subjective/Objective:    Myrna Brothers sent in blood glucose log for review. Last date of communication was: 9/14/18.    Gestational diabetes is being managed with medications    Taking diabetes medications:   yes:     Diabetes Medication(s)     Insulin Sig    insulin isophane human (HUMULIN N KWIKPEN) 100 UNIT/ML injection Inject 23 Units Subcutaneous At Bedtime    insulin isophane human (HUMULIN N KWIKPEN) 100 UNIT/ML injection Inject 18 Units Subcutaneous At Bedtime     Patient taking differently:  Inject 18 Units Subcutaneous At Bedtime Patient is now taking 23 units at bedtime    insulin NPH (HUMULIN N/NOVOLIN N) 100 UNIT/ML injection Inject 20 Units Subcutaneous At Bedtime          Estimated Date of Delivery: Nov 14, 2018    BG/Food Log:       Assessment:    Ketones: trace.   Fasting blood glucoses: 100% in target.  After breakfast: 50% in target.  After lunch: 66% in target.  After dinner: 50% in target.    Patient's postmeal blood sugars seem to be on the rise still, however they have overall improved in the last few days.  Some of her after meal numbers are as low as 80s, making it difficult to potentially dose insulin at this time.  Will continue to monitor for now, especially as she is still taking medication for her sinus infection which may be somewhat influencing her blood sugars.    Plan/Response:  Follow-up on Monday.    Andrea Norris,  Thanks for sending in your blood sugars! It does look like overall your blood sugars are starting to improve, especially in the last few days.  I still want to watch those numbers more closely however, so can you send in your numbers on Monday for another review?  No insulin changes today.    Thanks and have a great weekend!  ABRAHAM Hong CDE      Any diabetes medication dose changes were made via the CDE Protocol and Collaborative Practice Agreement with the patient's OB/GYN provider. A copy of this encounter  was shared with the provider.

## 2018-09-20 ENCOUNTER — PRENATAL OFFICE VISIT (OUTPATIENT)
Dept: OBGYN | Facility: CLINIC | Age: 33
End: 2018-09-20
Payer: COMMERCIAL

## 2018-09-20 VITALS
DIASTOLIC BLOOD PRESSURE: 69 MMHG | HEART RATE: 78 BPM | WEIGHT: 199.47 LBS | OXYGEN SATURATION: 95 % | BODY MASS INDEX: 32.69 KG/M2 | SYSTOLIC BLOOD PRESSURE: 105 MMHG

## 2018-09-20 DIAGNOSIS — O24.414 INSULIN CONTROLLED GESTATIONAL DIABETES MELLITUS (GDM) IN THIRD TRIMESTER: ICD-10-CM

## 2018-09-20 PROCEDURE — 99207 ZZC PRENATAL VISIT: CPT | Performed by: OBSTETRICS & GYNECOLOGY

## 2018-09-20 RX ORDER — ALBUTEROL SULFATE 90 UG/1
2 AEROSOL, METERED RESPIRATORY (INHALATION) EVERY 6 HOURS
COMMUNITY
End: 2024-08-02

## 2018-09-20 NOTE — PATIENT INSTRUCTIONS
If you have any questions regarding your visit, Please contact your care team.    Women s Health CLINIC HOURS TELEPHONE NUMBER   Ashley Bernardo DO.    [facility] -    [place]O [place]       Monday, Wednesday, Thursday and Friday, [place]  8:30a.m-5:00 p.m   [facility]  [address] [place] [address]  [phone] ask for [facility]    Imaging [phone]       Urgent Care locations:    [facility] Saturday and Sunday   9 am - 5 pm    Monday-Friday   12 pm - 8 pm  Saturday and Sunday   9 am - 5 pm   [phone]     [facility] Labor and Delivery:  [phone]    If you need a medication refill, please contact your pharmacy. Please allow 3 business days for your refill to be completed.  As always, Thank you for trusting us with your healthcare needs!

## 2018-09-20 NOTE — MR AVS SNAPSHOT
After Visit Summary   9/20/2018    Myrna Brothers    MRN: [id number]           Patient Information     Date Of Birth          1985        Visit Information        Provider Department      9/20/2018 2:45 PM Ashley Bernardo DO [facility]        Care Instructions                                                         If you have any questions regarding your visit, Please contact your care team.    [facility] CLINIC HOURS TELEPHONE NUMBER   Ashley Bernardo DO.    BABATUNDE Taylor -    DAO Vieira       Monday, Wednesday, Thursday and Friday[facility] [place]  8:30a.m-5:00 p.m   [facility]  [address] [place] [address] [phone] ask for [facility]    Imaging Lydhefesur-[phone]       Urgent Care locations:    [facility] Saturday and Sunday   9 am - 5 pm    Monday-Friday   12 pm - 8 pm  Saturday and Sunday   9 am - 5 pm   [phone]     [facility] Labor and Delivery:  [phone]    If you need a medication refill, please contact your pharmacy. Please allow 3 business days for your refill to be completed.  As always, Thank you for trusting us with your healthcare needs!                Follow-ups after your visit        Your next 10 appointments already scheduled     Oct 04, 2018  4:15 PM CDT   ESTABLISHED PRENATAL with Ashley Bernardo DO   [facility] ([facility])    [address]  [facility] [address] [phone]              Who to contact     If you have questions or need follow up information about today's clinic visit or your schedule please contact [facility] directly at [phone].  Normal or non-critical lab and imaging results will be communicated to you by MyChart, letter or phone within 4 business days after the clinic has received the results. If you do not hear from us  within 7 days, please contact the clinic through Olery or phone. If you have a critical or abnormal lab result, we will notify you by phone as soon as possible.  Submit refill requests through Olery or call your pharmacy and they will forward the refill request to us. Please allow 3 business days for your refill to be completed.          Additional Information About Your Visit        Next Generation Dance[employer] Information     Olery gives you secure access to your electronic health record. If you see a primary care provider, you can also send messages to your care team and make appointments. If you have questions, please call your primary care clinic.  If you do not have a primary care provider, please call [phone] and they will assist you.        Care EveryWhere ID     This is your Care EveryWhere ID. This could be used by other organizations to access your [place] medical records  [id number]        Your Vitals Were     Pulse Last Period Pulse Oximetry Breastfeeding? BMI (Body Mass Index)       78 02/03/2018 (Exact Date) 95% No 32.69 kg/m2        Blood Pressure from Last 3 Encounters:   09/20/18 105/69   09/14/18 111/60   09/08/18 119/75    Weight from Last 3 Encounters:   09/20/18 199 lb 7.5 oz (90.5 kg)   09/14/18 196 lb 3.2 oz (89 kg)   09/08/18 199 lb 3.2 oz (90.4 kg)              Today, you had the following     No orders found for display         Today's Medication Changes          These changes are accurate as of 9/20/18  3:09 PM.  If you have any questions, ask your nurse or doctor.               These medicines have changed or have updated prescriptions.        Dose/Directions    * insulin isophane human 100 UNIT/ML injection   Commonly known as:  HumuLIN N PEN   This may have changed:  Another medication with the same name was removed. Continue taking this medication, and follow the directions you see here.   Changed by:  Ashley Bernardo, DO        Dose:  20 Units   Inject 20 Units Subcutaneous At Bedtime    Refills:  0       * insulin  UNIT/ML injection   Commonly known as:  HumuLIN N/NovoLIN N   This may have changed:  Another medication with the same name was removed. Continue taking this medication, and follow the directions you see here.   Used for:  Insulin controlled gestational diabetes mellitus (GDM) in third trimester   Changed by:  Ashley Bernardo DO        Dose:  20 Units   Inject 20 Units Subcutaneous At Bedtime   Quantity:  10 mL   Refills:  3       * Notice:  This list has 2 medication(s) that are the same as other medications prescribed for you. Read the directions carefully, and ask your doctor or other care provider to review them with you.             Primary Care Provider Office Phone # Fax #    Mery Bronson, APRN [facility] [phone] [address]  [place] [address]        Equal Access to Services     MANNY OCONNOR : Hadii aad ku hadasho Soomaali, waaxda luqadaha, qaybta kaalmada adeegyada, raquel barreto . So [facility] [phone].    ATENCIÓN: Si habla español, tiene a flowers disposición servicios gratuitos de asistencia lingüística. Cory al [phone].    We comply with applicable federal civil rights laws and [place] laws. We do not discriminate on the basis of race, color, national origin, age, disability, sex, sexual orientation, or gender identity.            Thank you!     Thank you for choosing [facility]  for your care. Our goal is always to provide you with excellent care. Hearing back from our patients is one way we can continue to improve our services. Please take a few minutes to complete the written survey that you may receive in the mail after your visit with us. Thank you!             Your Updated Medication List - Protect others around you: Learn how to safely use, store and throw away your medicines at www.disposemymeds.org.          This list is accurate as of 9/20/18  3:09 PM.  Always  "use your most recent med list.                   Brand Name Dispense Instructions for use Diagnosis    acetone (Urine) test Strp     25 each    Test daily. When negative for 1week, reduce testing to once weekly.    Gestational diabetes       albuterol 108 (90 Base) MCG/ACT inhaler    PROAIR HFA/PROVENTIL HFA/VENTOLIN HFA     Inhale 2 puffs into the lungs every 6 hours        amoxicillin 875 MG tablet    AMOXIL    20 tablet    Take 1 tablet (875 mg) by mouth 2 times daily    Acute non-recurrent maxillary sinusitis       blood glucose monitoring lancets     100 each    Use to test blood sugar 4 times daily or as directed.    Gestational diabetes       blood glucose monitoring test strip    CONTOUR NEXT TEST    150 each    Use to test blood sugar 4 times daily or as directed.    Gestational diabetes       busPIRone 5 MG tablet    BUSPAR    180 tablet    Take 1 tablet (5 mg) by mouth 2 times daily    Generalized anxiety disorder       DAYQUIL OR           fluticasone 220 MCG/ACT Inhaler    FLOVENT HFA    1 Inhaler    Inhale 2 puffs into the lungs 2 times daily for 7 days    Upper respiratory tract infection, unspecified type, Reactive airway disease without complication, unspecified asthma severity, unspecified whether persistent       * insulin isophane human 100 UNIT/ML injection    HumuLIN N PEN     Inject 20 Units Subcutaneous At Bedtime        * insulin  UNIT/ML injection    HumuLIN N/NovoLIN N    10 mL    Inject 20 Units Subcutaneous At Bedtime    Insulin controlled gestational diabetes mellitus (GDM) in third trimester       insulin pen needle 31G X 5 MM    B-D U/F    100 each    Use pen needles daily as directed.    Insulin controlled gestational diabetes mellitus (GDM) in third trimester       insulin syringe-needle U-100 31G X 5/16\" 0.5 ML    BD insulin syringe ultrafine    100 each    Use one syringe daily or as directed.    Insulin controlled gestational diabetes mellitus (GDM) in third trimester    "    IRON SUPPLEMENT PO           PRENATAL VITAMIN PO           sertraline 50 MG tablet    ZOLOFT    90 tablet    TAKE 1 TABLET BY MOUTH EVERY DAY    Generalized anxiety disorder, Major depressive disorder with single episode, in full remission (H)       SUDAFED PO           TYLENOL PO      Take 325 mg by mouth        * Notice:  This list has 2 medication(s) that are the same as other medications prescribed for you. Read the directions carefully, and ask your doctor or other care provider to review them with you.

## 2018-09-20 NOTE — PROGRESS NOTES
She reports feeling reassuring daily fetal activity and will continue to record.  Her insulin dose has been decreased to 20 units daily and she denies any fluid leakage or regular uterine contractions.  She lost 2 lbs since her last visit but denies dieting - only eating more healthy.  [facility] has scheduled her weekly BPPs for every Thursday and Friday.  Her next growth US is scheduled for 11/19/18 per the pt.  All their questions and concerns were addressed.

## 2018-09-25 ENCOUNTER — TRANSFERRED RECORDS (OUTPATIENT)
Dept: HEALTH INFORMATION MANAGEMENT | Facility: CLINIC | Age: 33
End: 2018-09-25

## 2018-09-26 ENCOUNTER — PATIENT OUTREACH (OUTPATIENT)
Dept: EDUCATION SERVICES | Facility: CLINIC | Age: 33
End: 2018-09-26

## 2018-09-26 NOTE — PROGRESS NOTES
Gestational Diabetes Follow-up    Subjective/Objective:    Myrna Brothers sent in blood glucose log for review. Last date of communication was: 9/19/18.    Gestational diabetes is being managed with medications    Taking diabetes medications:   yes:     Diabetes Medication(s)     Insulin Sig    insulin isophane human (HUMULIN N PEN) 100 UNIT/ML injection Inject 20 Units Subcutaneous At Bedtime    insulin NPH (HUMULIN N/NOVOLIN N) 100 UNIT/ML injection Inject 20 Units Subcutaneous At Bedtime     Patient not taking:  Reported on 9/20/2018          Estimated Date of Delivery: Nov 14, 2018    BG/Food Log:   Attached are my numbers for 9-24-18.  I did have some really big jumps and I m not sure why.  For all those meals, except once where it had to be fast food for that meal, I was within my grams of carbs.  Let me know if you d like me to change my insulin dosage.    Also, SOFIE, my OB is having me induced on 11/6, about a week aged of my due date.  Not sure if that changes anything on your end or not, but wanted you to know that piece of info.        Assessment:    Ketones: T.   Fasting blood glucoses: 80% in target.  After breakfast: 100% in target.  After lunch: 100% in target.  After dinner: 60% in target.    Plan/Response:  Follow-up in 1 week.    Andrea Norris,  Thanks for sending in your numbers!  Right now most of the numbers are still looking in target, so I will not recommend any insulin changes today.  Sometimes numbers can jump a little bit when illness is present, and with you taking antibiotics I am assuming you are recovering from some sort of an illness.  Let's keep an eye on it for another week, and send us the numbers again next Wednesday.      Thanks and have a great week!   ABRAHAM Hong CDE    Any diabetes medication dose changes were made via the CDE Protocol and Collaborative Practice Agreement with the patient's OB/GYN provider. A copy of this encounter was shared with the  provider.

## 2018-09-28 ENCOUNTER — TRANSFERRED RECORDS (OUTPATIENT)
Dept: HEALTH INFORMATION MANAGEMENT | Facility: CLINIC | Age: 33
End: 2018-09-28

## 2018-10-02 ENCOUNTER — TRANSFERRED RECORDS (OUTPATIENT)
Dept: HEALTH INFORMATION MANAGEMENT | Facility: CLINIC | Age: 33
End: 2018-10-02

## 2018-10-03 ENCOUNTER — PATIENT OUTREACH (OUTPATIENT)
Dept: EDUCATION SERVICES | Facility: CLINIC | Age: 33
End: 2018-10-03

## 2018-10-03 NOTE — PROGRESS NOTES
Gestational Diabetes Follow-up    Subjective/Objective:    Myrna Brothers sent in blood glucose log for review. Last date of communication was: 9/26/18.    Gestational diabetes is being managed with medications    Taking diabetes medications:   yes:     Diabetes Medication(s)     Insulin Sig    insulin isophane human (HUMULIN N PEN) 100 UNIT/ML injection Inject 20 Units Subcutaneous At Bedtime    insulin NPH (HUMULIN N/NOVOLIN N) 100 UNIT/ML injection Inject 20 Units Subcutaneous At Bedtime     Patient not taking:  Reported on 9/20/2018          Estimated Date of Delivery: Nov 14, 2018    BG/Food Log:   FYI, my numbers were high again this morning- fasting was 97 and after breakfast was 55.    Myrna Brothers     CDE email:  Just to clarify -- your number was 55 after breakfast? Or 155?  Patient reply:  rebecca Aguilera 155.    Assessment:    Ketones: trace.   Fasting blood glucoses: 71% in target.  After breakfast: 83% in target.  After lunch: 100% in target.  After dinner: 100% in target.    Plan/Response:  Follow-up on Monday.    Kalani Bloom glad your number was 155, not 55! :) Your morning numbers are still mostly in target, so while I won't make a medication change today, let's have you email us Monday morning for a sooner check in. If both of your fasting numbers are elevated by Friday morning, you can increase your NPH to 22 units in the mean time.   Have a great week!    ABRAHAM Wright CDE    Any diabetes medication dose changes were made via the CDE Protocol and Collaborative Practice Agreement with the patient's OB/GYN provider. A copy of this encounter was shared with the provider.

## 2018-10-04 ENCOUNTER — PRENATAL OFFICE VISIT (OUTPATIENT)
Dept: OBGYN | Facility: CLINIC | Age: 33
End: 2018-10-04
Payer: COMMERCIAL

## 2018-10-04 VITALS
BODY MASS INDEX: 32.33 KG/M2 | DIASTOLIC BLOOD PRESSURE: 73 MMHG | HEART RATE: 77 BPM | OXYGEN SATURATION: 95 % | SYSTOLIC BLOOD PRESSURE: 125 MMHG | WEIGHT: 197.3 LBS

## 2018-10-04 DIAGNOSIS — Z23 NEED FOR PROPHYLACTIC VACCINATION AND INOCULATION AGAINST INFLUENZA: Primary | ICD-10-CM

## 2018-10-04 PROCEDURE — 90686 IIV4 VACC NO PRSV 0.5 ML IM: CPT | Performed by: OBSTETRICS & GYNECOLOGY

## 2018-10-04 PROCEDURE — 99207 ZZC PRENATAL VISIT: CPT | Performed by: OBSTETRICS & GYNECOLOGY

## 2018-10-04 PROCEDURE — 90471 IMMUNIZATION ADMIN: CPT | Performed by: OBSTETRICS & GYNECOLOGY

## 2018-10-04 NOTE — PROGRESS NOTES
Injectable Influenza Immunization Documentation    1.  Is the person to be vaccinated sick today?   No    2. Does the person to be vaccinated have an allergy to a component   of the vaccine?   No  Egg Allergy Algorithm Link    3. Has the person to be vaccinated ever had a serious reaction   to influenza vaccine in the past?   No    4. Has the person to be vaccinated ever had Guillain-Barré syndrome?   No    Form completed by Claudia Mendez CMA  October 4, 2018 4:35 PM

## 2018-10-04 NOTE — MR AVS SNAPSHOT
After Visit Summary   10/4/2018    Myrna Brothers    MRN: [id number]           Patient Information     Date Of Birth          1985        Visit Information        Provider Department      10/4/2018 4:15 PM Ashley Bernardo DO [facility]        Care Instructions                                                         If you have any questions regarding your visit, Please contact your care team.    [facility] Access Services: [phone]      [facility] CLINIC HOURS TELEPHONE NUMBER   Ashley Bernardo DO.    BABATUNDE Taylor -    DAO Vieira       Monday, Wednesday, [facility]  8:30a.m-5:00 p.m   [facility]  [address]  [place], [place] [address] [phone] ask for [facility]    Imaging Qbxkjpawic-[phone]       Urgent Care locations:    [facility] Saturday and Sunday   9 am - 5 pm    Monday-Friday   12 pm - 8 pm  Saturday and Sunday   9 am - 5 pm   [phone]     [facility] Labor and Delivery:  [phone]    If you need a medication refill, please contact your pharmacy. Please allow 3 business days for your refill to be completed.  As always, Thank you for trusting us with your healthcare needs!                Follow-ups after your visit        Your next 10 appointments already scheduled     Oct 05, 2018  1:50 PM CDT   No Charge with Imelda Borjas MD   [facility]    [address] [facility] [id number] [phone]            Oct 12, 2018  4:15 PM CDT   ESTABLISHED PRENATAL with Ashley Bernardo DO   [facility] ([facility]    [address] [facility] [address] [phone]              Who to contact     If you have questions or need follow up information about today's clinic visit or your schedule please  contact [facility] directly at [phone].  Normal or non-critical lab and imaging results will be communicated to you by MyChart, letter or phone within 4 business days after the clinic has received the results. If you do not hear from us within 7 days, please contact the clinic through Roving Plan[employer]art or phone. If you have a critical or abnormal lab result, we will notify you by phone as soon as possible.  Submit refill requests through Speak With Me or call your pharmacy and they will forward the refill request to us. Please allow 3 business days for your refill to be completed.          Additional Information About Your Visit        [employer] Information     Speak With Me gives you secure access to your electronic health record. If you see a primary care provider, you can also send messages to your care team and make appointments. If you have questions, please call your primary care clinic.  If you do not have a primary care provider, please call [phone] and they will assist you.        Care EveryWhere ID     This is your Care EveryWhere ID. This could be used by other organizations to access your [place] medical records  [id number]        Your Vitals Were     Pulse Last Period Pulse Oximetry Breastfeeding? BMI (Body Mass Index)       77 02/03/2018 (Exact Date) 95% No 32.33 kg/m2        Blood Pressure from Last 3 Encounters:   10/04/18 125/73   09/20/18 105/69   09/14/18 111/60    Weight from Last 3 Encounters:   10/04/18 197 lb 4.8 oz (89.5 kg)   09/20/18 199 lb 7.5 oz (90.5 kg)   09/14/18 196 lb 3.2 oz (89 kg)              Today, you had the following     No orders found for display         Today's Medication Changes          These changes are accurate as of 10/4/18  4:25 PM.  If you have any questions, ask your nurse or doctor.               Stop taking these medicines if you haven't already. Please contact your care team if you have questions.     amoxicillin 875 MG tablet   Commonly known as:  AMOXIL    Stopped by:  Ashley Bernardo DO           DAYQUIL OR   Stopped by:  Ashley Bernardo DO           SUDAFED PO   Stopped by:  Ashley Bernardo DO                    Primary Care Provider Office Phone # Fax #    DONY Mendiola [phone]       [address]  [place] [address]        Equal Access to Services     [facility] : Hadii aad ku hadasho Soomaali, waaxda luqadaha, qaybta kaalmada adeegyada, waxay idiin hayaan adeeg kharash la'aan . So [facility] [phone].    ATENCIÓN: Si lauren espdeepti, tiene a flowers disposición servicios gratuitos de asistencia lingüística. Gusame al [phone].    We comply with applicable federal civil rights laws and [place] laws. We do not discriminate on the basis of race, color, national origin, age, disability, sex, sexual orientation, or gender identity.            Thank you!     Thank you for choosing [facility]  for your care. Our goal is always to provide you with excellent care. Hearing back from our patients is one way we can continue to improve our services. Please take a few minutes to complete the written survey that you may receive in the mail after your visit with us. Thank you!             Your Updated Medication List - Protect others around you: Learn how to safely use, store and throw away your medicines at www.disposemymeds.org.          This list is accurate as of 10/4/18  4:25 PM.  Always use your most recent med list.                   Brand Name Dispense Instructions for use Diagnosis    acetone (urine) test strip    KETOSTIX    25 each    Test daily. When negative for 1week, reduce testing to once weekly.    Gestational diabetes       albuterol 108 (90 Base) MCG/ACT inhaler    PROAIR HFA/PROVENTIL HFA/VENTOLIN HFA     Inhale 2 puffs into the lungs every 6 hours        blood glucose monitoring lancets     100 each    Use to test blood sugar 4 times daily or as directed.    Gestational  "diabetes       blood glucose monitoring test strip    CONTOUR NEXT TEST    150 each    Use to test blood sugar 4 times daily or as directed.    Gestational diabetes       busPIRone 5 MG tablet    BUSPAR    180 tablet    Take 1 tablet (5 mg) by mouth 2 times daily    Generalized anxiety disorder       fluticasone 220 MCG/ACT Inhaler    FLOVENT HFA    1 Inhaler    Inhale 2 puffs into the lungs 2 times daily for 7 days    Upper respiratory tract infection, unspecified type, Reactive airway disease without complication, unspecified asthma severity, unspecified whether persistent       * insulin isophane human 100 UNIT/ML injection    HumuLIN N PEN     Inject 20 Units Subcutaneous At Bedtime        * insulin  UNIT/ML injection    HumuLIN N/NovoLIN N    10 mL    Inject 20 Units Subcutaneous At Bedtime    Insulin controlled gestational diabetes mellitus (GDM) in third trimester       insulin pen needle 31G X 5 MM    B-D U/F    100 each    Use pen needles daily as directed.    Insulin controlled gestational diabetes mellitus (GDM) in third trimester       insulin syringe-needle U-100 31G X 5/16\" 0.5 ML    BD insulin syringe ultrafine    100 each    Use one syringe daily or as directed.    Insulin controlled gestational diabetes mellitus (GDM) in third trimester       IRON SUPPLEMENT PO           PRENATAL VITAMIN PO           sertraline 50 MG tablet    ZOLOFT    90 tablet    TAKE 1 TABLET BY MOUTH EVERY DAY    Generalized anxiety disorder, Major depressive disorder with single episode, in full remission (H)       TYLENOL PO      Take 325 mg by mouth        * Notice:  This list has 2 medication(s) that are the same as other medications prescribed for you. Read the directions carefully, and ask your doctor or other care provider to review them with you.      "

## 2018-10-04 NOTE — PATIENT INSTRUCTIONS
If you have any questions regarding your visit, Please contact your care team.    [place] Access Services: [phone]      [facility] CLINIC HOURS TELEPHONE NUMBER   Ashley Bernardo DO.    BABATUNDE Taylor -    DAO Vieira       Monday, Wednesday, Thursday and Friday, [place]  8:30a.m-5:00 p.m   [facility]  [address] [place] [address]  [phone] ask for [facility]    Imaging Isstefypwi-[phone]       Urgent Care locations:    [facility] Saturday and Sunday   9 am - 5 pm    Monday-Friday   12 pm - 8 pm  Saturday and Sunday   9 am - 5 pm   [phone]     [facility] Labor and Delivery:  [phone]    If you need a medication refill, please contact your pharmacy. Please allow 3 business days for your refill to be completed.  As always, Thank you for trusting us with your healthcare needs!

## 2018-10-05 ENCOUNTER — TRANSFERRED RECORDS (OUTPATIENT)
Dept: HEALTH INFORMATION MANAGEMENT | Facility: CLINIC | Age: 33
End: 2018-10-05

## 2018-10-05 ENCOUNTER — OFFICE VISIT (OUTPATIENT)
Dept: PEDIATRICS | Facility: CLINIC | Age: 33
End: 2018-10-05
Payer: COMMERCIAL

## 2018-10-05 DIAGNOSIS — Z76.81 PEDIATRIC PRE-BIRTH VISIT FOR EXPECTANT MOTHER: Primary | ICD-10-CM

## 2018-10-05 PROCEDURE — 99207 ZZC NO CHARGE LOS: CPT | Performed by: PEDIATRICS

## 2018-10-05 NOTE — MR AVS SNAPSHOT
After Visit Summary   10/5/2018    Myrna Brothers    MRN: [id number]           Patient Information     Date Of Birth          1985        Visit Information        Provider Department      10/5/2018 1:50 PM Imelda Borjas MD [facility]        Today's Diagnoses     Pediatric pre-birth visit for expectant mother    -  1       Follow-ups after your visit        Your next 10 appointments already scheduled     Oct 12, 2018  4:15 PM CDT   ESTABLISHED PRENATAL with Ashley Bernardo DO   [facility] ([facility])    [address] [place] [address] [phone]              Who to contact     If you have questions or need follow up information about today's clinic visit or your schedule please contact [facility] directly at [phone].  Normal or non-critical lab and imaging results will be communicated to you by [employer] System[employer]art, letter or phone within 4 business days after the clinic has received the results. If you do not hear from us within 7 days, please contact the clinic through [employer] Systemshart or phone. If you have a critical or abnormal lab result, we will notify you by phone as soon as possible.  Submit refill requests through [employer] or call your pharmacy and they will forward the refill request to us. Please allow 3 business days for your refill to be completed.          Additional Information About Your Visit        MyChart Information     [employer] gives you secure access to your electronic health record. If you see a primary care provider, you can also send messages to your care team and make appointments. If you have questions, please call your primary care clinic.  If you do not have a primary care provider, please call [phone] and they will assist you.      [employer] is an electronic gateway that provides easy, online access to your medical records. With [employer], you can request a clinic  appointment, read your test results, renew a prescription or communicate with your care team.     To access your existing account, please contact your [facility] or call [phone] for assistance.        Care EveryWhere ID     This is your Care EveryWhere ID. This could be used by other organizations to access your [place] medical records  [id number]        Your Vitals Were     Last Period                   02/03/2018 (Exact Date)            Blood Pressure from Last 3 Encounters:   10/04/18 125/73   09/20/18 105/69   09/14/18 111/60    Weight from Last 3 Encounters:   10/04/18 197 lb 4.8 oz (89.5 kg)   09/20/18 199 lb 7.5 oz (90.5 kg)   09/14/18 196 lb 3.2 oz (89 kg)              Today, you had the following     No orders found for display       Primary Care Provider Office Phone # Fax #    Mery Crumpmyra Bronson, APRN [facility] [phone]       [address]  [place] [address]        Equal Access to Services     NOHELIA OCONNOR : Hadii aad ku hadasho Soomaali, waaxda luqadaha, qaybta kaalmada adeegyada, waxay idiin hayaan true barreto . So [facility] [phone].    ATENCIÓN: Si habla español, tiene a flowers disposición servicios gratuitos de asistencia lingüística. Llame al [phone].    We comply with applicable federal civil rights laws and [place] laws. We do not discriminate on the basis of race, color, national origin, age, disability, sex, sexual orientation, or gender identity.            Thank you!     Thank you for choosing [facility]  for your care. Our goal is always to provide you with excellent care. Hearing back from our patients is one way we can continue to improve our services. Please take a few minutes to complete the written survey that you may receive in the mail after your visit with us. Thank you!             Your Updated Medication List - Protect others around you: Learn how to safely use, store and throw away your  "medicines at www.disposemymeds.org.          This list is accurate as of 10/5/18  2:54 PM.  Always use your most recent med list.                   Brand Name Dispense Instructions for use Diagnosis    acetone (urine) test strip    KETOSTIX    25 each    Test daily. When negative for 1week, reduce testing to once weekly.    Gestational diabetes       albuterol 108 (90 Base) MCG/ACT inhaler    PROAIR HFA/PROVENTIL HFA/VENTOLIN HFA     Inhale 2 puffs into the lungs every 6 hours        blood glucose monitoring lancets     100 each    Use to test blood sugar 4 times daily or as directed.    Gestational diabetes       blood glucose monitoring test strip    CONTOUR NEXT TEST    150 each    Use to test blood sugar 4 times daily or as directed.    Gestational diabetes       busPIRone 5 MG tablet    BUSPAR    180 tablet    Take 1 tablet (5 mg) by mouth 2 times daily    Generalized anxiety disorder       fluticasone 220 MCG/ACT Inhaler    FLOVENT HFA    1 Inhaler    Inhale 2 puffs into the lungs 2 times daily for 7 days    Upper respiratory tract infection, unspecified type, Reactive airway disease without complication, unspecified asthma severity, unspecified whether persistent       * insulin isophane human 100 UNIT/ML injection    HumuLIN N PEN     Inject 20 Units Subcutaneous At Bedtime        * insulin  UNIT/ML injection    HumuLIN N/NovoLIN N    10 mL    Inject 20 Units Subcutaneous At Bedtime    Insulin controlled gestational diabetes mellitus (GDM) in third trimester       insulin pen needle 31G X 5 MM    B-D U/F    100 each    Use pen needles daily as directed.    Insulin controlled gestational diabetes mellitus (GDM) in third trimester       insulin syringe-needle U-100 31G X 5/16\" 0.5 ML    BD insulin syringe ultrafine    100 each    Use one syringe daily or as directed.    Insulin controlled gestational diabetes mellitus (GDM) in third trimester       IRON SUPPLEMENT PO           PRENATAL VITAMIN PO        "    sertraline 50 MG tablet    ZOLOFT    90 tablet    TAKE 1 TABLET BY MOUTH EVERY DAY    Generalized anxiety disorder, Major depressive disorder with single episode, in full remission (H)       TYLENOL PO      Take 325 mg by mouth        * Notice:  This list has 2 medication(s) that are the same as other medications prescribed for you. Read the directions carefully, and ask your doctor or other care provider to review them with you.

## 2018-10-05 NOTE — PROGRESS NOTES
She reports feeling reassuring daily fetal activity and will continue to record.  She lost 2 lbs but denies dieting.  She continues at the same dose of insulin - 20 units of Humulin at HS.  Today's NST was reactive and she is scheduled for twice weekly BPPs with MFM on Tuesdays and Fridays.  She would like a flu vaccine so this was provided today.  The plan is cervical ripening, if needed, on 11/6/18.  She has a follow up growth US scheduled with MFM.

## 2018-10-05 NOTE — PROGRESS NOTES
Prenatal Visit  Prenatal visit with mom and dad.  First baby (Mcintyre).  Will deliver at [facility].   Normal pregnancy with no complications   Discussed with parents routine  care, office hours and policies.

## 2018-10-08 ENCOUNTER — TELEPHONE (OUTPATIENT)
Dept: OBGYN | Facility: CLINIC | Age: 33
End: 2018-10-08

## 2018-10-09 ENCOUNTER — TRANSFERRED RECORDS (OUTPATIENT)
Dept: HEALTH INFORMATION MANAGEMENT | Facility: CLINIC | Age: 33
End: 2018-10-09

## 2018-10-10 ENCOUNTER — NURSE TRIAGE (OUTPATIENT)
Dept: NURSING | Facility: CLINIC | Age: 33
End: 2018-10-10

## 2018-10-10 NOTE — TELEPHONE ENCOUNTER
"Patient calling reporting having contractions all day. Describes contractions as \"tugging tight feeling and tigh wave\". States has been having \"probable contractions\" every 10 minutes for the past 1 hour. Denies any vaginal bleeding. Informed patient RN will page on-call OBGYN to call patient directly for 2nd level triage.     RN paged Dr. Meade, on-call obgyn for [facility] at 7:10pm through Smart Web to call patient directly at [phone]    Advised patient to call RN if no call from provider within 20 minutes. Caller verbalized understanding. Denies further questions.      Sagar Cartwright RN  [facility]         Reason for Disposition    Contractions < 10 minutes apart for 1 hour (i.e., 6 or more contractions an hour)    Additional Information    Negative: Shock suspected (e.g., cold/pale/clammy skin, too weak to stand, low BP, rapid pulse)    Negative: Passed out (i.e., lost consciousness, collapsed and was not responding)    Negative: Difficult to awaken or acting confused  (e.g., disoriented, slurred speech)    Negative: [1] SEVERE abdominal pain (e.g., excruciating) AND [2] constant AND [3] present > 1 hour    Negative: Severe bleeding (e.g., continuous red blood from vagina, or large blood clots)    Negative: Umbilical cord hanging out of the vagina (shiny, white, curled appearance, \"like telephone cord\")    Negative: Can see baby    Negative: Uncontrollable urge to push (i.e., feels like baby is coming out now)    Negative: Sounds like a life-threatening emergency to the triager    Negative: MODERATE-SEVERE abdominal pain    Protocols used: PREGNANCY - LABOR - -ADULT-AH      "

## 2018-10-11 ENCOUNTER — PATIENT OUTREACH (OUTPATIENT)
Dept: EDUCATION SERVICES | Facility: CLINIC | Age: 33
End: 2018-10-11

## 2018-10-11 DIAGNOSIS — O24.414 INSULIN CONTROLLED GESTATIONAL DIABETES MELLITUS (GDM) IN THIRD TRIMESTER: Primary | ICD-10-CM

## 2018-10-12 ENCOUNTER — PRENATAL OFFICE VISIT (OUTPATIENT)
Dept: OBGYN | Facility: CLINIC | Age: 33
End: 2018-10-12
Payer: COMMERCIAL

## 2018-10-12 ENCOUNTER — TRANSFERRED RECORDS (OUTPATIENT)
Dept: HEALTH INFORMATION MANAGEMENT | Facility: CLINIC | Age: 33
End: 2018-10-12

## 2018-10-12 VITALS
SYSTOLIC BLOOD PRESSURE: 120 MMHG | OXYGEN SATURATION: 97 % | WEIGHT: 198 LBS | HEART RATE: 69 BPM | BODY MASS INDEX: 32.45 KG/M2 | DIASTOLIC BLOOD PRESSURE: 79 MMHG

## 2018-10-12 DIAGNOSIS — O24.414 INSULIN CONTROLLED GESTATIONAL DIABETES MELLITUS (GDM) IN THIRD TRIMESTER: Primary | ICD-10-CM

## 2018-10-12 PROCEDURE — 87186 SC STD MICRODIL/AGAR DIL: CPT | Performed by: OBSTETRICS & GYNECOLOGY

## 2018-10-12 PROCEDURE — 59025 FETAL NON-STRESS TEST: CPT | Performed by: OBSTETRICS & GYNECOLOGY

## 2018-10-12 PROCEDURE — 87653 STREP B DNA AMP PROBE: CPT | Performed by: OBSTETRICS & GYNECOLOGY

## 2018-10-12 PROCEDURE — 99207 ZZC COMPLICATED OB VISIT: CPT | Performed by: OBSTETRICS & GYNECOLOGY

## 2018-10-12 NOTE — MR AVS SNAPSHOT
After Visit Summary   10/12/2018    Myrna Brothers    MRN: [id number]           Patient Information     Date Of Birth          1985        Visit Information        Provider Department      10/12/2018 4:15 PM Ashley Bernardo DO [facility]        Today's Diagnoses     Insulin controlled gestational diabetes mellitus (GDM) in third trimester    -  1      Care Instructions                                                         If you have any questions regarding your visit, Please contact your care team.    [email or web address] Access Services: [phone]      Women s Health CLINIC HOURS TELEPHONE NUMBER   Ashley Bernardo DO.    BABATUNDE Taylor -    DAO Vieira       Monday, Wednesday, Thursday and Friday, [place]  8:30a.m-5:00 p.m   [facility]  [address] [place] [address]  [phone] ask for [facility]    Imaging Mivqbeecxo-[phone]       Urgent Care locations:    [facility] Saturday and Sunday   9 am - 5 pm    Monday-Friday   12 pm - 8 pm  Saturday and Sunday   9 am - 5 pm   [phone]     [facility] Labor and Delivery:  [phone]    If you need a medication refill, please contact your pharmacy. Please allow 3 business days for your refill to be completed.  As always, Thank you for trusting us with your healthcare needs!                Follow-ups after your visit        Who to contact     If you have questions or need follow up information about today's clinic visit or your schedule please contact [facility] directly at [phone].  Normal or non-critical lab and imaging results will be communicated to you by MyChart, letter or phone within 4 business days after the clinic has received the results. If you do not hear from us within 7 days, please contact the clinic through Ripstone[employer] or phone. If you have a critical or abnormal lab result,  we will notify you by phone as soon as possible.  Submit refill requests through [employer] or call your pharmacy and they will forward the refill request to us. Please allow 3 business days for your refill to be completed.          Additional Information About Your Visit        Pagahart Information     [employer] gives you secure access to your electronic health record. If you see a primary care provider, you can also send messages to your care team and make appointments. If you have questions, please call your primary care clinic.  If you do not have a primary care provider, please call [phone] and they will assist you.        Care EveryWhere ID     This is your Care EveryWhere ID. This could be used by other organizations to access your [place] medical records  [id number]        Your Vitals Were     Pulse Last Period Pulse Oximetry Breastfeeding? BMI (Body Mass Index)       69 02/03/2018 (Exact Date) 97% No 32.45 kg/m2        Blood Pressure from Last 3 Encounters:   10/12/18 120/79   10/04/18 125/73   09/20/18 105/69    Weight from Last 3 Encounters:   10/12/18 198 lb (89.8 kg)   10/04/18 197 lb 4.8 oz (89.5 kg)   09/20/18 199 lb 7.5 oz (90.5 kg)              Today, you had the following     No orders found for display       Primary Care Provider Office Phone # Fax #    Mery Bronson, APRN [facility] [phone]       [address]  [place] [address]        Equal Access to Services     Herrick CampusVIKAS : Hadii aad ku hadasho Soomaali, waaxda luqadaha, qaybta kaalmada adeegyada, raquel barreto . So [facility] [phone].    ATENCIÓN: Si habla español, tiene a flowers disposición servicios gratuitos de asistencia lingüística. Cory al [phone].    We comply with applicable federal civil rights laws and [place] laws. We do not discriminate on the basis of race, color, national origin, age, disability, sex, sexual orientation, or gender identity.            Thank you!      Thank you for choosing [facility]  for your care. Our goal is always to provide you with excellent care. Hearing back from our patients is one way we can continue to improve our services. Please take a few minutes to complete the written survey that you may receive in the mail after your visit with us. Thank you!             Your Updated Medication List - Protect others around you: Learn how to safely use, store and throw away your medicines at www.disposemymeds.org.          This list is accurate as of 10/12/18  4:33 PM.  Always use your most recent med list.                   Brand Name Dispense Instructions for use Diagnosis    acetone (urine) test strip    KETOSTIX    25 each    Test daily. When negative for 1week, reduce testing to once weekly.    Gestational diabetes       albuterol 108 (90 Base) MCG/ACT inhaler    PROAIR HFA/PROVENTIL HFA/VENTOLIN HFA     Inhale 2 puffs into the lungs every 6 hours        blood glucose monitoring lancets     100 each    Use to test blood sugar 4 times daily or as directed.    Gestational diabetes       blood glucose monitoring test strip    CONTOUR NEXT TEST    150 each    Use to test blood sugar 4 times daily or as directed.    Gestational diabetes       busPIRone 5 MG tablet    BUSPAR    180 tablet    Take 1 tablet (5 mg) by mouth 2 times daily    Generalized anxiety disorder       fluticasone 220 MCG/ACT Inhaler    FLOVENT HFA    1 Inhaler    Inhale 2 puffs into the lungs 2 times daily for 7 days    Upper respiratory tract infection, unspecified type, Reactive airway disease without complication, unspecified asthma severity, unspecified whether persistent       * insulin isophane human 100 UNIT/ML injection    HumuLIN N PEN     Inject 20 Units Subcutaneous At Bedtime        * insulin  UNIT/ML injection    HumuLIN N/NovoLIN N    10 mL    Inject 20 Units Subcutaneous At Bedtime    Insulin controlled gestational diabetes mellitus (GDM) in third  "trimester       insulin pen needle 31G X 5 MM    B-D U/F    100 each    Use pen needles daily as directed.    Insulin controlled gestational diabetes mellitus (GDM) in third trimester       insulin syringe-needle U-100 31G X 5/16\" 0.5 ML    BD insulin syringe ultrafine    100 each    Use one syringe daily or as directed.    Insulin controlled gestational diabetes mellitus (GDM) in third trimester       IRON SUPPLEMENT PO           PRENATAL VITAMIN PO           sertraline 50 MG tablet    ZOLOFT    90 tablet    TAKE 1 TABLET BY MOUTH EVERY DAY    Generalized anxiety disorder, Major depressive disorder with single episode, in full remission (H)       TYLENOL PO      Take 325 mg by mouth        * Notice:  This list has 2 medication(s) that are the same as other medications prescribed for you. Read the directions carefully, and ask your doctor or other care provider to review them with you.      "

## 2018-10-12 NOTE — PROGRESS NOTES
She reports feeling reassuring daily fetal activity and will continue to record.  She feels that she had painful uterine contractions 2 days ago but these have spaced and weakened since.  She gained 1 lb since her last visit and denies any fluid leakage or regular uterine contractions.  Her GBS culture was obtained and submitted and she denies any PCN allergy.  Her cervix remains unchanged and unfavorable.  However, her NST shows several minutes of fetal tachycardia with a baseline of 180 so she was sent to L&D at [facility] for extended monitoring.  I called and gave report to the charge RN and Dr. Mckeon.  Her last BPP on 10/5/18 per [facility] scored 8/8.  She already has received her flu vaccine.

## 2018-10-12 NOTE — PATIENT INSTRUCTIONS
If you have any questions regarding your visit, Please contact your care team.    [employer] Access Services: [phone]      [facility] CLINIC HOURS TELEPHONE NUMBER   Ashley Bernardo DO.    BABATUNDE Taylor -    DAO Vieira       Monday, Wednesday, Thursday and Friday, [place]  8:30a.m-5:00 p.m   [facility]  [address] [place] [address]  [phone] ask for [facility]    Imaging Wvhgzldgen-[phone]       Urgent Care locations:    [facility] Saturday and Sunday   9 am - 5 pm    Monday-Friday   12 pm - 8 pm  Saturday and Sunday   9 am - 5 pm   [phone]     [facility] Labor and Delivery:  [phone]    If you need a medication refill, please contact your pharmacy. Please allow 3 business days for your refill to be completed.  As always, Thank you for trusting us with your healthcare needs!

## 2018-10-13 LAB
GP B STREP DNA SPEC QL NAA+PROBE: POSITIVE
SPECIMEN SOURCE: ABNORMAL

## 2018-10-15 ENCOUNTER — NURSE TRIAGE (OUTPATIENT)
Dept: NURSING | Facility: CLINIC | Age: 33
End: 2018-10-15

## 2018-10-15 NOTE — TELEPHONE ENCOUNTER
"ROM.  Will go to [facility] Ba[facility]uelos.  On call MD paged to [phone] via smart web.  Catia Ro RN  [facility]      Reason for Disposition    Leakage of fluid from vagina    Additional Information    Negative: [1] SEVERE abdominal pain (e.g., excruciating) AND [2] constant AND [3] present > 1 hour    Negative: Severe bleeding (e.g., continuous red blood from vagina, or large blood clots)    Negative: Umbilical cord hanging out of the vagina (shiny, white, curled appearance, \"like telephone cord\")    Negative: Uncontrollable urge to push (i.e., feels like baby is coming out now)    Negative: Can see baby    Negative: Sounds like a life-threatening emergency to the triager    Negative: Vaginal bleeding    Negative: < 20 weeks pregnant    Protocols used: PREGNANCY - RUPTURE OF MEMBRANES-ADULT-AH      "

## 2018-10-16 LAB
BACTERIA SPEC CULT: ABNORMAL
SPECIMEN SOURCE: ABNORMAL

## 2018-10-19 ENCOUNTER — TELEPHONE (OUTPATIENT)
Dept: OBGYN | Facility: CLINIC | Age: 33
End: 2018-10-19

## 2018-10-19 PROCEDURE — 86695 HERPES SIMPLEX TYPE 1 TEST: CPT | Performed by: OBSTETRICS & GYNECOLOGY

## 2018-10-19 PROCEDURE — 86645 CMV ANTIBODY IGM: CPT | Performed by: OBSTETRICS & GYNECOLOGY

## 2018-10-19 PROCEDURE — 86762 RUBELLA ANTIBODY: CPT | Performed by: OBSTETRICS & GYNECOLOGY

## 2018-10-19 PROCEDURE — 86777 TOXOPLASMA ANTIBODY: CPT | Performed by: OBSTETRICS & GYNECOLOGY

## 2018-10-19 PROCEDURE — 86696 HERPES SIMPLEX TYPE 2 TEST: CPT | Performed by: OBSTETRICS & GYNECOLOGY

## 2018-10-19 PROCEDURE — 86644 CMV ANTIBODY: CPT | Performed by: OBSTETRICS & GYNECOLOGY

## 2018-10-19 PROCEDURE — 36415 COLL VENOUS BLD VENIPUNCTURE: CPT | Performed by: OBSTETRICS & GYNECOLOGY

## 2018-10-19 NOTE — TELEPHONE ENCOUNTER
Called patient to discuss results.  Patient was seeing Dr. Bernardo for prenatal care and I saw her during her hospitalization.     Patient had  premature rupture of membranes at 35 weeks 5 days and delivered early the following morning on 10/16/2018.  Placenta was sent for pathology.  Report copied from the [facility] and pasted below.    Left message asking patient to return our call.  When she calls back, please let her know that the placenta demonstrated possible signs of infection.  We have ordered labs and she should get those done at her earliest convenience.  We will contact her with those results.    Surgical Pathology - Placenta   Order: [id number]   Status:  Final result   Visible to patient:  No (Not Released)   Component 3d ago   Case Report   Surgical Pathology                                Case: [id number]                                    Authorizing Provider:  Claudia Traylor MD  Collected:           10/16/2018 01:02 AM           Ordering Location:     [place] [id number]                   Received:            10/16/2018 01:02 AM           Pathologist:           Layla Barr MD                                                            Specimen:    Placenta, 3rd Trimester                                                                    Final Diagnosis   Immature third trimester placenta with patchy high-grade villitis of unknown etiology   Electronically signed by Layla Barr MD on 10/18/2018 at 1145   Comment    Villitis frequently remains unknown in etiology.  TORCH titers may be helpful.  It may be associated with adverse fetal consequences, and may recur   Gross Description    GENERAL  Condition:  Partially fixed      CORD  Length: 28 cm in length by 1 cm in diameter  Number of vessels: 3  Appearance: Myxoid white  Presence of true knot(s) or pseudoknot(s):  No  Insertion: Central, 6 cm from the nearest disc edge     MEMBRANES  Condition: Disrupted  Color:  Purple-pink  Transparency: Translucent  Insertion: Marginal     DISK  Trimmed weight: 436 g  Shape: Oval  Dimensions: 16 x 14 cm  Thickness (min/max): Minimum 2.5, maximum 3.5 cm  Fetal Surface: Purple-blue with normal arborization  Maternal surface: Complete  Findings upon sectioning: Spongy red-pink grossly unremarkable cut surface  Blocks: 1 - membrane roll including part of the marginal parenchyma, additional membrane roll, and two cross sections of cord; 2 - full-thickness section of placenta adjacent to cord insertion; 3,4 - central full-thickness placenta.      All cassettes confirmed closed.  (JR)   Microscopic Description    The umbilical cord contains 3 normal vessels.  The membranes are free of inflammation.  The placenta is histologically mature.  The villi demonstrate villitis.  There are clusters of villi with a mild diffuse infiltrate of lymphocytes and monocytes.  Neutrophils and plasma cells are not prominent.  No viral inclusions are seen.  Syncitial knots are focally prominent.  Villitis is observed in 2 of the 3 sections of parenchyma.   Resulting Agency [facility]      Specimen Collected: 10/16/18 01:02 Last Resulted: 10/18/18 11:45                Scans on Order [id number]     Document on 10/18/2018 11:45 AM by Layla Barr MD

## 2018-10-19 NOTE — TELEPHONE ENCOUNTER
Return call from patient. Gave orders per Dr. Traylor as below. Patient agreed to have labs done today at  lab. Scheduled for at 1510. Patient stated her baby boy was admitted to NICU.  was in the background and both are appreciative of assistance. Isha Moses RN, BAN

## 2018-10-20 LAB
CMV IGG SERPL QL IA: <0.2 AI (ref 0–0.8)
HSV1 IGG SERPL QL IA: <0.2 AI (ref 0–0.8)
HSV2 IGG SERPL QL IA: <0.2 AI (ref 0–0.8)
RUBV IGG SERPL IA-ACNC: 37 IU/ML

## 2018-10-22 LAB — T GONDII IGG SER QL: <3 IU/ML (ref 0–7.1)

## 2018-10-23 LAB — CMV IGM SERPL QL IA: <0.2 AI (ref 0–0.8)

## 2018-11-07 ENCOUNTER — OFFICE VISIT (OUTPATIENT)
Dept: PSYCHOLOGY | Facility: CLINIC | Age: 33
End: 2018-11-07
Payer: COMMERCIAL

## 2018-11-07 DIAGNOSIS — F33.1 MODERATE EPISODE OF RECURRENT MAJOR DEPRESSIVE DISORDER (H): Primary | ICD-10-CM

## 2018-11-07 DIAGNOSIS — F41.1 GENERALIZED ANXIETY DISORDER: ICD-10-CM

## 2018-11-07 PROCEDURE — 99207 ZZC NO CHARGE BEHAVIORAL WARM HANDOFF: CPT | Performed by: SOCIAL WORKER

## 2018-11-07 NOTE — MR AVS SNAPSHOT
After Visit Summary   11/7/2018    Myrna Brothers    MRN: [id number]           Patient Information     Date Of Birth          1985        Visit Information        Provider Department      11/7/2018 12:00 PM [facility]        Today's Diagnoses     Moderate episode of recurrent major depressive disorder (H)    -  1    Generalized anxiety disorder           Follow-ups after your visit        Your next 10 appointments already scheduled     Nov 15, 2018  9:00 AM CST   [facility]    [address] [place] [id number] [phone]            Nov 21, 2018  9:00 AM CST   [facility]    [address] [place] [id number] [phone]            Nov 29, 2018  9:00 AM CST   Return Visit with Stephanie Calabrese [facility] [place]note 2[facility]    [address] [place] [id number] [phone]              Who to contact     If you have questions or need follow up information about today's clinic visit or your schedule please contact [facility] directly at [phone].  Normal or non-critical lab and imaging results will be communicated to you by MyChart, letter or phone within 4 business days after the clinic has received the results. If you do not hear from us within 7 days, please contact the clinic through MyChart or phone. If you have a critical or abnormal lab result, we will notify you by phone as soon as possible.  Submit refill requests through [employer] or call your pharmacy and they will forward the refill request to us. Please allow 3 business days for your refill to be completed.          Additional Information About Your Visit        MyChart Information     [facility] gives  you secure access to your electronic health record. If you see a primary care provider, you can also send messages to your care team and make appointments. If you have questions, please call your primary care clinic.  If you do not have a primary care provider, please call [phone] and they will assist you.      [employer] is an electronic gateway that provides easy, online access to your medical records. With [employer], you can request a clinic appointment, read your test results, renew a prescription or communicate with your care team.     To access your existing account, please contact your [facility] or call [phone] for assistance.        Care EveryWhere ID     This is your Care EveryWhere ID. This could be used by other organizations to access your [place] medical records  [id number]        Your Vitals Were     Last Period                   02/03/2018 (Exact Date)            Blood Pressure from Last 3 Encounters:   10/12/18 120/79   10/04/18 125/73   09/20/18 105/69    Weight from Last 3 Encounters:   10/12/18 89.8 kg (198 lb)   10/04/18 89.5 kg (197 lb 4.8 oz)   09/20/18 90.5 kg (199 lb 7.5 oz)              Today, you had the following     No orders found for display       Primary Care Provider Office Phone # Fax #    Mery DONY Dugan [facility] [phone]       [address]  [place] [address]        Equal Access to Services     [facility] : Hadii aad ku hadasho Soomaali, waaxda luqadaha, qaybta kaalmada adeegyada, raquel layton haysauln true barreto . So [facility] [phone].    ATENCIÓN: Si habla español, tiene a flowers disposición servicios gratuitos de asistencia lingüística. Cory al [phone].    We comply with applicable federal civil rights laws and [place] laws. We do not discriminate on the basis of race, color, national origin, age, disability, sex, sexual orientation, or gender identity.            Thank you!     Thank you  "for [facility]  for your care. Our goal is always to provide you with excellent care. Hearing back from our patients is one way we can continue to improve our services. Please take a few minutes to complete the written survey that you may receive in the mail after your visit with us. Thank you!             Your Updated Medication List - Protect others around you: Learn how to safely use, store and throw away your medicines at www.disposemymeds.org.          This list is accurate as of 11/7/18 11:59 PM.  Always use your most recent med list.                   Brand Name Dispense Instructions for use Diagnosis    acetone (urine) test strip    KETOSTIX    25 each    Test daily. When negative for 1week, reduce testing to once weekly.    Insulin controlled gestational diabetes mellitus (GDM) in third trimester       albuterol 108 (90 Base) MCG/ACT inhaler    PROAIR HFA/PROVENTIL HFA/VENTOLIN HFA     Inhale 2 puffs into the lungs every 6 hours        blood glucose monitoring lancets     100 each    Use to test blood sugar 4 times daily or as directed.    Gestational diabetes       blood glucose monitoring test strip    CONTOUR NEXT TEST    150 each    Use to test blood sugar 4 times daily or as directed.    Gestational diabetes       busPIRone 5 MG tablet    BUSPAR    180 tablet    Take 1 tablet (5 mg) by mouth 2 times daily    Generalized anxiety disorder       * insulin isophane human 100 UNIT/ML injection    HumuLIN N PEN     Inject 20 Units Subcutaneous At Bedtime        * insulin  UNIT/ML injection    HumuLIN N/NovoLIN N    10 mL    Inject 20 Units Subcutaneous At Bedtime    Insulin controlled gestational diabetes mellitus (GDM) in third trimester       insulin pen needle 31G X 5 MM    B-D U/F    100 each    Use pen needles daily as directed.    Insulin controlled gestational diabetes mellitus (GDM) in third trimester       insulin syringe-needle U-100 31G X 5/16\" 0.5 ML    BD " insulin syringe ultrafine    100 each    Use one syringe daily or as directed.    Insulin controlled gestational diabetes mellitus (GDM) in third trimester       IRON SUPPLEMENT PO           PRENATAL VITAMIN PO           sertraline 50 MG tablet    ZOLOFT    90 tablet    TAKE 1 TABLET BY MOUTH EVERY DAY    Generalized anxiety disorder, Major depressive disorder with single episode, in full remission (H)       TYLENOL PO      Take 325 mg by mouth        * Notice:  This list has 2 medication(s) that are the same as other medications prescribed for you. Read the directions carefully, and ask your doctor or other care provider to review them with you.

## 2018-11-08 ENCOUNTER — OFFICE VISIT (OUTPATIENT)
Dept: PSYCHOLOGY | Facility: CLINIC | Age: 33
End: 2018-11-08
Payer: COMMERCIAL

## 2018-11-08 DIAGNOSIS — F33.1 MODERATE EPISODE OF RECURRENT MAJOR DEPRESSIVE DISORDER (H): Primary | ICD-10-CM

## 2018-11-08 DIAGNOSIS — F41.1 GENERALIZED ANXIETY DISORDER: ICD-10-CM

## 2018-11-08 PROCEDURE — 90834 PSYTX W PT 45 MINUTES: CPT | Performed by: SOCIAL WORKER

## 2018-11-08 NOTE — PROGRESS NOTES
"[facility]  Integrated Behavioral Health  November 9, 2018    Behavioral Health Clinician Progress Note    Patient Name: Myrna Brothers           Service Type:  Individual      Service Location:   Face to Face in Clinic     Session Start Time: 9:05 am   Session End Time:  10:00 am      Session Length: 38 - 52      Attendees: Patient    Visit Activities (Refresh list every visit): [facility] Only    Diagnostic Assessment Date: 4/12/18  Treatment Plan Review Date: 11/8/18  See Flowsheets for today's PHQ-9 and OREN-7 results  Previous PHQ-9:   PHQ-9 SCORE 2/28/2018 4/16/2018 7/13/2018   Total Score 5 11 1     Previous OREN-7:   OREN-7 SCORE 2/28/2018 4/16/2018 7/13/2018   Total Score 5 18 1       LUKASZ LEVEL:  No flowsheet data found.    DATA  Extended Session (60+ minutes): No  Interactive Complexity: No  Crisis: No  [facility] Patient: No    Treatment Objective(s) Addressed in This Session:  Target Behavior(s): disease management/lifestyle changes , mental health management    Depressed Mood: Decrease frequency and intensity of feeling down, depressed, hopeless  Identify negative self-talk and behaviors: challenge core beliefs, myths, and actions  Anxiety: will experience a reduction in anxiety, will develop more effective coping skills to manage anxiety symptoms, will develop healthy cognitive patterns and beliefs and will increase ability to function adaptively    Current Stressors / Issues:  [facility] met with patient for first time since she gave birth to baby.  She stated that she feels \"nervous about motherhood\".  She stated that she feels nervous and anxious when the baby cries, and reported that she feels dizzy, tingling sensations, and shortness of breath. She stated that she worries that she will not be able to figure out how to soothe him. Patient discussed additional anxiety when it is approaching feeding time since she worries whether or not he will have a good latch or how the feeding will go. " "Patient reported that she blames herself if there is not a good feeding, and discussed how she blames herself for not reading more about infant care before he was born. She discussed feeling mad at herself for not knowing how to engage in infant care (how to swaddle, how to best latch).  She stated that she also worries about giving the infant the \"perfect day\". She reported feeling pressured to expose her baby to singing, reading, tummy time, etc., and worries that she is not providing him with enough and will cause a deficiency in his development.  She reported that she feels as though she is not bonding with him, and stated that she feels as though her  has a better bond with him.  She stated that her  is able to soothe him better than she does, and she wishes that her baby would provide feedback to her that she is doing a good job.  She reported that she enjoys spending time away from him, and is worried that she does not feel a stronger attachment at this time.     Patient reported that she sometimes strongly believes what her depression and anxiety are telling her. She expressed goal of reducing symptoms, and stated that she wants to be able to bond with her baby, feel more confident in her ability to care for the baby.  She stated that she sometimes feels as though she is not contributing anything to the baby.  Patient reported that she does think about dying and that the baby would be better off without her, but she denied plans or intent to act thoughts. Patient denied access to weapons. She agreed to talk to her  if SI worsens.  Crisis resources reviewed.      Progress on Treatment Objective(s) / Homework:  Worsening - RELAPSE (Returned to unhealthy behavior); Intervened by reassessing readiness to change and identifying appropriate stage.  Identified reasons for relapse, successes, and change talk    Motivational Interviewing    MI Intervention: Expressed Empathy/Understanding, " Supported Autonomy, Collaboration, Evocation, Permission to raise concern or advise, Open-ended questions, Change talk (evoked) and Reframe     Change Talk Expressed by the Patient: Ability to change Reasons to change Need to change    Provider Response to Change Talk: E - Evoked more info from patient about behavior change, A - Affirmed patient's thoughts, decisions, or attempts at behavior change, R - Reflected patient's change talk and S - Summarized patient's change talk statements    Also provided psychoeducation about behavioral health condition, symptoms, and treatment options    Care Plan review completed: Yes    Medication Review:  No changes to current psychiatric medication(s)    Medication Compliance:  Yes    Changes in Health Issues:   None reported    Chemical Use Review:   Substance Use: Chemical use reviewed, no active concerns identified      Tobacco Use: No current tobacco use.      Assessment: Current Emotional / Mental Status (status of significant symptoms):  Risk status (Self / Other harm or suicidal ideation)  Patient has had a history of suicidal ideation: in correlation with stress, sent text on 4/13 about wanting to die, but no active SI. Per patient and , patient has had history of passive SI for more than 10 years and denies a history of suicide attempts, self-injurious behavior, homicidal ideation, homicidal behavior and and other safety concerns  Patient denies current fears or concerns for personal safety.  Patient denies current or recent suicidal ideation or behaviors.  Patient denies current or recent homicidal ideation or behaviors.  Patient denies current or recent self injurious behavior or ideation.  Patient denies other safety concerns.  A safety and risk management plan has not been developed at this time, however patient was encouraged to call [place] [phone] should there be a change in any of these risk factors.    Appearance:   Appropriate   Eye Contact:   Good  "  Psychomotor Behavior: Normal   Attitude:   Cooperative   Orientation:   All  Speech   Rate / Production: Normal    Volume:  Normal   Mood:    Normal  Affect:    Appropriate   Thought Content:  Clear   Thought Form:  Coherent  Logical   Insight:    Good     Diagnoses:  1. Moderate episode of recurrent major depressive disorder (H)    2. Generalized anxiety disorder        Collateral Reports Completed:  Not Applicable    Plan: (Homework, other):  Patient was given information about behavioral services and encouraged to schedule a follow up appointment with the clinic [facility] in 1 week.  She was also given Cognitive Behavioral Therapy skills to practice when experiencing anxiety and depression.  Patient also given resources for mother and baby support groups, postpartum depression \"warm line\". CD Recommendations: No indications of CD issues.  Stephanie Calabrese, [facility], [facility]      ______________________________________________________________________    Integrated Primary Care Behavioral Health Treatment Plan    Patient's Name: Myrna Brothers  YOB: 1985    Date: 4/23/18    DSM-V Diagnoses: 296.32 (F33.1) Major Depressive Disorder, Recurrent Episode, Moderate _ or 300.02 (F41.1) Generalized Anxiety Disorder  Psychosocial / Contextual Factors: recently postpartum  WHODAS: TBD    Referral / Collaboration:  Patient has appointment with psychiary next week..    Anticipated number of session or this episode of care: 6-8      MeasurableTreatment Goal(s) related to diagnosis / functional impairment(s)  Goal 1: Patient will reduce feelings of depression as evidenced by decreased score on PHQ-9.    I will know I've met my goal when I feel less down and emotional.      Objective #A (Patient Action)    Patient will Decrease frequency and intensity of feeling down, depressed, hopeless.  Status: New - Date: 4/23/18     Intervention(s)  [facility] will assign homework to assist with behavioral activation/activity " scheduling.    Objective #B  Patient will Identify negative self-talk and behaviors: challenge core beliefs, myths, and actions.  Status: New - Date: 4/23/18     Intervention(s)  [facility] will teach about automatic negative thoughts, irrational core beliefs, and cognitive restructuring techniques.        Goal 2: Patient will reduce feelings of anxiety as evidenced by decreased score on GAD7.    I will know I've met my goal when I feel less anxious and worried.      Objective #A (Patient Action)    Status: New - Date: 4/23/18     Patient will identify three distraction and diversion activities and use those activities to decrease level of anxiety  .    Intervention(s)  [facility] will assign homework to practice distress tolerance and mindfulness techniques between session.    Objective #B  Patient will use cognitive strategies identified in therapy to challenge anxious thoughts.    Status: New - Date: 4/23/18     Intervention(s)  [facility] will teach CBT techniques related to cognitive restructuring.    Objective #C  Patient will use relaxation strategies 1-2 times per day to reduce the physical symptoms of anxiety.  Status: New - Date: 4/23/18     Intervention(s)  [facility] will role-play relaxation techniques.    Patient has reviewed and agreed to the above plan.      KVNG Webb  April 23, 2018

## 2018-11-08 NOTE — MR AVS SNAPSHOT
After Visit Summary   11/8/2018    Myrna Brothers    MRN: [id number]           Patient Information     Date Of Birth          1985        Visit Information        Provider Department      11/8/2018 9:00 AM Stephanie Calabrese, [facility]        Care Instructions    [facility] [place]   [phone]   [email or web address]          Follow-ups after your visit        Who to contact     If you have questions or need follow up information about today's clinic visit or your schedule please contact [facility] directly at [phone].  Normal or non-critical lab and imaging results will be communicated to you by [employer], letter or phone within 4 business days after the clinic has received the results. If you do not hear from us within 7 days, please contact the clinic through [employer] or phone. If you have a critical or abnormal lab result, we will notify you by phone as soon as possible.  Submit refill requests through [employer] or call your pharmacy and they will forward the refill request to us. Please allow 3 business days for your refill to be completed.          Additional Information About Your Visit        MyChart Information     [employer] gives you secure access to your electronic health record. If you see a primary care provider, you can also send messages to your care team and make appointments. If you have questions, please call your primary care clinic.  If you do not have a primary care provider, please call [phone] and they will assist you.      [employer] is an electronic gateway that provides easy, online access to your medical records. With [employer], you can request a clinic appointment, read your test results, renew a prescription or communicate with your care team.     To access your existing account, please contact your [facility] or call [phone] for assistance.        Care  EveryWhere ID     This is your Care EveryWhere ID. This could be used by other organizations to access your [place] medical records  [id number]        Your Vitals Were     Last Period                   02/03/2018 (Exact Date)            Blood Pressure from Last 3 Encounters:   10/12/18 120/79   10/04/18 125/73   09/20/18 105/69    Weight from Last 3 Encounters:   10/12/18 89.8 kg (198 lb)   10/04/18 89.5 kg (197 lb 4.8 oz)   09/20/18 90.5 kg (199 lb 7.5 oz)              Today, you had the following     No orders found for display       Primary Care Provider Office Phone # Fax #    Mery Bronson, APRN [facility] [phone]       [address]  [place] [address]        Equal Access to Services     [facility] : Hadii aad ku hadasho Soomaali, waaxda luqadaha, qaybta kaalmada adeegyada, raquel jeffriesin hayaaranulfo barreto . So [facility] [phone].    ATENCIÓN: Si habla español, tiene a flowers disposición servicios gratuitos de asistencia lingüística. Cory al [phone].    We comply with applicable federal civil rights laws and [place] laws. We do not discriminate on the basis of race, color, national origin, age, disability, sex, sexual orientation, or gender identity.            Thank you!     Thank you for choosing [facility]  for your care. Our goal is always to provide you with excellent care. Hearing back from our patients is one way we can continue to improve our services. Please take a few minutes to complete the written survey that you may receive in the mail after your visit with us. Thank you!             Your Updated Medication List - Protect others around you: Learn how to safely use, store and throw away your medicines at www.disposemymeds.org.          This list is accurate as of 11/8/18 10:03 AM.  Always use your most recent med list.                   Brand Name Dispense Instructions for use Diagnosis    acetone (urine) test strip    KETOSTIX    25  "each    Test daily. When negative for 1week, reduce testing to once weekly.    Insulin controlled gestational diabetes mellitus (GDM) in third trimester       albuterol 108 (90 Base) MCG/ACT inhaler    PROAIR HFA/PROVENTIL HFA/VENTOLIN HFA     Inhale 2 puffs into the lungs every 6 hours        blood glucose monitoring lancets     100 each    Use to test blood sugar 4 times daily or as directed.    Gestational diabetes       blood glucose monitoring test strip    CONTOUR NEXT TEST    150 each    Use to test blood sugar 4 times daily or as directed.    Gestational diabetes       busPIRone 5 MG tablet    BUSPAR    180 tablet    Take 1 tablet (5 mg) by mouth 2 times daily    Generalized anxiety disorder       fluticasone 220 MCG/ACT Inhaler    FLOVENT HFA    1 Inhaler    Inhale 2 puffs into the lungs 2 times daily for 7 days    Upper respiratory tract infection, unspecified type, Reactive airway disease without complication, unspecified asthma severity, unspecified whether persistent       * insulin isophane human 100 UNIT/ML injection    HumuLIN N PEN     Inject 20 Units Subcutaneous At Bedtime        * insulin  UNIT/ML injection    HumuLIN N/NovoLIN N    10 mL    Inject 20 Units Subcutaneous At Bedtime    Insulin controlled gestational diabetes mellitus (GDM) in third trimester       insulin pen needle 31G X 5 MM    B-D U/F    100 each    Use pen needles daily as directed.    Insulin controlled gestational diabetes mellitus (GDM) in third trimester       insulin syringe-needle U-100 31G X 5/16\" 0.5 ML    BD insulin syringe ultrafine    100 each    Use one syringe daily or as directed.    Insulin controlled gestational diabetes mellitus (GDM) in third trimester       IRON SUPPLEMENT PO           PRENATAL VITAMIN PO           sertraline 50 MG tablet    ZOLOFT    90 tablet    TAKE 1 TABLET BY MOUTH EVERY DAY    Generalized anxiety disorder, Major depressive disorder with single episode, in full remission (H)       " TYLENOL PO      Take 325 mg by mouth        * Notice:  This list has 2 medication(s) that are the same as other medications prescribed for you. Read the directions carefully, and ask your doctor or other care provider to review them with you.

## 2018-11-08 NOTE — PROGRESS NOTES
Patient completed EPDS during child's office visit with pediatrician.  [facility] consult requested.  [facility] met with patient periodically during pregnancy, DA completed 4/12/18.  Patient reported increase in depression and anxiety since child has been born. Patient reported SI, but denied plans or intent. Patient contracted for safety, reviewed crisis resources.  Patient scheduled to meet with [facility] for 11/8 at 9:00 am.  [facility] consulted with patient's PCP to provide update.

## 2018-11-12 ENCOUNTER — TELEPHONE (OUTPATIENT)
Dept: PEDIATRICS | Facility: CLINIC | Age: 33
End: 2018-11-12

## 2018-11-12 ENCOUNTER — HOSPITAL ENCOUNTER (EMERGENCY)
Facility: CLINIC | Age: 33
Discharge: HOME | End: 2018-11-12
Attending: EMERGENCY MEDICINE | Admitting: EMERGENCY MEDICINE
Payer: COMMERCIAL

## 2018-11-12 ENCOUNTER — APPOINTMENT (OUTPATIENT)
Dept: CT IMAGING | Facility: CLINIC | Age: 33
End: 2018-11-12
Attending: EMERGENCY MEDICINE
Payer: COMMERCIAL

## 2018-11-12 ENCOUNTER — TELEPHONE (OUTPATIENT)
Dept: PSYCHOLOGY | Facility: CLINIC | Age: 33
End: 2018-11-12

## 2018-11-12 VITALS
DIASTOLIC BLOOD PRESSURE: 61 MMHG | BODY MASS INDEX: 28.68 KG/M2 | RESPIRATION RATE: 16 BRPM | TEMPERATURE: 99.7 F | OXYGEN SATURATION: 95 % | SYSTOLIC BLOOD PRESSURE: 98 MMHG | HEART RATE: 100 BPM | WEIGHT: 175 LBS

## 2018-11-12 DIAGNOSIS — N61.0 MASTITIS, LEFT, ACUTE: ICD-10-CM

## 2018-11-12 DIAGNOSIS — F33.1 MODERATE EPISODE OF RECURRENT MAJOR DEPRESSIVE DISORDER (H): ICD-10-CM

## 2018-11-12 LAB
ALBUMIN SERPL-MCNC: 4.1 G/DL (ref 3.4–5)
ALBUMIN UR-MCNC: NEGATIVE MG/DL
ALP SERPL-CCNC: 165 U/L (ref 40–150)
ALT SERPL W P-5'-P-CCNC: 27 U/L (ref 0–50)
AMPHETAMINES UR QL SCN: NEGATIVE
ANION GAP SERPL CALCULATED.3IONS-SCNC: 10 MMOL/L (ref 3–14)
APPEARANCE UR: CLEAR
AST SERPL W P-5'-P-CCNC: 21 U/L (ref 0–45)
BARBITURATES UR QL: NEGATIVE
BASOPHILS # BLD AUTO: 0 10E9/L (ref 0–0.2)
BASOPHILS NFR BLD AUTO: 0.2 %
BENZODIAZ UR QL: NEGATIVE
BILIRUB SERPL-MCNC: 0.6 MG/DL (ref 0.2–1.3)
BILIRUB UR QL STRIP: NEGATIVE
BUN SERPL-MCNC: 18 MG/DL (ref 7–30)
CALCIUM SERPL-MCNC: 9.5 MG/DL (ref 8.5–10.1)
CANNABINOIDS UR QL SCN: NEGATIVE
CHLORIDE SERPL-SCNC: 102 MMOL/L (ref 94–109)
CO2 SERPL-SCNC: 26 MMOL/L (ref 20–32)
COCAINE UR QL: NEGATIVE
COLOR UR AUTO: YELLOW
CREAT SERPL-MCNC: 0.89 MG/DL (ref 0.52–1.04)
DIFFERENTIAL METHOD BLD: ABNORMAL
EOSINOPHIL # BLD AUTO: 0 10E9/L (ref 0–0.7)
EOSINOPHIL NFR BLD AUTO: 0 %
ERYTHROCYTE [DISTWIDTH] IN BLOOD BY AUTOMATED COUNT: 13.5 % (ref 10–15)
ETHANOL UR QL SCN: NEGATIVE
FLUAV+FLUBV AG SPEC QL: NEGATIVE
FLUAV+FLUBV AG SPEC QL: NEGATIVE
GFR SERPL CREATININE-BSD FRML MDRD: 72 ML/MIN/1.7M2
GLUCOSE SERPL-MCNC: 112 MG/DL (ref 70–99)
GLUCOSE UR STRIP-MCNC: NEGATIVE MG/DL
HCT VFR BLD AUTO: 43.5 % (ref 35–47)
HGB BLD-MCNC: 14.2 G/DL (ref 11.7–15.7)
HGB UR QL STRIP: ABNORMAL
IMM GRANULOCYTES # BLD: 0 10E9/L (ref 0–0.4)
IMM GRANULOCYTES NFR BLD: 0.2 %
KETONES UR STRIP-MCNC: NEGATIVE MG/DL
LACTATE BLD-SCNC: 0.9 MMOL/L (ref 0.7–2)
LEUKOCYTE ESTERASE UR QL STRIP: ABNORMAL
LYMPHOCYTES # BLD AUTO: 0.7 10E9/L (ref 0.8–5.3)
LYMPHOCYTES NFR BLD AUTO: 5.7 %
MCH RBC QN AUTO: 28.9 PG (ref 26.5–33)
MCHC RBC AUTO-ENTMCNC: 32.6 G/DL (ref 31.5–36.5)
MCV RBC AUTO: 89 FL (ref 78–100)
MONOCYTES # BLD AUTO: 0.6 10E9/L (ref 0–1.3)
MONOCYTES NFR BLD AUTO: 4.5 %
MUCOUS THREADS #/AREA URNS LPF: PRESENT /LPF
NEUTROPHILS # BLD AUTO: 11.2 10E9/L (ref 1.6–8.3)
NEUTROPHILS NFR BLD AUTO: 89.4 %
NITRATE UR QL: NEGATIVE
NRBC # BLD AUTO: 0 10*3/UL
NRBC BLD AUTO-RTO: 0 /100
OPIATES UR QL SCN: NEGATIVE
PH UR STRIP: 5 PH (ref 5–7)
PLATELET # BLD AUTO: 264 10E9/L (ref 150–450)
POTASSIUM SERPL-SCNC: 3.6 MMOL/L (ref 3.4–5.3)
PROT SERPL-MCNC: 8.4 G/DL (ref 6.8–8.8)
RBC # BLD AUTO: 4.91 10E12/L (ref 3.8–5.2)
RBC #/AREA URNS AUTO: 5 /HPF (ref 0–2)
RENAL EPI CELLS #/AREA URNS HPF: <1 /HPF
SODIUM SERPL-SCNC: 138 MMOL/L (ref 133–144)
SOURCE: ABNORMAL
SP GR UR STRIP: 1.02 (ref 1–1.03)
SPECIMEN SOURCE: NORMAL
SQUAMOUS #/AREA URNS AUTO: <1 /HPF (ref 0–1)
TRANS CELLS #/AREA URNS HPF: <1 /HPF (ref 0–1)
UROBILINOGEN UR STRIP-MCNC: NORMAL MG/DL (ref 0–2)
WBC # BLD AUTO: 12.5 10E9/L (ref 4–11)
WBC #/AREA URNS AUTO: 3 /HPF (ref 0–5)

## 2018-11-12 PROCEDURE — 80307 DRUG TEST PRSMV CHEM ANLYZR: CPT | Performed by: EMERGENCY MEDICINE

## 2018-11-12 PROCEDURE — 85025 COMPLETE CBC W/AUTO DIFF WBC: CPT | Performed by: FAMILY MEDICINE

## 2018-11-12 PROCEDURE — 99285 EMERGENCY DEPT VISIT HI MDM: CPT | Mod: 25 | Performed by: EMERGENCY MEDICINE

## 2018-11-12 PROCEDURE — 87040 BLOOD CULTURE FOR BACTERIA: CPT | Performed by: EMERGENCY MEDICINE

## 2018-11-12 PROCEDURE — 25000132 ZZH RX MED GY IP 250 OP 250 PS 637: Performed by: EMERGENCY MEDICINE

## 2018-11-12 PROCEDURE — 74177 CT ABD & PELVIS W/CONTRAST: CPT

## 2018-11-12 PROCEDURE — 80307 DRUG TEST PRSMV CHEM ANLYZR: CPT | Mod: 59 | Performed by: EMERGENCY MEDICINE

## 2018-11-12 PROCEDURE — 25000125 ZZHC RX 250: Performed by: EMERGENCY MEDICINE

## 2018-11-12 PROCEDURE — 83605 ASSAY OF LACTIC ACID: CPT | Performed by: EMERGENCY MEDICINE

## 2018-11-12 PROCEDURE — 81001 URINALYSIS AUTO W/SCOPE: CPT | Mod: XU | Performed by: EMERGENCY MEDICINE

## 2018-11-12 PROCEDURE — 99284 EMERGENCY DEPT VISIT MOD MDM: CPT | Mod: Z6 | Performed by: EMERGENCY MEDICINE

## 2018-11-12 PROCEDURE — 90791 PSYCH DIAGNOSTIC EVALUATION: CPT

## 2018-11-12 PROCEDURE — 80053 COMPREHEN METABOLIC PANEL: CPT | Performed by: FAMILY MEDICINE

## 2018-11-12 PROCEDURE — 25000128 H RX IP 250 OP 636: Performed by: EMERGENCY MEDICINE

## 2018-11-12 PROCEDURE — 80320 DRUG SCREEN QUANTALCOHOLS: CPT | Performed by: EMERGENCY MEDICINE

## 2018-11-12 PROCEDURE — 25000128 H RX IP 250 OP 636: Performed by: FAMILY MEDICINE

## 2018-11-12 PROCEDURE — 87804 INFLUENZA ASSAY W/OPTIC: CPT | Mod: 91 | Performed by: EMERGENCY MEDICINE

## 2018-11-12 RX ORDER — ONDANSETRON 4 MG/1
4 TABLET, ORALLY DISINTEGRATING ORAL EVERY 8 HOURS PRN
Qty: 10 TABLET | Refills: 0 | Status: SHIPPED | OUTPATIENT
Start: 2018-11-12 | End: 2018-11-15

## 2018-11-12 RX ORDER — ACETAMINOPHEN 500 MG
500-1000 TABLET ORAL PRN
COMMUNITY
End: 2024-08-02

## 2018-11-12 RX ORDER — IOPAMIDOL 755 MG/ML
100 INJECTION, SOLUTION INTRAVASCULAR ONCE
Status: COMPLETED | OUTPATIENT
Start: 2018-11-12 | End: 2018-11-12

## 2018-11-12 RX ORDER — ACETAMINOPHEN 325 MG/1
650 TABLET ORAL ONCE
Status: COMPLETED | OUTPATIENT
Start: 2018-11-12 | End: 2018-11-12

## 2018-11-12 RX ORDER — CEPHALEXIN 500 MG/1
500 CAPSULE ORAL 4 TIMES DAILY
Qty: 40 CAPSULE | Refills: 0 | Status: SHIPPED | OUTPATIENT
Start: 2018-11-12 | End: 2019-03-28

## 2018-11-12 RX ADMIN — ACETAMINOPHEN 650 MG: 325 TABLET, FILM COATED ORAL at 14:00

## 2018-11-12 RX ADMIN — IOPAMIDOL 85 ML: 755 INJECTION, SOLUTION INTRAVENOUS at 13:36

## 2018-11-12 RX ADMIN — SODIUM CHLORIDE 1000 ML: 9 INJECTION, SOLUTION INTRAVENOUS at 13:50

## 2018-11-12 RX ADMIN — SODIUM CHLORIDE 61 ML: 9 INJECTION, SOLUTION INTRAVENOUS at 13:37

## 2018-11-12 ASSESSMENT — ENCOUNTER SYMPTOMS
DIARRHEA: 1
DYSURIA: 0
SHORTNESS OF BREATH: 0
MYALGIAS: 1
COUGH: 0
ABDOMINAL PAIN: 1
WEAKNESS: 1
NERVOUS/ANXIOUS: 1
RHINORRHEA: 0
LIGHT-HEADEDNESS: 1
SLEEP DISTURBANCE: 1
FREQUENCY: 0
DYSPHORIC MOOD: 1
SORE THROAT: 0
FEVER: 1
VOMITING: 1
DIZZINESS: 1

## 2018-11-12 NOTE — TELEPHONE ENCOUNTER
Patient called back and he was able to convince the patient to go to the hospital.   He will take her to [facility].  He asked to cancel the appt with Stephanie Calabrese but to let her know what is going on.      Alyse Earl RN, [facility]

## 2018-11-12 NOTE — TELEPHONE ENCOUNTER
[facility] Call Center    Phone Message    May a detailed message be left on voicemail: no    Reason for Call: Other: On a scale 1 to 10, patient is at a 9 for wanting to get in this morning to see CATHY Bronson.  Urgent situation.  Please call  Willie emilie quinteros.       Action Taken: Message routed to:  Primary Care p [phone]

## 2018-11-12 NOTE — TELEPHONE ENCOUNTER
[facility] received voicemail from patient's , expressing need for [facility] to return phone call.   expressed concern about worsening symptoms of depression and anxiety. [facility] received update from primary care clinic, returned phone call to .   stated that they had just arrived at [facility]. [facility] remains available for disposition planning as needed.  [facility] mentioned [facility] PHP program available from [facility] as an option depending on outcome of assessment in ED.    expressed appreciation, and stated that he will follow up with [facility].    Stephanie Calabrese, [facility]  Behavioral Health Clinician

## 2018-11-12 NOTE — TELEPHONE ENCOUNTER
"Called patient's , Willie.  He states this past weekend has not been a good weekend for this patient, she has been very down and this morning she is not getting out of bed.  She complains that she is feeling ill, and is weepy, crying and moaning in bed.  She told her  that she has had \"thoughts\" (suicidal) this morning.   Her  says she is not one to act on this but it is more of a \"comforting \" idea.      He states she does not have a fever, vomiting, cough or other illness symptoms, but she would say this problem is flu like, she does complain of diarrhea but this is more of a norm for her.  Willie believes that she is in a high anxiety state and depression.       Huddle with Mary Bronson NP.  She advises patient go to [facility] for mental health evaluation.      Called Willie back   He agrees with this plan but the patient is not apt to listen to him and would cause an argument and her resisting to take his advisement.  He is asking if they can see Stephanie Calabrese today, the patient would listen to her.    Scheduled pt at 1:30 pm with Stephanie Willei agrees to take her to the hospital if patient worsens.    Alyse Earl RN, [facility]    "

## 2018-11-12 NOTE — ED AVS SNAPSHOT
[facility], Emergency Department    [address]    [facility] [address]    Phone:  [phone]    Fax:  [phone]                                       Myrna Brothers   MRN: [id number]    Department:  [facility], Emergency Department   Date of Visit:  11/12/2018           After Visit Summary Signature Page     I have received my discharge instructions, and my questions have been answered. I have discussed any challenges I see with this plan with the nurse or doctor.    ..........................................................................................................................................  Patient/Patient Representative Signature      ..........................................................................................................................................  Patient Representative Print Name and Relationship to Patient    ..................................................               ................................................  Date                                   Time    ..........................................................................................................................................  Reviewed by Signature/Title    ...................................................              ..............................................  Date                                               Time          22EPIC Rev 08/18

## 2018-11-12 NOTE — ED AVS SNAPSHOT
[facility], Emergency Department    [address]    [facility] [place] [address]    Phone:  [phone]    Fax:  [phone]                                       Myrna Brothers   MRN: [id number]    Department:  [facility], Emergency Department   Date of Visit:  11/12/2018           Patient Information     Date Of Birth          1985        Your diagnoses for this visit were:     Mastitis, left, acute     Moderate episode of recurrent major depressive disorder (H)        You were seen by Dav Galvez MD.      Follow-up Information     Go to With therapist tomorrow vacation providers/psychiatry in 2 days.        Follow up with Mery Bronson APRN CNP In 3 days.    Specialty:  Family Practice    Why:  For mastitis recheck    Contact information:    [address] [place] [address]  [phone]        Your next 10 appointments already scheduled     Nov 13, 2018  1:00 PM CST   Return Visit with KVNG Bennett   [facility])    [address] [place] [address]   [phone]            Nov 15, 2018  9:00 AM CST   Return Visit with KVNG Benntet   [facility])    [address] [place] [address]   [phone]            Nov 21, 2018  9:00 AM CST   Return Visit with KVNG Bennett   [facility])    [address] [place] [id number] [phone]            Nov 29, 2018  9:00 AM CST   Return Visit with KVNG Bennett   [facility])    [address] [place] [id number] [phone]              24 Hour Appointment Hotline       To make an appointment at any [facility], call [phone] ([phone]). If you don't have a family doctor or clinic, we will help you find one. [facility]  are conveniently located to serve the needs of you and your family.             Review of your medicines      START taking        Dose / Directions Last dose taken    cephALEXin 500 MG capsule   Commonly known as:  KEFLEX   Dose:  500 mg   Quantity:  40 capsule        Take 1 capsule (500 mg) by mouth 4 times daily for 10 days   Refills:  0        ondansetron 4 MG ODT tab   Commonly known as:  ZOFRAN ODT   Dose:  4 mg   Quantity:  10 tablet        Take 1 tablet (4 mg) by mouth every 8 hours as needed for nausea   Refills:  0          Our records show that you are taking the medicines listed below. If these are incorrect, please call your family doctor or clinic.        Dose / Directions Last dose taken    acetone (urine) test strip   Commonly known as:  KETOSTIX   Quantity:  25 each        Test daily. When negative for 1week, reduce testing to once weekly.   Refills:  1        albuterol 108 (90 Base) MCG/ACT inhaler   Commonly known as:  PROAIR HFA/PROVENTIL HFA/VENTOLIN HFA   Dose:  2 puff        Inhale 2 puffs into the lungs every 6 hours   Refills:  0        blood glucose monitoring lancets   Quantity:  100 each        Use to test blood sugar 4 times daily or as directed.   Refills:  3        blood glucose monitoring test strip   Commonly known as:  CONTOUR NEXT TEST   Quantity:  150 each        Use to test blood sugar 4 times daily or as directed.   Refills:  3        busPIRone 5 MG tablet   Commonly known as:  BUSPAR   Dose:  5 mg   Quantity:  180 tablet        Take 1 tablet (5 mg) by mouth 2 times daily   Refills:  1        fluticasone 220 MCG/ACT Inhaler   Commonly known as:  FLOVENT HFA   Dose:  2 puff   Quantity:  1 Inhaler        Inhale 2 puffs into the lungs 2 times daily for 7 days   Refills:  0        * insulin isophane human 100 UNIT/ML injection   Commonly known as:  HumuLIN N PEN   Dose:  20 Units        Inject 20 Units Subcutaneous At Bedtime   Refills:  0        * insulin  UNIT/ML injection  "  Commonly known as:  HumuLIN N/NovoLIN N   Dose:  20 Units   Quantity:  10 mL        Inject 20 Units Subcutaneous At Bedtime   Refills:  3        insulin pen needle 31G X 5 MM   Commonly known as:  B-D U/F   Quantity:  100 each        Use pen needles daily as directed.   Refills:  3        insulin syringe-needle U-100 31G X 5/16\" 0.5 ML   Commonly known as:  BD insulin syringe ultrafine   Quantity:  100 each        Use one syringe daily or as directed.   Refills:  1        IRON SUPPLEMENT PO        Refills:  0        PRENATAL VITAMIN PO        Refills:  0        sertraline 50 MG tablet   Commonly known as:  ZOLOFT   Quantity:  90 tablet        TAKE 1 TABLET BY MOUTH EVERY DAY   Refills:  1        * TYLENOL PO   Dose:  650 mg        Take 650 mg by mouth every 6 hours as needed   Refills:  0        * acetaminophen 500 MG tablet   Commonly known as:  TYLENOL   Dose:  500-1000 mg        Take 500-1,000 mg by mouth every 6 hours as needed for mild pain   Refills:  0        * Notice:  This list has 4 medication(s) that are the same as other medications prescribed for you. Read the directions carefully, and ask your doctor or other care provider to review them with you.            Prescriptions were sent or printed at these locations (2 Prescriptions)                   Other Prescriptions                Printed at Department/Unit printer (2 of 2)         cephALEXin (KEFLEX) 500 MG capsule               ondansetron (ZOFRAN ODT) 4 MG ODT tab                Procedures and tests performed during your visit     Procedure/Test Number of Times Performed    Blood culture ONE site 1    CBC with platelets differential 1    CT Abdomen Pelvis w Contrast 1    Comprehensive metabolic panel 1    Draw and hold 3    Drug abuse screen 6 urine (chem dep) 1    Influenza A/B antigen swab 1    Lactic acid 1    Peripheral IV: Standard 1    UA with Microscopic reflex to Culture 1      Orders Needing Specimen Collection     None      Pending " Results     Date and Time Order Name Status Description    11/12/2018 1153 Blood culture ONE site Preliminary             Pending Culture Results     Date and Time Order Name Status Description    11/12/2018 1153 Blood culture ONE site Preliminary             Pending Results Instructions     If you had any lab results that were not finalized at the time of your Discharge, you can call the ED Lab Result RN at [phone]. You will be contacted by this team for any positive Lab results or changes in treatment. The nurses are available 7 days a week from 10A to 6:30P.  You can leave a message 24 hours per day and they will return your call.        Thank you for choosing [facility]       Thank you for choosing [facility] for your care. Our goal is always to provide you with excellent care. Hearing back from our patients is one way we can continue to improve our services. Please take a few minutes to complete the written survey that you may receive in the mail after you visit with us. Thank you!        [facility] Information     [facility] gives you secure access to your electronic health record. If you see a primary care provider, you can also send messages to your care team and make appointments. If you have questions, please call your primary care clinic.  If you do not have a primary care provider, please call [phone] and they will assist you.        Care EveryWhere ID     This is your Care EveryWhere ID. This could be used by other organizations to access your [facility] medical records  [id number]        Equal Access to Services     MANNY OCONNOR : Hadii aad ku hadasho [place], waaxda luqadaha, qaybta kaalmada rodri, raquel miller. So [facility] [place] [phone].    ATENCIÓN: Si habla español, tiene a flowers disposición servicios gratuitos de asistencia lingüística. Llame al [phone].    We comply with applicable federal civil rights laws and [place] laws. We do not discriminate on the basis of  race, color, national origin, age, disability, sex, sexual orientation, or gender identity.            After Visit Summary       This is your record. Keep this with you and show to your community pharmacist(s) and doctor(s) at your next visit.

## 2018-11-12 NOTE — ED PROVIDER NOTES
"  History     Chief Complaint   Patient presents with     Suicidal     Reports has postpartum depression, woke up this morning feeling anxious and unable to calm down, suicidal thoughts without plans     Flu Symptoms     Reports started with symptoms this morning     Breast Pain     left breast     The history is provided by the patient and the spouse.     Myrna Brothers is a 33 year old female with a history of anxiety and depression who presents to the Emergency Department due to postpartum depression. Patient recently had a baby boy on October 15. During delivery, there was difficulty finding a fetal heartbeat and the patient's blood pressure also dropped rather quickly. The team was able to maneuver the child and blood pressure returned to normal. The child was born premature and also has ankyloglossia which has caused difficulty with feeding. Since delivery, patient has been having panic and crying attacks. She has been having suicidal thoughts but does not have a plan. Rather, she has a desire to \"escape.\" Patient also notes low self-esteem. Patient's  notes that the patient has been more upset with the baby and has been seen yelling at him. They have a plan put in place in case she cannot manage the crying; she is supposed to call her  to take over in the care. Patient's  notes that she has also been taking large amounts of time to do things such as change a diaper or feed. Changing a diaper often takes 20 minutes, while feeding usually takes up to an hour. Patient denies the use of drugs or alcohol. She notes that she has been sleeping approximately two hours a day. She notes that she had prenatal depression and started seeing a therapist a few weeks ago. She has an appointment with a psychiatrist later this week. Patient denies past suicidal ideation.     Upon arrival to the ED, patient is found to have an elevated temperature of 101.4  F. She states that she woke up this morning " around 0600 with symptoms including dizziness, lightheadedness and headache. Additionally, she notes that she had three episodes of emesis and one episode of watery diarrhea. She also reports bilateral lower extremity weakness and myalgias. Patient denies rhinorrhea, congestion, sore throat or cough. She has not had any skin changes including rash. She denies urinary symptoms of dysuria, urgency or frequency. Patient notes that she continues to have vaginal drainage from the pregnancy but states that this has been decreasing. Additionally, patient reports that she developed right lower quadrant abdominal pain two days ago (11/10). She states that the pain has been intermittent and stabbing in nature; it does not radiate. She notes that bending over makes the pain worse. Patient denies any recent contact with ill individuals.      Patient also reports bilateral breast pain and swelling. She states that the symptoms are present in both breasts but notes that the left breat has been more painful than the right. She denies skin changes around the breast area including redness. Patient reports that her left breast felt warmer than the right breast this morning. She notes that she has not pumped yet today.      Current Facility-Administered Medications   Medication     0.9% sodium chloride BOLUS     sodium chloride 0.9 % bag 500mL for CT scan flush use     Current Outpatient Prescriptions   Medication     acetaminophen (TYLENOL) 500 MG tablet     cephALEXin (KEFLEX) 500 MG capsule     ondansetron (ZOFRAN ODT) 4 MG ODT tab     Acetaminophen (TYLENOL PO)     acetone, urine, test (KETOSTIX) strip     albuterol (PROAIR HFA/PROVENTIL HFA/VENTOLIN HFA) 108 (90 Base) MCG/ACT inhaler     blood glucose monitoring (ERNIE MICROLET) lancets     blood glucose monitoring (CONTOUR NEXT TEST) test strip     busPIRone (BUSPAR) 5 MG tablet     Ferrous Sulfate (IRON SUPPLEMENT PO)     fluticasone (FLOVENT HFA) 220 MCG/ACT Inhaler      "insulin isophane human (HUMULIN N PEN) 100 UNIT/ML injection     insulin NPH (HUMULIN N/NOVOLIN N) 100 UNIT/ML injection     insulin pen needle (B-D U/F) 31G X 5 MM     insulin syringe-needle U-100 (BD INSULIN SYRINGE ULTRAFINE) 31G X 5/16\" 0.5 ML     Prenatal Vit-Fe Fumarate-FA (PRENATAL VITAMIN PO)     sertraline (ZOLOFT) 50 MG tablet     Past Medical History:   Diagnosis Date     Diabetes (H)     gestational     Generalized anxiety disorder 1/4/2018     Hyperlipidemia      Major depressive disorder with single episode, in full remission (H) 1/4/2018       Past Surgical History:   Procedure Laterality Date     HEAD & NECK SURGERY      wisdom teeth       Family History   Problem Relation Age of Onset     Breast Cancer Mother      Stillborn Mother      had a still born     Other - See Comments Mother      2 miscarriage     Diabetes Father      HEART DISEASE Father      Anxiety Disorder Maternal Grandmother      Alzheimer Disease Maternal Grandmother      Asthma Maternal Grandfather      Prostate Cancer Maternal Grandfather      Hypertension Paternal Grandmother      Leukemia Paternal Grandfather        Social History   Substance Use Topics     Smoking status: Never Smoker     Smokeless tobacco: Never Used     Alcohol use Yes      Comment: denies recent use     Allergies   Allergen Reactions     Xylocaine [Lidocaine] Anaphylaxis       I have reviewed the Medications, Allergies, Past Medical and Surgical History, and Social History in the Epic system.    Review of Systems   Constitutional: Positive for fever (101.4  F).   HENT: Negative for congestion, rhinorrhea and sore throat.    Respiratory: Negative for cough and shortness of breath.    Cardiovascular: Negative for chest pain.   Gastrointestinal: Positive for abdominal pain (RLQ), diarrhea and vomiting.   Genitourinary: Positive for vaginal discharge (postpartum discharge). Negative for dysuria, frequency and urgency.   Musculoskeletal: Positive for myalgias.     "    Positive for bilateral breast pain and swelling (L>R)   Skin: Negative for rash.   Neurological: Positive for dizziness, weakness (bilateral lower extremities) and light-headedness.   Psychiatric/Behavioral: Positive for dysphoric mood, sleep disturbance and suicidal ideas. The patient is nervous/anxious.    All other systems reviewed and are negative.      Physical Exam   BP: 92/56  Pulse: 123  Temp: 101.4  F (38.6  C)  Resp: 16  Weight: 79.4 kg (175 lb)  SpO2: 95 %      Physical Exam   Constitutional: No distress.   HENT:   Head: Atraumatic.   Mouth/Throat: Oropharynx is clear and moist. No oropharyngeal exudate.   Eyes: Pupils are equal, round, and reactive to light. No scleral icterus.   Cardiovascular: Normal heart sounds and intact distal pulses.    Pulmonary/Chest: Breath sounds normal. No respiratory distress.       Abdominal: Soft. Bowel sounds are normal. There is tenderness in the right lower quadrant.   Musculoskeletal: She exhibits no edema or tenderness.   Skin: Skin is warm. No rash noted. She is not diaphoretic.   Psychiatric: Judgment normal. Her mood appears anxious. She is slowed. Cognition and memory are normal. She exhibits a depressed mood. She expresses suicidal ideation. She expresses no suicidal plans.       ED Course   12:17 PM  The patient was seen and examined by Dav Galvez MD in Room 10.   ED Course     Procedures           Results for orders placed or performed during the hospital encounter of 11/12/18 (from the past 24 hour(s))   Drug abuse screen 6 urine (chem dep)   Result Value Ref Range    Amphetamine Qual Urine Negative NEG^Negative    Barbiturates Qual Urine Negative NEG^Negative    Benzodiazepine Qual Urine Negative NEG^Negative    Cannabinoids Qual Urine Negative NEG^Negative    Cocaine Qual Urine Negative NEG^Negative    Ethanol Qual Urine Negative NEG^Negative    Opiates Qualitative Urine Negative NEG^Negative   UA with Microscopic reflex to Culture   Result Value Ref  Range    Color Urine Yellow     Appearance Urine Clear     Glucose Urine Negative NEG^Negative mg/dL    Bilirubin Urine Negative NEG^Negative    Ketones Urine Negative NEG^Negative mg/dL    Specific Gravity Urine 1.019 1.003 - 1.035    Blood Urine Trace (A) NEG^Negative    pH Urine 5.0 5.0 - 7.0 pH    Protein Albumin Urine Negative NEG^Negative mg/dL    Urobilinogen mg/dL Normal 0.0 - 2.0 mg/dL    Nitrite Urine Negative NEG^Negative    Leukocyte Esterase Urine Trace (A) NEG^Negative    Source Midstream Urine     WBC Urine 3 0 - 5 /HPF    RBC Urine 5 (H) 0 - 2 /HPF    Squamous Epithelial /HPF Urine <1 0 - 1 /HPF    Transitional Epi <1 0 - 1 /HPF    Renal Tub Epi <1 (A) NEG^Negative /HPF    Mucous Urine Present (A) NEG^Negative /LPF   Comprehensive metabolic panel   Result Value Ref Range    Sodium 138 133 - 144 mmol/L    Potassium 3.6 3.4 - 5.3 mmol/L    Chloride 102 94 - 109 mmol/L    Carbon Dioxide 26 20 - 32 mmol/L    Anion Gap 10 3 - 14 mmol/L    Glucose 112 (H) 70 - 99 mg/dL    Urea Nitrogen 18 7 - 30 mg/dL    Creatinine 0.89 0.52 - 1.04 mg/dL    GFR Estimate 72 >60 mL/min/1.7m2    GFR Estimate If Black 87 >60 mL/min/1.7m2    Calcium 9.5 8.5 - 10.1 mg/dL    Bilirubin Total 0.6 0.2 - 1.3 mg/dL    Albumin 4.1 3.4 - 5.0 g/dL    Protein Total 8.4 6.8 - 8.8 g/dL    Alkaline Phosphatase 165 (H) 40 - 150 U/L    ALT 27 0 - 50 U/L    AST 21 0 - 45 U/L   CBC with platelets differential   Result Value Ref Range    WBC 12.5 (H) 4.0 - 11.0 10e9/L    RBC Count 4.91 3.8 - 5.2 10e12/L    Hemoglobin 14.2 11.7 - 15.7 g/dL    Hematocrit 43.5 35.0 - 47.0 %    MCV 89 78 - 100 fl    MCH 28.9 26.5 - 33.0 pg    MCHC 32.6 31.5 - 36.5 g/dL    RDW 13.5 10.0 - 15.0 %    Platelet Count 264 150 - 450 10e9/L    Diff Method Automated Method     % Neutrophils 89.4 %    % Lymphocytes 5.7 %    % Monocytes 4.5 %    % Eosinophils 0.0 %    % Basophils 0.2 %    % Immature Granulocytes 0.2 %    Nucleated RBCs 0 0 /100    Absolute Neutrophil 11.2 (H)  1.6 - 8.3 10e9/L    Absolute Lymphocytes 0.7 (L) 0.8 - 5.3 10e9/L    Absolute Monocytes 0.6 0.0 - 1.3 10e9/L    Absolute Eosinophils 0.0 0.0 - 0.7 10e9/L    Absolute Basophils 0.0 0.0 - 0.2 10e9/L    Abs Immature Granulocytes 0.0 0 - 0.4 10e9/L    Absolute Nucleated RBC 0.0    Blood culture ONE site   Result Value Ref Range    Specimen Description Blood Right Arm     Special Requests Aerobic and anaerobic bottles received     Culture Micro PENDING    Influenza A/B antigen swab   Result Value Ref Range    Influenza A/B Agn Specimen Nasopharyngeal     Influenza A Negative NEG^Negative    Influenza B Negative NEG^Negative   Lactic acid   Result Value Ref Range    Lactic Acid 0.9 0.7 - 2.0 mmol/L   CT Abdomen Pelvis w Contrast    Narrative    PROCEDURE:  CT of the abdomen and pelvis with contrast    DATE OF PROCEDURE:  11/12/2018 1:38 PM    CLINICAL HISTORY/INDICATION:  33-year-old female for weeks postpartum with right lower quadrant  abdominal pain.    COMPARISON:  None    TECHNIQUE:  CT of the abdomen pelvis was performed following the administration of  intravenous contrast. Coronal reformats were performed. Radiation dose  for this scan was reduced using automated exposure control, adjustment  of the mA and/or kV according to patient size, or iterative  reconstruction technique.    DLP:  499 mGycm    FINDINGS:  Lower chest:   Visualized lung fields are clear. No pleural effusion. The heart is  not enlarged.    Abdomen/pelvis:  The liver is noncirrhotic in morphology without focal mass, intra or  extrahepatic biliary ductal dilatation. The gallbladder is  nondistended without wall thickening or pericholecystic abnormality.  The spleen, adrenal glands and pancreas are unremarkable. The kidneys  enhance symmetrically without hydronephrosis or large stone. The  appendix is within normal limits. Large and small bowel are of normal  caliber without evidence of obstruction. Uterine enhancement is within  normal limits for  postpartum uterus.       Impression    IMPRESSION:  1.  Appendix within normal limits.  2.  Uterus is unremarkable for postpartum state, if high clinical  concern for endometritis an ultrasound could be performed.    NYDIA DOMINGUEZ MD       Labs Ordered and Resulted from Time of ED Arrival Up to the Time of Departure from the ED   COMPREHENSIVE METABOLIC PANEL - Abnormal; Notable for the following:        Result Value    Glucose 112 (*)     Alkaline Phosphatase 165 (*)     All other components within normal limits   CBC WITH PLATELETS DIFFERENTIAL - Abnormal; Notable for the following:     WBC 12.5 (*)     Absolute Neutrophil 11.2 (*)     Absolute Lymphocytes 0.7 (*)     All other components within normal limits   ROUTINE UA WITH MICROSCOPIC REFLEX TO CULTURE - Abnormal; Notable for the following:     Blood Urine Trace (*)     Leukocyte Esterase Urine Trace (*)     RBC Urine 5 (*)     Renal Tub Epi <1 (*)     Mucous Urine Present (*)     All other components within normal limits   DRUG ABUSE SCREEN 6 CHEM DEP URINE ([facility])   LACTIC ACID WHOLE BLOOD   PERIPHERAL IV CATHETER   NURSING DRAW AND HOLD   NURSING DRAW AND HOLD   NURSING DRAW AND HOLD   PULSE OXIMETRY NURSING   CARDIAC CONTINUOUS MONITORING   INFLUENZA A/B ANTIGEN   BLOOD CULTURE            Assessments & Plan (with Medical Decision Making)   33-year-old female who is approximately 4 weeks postpartum presents with worsening depression, anxiety now with fever, nausea, episodes of vomiting, left breast pain and redness as well as abdominal pain.  Initial exam revealed elevated heart rate of 123, elevated temperature of 101.4 as well as left-sided breast redness with tenderness and right lower quadrant abdominal pain.  Differential included mastitis, appendicitis, urinary tract infection, diverticulitis, other.  Laboratories were obtained including blood culture which is pending, lactic acid level which was normal and CBC revealing minimally elevated white  blood count of 12.5.  Comprehensive metabolic panel revealed minimal elevations in alkaline phosphatase and glucose.  Urine toxicology screen was negative and urinalysis revealed essentially unremarkable results.  CT of the abdomen was obtained which was unremarkable.  Signs and symptoms consistent with mastitis and have recommended frequent lactation and/or breast pumping as well as Keflex.    Regarding ongoing depression with anxiety and at times suicidal ideation without a plan.  The case was discussed at length with the behavioral emergency room , please see separate note.  We recommended admission for stabilization, however, patient declined and is currently not holdable.  Patient was willing to follow-up with her therapist tomorrow and mental health prescribing health provider in 2 days.  Both  and patient desired this plan.  We talked about the role of medications and that a prescription would be most appropriately received from a monitoring health care provider.  Patient will be discharged to follow-up as noted per behavioral emergency room .        I have reviewed the nursing notes.    I have reviewed the findings, diagnosis, plan and need for follow up with the patient.    New Prescriptions    CEPHALEXIN (KEFLEX) 500 MG CAPSULE    Take 1 capsule (500 mg) by mouth 4 times daily for 10 days    ONDANSETRON (ZOFRAN ODT) 4 MG ODT TAB    Take 1 tablet (4 mg) by mouth every 8 hours as needed for nausea       Final diagnoses:   Mastitis, left, acute   Moderate episode of recurrent major depressive disorder (H)   IJane, am serving as a trained medical scribe to document services personally performed by Dav Galvez MD, based on the provider's statements to me.   Dav MUNIZ MD, was physically present and have reviewed and verified the accuracy of this note documented by Jane Hargrove.    11/12/2018   [facility], EMERGENCY DEPARTMENT     Dav Galvez MD  11/12/18  [id number]

## 2018-11-13 ENCOUNTER — OFFICE VISIT (OUTPATIENT)
Dept: PSYCHOLOGY | Facility: CLINIC | Age: 33
End: 2018-11-13
Payer: COMMERCIAL

## 2018-11-13 DIAGNOSIS — F33.1 MODERATE EPISODE OF RECURRENT MAJOR DEPRESSIVE DISORDER (H): Primary | ICD-10-CM

## 2018-11-13 DIAGNOSIS — F41.1 GENERALIZED ANXIETY DISORDER: ICD-10-CM

## 2018-11-13 PROCEDURE — 90832 PSYTX W PT 30 MINUTES: CPT | Performed by: SOCIAL WORKER

## 2018-11-13 NOTE — PROGRESS NOTES
[facility]  Integrated Behavioral Health  November 13, 2018    Behavioral Health Clinician Progress Note    Patient Name: Myrna Brothers           Service Type:  Individual      Service Location:   Face to Face in Clinic     Session Start Time: 12: 59 pm  Session End Time:  1: 35 pm      Session Length: 16 - 37      Attendees: Patient    Visit Activities (Refresh list every visit): [facility] Only    Diagnostic Assessment Date: 4/12/18  Treatment Plan Review Date: 11/8/18  See Flowsheets for today's PHQ-9 and OREN-7 results  Previous PHQ-9:   PHQ-9 SCORE 2/28/2018 4/16/2018 7/13/2018   Total Score 5 11 1     Previous OREN-7:   OREN-7 SCORE 2/28/2018 4/16/2018 7/13/2018   Total Score 5 18 1       LUKASZ LEVEL:  No flowsheet data found.    DATA  Extended Session (60+ minutes): No  Interactive Complexity: No  Crisis: No  [facility] Patient: No    Treatment Objective(s) Addressed in This Session:  Target Behavior(s): disease management/lifestyle changes , mental health management    Depressed Mood: Decrease frequency and intensity of feeling down, depressed, hopeless  Identify negative self-talk and behaviors: challenge core beliefs, myths, and actions  Anxiety: will experience a reduction in anxiety, will develop more effective coping skills to manage anxiety symptoms, will develop healthy cognitive patterns and beliefs and will increase ability to function adaptively    Current Stressors / Issues:  Patient evaluated in ED on 11/12 by recommendation of PCP due to concerns about SI. While in ED, patient diagnosed with mastitis. Patient was recommended for inpatient admission, but declined due to having more anxiety related to thought of admission.  Patient stated that she learned that her physical symptoms likely caused her depressive symptoms to intensify.  She stated that she felt suicidal, without a plan or intent, and felt as though her son would be better off without her. Patient reported that she is  amazed that she felt this way since she had had a positive weekend/interactions with her son.   Patient and C reviewed emotional regulation skills to assist with reduction in anxiety. A list was created, and patient expressed interest in placing in a visible location as she cannot recall coping skills in the moment.  Patient and C began to explore patient's beliefs that her son does not like her, does not respond to her, and that she is not a good mother.  Patient was guided to identify evidence for and against her beliefs. Patient was able to identify evidence that supports that she is being a good mother, that if the baby cries, it may not be due to her, and that the baby does respond to her.     [facility] discussed Mother Baby PHP program through [facility]. Patient expressed interest in continuing to think about participating in this level of care. Patient began to identify potential gains and benefits of the program, and will talk to her .     Progress on Treatment Objective(s) / Homework:  Worsening - RELAPSE (Returned to unhealthy behavior); Intervened by reassessing readiness to change and identifying appropriate stage.  Identified reasons for relapse, successes, and change talk    Motivational Interviewing    MI Intervention: Expressed Empathy/Understanding, Supported Autonomy, Collaboration, Evocation, Permission to raise concern or advise, Open-ended questions, Change talk (evoked) and Reframe     Change Talk Expressed by the Patient: Ability to change Reasons to change Need to change    Provider Response to Change Talk: E - Evoked more info from patient about behavior change, A - Affirmed patient's thoughts, decisions, or attempts at behavior change, R - Reflected patient's change talk and S - Summarized patient's change talk statements    Also provided psychoeducation about behavioral health condition, symptoms, and treatment options    Care Plan review completed: Yes    Medication Review:  No changes to  current psychiatric medication(s)    Medication Compliance:  Yes    Changes in Health Issues:   None reported    Chemical Use Review:   Substance Use: Chemical use reviewed, no active concerns identified      Tobacco Use: No current tobacco use.      Assessment: Current Emotional / Mental Status (status of significant symptoms):  Risk status (Self / Other harm or suicidal ideation)  Patient has had a history of suicidal ideation: in correlation with stress, sent text on 4/13 about wanting to die, but no active SI. Per patient and , patient has had history of passive SI for more than 10 years and denies a history of suicide attempts, self-injurious behavior, homicidal ideation, homicidal behavior and and other safety concerns  Patient denies current fears or concerns for personal safety.  Patient denies current or recent suicidal ideation or behaviors.  Patient denies current or recent homicidal ideation or behaviors.  Patient denies current or recent self injurious behavior or ideation.  Patient denies other safety concerns.  A safety and risk management plan has not been developed at this time, however patient was encouraged to call [place] [phone] should there be a change in any of these risk factors.    Appearance:   Appropriate   Eye Contact:   Good   Psychomotor Behavior: Normal   Attitude:   Cooperative   Orientation:   All  Speech   Rate / Production: Normal    Volume:  Normal   Mood:    Normal  Affect:    Appropriate   Thought Content:  Clear   Thought Form:  Coherent  Logical   Insight:    Good     Diagnoses:  1. Moderate episode of recurrent major depressive disorder (H)    2. Generalized anxiety disorder        Collateral Reports Completed:  Communicated with: PCP    Plan: (Homework, other):  Patient was given information about behavioral services and encouraged to schedule a follow up appointment with the clinic [facility] in 1 week.  She was also given Cognitive Behavioral Therapy skills to practice  "when experiencing anxiety and depression.  Patient also given resources for mother and baby support groups, postpartum depression \"warm line\". CD Recommendations: No indications of CD issues.  Stephanie Calabrese, [facility], [facility]      ______________________________________________________________________    Integrated Primary Care Behavioral Health Treatment Plan    Patient's Name: Myrna Brothers  YOB: 1985    Date: 4/23/18    DSM-V Diagnoses: 296.32 (F33.1) Major Depressive Disorder, Recurrent Episode, Moderate _ or 300.02 (F41.1) Generalized Anxiety Disorder  Psychosocial / Contextual Factors: recently postpartum  WHODAS: TBD    Referral / Collaboration:  Patient has appointment with psychiary next week..    Anticipated number of session or this episode of care: 6-8      MeasurableTreatment Goal(s) related to diagnosis / functional impairment(s)  Goal 1: Patient will reduce feelings of depression as evidenced by decreased score on PHQ-9.    I will know I've met my goal when I feel less down and emotional.      Objective #A (Patient Action)    Patient will Decrease frequency and intensity of feeling down, depressed, hopeless.  Status: New - Date: 4/23/18     Intervention(s)  [facility] will assign homework to assist with behavioral activation/activity scheduling.    Objective #B  Patient will Identify negative self-talk and behaviors: challenge core beliefs, myths, and actions.  Status: New - Date: 4/23/18     Intervention(s)  [facility] will teach about automatic negative thoughts, irrational core beliefs, and cognitive restructuring techniques.        Goal 2: Patient will reduce feelings of anxiety as evidenced by decreased score on GAD7.    I will know I've met my goal when I feel less anxious and worried.      Objective #A (Patient Action)    Status: New - Date: 4/23/18     Patient will identify three distraction and diversion activities and use those activities to decrease level of anxiety  " .    Intervention(s)  [facility] will assign homework to practice distress tolerance and mindfulness techniques between session.    Objective #B  Patient will use cognitive strategies identified in therapy to challenge anxious thoughts.    Status: New - Date: 4/23/18     Intervention(s)  [facility] will teach CBT techniques related to cognitive restructuring.    Objective #C  Patient will use relaxation strategies 1-2 times per day to reduce the physical symptoms of anxiety.  Status: New - Date: 4/23/18     Intervention(s)  [facility] will role-play relaxation techniques.    Patient has reviewed and agreed to the above plan.      Stephanie Calabrese, KV[facility]  April 23, 2018

## 2018-11-15 ENCOUNTER — TELEPHONE (OUTPATIENT)
Dept: PEDIATRICS | Facility: CLINIC | Age: 33
End: 2018-11-15

## 2018-11-15 ENCOUNTER — OFFICE VISIT (OUTPATIENT)
Dept: PSYCHOLOGY | Facility: CLINIC | Age: 33
End: 2018-11-15
Payer: COMMERCIAL

## 2018-11-15 ENCOUNTER — OFFICE VISIT (OUTPATIENT)
Dept: PEDIATRICS | Facility: CLINIC | Age: 33
End: 2018-11-15
Payer: COMMERCIAL

## 2018-11-15 VITALS
HEART RATE: 77 BPM | SYSTOLIC BLOOD PRESSURE: 115 MMHG | WEIGHT: 177.6 LBS | OXYGEN SATURATION: 96 % | TEMPERATURE: 97.1 F | DIASTOLIC BLOOD PRESSURE: 60 MMHG | BODY MASS INDEX: 29.1 KG/M2

## 2018-11-15 DIAGNOSIS — M62.830 BACK MUSCLE SPASM: ICD-10-CM

## 2018-11-15 DIAGNOSIS — F33.1 MODERATE EPISODE OF RECURRENT MAJOR DEPRESSIVE DISORDER (H): ICD-10-CM

## 2018-11-15 DIAGNOSIS — F33.1 MODERATE EPISODE OF RECURRENT MAJOR DEPRESSIVE DISORDER (H): Primary | ICD-10-CM

## 2018-11-15 DIAGNOSIS — N61.0 MASTITIS, LEFT, ACUTE: Primary | ICD-10-CM

## 2018-11-15 DIAGNOSIS — F41.1 GENERALIZED ANXIETY DISORDER: ICD-10-CM

## 2018-11-15 DIAGNOSIS — N61.0 MASTITIS, LEFT, ACUTE: ICD-10-CM

## 2018-11-15 PROCEDURE — 90834 PSYTX W PT 45 MINUTES: CPT | Performed by: SOCIAL WORKER

## 2018-11-15 PROCEDURE — 99214 OFFICE O/P EST MOD 30 MIN: CPT | Performed by: NURSE PRACTITIONER

## 2018-11-15 NOTE — PROGRESS NOTES
SUBJECTIVE:   Myrna Brothers is a 33 year old female who presents to clinic today for the following health issues:    ED/UC Followup:    Facility:  [facility]  Date of visit: 11/12/18  Reason for visit: fever, breast pain, anxiety  Current Status: abdominal pain is better, having lower back spasms, started last night and radiated down to hips. Patient notes left nipple is cracked and bleeding and would like to know what she can use. Burning sensation with the cracked nipples   Note from ER:   33-year-old female who is approximately 4 weeks postpartum presents with worsening depression, anxiety now with fever, nausea, episodes of vomiting, left breast pain and redness as well as abdominal pain.  Initial exam revealed elevated heart rate of 123, elevated temperature of 101.4 as well as left-sided breast redness with tenderness and right lower quadrant abdominal pain.  Differential included mastitis, appendicitis, urinary tract infection, diverticulitis, other.  Laboratories were obtained including blood culture which is pending, lactic acid level which was normal and CBC revealing minimally elevated white blood count of 12.5.  Comprehensive metabolic panel revealed minimal elevations in alkaline phosphatase and glucose.  Urine toxicology screen was negative and urinalysis revealed essentially unremarkable results.  CT of the abdomen was obtained which was unremarkable.  Signs and symptoms consistent with mastitis and have recommended frequent lactation and/or breast pumping as well as Keflex.     Regarding ongoing depression with anxiety and at times suicidal ideation without a plan.  The case was discussed at length with the behavioral emergency room , please see separate note.  We recommended admission for stabilization, however, patient declined and is currently not holdable.  Patient was willing to follow-up with her therapist tomorrow and mental health prescribing health provider in 2 days.   Both  and patient desired this plan.  We talked about the role of medications and that a prescription would be most appropriately received from a monitoring health care provider.  Patient will be discharged to follow-up as noted per behavioral emergency room .     Here for follow up related to her mastitis and her anxiety and depression   Has appointment already scheduled with psychiatry tomorrow  Is seeing counseling regularly here with Miroslava Calabrese   One question she had was had a bad bout of back pain yesterday which went across her back  Was so bad could feel it coming and did take Some Aleve and it helped   After her  massaged it felt better     Problem list and histories reviewed & adjusted, as indicated.  Additional history: as documented    Patient Active Problem List   Diagnosis     Moderate episode of recurrent major depressive disorder (H)     Generalized anxiety disorder     Cervical cancer screening     Insulin controlled gestational diabetes mellitus (GDM) in third trimester     Past Surgical History:   Procedure Laterality Date     HEAD & NECK SURGERY      wisdom teeth       Social History   Substance Use Topics     Smoking status: Never Smoker     Smokeless tobacco: Never Used     Alcohol use Yes      Comment: denies recent use     Family History   Problem Relation Age of Onset     Breast Cancer Mother      Stillborn Mother      had a still born     Other - See Comments Mother      2 miscarriage     Diabetes Father      HEART DISEASE Father      Anxiety Disorder Maternal Grandmother      Alzheimer Disease Maternal Grandmother      Asthma Maternal Grandfather      Prostate Cancer Maternal Grandfather      Hypertension Paternal Grandmother      Leukemia Paternal Grandfather          Current Outpatient Prescriptions   Medication Sig Dispense Refill     albuterol (PROAIR HFA/PROVENTIL HFA/VENTOLIN HFA) 108 (90 Base) MCG/ACT inhaler Inhale 2 puffs into the lungs every 6 hours        busPIRone (BUSPAR) 5 MG tablet Take 1 tablet (5 mg) by mouth 2 times daily 180 tablet 1     cephALEXin (KEFLEX) 500 MG capsule Take 1 capsule (500 mg) by mouth 4 times daily for 10 days 40 capsule 0     ondansetron (ZOFRAN ODT) 4 MG ODT tab Take 1 tablet (4 mg) by mouth every 8 hours as needed for nausea 10 tablet 0     sertraline (ZOLOFT) 50 MG tablet TAKE 1 TABLET BY MOUTH EVERY DAY 90 tablet 1     Acetaminophen (TYLENOL PO) Take 650 mg by mouth every 6 hours as needed        acetaminophen (TYLENOL) 500 MG tablet Take 500-1,000 mg by mouth every 6 hours as needed for mild pain       fluticasone (FLOVENT HFA) 220 MCG/ACT Inhaler Inhale 2 puffs into the lungs 2 times daily for 7 days 1 Inhaler 0     Allergies   Allergen Reactions     Xylocaine [Lidocaine] Anaphylaxis     BP Readings from Last 3 Encounters:   11/15/18 115/60   11/12/18 98/61   10/12/18 120/79    Wt Readings from Last 3 Encounters:   11/15/18 177 lb 9.6 oz (80.6 kg)   11/12/18 175 lb (79.4 kg)   10/12/18 198 lb (89.8 kg)                  Labs reviewed in EPIC    Reviewed and updated as needed this visit by clinical staff  Tobacco  Allergies  Meds  Med Hx  Surg Hx  Fam Hx  Soc Hx      Reviewed and updated as needed this visit by Provider         ROS:  CONSTITUTIONAL:NEGATIVE for fever, chills, change in weight  INTEGUMENTARY/SKIN: NEGATIVE for rash   RESP:NEGATIVE for significant cough or SOB  BREAST: POSITIVE for engorgement and erythema and NEGATIVE for nipple inversion bilaterally  CV: NEGATIVE for chest pain, palpitations or peripheral edema  GI: NEGATIVE for nausea, poor appetite and vomiting  MUSCULOSKELETAL: POSITIVE  for back pain  and NEGATIVE for joint swelling  and joint warmth   PSYCHIATRIC: POSITIVE forHx anxiety and Hx depression and NEGATIVE foralcohol abuse, hallucinations, drug usage, thoughts of hurting someone else and thoughts of self harm    OBJECTIVE:     /60 (BP Location: Right arm, Patient Position: Sitting, Cuff  Size: Adult Regular)  Pulse 77  Temp 97.1  F (36.2  C) (Temporal)  Wt 177 lb 9.6 oz (80.6 kg)  LMP 02/03/2018 (Exact Date)  SpO2 96%  Breastfeeding? No  BMI 29.1 kg/m2  Body mass index is 29.1 kg/(m^2).  GENERAL APPEARANCE: alert, active and no distress  NECK: supple   RESP: lungs clear to auscultation - no rales, rhonchi or wheezes  BREAST: no palpable axillary masses or adenopathy, erythema right and tenderness right   CV: regular rates and rhythm and no murmur, click or rub  MS: extremities normal- no gross deformities noted  Tender:  left para lumbar muscles, right para lumbar muscles  Non-tender:  lumbar spinous processes, lumbar facet joints  Range of Motion:  lumbar flexion  full, lumbar extension  full  Strength:  Normal   Special tests:  negative straight leg raises  SKIN: no suspicious lesions or rashes  PSYCH: mentation appears normal and affect normal/bright  MENTAL STATUS EXAM:  Appearance/Behavior: No apparent distress, Casually groomed and Dressed appropriately for weather  Speech: Normal  Mood/Affect: normal affect  Insight: Adequate    Diagnostic Test Results:  none     ASSESSMENT/PLAN:     Myrna was seen today for er f/u.    Diagnoses and all orders for this visit:    Mastitis, left, acute  -     APNO CREA ointment; Apply 60 g topically every hour as needed for skin care  Continue current medications.    Moderate episode of recurrent major depressive disorder (H)  Reviewed concept of depression as function of biochemical imbalance of neurotransmitters/rationale for treatment.  Patient instructed to call for significant side effects medications or problems  Patient advised immediate presentation to hospital for suicidal thought, etc.  Continue current medications.  FOLLOW UP WITH SPECIALIST :Psychiatry    Generalized anxiety disorder  Discussed the pathophysiology of anxiety episodes and the various symptoms seen associated with anxiety episodes.  Discussed possible triggers including  fatigue, depression, stress, and chemicals such as alcohol, caffeine and certain drugs.  Discussed the treatment including an aerobic exercise program, adequate rest, and both rescue meds and maintenance meds.  FOLLOW UP WITH SPECIALIST :Psychiatry    Back muscle spasm  1) Ice to back 2-3 times per day  2) Lifting mechanics discussed  3) Analgesics such as Tylenol and/or ibuprofen.  PT if not improving    PLAN:   1.   Symptomatic therapy suggested: Continue current medications.  Can try cream for breast discomfort over nippe.  use acetaminophen, aleve as needed,  2.  Orders Placed This Encounter   Medications     APNO CREA ointment     Sig: Apply 60 g topically every hour as needed for skin care     Dispense:  30 g     Refill:  3     3. Patient needs to follow up in if no improvement,or sooner if worsening of symptoms or other symptoms develop.  FOLLOW UP WITH SPECIALIST :Psychiatry  Consider physical therapy if want to change your mind just send me a message     See Patient Instructions    DONY Mendiola CNP  [facility]

## 2018-11-15 NOTE — PATIENT INSTRUCTIONS
PLAN:   1.   Symptomatic therapy suggested: Continue current medications.  Can try cream for breast discomfort over nippe.  use acetaminophen, aleve as needed,  2.  Orders Placed This Encounter   Medications     APNO CREA ointment     Sig: Apply 60 g topically every hour as needed for skin care     Dispense:  30 g     Refill:  3     3. Patient needs to follow up in if no improvement,or sooner if worsening of symptoms or other symptoms develop.  FOLLOW UP WITH SPECIALIST :Psychiatry  Consider physical therapy if want to change your mind just send me a message       It was a pleasure seeing you today at the [facility]. Thank you for allowing us to care for you today. We truly hope we provided you with the excellent service you deserve. Please let us know if there is anything else we can do for you so we can be sure you are leaving completley satisfied with your care experience.       General information about your clinic   Clinic Hours Lab Hours (Appointments are required)   Mon-Thurs: 7:30 AM - 7 PM Mon-Thurs: 7:30 AM - 7 PM   Fri: 7:30 AM - 5 PM Fri: 7:30 AM - 5 PM        After Hours Nurse Advise & Appts:  Martha Nurse Advisors: [phone]  Martha On Call: to make appointments anytime: [phone] On Call Physician: call [phone] and answering service will page the on call physician.        For urgent appointments, please call [phone] and ask for the triage nurse or your care team clinic nurse.  How to contact my care team:  MyChart: [email or web address]   Phone: [phone]   Fax: [phone]       [facility]:   Phone: [phone]  Hours: 8:00 AM - 6:00 PM  Medication Refills:  Call your pharmacy and they will forward the refill to us. Please allow 3 business days for your refills to be completed.       Normal or non-critical lab and imaging results will be communicated to you by MyChart, letter or phone within 7 days.  If you do not hear from us within  10 days, please call the clinic. If you have a critical or abnormal lab result, we will notify you by phone as soon as possible.       We now have PWIC (Pediatric Walk in Care)  Monday-Friday from 7:30-4. Simply walk in and be seen for your urgent needs like cough, fever, rash, diarrhea or vomiting, pink eye, UTI. No appointments needed. Ask one of the team for more information      -Your Care Team:    Dr. Augie Ochoa - Internal Medicine/Pediatrics   Dr. Anne Marie Nieves - Family Medicine  Dr. Imelda Taylor - Pediatrics  Dr. Isa Ibarra - Pediatrics  Mery Bronson CNP - Family Practice Nurse Practitioner                 Back Spasm (No Trauma)    Spasm of the back muscles can occur after a sudden forceful twisting or bending force (such as in a car accident), after a simple awkward movement, or after lifting something heavy with poor body positioning. In any case, muscle spasm adds to the pain. Sleeping in an awkward position or on a poor quality mattress can also cause this. Some people respond to emotional stress by tensing the muscles of their back.  Pain that continues may need further evaluation or other types of treatment such as physical therapy.  You don't always need X-rays for the initial evaluation of back pain, unless you had a physical injury such as from a car accident or fall. If your pain continues and doesn't respond to medical treatment, X-rays and other tests may then be done.   Home care    As soon as possible, start sitting or walking again to avoid problems from prolonged bed rest (muscle weakness, worsening back stiffness and pain, blood clots in the legs).    When in bed, try to find a position of comfort. A firm mattress is best. Try lying flat on your back with pillows under your knees. You can also try lying on your side with your knees bent up toward your chest and a pillow between your knees.    Avoid prolonged sitting, long car rides, or travel. This puts more stress on the lower back  than standing or walking.     During the first 24 to 72 hours after an injury or flare-up, apply an ice pack to the painful area for 20 minutes, then remove it for 20 minutes. Do this over a period of 60 to 90 minutes or several times a day. This will reduce swelling and pain. Always wrap ice packs in a thin towel.    You can start with ice, then switch to heat. Heat (hot shower, hot bath, or heating pad) reduces pain, and works well for muscle spasms. Apply heat to the painful area for 20 minutes, then remove it for 20 minutes. Do this over a period of 60 to 90 minutes or several times a day. Do not sleep on a heating pad as it can burn or damage skin.    Alternate ice and heat therapies.    Be aware of safe lifting methods and do not lift anything over 15 pounds until all the pain is gone.  Gentle stretching will help your back heal faster. Do this simple routine 2 to 3 times a day until your back is feeling better.    Lie on your back with your knees bent and both feet on the ground    Slowly raise your left knee to your chest as you flatten your lower back against the floor. Hold for 20 to 30 seconds.    Relax and repeat the exercise with your right knee.    Do 2 to 3 of these exercises for each leg.    Repeat, hugging both knees to your chest at the same time.    Do not bounce, but use a gentle pull.  Medicines  Talk to your doctor before using medicine, especially if you have other medical problems or are taking other medicines.  You may use acetaminophen or ibuprofen to control pain, unless your healthcare provider prescribed another pain medicine. If you have a chronic condition such as diabetes, liver or kidney disease, stomach ulcer, or gastrointestinal bleeding, or are taking blood thinners, talk with your healthcare provider before taking any medicines.  Be careful if you are given prescription pain medicine, narcotics, or medicine for muscle spasm. They can cause drowsiness, affect your coordination,  reflexes, or judgment. Do not drive or operate heavy machinery when taking these medicines. Take pain medicine only as prescribed by your healthcare provider.  Follow-up care  Follow up with your doctor, or as advised. Physical therapy or further tests may be needed.  If X-rays were taken, they may be reviewed by a radiologist. You will be notified of any new findings that may affect your care.  Call 911  Call 911 if any of these occur:    Trouble breathing    Confusion    Drowsiness or trouble awakening    Fainting or loss of consciousness    Rapid or very slow heart rate    Loss of bowel or bladder control  When to seek medical advice  Call your healthcare provider right away if any of these occur:    Pain becomes worse or spreads to your legs    Weakness or numbness in one or both legs    Numbness in the groin or genital area    Fever of 100.4 F (38 C) or higher, or as directed by your healthcare provider    Burning or pain when passing urine  Date Last Reviewed: 6/1/2016 2000-2018 [employer]. [address] [place] [address], [place] [address]. All rights reserved. This information is not intended as a substitute for professional medical care. Always follow your healthcare professional's instructions.

## 2018-11-15 NOTE — MR AVS SNAPSHOT
After Visit Summary   11/15/2018    Myrna Brothers    MRN: [id number]           Patient Information     Date Of Birth          1985        Visit Information        Provider Department      11/15/2018 11:10 AM Mery Bronson APRN CNP [facility]        Today's Diagnoses     Mastitis, left, acute    -  1    Moderate episode of recurrent major depressive disorder (H)        Generalized anxiety disorder        Back muscle spasm          Care Instructions    PLAN:   1.   Symptomatic therapy suggested: Continue current medications.  Can try cream for breast discomfort over nippe.  use acetaminophen, aleve as needed,  2.  Orders Placed This Encounter   Medications     APNO CREA ointment     Sig: Apply 60 g topically every hour as needed for skin care     Dispense:  30 g     Refill:  3     3. Patient needs to follow up in if no improvement,or sooner if worsening of symptoms or other symptoms develop.  FOLLOW UP WITH SPECIALIST :Psychiatry  Consider physical therapy if want to change your mind just send me a message       It was a [facility] seeing you today at the [facility]. Thank you for allowing us to care for you today. We truly hope we provided you with the excellent service you deserve. Please let us know if there is anything else we can do for you so we can be sure you are leaving completley satisfied with your care experience.       General information about your clinic   Clinic Hours Lab Hours (Appointments are required)   Mon-Thurs: 7:30 AM - 7 PM Mon-Thurs: 7:30 AM - 7 PM   Fri: 7:30 AM - 5 PM Fri: 7:30 AM - 5 PM        After Hours Nurse Advise & Appts:  Martha Nurse Advisors: [phone]  Martha On Call: to make appointments anytime: [phone] On Call Physician: call [phone] and answering service will page the on call physician.        For urgent appointments, please call [phone] and ask for the triage nurse or your  care team clinic nurse.  How to contact my care team:  Luis Alfredo: [email or web address]   Phone: [phone]   Fax: [phone]       [facility]:   Phone: [phone]  Hours: 8:00 AM - 6:00 PM  Medication Refills:  Call your pharmacy and they will forward the refill to us. Please allow 3 business days for your refills to be completed.       Normal or non-critical lab and imaging results will be communicated to you by MyChart, letter or phone within 7 days.  If you do not hear from us within 10 days, please call the clinic. If you have a critical or abnormal lab result, we will notify you by phone as soon as possible.       We now have PWIC (Pediatric Walk in Care)  Monday-Friday from 7:30-4. Simply walk in and be seen for your urgent needs like cough, fever, rash, diarrhea or vomiting, pink eye, UTI. No appointments needed. Ask one of the team for more information      -Your Care Team:    Dr. Augie Ochoa - Internal Medicine/Pediatrics   Dr. Anne Marie Nieves - Family Medicine  Dr. Imelda Taylor - Pediatrics  Dr. Isa Ibarra - Pediatrics  Mery Bronson CNP - Family Practice Nurse Practitioner                 Back Spasm (No Trauma)    Spasm of the back muscles can occur after a sudden forceful twisting or bending force (such as in a car accident), after a simple awkward movement, or after lifting something heavy with poor body positioning. In any case, muscle spasm adds to the pain. Sleeping in an awkward position or on a poor quality mattress can also cause this. Some people respond to emotional stress by tensing the muscles of their back.  Pain that continues may need further evaluation or other types of treatment such as physical therapy.  You don't always need X-rays for the initial evaluation of back pain, unless you had a physical injury such as from a car accident or fall. If your pain continues and doesn't respond to medical treatment, X-rays and other tests may then be done.   Home care    As soon as  possible, start sitting or walking again to avoid problems from prolonged bed rest (muscle weakness, worsening back stiffness and pain, blood clots in the legs).    When in bed, try to find a position of comfort. A firm mattress is best. Try lying flat on your back with pillows under your knees. You can also try lying on your side with your knees bent up toward your chest and a pillow between your knees.    Avoid prolonged sitting, long car rides, or travel. This puts more stress on the lower back than standing or walking.     During the first 24 to 72 hours after an injury or flare-up, apply an ice pack to the painful area for 20 minutes, then remove it for 20 minutes. Do this over a period of 60 to 90 minutes or several times a day. This will reduce swelling and pain. Always wrap ice packs in a thin towel.    You can start with ice, then switch to heat. Heat (hot shower, hot bath, or heating pad) reduces pain, and works well for muscle spasms. Apply heat to the painful area for 20 minutes, then remove it for 20 minutes. Do this over a period of 60 to 90 minutes or several times a day. Do not sleep on a heating pad as it can burn or damage skin.    Alternate ice and heat therapies.    Be aware of safe lifting methods and do not lift anything over 15 pounds until all the pain is gone.  Gentle stretching will help your back heal faster. Do this simple routine 2 to 3 times a day until your back is feeling better.    Lie on your back with your knees bent and both feet on the ground    Slowly raise your left knee to your chest as you flatten your lower back against the floor. Hold for 20 to 30 seconds.    Relax and repeat the exercise with your right knee.    Do 2 to 3 of these exercises for each leg.    Repeat, hugging both knees to your chest at the same time.    Do not bounce, but use a gentle pull.  Medicines  Talk to your doctor before using medicine, especially if you have other medical problems or are taking  other medicines.  You may use acetaminophen or ibuprofen to control pain, unless your healthcare provider prescribed another pain medicine. If you have a chronic condition such as diabetes, liver or kidney disease, stomach ulcer, or gastrointestinal bleeding, or are taking blood thinners, talk with your healthcare provider before taking any medicines.  Be careful if you are given prescription pain medicine, narcotics, or medicine for muscle spasm. They can cause drowsiness, affect your coordination, reflexes, or judgment. Do not drive or operate heavy machinery when taking these medicines. Take pain medicine only as prescribed by your healthcare provider.  Follow-up care  Follow up with your doctor, or as advised. Physical therapy or further tests may be needed.  If X-rays were taken, they may be reviewed by a radiologist. You will be notified of any new findings that may affect your care.  Call 911  Call 911 if any of these occur:    Trouble breathing    Confusion    Drowsiness or trouble awakening    Fainting or loss of consciousness    Rapid or very slow heart rate    Loss of bowel or bladder control  When to seek medical advice  Call your healthcare provider right away if any of these occur:    Pain becomes worse or spreads to your legs    Weakness or numbness in one or both legs    Numbness in the groin or genital area    Fever of 100.4 F (38 C) or higher, or as directed by your healthcare provider    Burning or pain when passing urine  Date Last Reviewed: 6/1/2016 2000-2018 [employer]. [address] [place] [address]. All rights reserved. This information is not intended as a substitute for professional medical care. Always follow your healthcare professional's instructions.                Follow-ups after your visit        Your next 10 appointments already scheduled     Nov 21, 2018  9:00 AM CST   Return Visit with Stephanie Calabrese [facility])    [address] [facility] [address] [phone]            Nov 29, 2018  9:00 AM CST   Return Visit with KVNG Bennett   [facility] ([facility]    [address] [facility] [address] [phone]              Who to contact     If you have questions or need follow up information about today's clinic visit or your schedule please contact [facility] directly at [phone].  Normal or non-critical lab and imaging results will be communicated to you by [employer], letter or phone within 4 business days after the clinic has received the results. If you do not hear from us within 7 days, please contact the clinic through [employer] or phone. If you have a critical or abnormal lab result, we will notify you by phone as soon as possible.  Submit refill requests through [employer] or call your pharmacy and they will forward the refill request to us. Please allow 3 business days for your refill to be completed.          Additional Information About Your Visit        MyChart Information     [employer] gives you secure access to your electronic health record. If you see a primary care provider, you can also send messages to your care team and make appointments. If you have questions, please call your primary care clinic.  If you do not have a primary care provider, please call [phone] and they will assist you.      [employer] is an electronic gateway that provides easy, online access to your medical records. With [employer], you can request a clinic appointment, read your test results, renew a prescription or communicate with your care team.     To access your existing account, please contact your [facility] or call [phone] for assistance.        Care EveryWhere ID     This is your Care EveryWhere ID. This could be used by other organizations to access your [facility]  records  [id number]        Your Vitals Were     Pulse Temperature Last Period Pulse Oximetry Breastfeeding? BMI (Body Mass Index)    77 97.1  F (36.2  C) (Temporal) 02/03/2018 (Exact Date) 96% No 29.1 kg/m2       Blood Pressure from Last 3 Encounters:   11/15/18 115/60   11/12/18 98/61   10/12/18 120/79    Weight from Last 3 Encounters:   11/15/18 177 lb 9.6 oz (80.6 kg)   11/12/18 175 lb (79.4 kg)   10/12/18 198 lb (89.8 kg)              Today, you had the following     No orders found for display         Today's Medication Changes          These changes are accurate as of 11/15/18 11:45 AM.  If you have any questions, ask your nurse or doctor.               Start taking these medicines.        Dose/Directions    APNO Crea ointment   Used for:  Mastitis, left, acute   Started by:  Mery Bronson APRN CNP        Dose:  60 g   Apply 60 g topically every hour as needed for skin care   Quantity:  30 g   Refills:  3            Where to get your medicines      Some of these will need a paper prescription and others can be bought over the counter.  Ask your nurse if you have questions.     Bring a paper prescription for each of these medications     APNO Crea ointment                Primary Care Provider Office Phone # Fax #    DONY Mendiola [phone]       [address]  [place] [address]        Equal Access to Services     [facility]: Hadii aad ku hadasho Soomaali, waaxda luqadaha, qaybta kaalmada adeegyada, wax calin hines adeoscar barreto . So [facility] [phone].    ATENCIÓN: Si habla español, tiene a flowers disposición servicios gratuitos de asistencia lingüística. Llame al [phone].    We comply with applicable federal civil rights laws and [place] laws. We do not discriminate on the basis of race, color, national origin, age, disability, sex, sexual orientation, or gender identity.            Thank you!     Thank you for choosing [facility]  [facility]  for your care. Our goal is always to provide you with excellent care. Hearing back from our patients is one way we can continue to improve our services. Please take a few minutes to complete the written survey that you may receive in the mail after your visit with us. Thank you!             Your Updated Medication List - Protect others around you: Learn how to safely use, store and throw away your medicines at www.disposemymeds.org.          This list is accurate as of 11/15/18 11:45 AM.  Always use your most recent med list.                   Brand Name Dispense Instructions for use Diagnosis    albuterol 108 (90 Base) MCG/ACT inhaler    PROAIR HFA/PROVENTIL HFA/VENTOLIN HFA     Inhale 2 puffs into the lungs every 6 hours        APNO Crea ointment     30 g    Apply 60 g topically every hour as needed for skin care    Mastitis, left, acute       busPIRone 5 MG tablet    BUSPAR    180 tablet    Take 1 tablet (5 mg) by mouth 2 times daily    Generalized anxiety disorder       cephALEXin 500 MG capsule    KEFLEX    40 capsule    Take 1 capsule (500 mg) by mouth 4 times daily for 10 days        fluticasone 220 MCG/ACT Inhaler    FLOVENT HFA    1 Inhaler    Inhale 2 puffs into the lungs 2 times daily for 7 days    Upper respiratory tract infection, unspecified type, Reactive airway disease without complication, unspecified asthma severity, unspecified whether persistent       ondansetron 4 MG ODT tab    ZOFRAN ODT    10 tablet    Take 1 tablet (4 mg) by mouth every 8 hours as needed for nausea        sertraline 50 MG tablet    ZOLOFT    90 tablet    TAKE 1 TABLET BY MOUTH EVERY DAY    Generalized anxiety disorder, Major depressive disorder with single episode, in full remission (H)       * TYLENOL PO      Take 650 mg by mouth every 6 hours as needed        * acetaminophen 500 MG tablet    TYLENOL     Take 500-1,000 mg by mouth every 6 hours as needed for mild pain        * Notice:  This list has 2  medication(s) that are the same as other medications prescribed for you. Read the directions carefully, and ask your doctor or other care provider to review them with you.

## 2018-11-15 NOTE — TELEPHONE ENCOUNTER
[facility] Call Center    Phone Message    May a detailed message be left on voicemail: yes    Reason for Call: Medication Question or concern regarding medication   Prescription Clarification  Name of Medication: APNO CREA ointment [[id number]] (Order [id number])     Prescribing Provider: Mary Bronson   Pharmacy: [facility]   What on the order needs clarification? Pt states pharmacy has not received prescription yet, pt would like to  soon.           Action Taken: Message routed to:  Primary Care p [id number]

## 2018-11-15 NOTE — PROGRESS NOTES
"[facility]  Integrated Behavioral Health  November 15, 2018    Behavioral Health Clinician Progress Note    Patient Name: Myrna Brothers           Service Type:  Individual      Service Location:   Face to Face in Clinic     Session Start Time: 9: 03 am  Session End Time: 9: 55 am      Session Length: 38 - 52      Attendees: Patient    Visit Activities (Refresh list every visit): [facility] Only    Diagnostic Assessment Date: 4/12/18  Treatment Plan Review Date: 11/8/18  See Flowsheets for today's PHQ-9 and OREN-7 results  Previous PHQ-9:   PHQ-9 SCORE 2/28/2018 4/16/2018 7/13/2018   Total Score 5 11 1     Previous OREN-7:   OREN-7 SCORE 2/28/2018 4/16/2018 7/13/2018   Total Score 5 18 1       LUKASZ LEVEL:  No flowsheet data found.    DATA  Extended Session (60+ minutes): No  Interactive Complexity: No  Crisis: No  [facility] Patient: No    Treatment Objective(s) Addressed in This Session:  Target Behavior(s): disease management/lifestyle changes , mental health management    Depressed Mood: Decrease frequency and intensity of feeling down, depressed, hopeless  Identify negative self-talk and behaviors: challenge core beliefs, myths, and actions  Anxiety: will experience a reduction in anxiety, will develop more effective coping skills to manage anxiety symptoms, will develop healthy cognitive patterns and beliefs and will increase ability to function adaptively    Current Stressors / Issues:  Patient reported significant improvement in mental health symptoms since previous visit. She stated that she has been \"very happy\" due to positive interactions with her son and feeling more confident in her abilities.  Patient stated that she also spoke with her mother about her symptoms, and had a positive interaction. She stated that it normalized her feelings, helped her to feel less alone, and her mother was able to remind her that she is a good mother.  Patient reported that she has been more mindful of " "her interactions with her son, recognizes when he responds, and is able to focus on the positive interactions even when a negative interaction occurs.  Patient shared that the daily self reflection on what has gone well has been helpful.  She noted decrease in anxiety when her son cries, because she is now looking forward to demonstrating to herself that she can help him through his cries. Patient continued to discuss strategies to help her to establish realistic goals for her and her son each day, and she shared that she is less overwhelmed by all that she needs to accomplish in the day. [facility] reviewed strategies to help patient to continue with these new beliefs about herself in moments of heightened emotional intensity. Patient denied SI. She stated that she has not had SI since her ED visit. She expressed goal of reacting and responding to suicidal thoughts in the future (to reduce them) instead of ruminating and feeling \"comfort\" in them.    Progress on Treatment Objective(s) / Homework:  Satisfactory progress - ACTION (Actively working towards change); Intervened by reinforcing change plan / affirming steps taken    Motivational Interviewing    MI Intervention: Expressed Empathy/Understanding, Supported Autonomy, Collaboration, Evocation, Permission to raise concern or advise, Open-ended questions, Change talk (evoked) and Reframe     Change Talk Expressed by the Patient: Ability to change Reasons to change Need to change    Provider Response to Change Talk: E - Evoked more info from patient about behavior change, A - Affirmed patient's thoughts, decisions, or attempts at behavior change, R - Reflected patient's change talk and S - Summarized patient's change talk statements    Also provided psychoeducation about behavioral health condition, symptoms, and treatment options    Care Plan review completed: Yes    Medication Review:  No changes to current psychiatric medication(s)    Medication " Compliance:  Yes    Changes in Health Issues:   None reported    Chemical Use Review:   Substance Use: Chemical use reviewed, no active concerns identified      Tobacco Use: No current tobacco use.      Assessment: Current Emotional / Mental Status (status of significant symptoms):  Risk status (Self / Other harm or suicidal ideation)  Patient has had a history of suicidal ideation: in correlation with stress, sent text on 4/13 about wanting to die, but no active SI. Per patient and , patient has had history of passive SI for more than 10 years and denies a history of suicide attempts, self-injurious behavior, homicidal ideation, homicidal behavior and and other safety concerns  Patient denies current fears or concerns for personal safety.  Patient denies current or recent suicidal ideation or behaviors.  Patient denies current or recent homicidal ideation or behaviors.  Patient denies current or recent self injurious behavior or ideation.  Patient denies other safety concerns.  A safety and risk management plan has not been developed at this time, however patient was encouraged to call [place] [phone] should there be a change in any of these risk factors.    Appearance:   Appropriate   Eye Contact:   Good   Psychomotor Behavior: Normal   Attitude:   Cooperative   Orientation:   All  Speech   Rate / Production: Normal    Volume:  Normal   Mood:    Normal  Affect:    Appropriate   Thought Content:  Clear   Thought Form:  Coherent  Logical   Insight:    Good     Diagnoses:  1. Moderate episode of recurrent major depressive disorder (H)    2. Generalized anxiety disorder        Collateral Reports Completed:  Communicated with: PCP    Plan: (Homework, other):  Patient was given information about behavioral services and encouraged to schedule a follow up appointment with the clinic [facility] in 1 week.  She was also given Cognitive Behavioral Therapy skills to practice when experiencing anxiety and depression.   "Patient also given resources for mother and baby support groups, postpartum depression \"warm line\". CD Recommendations: No indications of CD issues.  Stephanie Calabrese, [facility], [facility]      ______________________________________________________________________    Integrated Primary Care Behavioral Health Treatment Plan    Patient's Name: Myrna Brothers  YOB: 1985    Date: 4/23/18    DSM-V Diagnoses: 296.32 (F33.1) Major Depressive Disorder, Recurrent Episode, Moderate _ or 300.02 (F41.1) Generalized Anxiety Disorder  Psychosocial / Contextual Factors: recently postpartum  WHODAS: TBD    Referral / Collaboration:  Patient has appointment with psychiary 11/16/18.    Anticipated number of session or this episode of care: 6-8      MeasurableTreatment Goal(s) related to diagnosis / functional impairment(s)  Goal 1: Patient will reduce feelings of depression as evidenced by decreased score on PHQ-9.    I will know I've met my goal when I feel less down and emotional.      Objective #A (Patient Action)    Patient will Decrease frequency and intensity of feeling down, depressed, hopeless.  Status: New - Date: 4/23/18     Intervention(s)  [facility] will assign homework to assist with behavioral activation/activity scheduling.    Objective #B  Patient will Identify negative self-talk and behaviors: challenge core beliefs, myths, and actions.  Status: New - Date: 4/23/18     Intervention(s)  [facility] will teach about automatic negative thoughts, irrational core beliefs, and cognitive restructuring techniques.        Goal 2: Patient will reduce feelings of anxiety as evidenced by decreased score on GAD7.    I will know I've met my goal when I feel less anxious and worried.      Objective #A (Patient Action)    Status: New - Date: 4/23/18     Patient will identify three distraction and diversion activities and use those activities to decrease level of anxiety  .    Intervention(s)  [facility] will assign homework to practice distress " tolerance and mindfulness techniques between session.    Objective #B  Patient will use cognitive strategies identified in therapy to challenge anxious thoughts.    Status: New - Date: 4/23/18     Intervention(s)  [facility] will teach CBT techniques related to cognitive restructuring.    Objective #C  Patient will use relaxation strategies 1-2 times per day to reduce the physical symptoms of anxiety.  Status: New - Date: 4/23/18     Intervention(s)  [facility] will role-play relaxation techniques.    Patient has reviewed and agreed to the above plan.      KVNG Webb  April 23, 2018

## 2018-11-15 NOTE — TELEPHONE ENCOUNTER
"Verified Rx was faxed to pharmacy today. Called [facility] pharmacy to check status. Pharmacy indicates they don't have anything matching the \"APNO CREA ointment\" description and no prescriptions received today for this patient.    Routing back to Mery Bronson NP, APRN CNP for clarification of orders and resending Rx to pharmacy with detailed description and instructions for use.  Caryn KEVIN CMA    "

## 2018-11-15 NOTE — NURSING NOTE
"Chief Complaint   Patient presents with     ER F/U     follow-up from [facility]       Initial /60 (BP Location: Right arm, Patient Position: Sitting, Cuff Size: Adult Regular)  Pulse 77  Temp 97.1  F (36.2  C) (Temporal)  Wt 177 lb 9.6 oz (80.6 kg)  LMP 02/03/2018 (Exact Date)  SpO2 96%  Breastfeeding? No  BMI 29.1 kg/m2 Estimated body mass index is 29.1 kg/(m^2) as calculated from the following:    Height as of 3/30/18: 5' 5.5\" (1.664 m).    Weight as of this encounter: 177 lb 9.6 oz (80.6 kg).  Medication Reconciliation: complete      DAHLIA Ruffin      "

## 2018-11-18 LAB
BACTERIA SPEC CULT: NO GROWTH
Lab: NORMAL
SPECIMEN SOURCE: NORMAL

## 2018-11-21 ENCOUNTER — OFFICE VISIT (OUTPATIENT)
Dept: PSYCHOLOGY | Facility: CLINIC | Age: 33
End: 2018-11-21
Payer: COMMERCIAL

## 2018-11-21 DIAGNOSIS — F41.1 GENERALIZED ANXIETY DISORDER: ICD-10-CM

## 2018-11-21 DIAGNOSIS — F33.1 MODERATE EPISODE OF RECURRENT MAJOR DEPRESSIVE DISORDER (H): Primary | ICD-10-CM

## 2018-11-21 PROCEDURE — 90834 PSYTX W PT 45 MINUTES: CPT | Performed by: SOCIAL WORKER

## 2018-11-21 ASSESSMENT — ANXIETY QUESTIONNAIRES
3. WORRYING TOO MUCH ABOUT DIFFERENT THINGS: MORE THAN HALF THE DAYS
IF YOU CHECKED OFF ANY PROBLEMS ON THIS QUESTIONNAIRE, HOW DIFFICULT HAVE THESE PROBLEMS MADE IT FOR YOU TO DO YOUR WORK, TAKE CARE OF THINGS AT HOME, OR GET ALONG WITH OTHER PEOPLE: SOMEWHAT DIFFICULT
GAD7 TOTAL SCORE: 12
1. FEELING NERVOUS, ANXIOUS, OR ON EDGE: MORE THAN HALF THE DAYS
7. FEELING AFRAID AS IF SOMETHING AWFUL MIGHT HAPPEN: MORE THAN HALF THE DAYS
6. BECOMING EASILY ANNOYED OR IRRITABLE: SEVERAL DAYS
5. BEING SO RESTLESS THAT IT IS HARD TO SIT STILL: SEVERAL DAYS
2. NOT BEING ABLE TO STOP OR CONTROL WORRYING: MORE THAN HALF THE DAYS

## 2018-11-21 ASSESSMENT — PATIENT HEALTH QUESTIONNAIRE - PHQ9
SUM OF ALL RESPONSES TO PHQ QUESTIONS 1-9: 14
5. POOR APPETITE OR OVEREATING: MORE THAN HALF THE DAYS

## 2018-11-21 NOTE — MR AVS SNAPSHOT
After Visit Summary   11/21/2018    Myrna Brothers    MRN: [id number]           Patient Information     Date Of Birth          1985        Visit Information        Provider Department      11/21/2018 9:00 AM Stephanie Calabrese LICSW [facility]        Today's Diagnoses     Moderate episode of recurrent major depressive disorder (H)    -  1    Generalized anxiety disorder           Follow-ups after your visit        Your next 10 appointments already scheduled     Nov 29, 2018  9:00 AM CST   Return Visit with KVNG Bennett   [facility]    [address] [place] [address] [phone] [phone]            Nov 29, 2018  3:15 PM CST   [facility] [place]um with Ashley Bernardo DO   [facility]    [address] [place] [address] [phone]              Who to contact     If you have questions or need follow up information about today's clinic visit or your schedule please contact [facility] directly at [phone].  Normal or non-critical lab and imaging results will be communicated to you by MyChart, letter or phone within 4 business days after the clinic has received the results. If you do not hear from us within 7 days, please contact the clinic through [employer] or phone. If you have a critical or abnormal lab result, we will notify you by phone as soon as possible.  Submit refill requests through [employer] or call your pharmacy and they will forward the refill request to us. Please allow 3 business days for your refill to be completed.          Additional Information About Your Visit        MyChart Information     [employer] gives you secure access to your electronic health record. If you see a primary care provider, you can also send messages to your care team and make appointments. If you have questions, please call your primary care  clinic.  If you do not have a primary care provider, please call [phone] and they will assist you.      [employer] is an electronic gateway that provides easy, online access to your medical records. With [employer], you can request a clinic appointment, read your test results, renew a prescription or communicate with your care team.     To access your existing account, please contact your [facility] or call [phone] for assistance.        Care EveryWhere ID     This is your Care EveryWhere ID. This could be used by other organizations to access your [facility] medical records  [id number]        Your Vitals Were     Last Period                   02/03/2018 (Exact Date)            Blood Pressure from Last 3 Encounters:   11/15/18 115/60   11/12/18 98/61   10/12/18 120/79    Weight from Last 3 Encounters:   11/15/18 80.6 kg (177 lb 9.6 oz)   11/12/18 79.4 kg (175 lb)   10/12/18 89.8 kg (198 lb)              Today, you had the following     No orders found for display       Primary Care Provider Office Phone # Fax #    Mery Crumpmyra Bronson, APRN [facility] [phone]       [address]  [place] [address]        Equal Access to Services     MANNY OCONNOR : Hadii aad ku hadasho Soomaali, waaxda luqadaha, qaybta kaalmada adeegyada, waxay idiin hayaan shannaeg raeann laernestina . So [facility] [phone].    ATENCIÓN: Si habla español, tiene a flowers disposición servicios gratuitos de asistencia lingüística. Llame al [phone].    We comply with applicable federal civil rights laws and [place] laws. We do not discriminate on the basis of race, color, national origin, age, disability, sex, sexual orientation, or gender identity.            Thank you!     Thank you for choosing [facility]  for your care. Our goal is always to provide you with excellent care. Hearing back from our patients is one way we can continue to improve our services. Please take a few  minutes to complete the written survey that you may receive in the mail after your visit with us. Thank you!             Your Updated Medication List - Protect others around you: Learn how to safely use, store and throw away your medicines at www.disposemymeds.org.          This list is accurate as of 11/21/18 10:15 AM.  Always use your most recent med list.                   Brand Name Dispense Instructions for use Diagnosis    albuterol 108 (90 Base) MCG/ACT inhaler    PROAIR HFA/PROVENTIL HFA/VENTOLIN HFA     Inhale 2 puffs into the lungs every 6 hours        APNO Crea ointment     30 g    Apply 60 g topically every hour as needed for skin care    Mastitis, left, acute       busPIRone 5 MG tablet    BUSPAR    180 tablet    Take 1 tablet (5 mg) by mouth 2 times daily    Generalized anxiety disorder       cephALEXin 500 MG capsule    KEFLEX    40 capsule    Take 1 capsule (500 mg) by mouth 4 times daily for 10 days        fluticasone 220 MCG/ACT Inhaler    FLOVENT HFA    1 Inhaler    Inhale 2 puffs into the lungs 2 times daily for 7 days    Upper respiratory tract infection, unspecified type, Reactive airway disease without complication, unspecified asthma severity, unspecified whether persistent       sertraline 50 MG tablet    ZOLOFT    90 tablet    TAKE 1 TABLET BY MOUTH EVERY DAY    Generalized anxiety disorder, Major depressive disorder with single episode, in full remission (H)       * TYLENOL PO      Take 650 mg by mouth every 6 hours as needed        * acetaminophen 500 MG tablet    TYLENOL     Take 500-1,000 mg by mouth every 6 hours as needed for mild pain        * Notice:  This list has 2 medication(s) that are the same as other medications prescribed for you. Read the directions carefully, and ask your doctor or other care provider to review them with you.

## 2018-11-21 NOTE — PROGRESS NOTES
"[facility]  Integrated Behavioral Health  November 21, 2018    Behavioral Health Clinician Progress Note    Patient Name: Myrna Brothers           Service Type:  Individual      Service Location:   Face to Face in Clinic     Session Start Time: 9: 07 am  Session End Time:  9: 55 am      Session Length: 38 - 52      Attendees: Patient    Visit Activities (Refresh list every visit): [facility] Only    Diagnostic Assessment Date: 4/12/18  Treatment Plan Review Date: 11/8/18  See Flowsheets for today's PHQ-9 and OREN-7 results  Previous PHQ-9:   PHQ-9 SCORE 4/16/2018 7/13/2018 11/21/2018   Total Score 11 1 14     Previous OREN-7:   OREN-7 SCORE 4/16/2018 7/13/2018 11/21/2018   Total Score 18 1 12       LUKASZ LEVEL:  No flowsheet data found.    DATA  Extended Session (60+ minutes): No  Interactive Complexity: No  Crisis: No  [facility] Patient: No    Treatment Objective(s) Addressed in This Session:  Target Behavior(s): disease management/lifestyle changes , mental health management    Depressed Mood: Decrease frequency and intensity of feeling down, depressed, hopeless  Identify negative self-talk and behaviors: challenge core beliefs, myths, and actions  Anxiety: will experience a reduction in anxiety, will develop more effective coping skills to manage anxiety symptoms, will develop healthy cognitive patterns and beliefs and will increase ability to function adaptively    Current Stressors / Issues:  See updated PHQ9 and GAD7.  PHQ9 is elevated with score of 14, but as mother of infant, patient reported feeling tired and low energy due to infant's sleep schedule.  Patient stated that despite low interest and motivation to complete activities over the past weekend, she still went to the store, ran errands, and put forth effort to complete all activities. She reported that she felt \"good\" knowing that she was still able to be productive despite low interest and motivation.  Patient denied active SI with " "plan or intent. She stated that SI is improving.    Patient discussed overall that she feels as though things are \"going well\". She stated that she is feeling more confident in nursing and burping her infant. She stated that she recently felt anxious and upset with herself when she felt that she caused her infant to be upset. For example, during a diaper change, her infant urinated on himself and clothes, which led to him crying more than normal. Patient blamed self and felt as though she should have prevented it.  Patient and C reviewed common cognitive distortions, and she recognized that she often blames herself and uses \"should statements\" that cause her to feel guilty and frustrated with herself. [facility] and patient began to explore cognitive restructuring techniques to assist in these moments. C also introduced strategies for non-judgmental thinking, including suspending judgement regarding events.  Patient stated that she has historically felt that it is \"bad\" if the infant is crying, but was receptive to exploring how viewing crying as a neutral event, and how this may reduce anxiety.       Progress on Treatment Objective(s) / Homework:  Satisfactory progress - ACTION (Actively working towards change); Intervened by reinforcing change plan / affirming steps taken    Motivational Interviewing    MI Intervention: Expressed Empathy/Understanding, Supported Autonomy, Collaboration, Evocation, Permission to raise concern or advise, Open-ended questions, Change talk (evoked) and Reframe     Change Talk Expressed by the Patient: Ability to change Reasons to change Need to change    Provider Response to Change Talk: E - Evoked more info from patient about behavior change, A - Affirmed patient's thoughts, decisions, or attempts at behavior change, R - Reflected patient's change talk and S - Summarized patient's change talk statements    Also provided psychoeducation about behavioral health condition, symptoms, and " treatment options    Care Plan review completed: Yes    Medication Review:  Changes to psychiatric medications. Patient is currently prescribed 100 mg of Zoloft and 15 mg Buspar 2 times per day.    Medication Compliance:  Yes    Changes in Health Issues:   None reported    Chemical Use Review:   Substance Use: Chemical use reviewed, no active concerns identified      Tobacco Use: No current tobacco use.      Assessment: Current Emotional / Mental Status (status of significant symptoms):  Risk status (Self / Other harm or suicidal ideation)  Patient has had a history of suicidal ideation: in correlation with stress, sent text on 4/13 about wanting to die, but no active SI. Per patient and , patient has had history of passive SI for more than 10 years and denies a history of suicide attempts, self-injurious behavior, homicidal ideation, homicidal behavior and and other safety concerns  Patient denies current fears or concerns for personal safety.  Patient denies current or recent suicidal ideation or behaviors.  Patient denies current or recent homicidal ideation or behaviors.  Patient denies current or recent self injurious behavior or ideation.  Patient denies other safety concerns.  A safety and risk management plan has not been developed at this time, however patient was encouraged to call [facility] should there be a change in any of these risk factors.    Appearance:   Appropriate   Eye Contact:   Good   Psychomotor Behavior: Normal   Attitude:   Cooperative   Orientation:   All  Speech   Rate / Production: Normal    Volume:  Normal   Mood:    Normal  Affect:    Appropriate   Thought Content:  Clear   Thought Form:  Coherent  Logical   Insight:    Good     Diagnoses:  1. Moderate episode of recurrent major depressive disorder (H)    2. Generalized anxiety disorder        Collateral Reports Completed:  Not Applicable    Plan: (Homework, other):  Patient was given information about behavioral  "services and encouraged to schedule a follow up appointment with the clinic [facility] in 1 week.  She was also given Cognitive Behavioral Therapy skills to practice when experiencing anxiety and depression.  Patient given list of common cognitive distortions, and was encouraged to identify her distortions when her anxiety is activated. Patient also given resources for mother and baby support groups, postpartum depression \"warm line\". CD Recommendations: No indications of CD issues.  Stephanie Calabrese, [facility], [facility]      ______________________________________________________________________    Integrated Primary Care Behavioral Health Treatment Plan    Patient's Name: Myrna Brothers  YOB: 1985    Date: 4/23/18    DSM-V Diagnoses: 296.32 (F33.1) Major Depressive Disorder, Recurrent Episode, Moderate _ or 300.02 (F41.1) Generalized Anxiety Disorder  Psychosocial / Contextual Factors: recently postpartum  WHODAS: TBD    Referral / Collaboration:  Patient has appointment with psychiary 11/16/18.    Anticipated number of session or this episode of care: 6-8      MeasurableTreatment Goal(s) related to diagnosis / functional impairment(s)  Goal 1: Patient will reduce feelings of depression as evidenced by decreased score on PHQ-9.    I will know I've met my goal when I feel less down and emotional.      Objective #A (Patient Action)    Patient will Decrease frequency and intensity of feeling down, depressed, hopeless.  Status: New - Date: 4/23/18     Intervention(s)  [facility] will assign homework to assist with behavioral activation/activity scheduling.    Objective #B  Patient will Identify negative self-talk and behaviors: challenge core beliefs, myths, and actions.  Status: New - Date: 4/23/18     Intervention(s)  [facility] will teach about automatic negative thoughts, irrational core beliefs, and cognitive restructuring techniques.        Goal 2: Patient will reduce feelings of anxiety as evidenced by decreased score on " GAD7.    I will know I've met my goal when I feel less anxious and worried.      Objective #A (Patient Action)    Status: New - Date: 4/23/18     Patient will identify three distraction and diversion activities and use those activities to decrease level of anxiety  .    Intervention(s)  [facility] will assign homework to practice distress tolerance and mindfulness techniques between session.    Objective #B  Patient will use cognitive strategies identified in therapy to challenge anxious thoughts.    Status: New - Date: 4/23/18     Intervention(s)  [facility] will teach CBT techniques related to cognitive restructuring.    Objective #C  Patient will use relaxation strategies 1-2 times per day to reduce the physical symptoms of anxiety.  Status: New - Date: 4/23/18     Intervention(s)  [facility] will role-play relaxation techniques.    Patient has reviewed and agreed to the above plan.      KVNG Webb  April 23, 2018

## 2018-11-22 ASSESSMENT — ANXIETY QUESTIONNAIRES: GAD7 TOTAL SCORE: 12

## 2018-11-29 ENCOUNTER — PRENATAL OFFICE VISIT (OUTPATIENT)
Dept: OBGYN | Facility: CLINIC | Age: 33
End: 2018-11-29
Payer: COMMERCIAL

## 2018-11-29 ENCOUNTER — OFFICE VISIT (OUTPATIENT)
Dept: PSYCHOLOGY | Facility: CLINIC | Age: 33
End: 2018-11-29
Payer: COMMERCIAL

## 2018-11-29 VITALS
OXYGEN SATURATION: 97 % | DIASTOLIC BLOOD PRESSURE: 64 MMHG | WEIGHT: 176 LBS | HEART RATE: 79 BPM | SYSTOLIC BLOOD PRESSURE: 105 MMHG | BODY MASS INDEX: 28.84 KG/M2

## 2018-11-29 DIAGNOSIS — F33.1 MODERATE EPISODE OF RECURRENT MAJOR DEPRESSIVE DISORDER (H): Primary | ICD-10-CM

## 2018-11-29 DIAGNOSIS — F41.1 GENERALIZED ANXIETY DISORDER: ICD-10-CM

## 2018-11-29 DIAGNOSIS — Z86.32 HISTORY OF GESTATIONAL DIABETES MELLITUS, NOT CURRENTLY PREGNANT: ICD-10-CM

## 2018-11-29 DIAGNOSIS — Z30.09 GENERAL COUNSELING FOR PRESCRIPTION OF ORAL CONTRACEPTIVES: ICD-10-CM

## 2018-11-29 PROCEDURE — 99207 ZZC POST PARTUM EXAM: CPT | Performed by: OBSTETRICS & GYNECOLOGY

## 2018-11-29 PROCEDURE — 90834 PSYTX W PT 45 MINUTES: CPT | Performed by: SOCIAL WORKER

## 2018-11-29 RX ORDER — SERTRALINE HYDROCHLORIDE 100 MG/1
TABLET, FILM COATED ORAL DAILY
Refills: 0 | COMMUNITY
Start: 2018-11-16 | End: 2024-08-02

## 2018-11-29 RX ORDER — BUSPIRONE HYDROCHLORIDE 15 MG/1
15 TABLET ORAL 2 TIMES DAILY
COMMUNITY
End: 2024-08-02

## 2018-11-29 RX ORDER — ACETAMINOPHEN AND CODEINE PHOSPHATE 120; 12 MG/5ML; MG/5ML
0.35 SOLUTION ORAL DAILY
Qty: 84 TABLET | Refills: 3 | Status: SHIPPED | OUTPATIENT
Start: 2018-11-29 | End: 2024-08-02

## 2018-11-29 ASSESSMENT — ANXIETY QUESTIONNAIRES
5. BEING SO RESTLESS THAT IT IS HARD TO SIT STILL: SEVERAL DAYS
2. NOT BEING ABLE TO STOP OR CONTROL WORRYING: MORE THAN HALF THE DAYS
1. FEELING NERVOUS, ANXIOUS, OR ON EDGE: MORE THAN HALF THE DAYS
3. WORRYING TOO MUCH ABOUT DIFFERENT THINGS: MORE THAN HALF THE DAYS
6. BECOMING EASILY ANNOYED OR IRRITABLE: SEVERAL DAYS
IF YOU CHECKED OFF ANY PROBLEMS ON THIS QUESTIONNAIRE, HOW DIFFICULT HAVE THESE PROBLEMS MADE IT FOR YOU TO DO YOUR WORK, TAKE CARE OF THINGS AT HOME, OR GET ALONG WITH OTHER PEOPLE: SOMEWHAT DIFFICULT
GAD7 TOTAL SCORE: 11
7. FEELING AFRAID AS IF SOMETHING AWFUL MIGHT HAPPEN: SEVERAL DAYS

## 2018-11-29 ASSESSMENT — PATIENT HEALTH QUESTIONNAIRE - PHQ9
5. POOR APPETITE OR OVEREATING: MORE THAN HALF THE DAYS
SUM OF ALL RESPONSES TO PHQ QUESTIONS 1-9: 11

## 2018-11-29 NOTE — MR AVS SNAPSHOT
After Visit Summary   11/29/2018    Myrna Brothers    MRN: [id number]           Patient Information     Date Of Birth          1985        Visit Information        Provider Department      11/29/2018 9:00 AM Stephanie Calabrese LICSW [facility]        Today's Diagnoses     Moderate episode of recurrent major depressive disorder (H)    -  1    Generalized anxiety disorder           Follow-ups after your visit        Your next 10 appointments already scheduled     Nov 29, 2018  3:15 PM CST   Post Partum with Ashley Bernardo DO   [facility] ([facility])    [address] [place] [address] [id number] [phone]            Dec 03, 2018  3:00 PM CST   [facility]    [address] [place] [address] [id number] [phone]            Dec 06, 2018  9:00 AM CST   [facility]    [address] [place] [address] [id number] [phone]              Who to contact     If you have questions or need follow up information about today's clinic visit or your schedule please contact [facility] directly at [phone].  Normal or non-critical lab and imaging results will be communicated to you by MyChart, letter or phone within 4 business days after the clinic has received the results. If you do not hear from us within 7 days, please contact the clinic through [employer] or phone. If you have a critical or abnormal lab result, we will notify you by phone as soon as possible.  Submit refill requests through [email or web address] or call your pharmacy and they will forward the refill request to us. Please allow 3 business days for your refill to be completed.          Additional Information About Your Visit        MyChart Information     [employer] gives  you secure access to your electronic health record. If you see a primary care provider, you can also send messages to your care team and make appointments. If you have questions, please call your primary care clinic.  If you do not have a primary care provider, please call [phone] and they will assist you.      [employer] is an electronic gateway that provides easy, online access to your medical records. With [employer], you can request a clinic appointment, read your test results, renew a prescription or communicate with your care team.     To access your existing account, please contact your [facility] or call [phone] for assistance.        Care EveryWhere ID     This is your Care EveryWhere ID. This could be used by other organizations to access your [facility] medical records  [id number]        Your Vitals Were     Last Period                   02/03/2018 (Exact Date)            Blood Pressure from Last 3 Encounters:   11/15/18 115/60   11/12/18 98/61   10/12/18 120/79    Weight from Last 3 Encounters:   11/15/18 80.6 kg (177 lb 9.6 oz)   11/12/18 79.4 kg (175 lb)   10/12/18 89.8 kg (198 lb)              Today, you had the following     No orders found for display       Primary Care Provider Office Phone # Fax #    Mery DONY Dugan [facility] [phone]       [address]  [place] [address]        Equal Access to Services     [facility]: Hadii aad ku hadasho Soomaali, waaxda luqadaha, qaybta kaalmada adeegyada, raquel layton hayterrance barreto . So [facility] [phone].    ATENCIÓN: Si habla español, tiene a flowers disposición servicios gratuitos de asistencia lingüística. Cory al [phone].    We comply with applicable federal civil rights laws and [place] laws. We do not discriminate on the basis of race, color, national origin, age, disability, sex, sexual orientation, or gender identity.            Thank you!     Thank you for  [facility]  for your care. Our goal is always to provide you with excellent care. Hearing back from our patients is one way we can continue to improve our services. Please take a few minutes to complete the written survey that you may receive in the mail after your visit with us. Thank you!             Your Updated Medication List - Protect others around you: Learn how to safely use, store and throw away your medicines at www.disposemymeds.org.          This list is accurate as of 11/29/18 10:26 AM.  Always use your most recent med list.                   Brand Name Dispense Instructions for use Diagnosis    albuterol 108 (90 Base) MCG/ACT inhaler    PROAIR HFA/PROVENTIL HFA/VENTOLIN HFA     Inhale 2 puffs into the lungs every 6 hours        APNO Crea ointment     30 g    Apply 60 g topically every hour as needed for skin care    Mastitis, left, acute       busPIRone 5 MG tablet    BUSPAR    180 tablet    Take 1 tablet (5 mg) by mouth 2 times daily    Generalized anxiety disorder       fluticasone 220 MCG/ACT inhaler    FLOVENT HFA    1 Inhaler    Inhale 2 puffs into the lungs 2 times daily for 7 days    Upper respiratory tract infection, unspecified type, Reactive airway disease without complication, unspecified asthma severity, unspecified whether persistent       sertraline 50 MG tablet    ZOLOFT    90 tablet    TAKE 1 TABLET BY MOUTH EVERY DAY    Generalized anxiety disorder, Major depressive disorder with single episode, in full remission (H)       * TYLENOL PO      Take 650 mg by mouth every 6 hours as needed        * acetaminophen 500 MG tablet    TYLENOL     Take 500-1,000 mg by mouth every 6 hours as needed for mild pain        * Notice:  This list has 2 medication(s) that are the same as other medications prescribed for you. Read the directions carefully, and ask your doctor or other care provider to review them with you.

## 2018-11-29 NOTE — PROGRESS NOTES
"[facility]  Integrated Behavioral Health  November 29, 2018    Behavioral Health Clinician Progress Note    Patient Name: Myrna Brothers           Service Type:  Individual      Service Location:   Face to Face in Clinic     Session Start Time: 9: 10 am  Session End Time:  9: 55 am      Session Length: 38 - 52      Attendees: Patient    Visit Activities (Refresh list every visit): [facility] Only    Diagnostic Assessment Date: 4/12/18  Treatment Plan Review Date: 11/29/18  See Flowsheets for today's PHQ-9 and OREN-7 results  Previous PHQ-9:   PHQ-9 SCORE 4/16/2018 7/13/2018 11/21/2018   PHQ-9 Total Score 11 1 14     Previous OREN-7:   OREN-7 SCORE 4/16/2018 7/13/2018 11/21/2018   Total Score 18 1 12       LUKASZ LEVEL:  No flowsheet data found.    DATA  Extended Session (60+ minutes): No  Interactive Complexity: No  Crisis: No  [facility] Patient: No    Treatment Objective(s) Addressed in This Session:  Target Behavior(s): disease management/lifestyle changes , mental health management    Depressed Mood: Decrease frequency and intensity of feeling down, depressed, hopeless  Identify negative self-talk and behaviors: challenge core beliefs, myths, and actions  Anxiety: will experience a reduction in anxiety, will develop more effective coping skills to manage anxiety symptoms, will develop healthy cognitive patterns and beliefs and will increase ability to function adaptively    Current Stressors / Issues:  Patient reported overall she has a \"good week\". She discussed feeling sad and down two days ago, and shared that she woke up feeling down. Patient stated that she attempted to use her cognitive and behavioral coping skills to help improve the moment. Patient reported that when she woke up the next day, she was feeling better.  Patient stated that it is helpful to remind herself of her past experiences, such as knowing that her mood can improve, and one difficult day does not indicate the outcome of " "future days.  During session, patient and [facility] discovered how her baby's fussiness is a trigger for her anxiety. She stated that when he begins to cry, she feels pressure and a need to resolve the crying immediately. Patient stated that the longer he cries, the more overwhelming she feels when she cannot identify the source of his cry.  She recognized how she is using \"should statements\", and is playing rigid and absolute rules about herself in these moments. Patient stated that she feels overwhelmed and frustrated when her expectations for herself cannot be met. Patient was receptive to exploring cognitive restructuring techniques to use in these moments, and strategies to approach situations when the baby begins to cry (initiate deep breaths and healthy cognitions).   Patient reported thoughts that her baby would be better off without her two days ago, but denied having plan or intent to act on her thoughts. She shared that in the moment she believes the thought to be true, but afterwards, recognizes that her baby needs her, that he has a special with her, and that she wants to be around to watch him grow.  She stated that the thoughts were intermittent throughout the day.     Progress on Treatment Objective(s) / Homework:  Satisfactory progress - ACTION (Actively working towards change); Intervened by reinforcing change plan / affirming steps taken    Motivational Interviewing    MI Intervention: Expressed Empathy/Understanding, Supported Autonomy, Collaboration, Evocation, Permission to raise concern or advise, Open-ended questions, Change talk (evoked) and Reframe     Change Talk Expressed by the Patient: Ability to change Reasons to change Need to change    Provider Response to Change Talk: E - Evoked more info from patient about behavior change, A - Affirmed patient's thoughts, decisions, or attempts at behavior change, R - Reflected patient's change talk and S - Summarized patient's change talk " statements    Also provided psychoeducation about behavioral health condition, symptoms, and treatment options    Care Plan review completed: Yes    Medication Review:  No changes to psychiatric medications. Patient is currently prescribed 100 mg of Zoloft and 15 mg Buspar 2 times per day.    Medication Compliance:  Yes    Changes in Health Issues:   None reported    Chemical Use Review:   Substance Use: Chemical use reviewed, no active concerns identified      Tobacco Use: No current tobacco use.      Assessment: Current Emotional / Mental Status (status of significant symptoms):  Risk status (Self / Other harm or suicidal ideation)  Patient has had a history of suicidal ideation: in correlation with stress, sent text on 4/13 about wanting to die, but no active SI. Per patient and , patient has had history of passive SI for more than 10 years and denies a history of suicide attempts, self-injurious behavior, homicidal ideation, homicidal behavior and and other safety concerns  Patient denies current fears or concerns for personal safety.  Patient reports the following current or recent suicidal ideation or behaviors: Patient reported two days ago that she felt that baby was better off without her. She stated that once emotions decreased, she realized that the baby does need her, that she is a good mother.  Patient denied having plan or intent when she had thoughts.  Patient denies current or recent homicidal ideation or behaviors.  Patient denies current or recent self injurious behavior or ideation.  Patient denies other safety concerns.  A safety and risk management plan has not been developed at this time, however patient was encouraged to call [facility] should there be a change in any of these risk factors.    Appearance:   Appropriate   Eye Contact:   Good   Psychomotor Behavior: Normal   Attitude:   Cooperative   Orientation:   All  Speech   Rate / Production: Normal    Volume:  Normal    Mood:    Normal  Affect:    Appropriate   Thought Content:  Clear   Thought Form:  Coherent  Logical   Insight:    Good     Diagnoses:  1. Moderate episode of recurrent major depressive disorder (H)    2. Generalized anxiety disorder        Collateral Reports Completed:  Not Applicable    Plan: (Homework, other):  Patient was given information about behavioral services and encouraged to schedule a follow up appointment with the clinic [facility] in 1 week.  She was also given Cognitive Behavioral Therapy skills to practice when experiencing anxiety and depression. CD Recommendations: No indications of CD issues.  Stephanie Calabrese, [facility], [facility]      ______________________________________________________________________    Integrated Primary Care Behavioral Health Treatment Plan    Patient's Name: Myrna Brothers  YOB: 1985    Date: 11/29/18    DSM-V Diagnoses: 296.32 (F33.1) Major Depressive Disorder, Recurrent Episode, Moderate _ or 300.02 (F41.1) Generalized Anxiety Disorder  Psychosocial / Contextual Factors: recently postpartum  WHODAS: TBD    Ref[facility] / Collaboration:  [facility]    Anticipated number of session or this episode of care: 6-8      MeasurableTreatment Goal(s) related to diagnosis / functional impairment(s)  Goal 1: Patient will reduce feelings of depression as evidenced by decreased score on PHQ-9.    I will know I've met my goal when I feel less down and emotional.      Objective #A (Patient Action)    Patient will Decrease frequency and intensity of feeling down, depressed, hopeless.  Status: Continued - Date(s): 11/29/18    Intervention(s)  [facility] will assign homework to assist with behavioral activation/activity scheduling.    Objective #B  Patient will Identify negative self-talk and behaviors: challenge core beliefs, myths, and actions.  Status: Continued - Date(s):11/29/18     Intervention(s)  [facility] will teach about automatic negative thoughts,  irrational core beliefs, and cognitive restructuring techniques.        Goal 2: Patient will reduce feelings of anxiety as evidenced by decreased score on GAD7.    I will know I've met my goal when I feel less anxious and worried.      Objective #A (Patient Action)    Status: Continued - Date(s):11/29/18     Patient will identify three distraction and diversion activities and use those activities to decrease level of anxiety  .    Intervention(s)  [facility] will assign homework to practice distress tolerance and mindfulness techniques between session.    Objective #B  Patient will use cognitive strategies identified in therapy to challenge anxious thoughts.    Status: Continued - Date(s):11/29/18     Intervention(s)  [facility] will teach CBT techniques related to cognitive restructuring.    Objective #C  Patient will use relaxation strategies 1-2 times per day to reduce the physical symptoms of anxiety.  Status: Continued - Date(s):11/29/18     Intervention(s)  [facility] will role-play relaxation techniques.    Patient has reviewed and agreed to the above plan.      KVNG Webb  April 23, 2018

## 2018-11-29 NOTE — PATIENT INSTRUCTIONS
If you have any questions regarding your visit, Please contact your care team.    [facility]: [phone]      [facility] CLINIC HOURS TELEPHONE NUMBER   Ashley Bernardo DO.    BABATUNDE Taylor -    DAO Vieira       Monday, Wednesday, Thursday and Friday, [place]  8:30a.m-5:00 p.m   [facility]  [address] [place] [address]  [phone] ask for [facility]    Imaging [phone]       Urgent Care locations:    [facility] Saturday and Sunday   9 am - 5 pm    Monday-Friday   12 pm - 8 pm  Saturday and Sunday   9 am - 5 pm   [phone]     [facility] Labor and Delivery:  [phone]    If you need a medication refill, please contact your pharmacy. Please allow 3 business days for your refill to be completed.  As always, Thank you for trusting us with your healthcare needs!

## 2018-11-29 NOTE — MR AVS SNAPSHOT
After Visit Summary   11/29/2018    Myrna Brothers    MRN: [id number]           Patient Information     Date Of Birth          1985        Visit Information        Provider Department      11/29/2018 3:15 PM Ashley Bernardo DO [facility]        Today's Diagnoses     Routine postpartum follow-up    -  1    History of gestational diabetes mellitus, not currently pregnant        General counseling for prescription of oral contraceptives          Care Instructions                                                         If you have any questions regarding your visit, Please contact your care team.    [facility]: [phone]      Women s Health CLINIC HOURS TELEPHONE NUMBER   Ashley DO Az.    BABATUNDE Taylor -    DAO Vieira       Monday, Wednesday, Thursday and Friday, [place]  8:30a.m-5:00 p.m   [facility]  [address]  [place], [place] [address] [phone] ask for [facility]    Imaging [phone]       Urgent Care locations:    [facility] Saturday and Sunday   9 am - 5 pm    Monday-Friday   12 pm - 8 pm  Saturday and Sunday   9 am - 5 pm   [phone]     [facility] Labor and Delivery:  [phone]    If you need a medication refill, please contact your pharmacy. Please allow 3 business days for your refill to be completed.  As always, Thank you for trusting us with your healthcare needs!                Follow-ups after your visit        Your next 10 appointments already scheduled     Dec 03, 2018  3:00 PM CST   [facility]    [address] [place] [address] [facility] [id number] [phone]            Dec 04, 2018  7:50 AM CST   LAB with LAB FIRST FLOOR [facility] ([facility])    [address] [place] [address]  [facility] [id number] [phone]           Please do not eat 10-12 hours before your appointment if you are coming in fasting for labs on lipids, cholesterol, or glucose (sugar). This does not apply to pregnant women. Water, hot tea and black coffee (with nothing added) are okay. Do not drink other fluids, diet soda or chew gum.            Dec 06, 2018  9:00 AM CST   Return Visit with KVNG Bennett   [facility]    [address] [place] [id number] [phone]              Future tests that were ordered for you today     Open Standing Orders        Priority Remaining Interval Expires Ordered    Glucose tolerance gest alt 75 gm 2 hr Routine 1/1 11/29/2019 11/29/2018            Who to contact     If you have questions or need follow up information about today's clinic visit or your schedule please contact [facility] directly at [phone].  Normal or non-critical lab and imaging results will be communicated to you by Cellmaxhart, letter or phone within 4 business days after the clinic has received the results. If you do not hear from us within 7 days, please contact the clinic through SafeNett or phone. If you have a critical or abnormal lab result, we will notify you by phone as soon as possible.  Submit refill requests through EpicForce or call your pharmacy and they will forward the refill request to us. Please allow 3 business days for your refill to be completed.          Additional Information About Your Visit        EpicForce Information     EpicForce gives you secure access to your electronic health record. If you see a primary care provider, you can also send messages to your care team and make appointments. If you have questions, please call your primary care clinic.  If you do not have a primary care provider, please call [phone] and they will assist you.        Care EveryWhere ID     This is your Care  EveryWhere ID. This could be used by other organizations to access your [place] medical records  [id number]        Your Vitals Were     Pulse Last Period Pulse Oximetry Breastfeeding? BMI (Body Mass Index)       79 02/03/2018 (Exact Date) 97% Yes 28.84 kg/m2        Blood Pressure from Last 3 Encounters:   11/29/18 105/64   11/15/18 115/60   11/12/18 98/61    Weight from Last 3 Encounters:   11/29/18 176 lb (79.8 kg)   11/15/18 177 lb 9.6 oz (80.6 kg)   11/12/18 175 lb (79.4 kg)                 Today's Medication Changes          These changes are accurate as of 11/29/18  4:09 PM.  If you have any questions, ask your nurse or doctor.               Start taking these medicines.        Dose/Directions    norethindrone 0.35 MG tablet   Commonly known as:  MICRONOR   Used for:  General counseling for prescription of oral contraceptives   Started by:  Ashley Bernardo DO        Dose:  0.35 mg   Take 1 tablet (0.35 mg) by mouth daily   Quantity:  84 tablet   Refills:  3         These medicines have changed or have updated prescriptions.        Dose/Directions    acetaminophen 500 MG tablet   Commonly known as:  TYLENOL   This may have changed:  Another medication with the same name was removed. Continue taking this medication, and follow the directions you see here.   Changed by:  Ashley Bernardo DO        Dose:  500-1000 mg   Take 500-1,000 mg by mouth as needed for mild pain   Refills:  0       * busPIRone 15 MG tablet   Commonly known as:  BUSPAR   This may have changed:  Another medication with the same name was changed. Make sure you understand how and when to take each.        Dose:  5 mg   Take 5 mg by mouth   Refills:  0       * busPIRone 5 MG tablet   Commonly known as:  BUSPAR   This may have changed:  how much to take   Used for:  Generalized anxiety disorder        Dose:  5 mg   Take 1 tablet (5 mg) by mouth 2 times daily   Quantity:  180 tablet   Refills:  1       sertraline 100 MG tablet    Commonly known as:  ZOLOFT   This may have changed:  Another medication with the same name was removed. Continue taking this medication, and follow the directions you see here.   Changed by:  Ashley Bernardo DO        Refills:  0       * Notice:  This list has 2 medication(s) that are the same as other medications prescribed for you. Read the directions carefully, and ask your doctor or other care provider to review them with you.      Stop taking these medicines if you haven't already. Please contact your care team if you have questions.     fluticasone 220 MCG/ACT inhaler   Commonly known as:  FLOVENT HFA   Stopped by:  Ashley Bernardo DO           IRON PO   Stopped by:  Ashley Bernardo DO                Where to get your medicines      These medications were sent to [facility]pharm[facility] #[id number] - [facility] [address] [facility] AT [address] [address]     Phone:  [phone]     norethindrone 0.35 MG tablet                Primary Care Provider Office Phone # Fax #    Mery Jalyn Bronson, APRN [facility] [phone]       [address]  [place] [address]        Equal Access to Services     MANNY OCONNOR : Hadii tuan ku hadasho Soomaali, waaxda luqadaha, qaybta kaalmada adeegyada, raquel layton hayterrance miller. So [facility] [phone].    ATENCIÓN: Si habla español, tiene a flowers disposición servicios gratuitos de asistencia lingüística. Llame al [phone].    We comply with applicable federal civil rights laws and [place] laws. We do not discriminate on the basis of race, color, national origin, age, disability, sex, sexual orientation, or gender identity.            Thank you!     Thank you for choosing [facility]  for your care. Our goal is always to provide you with excellent care. Hearing back from our patients is one way we can continue to improve our services. Please take a few  minutes to complete the written survey that you may receive in the mail after your visit with us. Thank you!             Your Updated Medication List - Protect others around you: Learn how to safely use, store and throw away your medicines at www.disposemymeds.org.          This list is accurate as of 11/29/18  4:09 PM.  Always use your most recent med list.                   Brand Name Dispense Instructions for use Diagnosis    acetaminophen 500 MG tablet    TYLENOL     Take 500-1,000 mg by mouth as needed for mild pain        albuterol 108 (90 Base) MCG/ACT inhaler    PROAIR HFA/PROVENTIL HFA/VENTOLIN HFA     Inhale 2 puffs into the lungs every 6 hours        APNO Crea ointment     30 g    Apply 60 g topically every hour as needed for skin care    Mastitis, left, acute       * busPIRone 15 MG tablet    BUSPAR     Take 5 mg by mouth        * busPIRone 5 MG tablet    BUSPAR    180 tablet    Take 1 tablet (5 mg) by mouth 2 times daily    Generalized anxiety disorder       norethindrone 0.35 MG tablet    MICRONOR    84 tablet    Take 1 tablet (0.35 mg) by mouth daily    General counseling for prescription of oral contraceptives       sertraline 100 MG tablet    ZOLOFT          * Notice:  This list has 2 medication(s) that are the same as other medications prescribed for you. Read the directions carefully, and ask your doctor or other care provider to review them with you.

## 2018-11-29 NOTE — PROGRESS NOTES
Myrna is here for a postpartum checkup s/p  on 10/15/18.  She denies any problems after delivery and would like to discuss her contraceptive options while breastfeeding.  She lost 22 lbs since delivery and will need to return for the 2-hour GTT due to her history of gestational diabetes.  Her father is a type 2 diabetic.    She had a   Obstetric History       T0      L1     SAB0   TAB0   Ectopic0   Multiple0   Live Births1       # Outcome Date GA Lbr Carlos/2nd Weight Sex Delivery Anes PTL Lv   1  10/15/18 35w5d  5 lb 11 oz (2.58 kg) M  EPI Y LESLIE      Name: Francisco Javier      Complications:  premature rupture of membranes (PPROM) with onset of labor within 24 hours of rupture in third trimester, antepartum      Apgar1:  8                Apgar5: 9         Since delivery, she has been breast feeding.  She has had a normal menses.  She has not had intercourse.  Patient screened for postpartum depression and complaints are positive but she is taking antidepressants and is being followed by Mental Health already.  She denies any thoughts of self-harm or harm to her baby. Screening has also been completed for intimate partner violence. She would like to discuss her contraceptive options.      PE: /64  Pulse 79  Wt 176 lb (79.8 kg)  LMP 2018 (Exact Date)  SpO2 97%  Breastfeeding? Yes  BMI 28.84 kg/m2  Body mass index is 28.84 kg/(m^2).    General Appearance:  healthy, alert, active, no distress  Cardiovascular:  Regular rate and Rhythm without murmur  Lungs:  Clear, without wheeze, rale or rhonchi  Abdomen: Benign, Soft, flat, non-tender, No masses, organomegaly, No inguinal nodes and Bowel sounds normoactive.   Pelvic:       - Ext: Vulva and perineum are normal without lesion, mass or discharge        - Bladder: no tenderness, no masses       - Vagina: Normal mucosa, no discharge        - Cervix: normal and multiparous       - Uterus:Normal shape, position and  consistencyfirm, nontender, nongravid uterus without CMT       - Adnexa: Normal without masses or tenderness       - Rectal: deferred    A/P  Routine Postpartum Exam, Contraceptive Consult, Hx of Gestational Diabetes     - I discussed the new pap recommendations regarding screening.  Explained the rationale for increased intervals between paps.  Questions asked and answered.  She does agree to this regiment.   - Pap was not performed since due in 3/2021   - Contraception: she decided on Micronor while breastfeeding and instructions were reviewed   - She is to return for her 2-hour GTT because of her hx of gestational diabetes (on insulin)    Ashley Bernardo DO  FACOG, FACS

## 2018-11-30 RX ORDER — BUSPIRONE HYDROCHLORIDE 5 MG/1
5 TABLET ORAL 2 TIMES DAILY
Qty: 180 TABLET | Refills: 1 | Status: CANCELLED | OUTPATIENT
Start: 2018-11-30

## 2018-11-30 ASSESSMENT — ANXIETY QUESTIONNAIRES: GAD7 TOTAL SCORE: 11

## 2018-11-30 NOTE — TELEPHONE ENCOUNTER
Routing refill request to provider for review/approval because:  Labs out of range:  GAD7 score is > 5    busPIRone (BUSPAR) 5 MG tablet 180 tablet 1 7/13/2018  No   Sig - Route: Take 1 tablet (5 mg) by mouth 2 times daily - Oral     Last OV with KRISTIN Bronson CNP: 11/15/2018    Next 5 appointments (look out 90 days)     Dec 03, 2018  3:00 PM CST   Return Visit with KVNG Bennett   [facility])    [address] [place] [id number]   [phone]            Dec 06, 2018  9:00 AM CST   Return Visit with KVNG Bennett   [facility])    [address] [place] [id number]   [phone]                OREN-7 SCORE 7/13/2018 11/21/2018 11/29/2018   Total Score 1 12 11       Atypical Antidepressants Protocol Sgmpuv60/29 11:36 AM   No positive pregnancy test in past 12 mos    Recent (12 mo) or future (30 days) visit within the authorizing provider's specialty    Patient is age 18 or older    No active pregnancy on record     Esther Rowland RN

## 2018-12-03 ENCOUNTER — OFFICE VISIT (OUTPATIENT)
Dept: PSYCHOLOGY | Facility: CLINIC | Age: 33
End: 2018-12-03
Payer: COMMERCIAL

## 2018-12-03 DIAGNOSIS — F41.1 GENERALIZED ANXIETY DISORDER: ICD-10-CM

## 2018-12-03 DIAGNOSIS — F33.1 MODERATE EPISODE OF RECURRENT MAJOR DEPRESSIVE DISORDER (H): Primary | ICD-10-CM

## 2018-12-03 PROCEDURE — 90834 PSYTX W PT 45 MINUTES: CPT | Performed by: SOCIAL WORKER

## 2018-12-03 NOTE — PROGRESS NOTES
[facility]  Integrated Behavioral Health  December 3, 2018    Behavioral Health Clinician Progress Note    Patient Name: Myrna Brothers           Service Type:  Individual      Service Location:   Face to Face in Clinic     Session Start Time:  3: 05 pm Session End Time:  3: 50 pm      Session Length: 38 - 52      Attendees: Patient and Spouse / Significant Other    Visit Activities (Refresh list every visit): [facility] Only    Diagnostic Assessment Date: 18  Treatment Plan Review Date: 18  See Flowsheets for today's PHQ-9 and OREN-7 results  Previous PHQ-9:   PHQ-9 SCORE 2018   PHQ-9 Total Score 1 14 11     Previous OREN-7:   OREN-7 SCORE 2018   Total Score 1 12 11       LUKASZ LEVEL:  No flowsheet data found.    DATA  Extended Session (60+ minutes): No  Interactive Complexity: No  Crisis: No  [facility] Patient: No    Treatment Objective(s) Addressed in This Session:  Target Behavior(s): disease management/lifestyle changes , mental health management    Depressed Mood: Decrease frequency and intensity of feeling down, depressed, hopeless  Identify negative self-talk and behaviors: challenge core beliefs, myths, and actions  Anxiety: will experience a reduction in anxiety, will develop more effective coping skills to manage anxiety symptoms, will develop healthy cognitive patterns and beliefs and will increase ability to function adaptively    Current Stressors / Issues:  Patient and  arrived for office visit.  expressed interest in learning strategies to help support patient with  depression and anxiety.  Patient discussed how she tends to personalize his comments. For example, she feels as though he is calling her a bad mom if she cannot figure out how to soothe their baby.  Patient's  requesting recommendations on how to improve communication to try to reduce personalization. [facility], patient, and   "continued to explore how to help patient reduce unhealthy beliefs including cognitive restructuring techniques such as  helping point out evidence that supports the belief that patient is a good mother and that the baby is responding to her.  Additional strategies discussed to help patient have the most effective one hour of personal time. Patient asked if it is \"normal\" for parents to want a break.   assisted with normalization of feelings.     Progress on Treatment Objective(s) / Homework:  Satisfactory progress - ACTION (Actively working towards change); Intervened by reinforcing change plan / affirming steps taken    Motivational Interviewing    MI Intervention: Expressed Empathy/Understanding, Supported Autonomy, Collaboration, Evocation, Permission to raise concern or advise, Open-ended questions, Change talk (evoked) and Reframe     Change Talk Expressed by the Patient: Ability to change Reasons to change Need to change    Provider Response to Change Talk: E - Evoked more info from patient about behavior change, A - Affirmed patient's thoughts, decisions, or attempts at behavior change, R - Reflected patient's change talk and S - Summarized patient's change talk statements    Also provided psychoeducation about behavioral health condition, symptoms, and treatment options    Care Plan review completed: Yes    Medication Review:  No changes to psychiatric medications. Patient is currently prescribed 100 mg of Zoloft and 15 mg Buspar 2 times per day.    Medication Compliance:  Yes    Changes in Health Issues:   None reported    Chemical Use Review:   Substance Use: Chemical use reviewed, no active concerns identified      Tobacco Use: No current tobacco use.      Assessment: Current Emotional / Mental Status (status of significant symptoms):  Risk status (Self / Other harm or suicidal ideation)  Patient has had a history of suicidal ideation: in correlation with stress, sent text on 4/13 about " wanting to die, but no active SI. Per patient and , patient has had history of passive SI for more than 10 years and denies a history of suicide attempts, self-injurious behavior, homicidal ideation, homicidal behavior and and other safety concerns  Patient denies current fears or concerns for personal safety.  Patient reports the following current or recent suicidal ideation or behaviors: Patient reported two days ago that she felt that baby was better off without her. She stated that once emotions decreased, she realized that the baby does need her, that she is a good mother.  Patient denied having plan or intent when she had thoughts.  Patient denies current or recent homicidal ideation or behaviors.  Patient denies current or recent self injurious behavior or ideation.  Patient denies other safety concerns.  A safety and risk management plan has not been developed at this time, however patient was encouraged to call [place] [id number] should there be a change in any of these risk factors.    Appearance:   Appropriate   Eye Contact:   Good   Psychomotor Behavior: Normal   Attitude:   Cooperative   Orientation:   All  Speech   Rate / Production: Normal    Volume:  Normal   Mood:    Normal  Affect:    Appropriate   Thought Content:  Clear   Thought Form:  Coherent  Logical   Insight:    Good     Diagnoses:  1. Moderate episode of recurrent major depressive disorder (H)    2. Generalized anxiety disorder        Collateral Reports Completed:  Not Applicable    Plan: (Homework, other):  Patient was given information about behavioral services and encouraged to schedule a follow up appointment with the clinic [facility] in 1 week.  She was also given Cognitive Behavioral Therapy skills to practice when experiencing anxiety and depression. CD Recommendations: No indications of CD issues.  Stephanie Calabrese, KVNG, [facility]      ______________________________________________________________________    Integrated Primary Care  Behavioral Health Treatment Plan    Patient's Name: Myrna Brothers  YOB: 1985    Date: 11/29/18    DSM-V Diagnoses: 296.32 (F33.1) Major Depressive Disorder, Recurrent Episode, Moderate _ or 300.02 (F41.1) Generalized Anxiety Disorder  Psychosocial / Contextual Factors: recently postpartum  WHODAS: TBD    Referral / Collaboration:  Patient had appointment with [facility] 11/16/18.    Anticipated number of session or this episode of care: 6-8      MeasurableTreatment Goal(s) related to diagnosis / functional impairment(s)  Goal 1: Patient will reduce feelings of depression as evidenced by decreased score on PHQ-9.    I will know I've met my goal when I feel less down and emotional.      Objective #A (Patient Action)    Patient will Decrease frequency and intensity of feeling down, depressed, hopeless.  Status: Continued - Date(s): 11/29/18    Intervention(s)  [facility] will assign homework to assist with behavioral activation/activity scheduling.    Objective #B  Patient will Identify negative self-talk and behaviors: challenge core beliefs, myths, and actions.  Status: Continued - Date(s):11/29/18     Intervention(s)  [facility] will teach about automatic negative thoughts, irrational core beliefs, and cognitive restructuring techniques.        Goal 2: Patient will reduce feelings of anxiety as evidenced by decreased score on GAD7.    I will know I've met my goal when I feel less anxious and worried.      Objective #A (Patient Action)    Status: Continued - Date(s):11/29/18     Patient will identify three distraction and diversion activities and use those activities to decrease level of anxiety  .    Intervention(s)  [facility] will assign homework to practice distress tolerance and mindfulness techniques between session.    Objective #B  Patient will use cognitive strategies identified in therapy to challenge anxious thoughts.    Status: Continued - Date(s):11/29/18     Intervention(s)  [facility] will teach CBT techniques  related to cognitive restructuring.    Objective #C  Patient will use relaxation strategies 1-2 times per day to reduce the physical symptoms of anxiety.  Status: Continued - Date(s):11/29/18     Intervention(s)  [facility] will role-play relaxation techniques.    Patient has reviewed and agreed to the above plan.      KVNG Webb  April 23, 2018

## 2018-12-03 NOTE — TELEPHONE ENCOUNTER
Patient Contact    Attempt # 1    Was call answered?  No.  Left message on home number to return call to clinic regarding refill request received from her pharmacy.    Noted patient is scheduled TODAY with Stephanie Calabrese. Updated appointment notes to clarify at time of visit.  Caryn KEVIN CMA

## 2018-12-03 NOTE — TELEPHONE ENCOUNTER
Patient returned call to clinic. Patient states her psychiatrist is filling her Buspar and request shouldn't have come to PCP. Patient will update her pharmacy.  Caryn KEVIN CMA

## 2018-12-04 DIAGNOSIS — Z86.32 HISTORY OF GESTATIONAL DIABETES MELLITUS, NOT CURRENTLY PREGNANT: ICD-10-CM

## 2018-12-04 LAB
GLUCOSE 1H P 75 G GLC PO SERPL-MCNC: 175 MG/DL (ref 60–180)
GLUCOSE 2H P 75 G GLC PO SERPL-MCNC: 87 MG/DL (ref 60–155)
GLUCOSE BLDC GLUCOMTR-MCNC: 95 MG/DL (ref 70–99)
GLUCOSE P FAST SERPL-MCNC: 103 MG/DL (ref 60–95)

## 2018-12-04 PROCEDURE — 82951 GLUCOSE TOLERANCE TEST (GTT): CPT | Performed by: OBSTETRICS & GYNECOLOGY

## 2018-12-04 PROCEDURE — 36415 COLL VENOUS BLD VENIPUNCTURE: CPT | Performed by: OBSTETRICS & GYNECOLOGY

## 2018-12-06 ENCOUNTER — OFFICE VISIT (OUTPATIENT)
Dept: PSYCHOLOGY | Facility: CLINIC | Age: 33
End: 2018-12-06
Payer: COMMERCIAL

## 2018-12-06 DIAGNOSIS — F41.1 GENERALIZED ANXIETY DISORDER: ICD-10-CM

## 2018-12-06 DIAGNOSIS — F33.1 MODERATE EPISODE OF RECURRENT MAJOR DEPRESSIVE DISORDER (H): Primary | ICD-10-CM

## 2018-12-06 PROCEDURE — 90832 PSYTX W PT 30 MINUTES: CPT | Performed by: SOCIAL WORKER

## 2018-12-06 ASSESSMENT — ANXIETY QUESTIONNAIRES
GAD7 TOTAL SCORE: 9
1. FEELING NERVOUS, ANXIOUS, OR ON EDGE: MORE THAN HALF THE DAYS
5. BEING SO RESTLESS THAT IT IS HARD TO SIT STILL: SEVERAL DAYS
7. FEELING AFRAID AS IF SOMETHING AWFUL MIGHT HAPPEN: SEVERAL DAYS
2. NOT BEING ABLE TO STOP OR CONTROL WORRYING: SEVERAL DAYS
3. WORRYING TOO MUCH ABOUT DIFFERENT THINGS: SEVERAL DAYS
IF YOU CHECKED OFF ANY PROBLEMS ON THIS QUESTIONNAIRE, HOW DIFFICULT HAVE THESE PROBLEMS MADE IT FOR YOU TO DO YOUR WORK, TAKE CARE OF THINGS AT HOME, OR GET ALONG WITH OTHER PEOPLE: SOMEWHAT DIFFICULT
6. BECOMING EASILY ANNOYED OR IRRITABLE: SEVERAL DAYS

## 2018-12-06 ASSESSMENT — PATIENT HEALTH QUESTIONNAIRE - PHQ9
5. POOR APPETITE OR OVEREATING: MORE THAN HALF THE DAYS
SUM OF ALL RESPONSES TO PHQ QUESTIONS 1-9: 10

## 2018-12-06 NOTE — PROGRESS NOTES
[facility]  Integrated Behavioral Health  December 6, 2018    Behavioral Health Clinician Progress Note    Patient Name: Myrna Brothers           Service Type:  Individual      Service Location:   Face to Face in Clinic     Session Start Time:  9: 15 am Session End Time:  9: 52 pm      Session Length: 16 - 37      Attendees: Patient    Visit Activities (Refresh list every visit): [facility] Only    Diagnostic Assessment Date: 4/12/18  Treatment Plan Review Date: 11/29/18  See Flowsheets for today's PHQ-9 and OREN-7 results  Previous PHQ-9:   PHQ-9 SCORE 11/21/2018 11/29/2018 12/6/2018   PHQ-9 Total Score 14 11 10     Previous OREN-7:   OREN-7 SCORE 11/21/2018 11/29/2018 12/6/2018   Total Score 12 11 9       LUKASZ LEVEL:  No flowsheet data found.    DATA  Extended Session (60+ minutes): No  Interactive Complexity: No  Crisis: No  [facility] Patient: No    Treatment Objective(s) Addressed in This Session:  Target Behavior(s): disease management/lifestyle changes , mental health management    Depressed Mood: Decrease frequency and intensity of feeling down, depressed, hopeless  Identify negative self-talk and behaviors: challenge core beliefs, myths, and actions  Anxiety: will experience a reduction in anxiety, will develop more effective coping skills to manage anxiety symptoms, will develop healthy cognitive patterns and beliefs and will increase ability to function adaptively    Current Stressors / Issues:  Patient reported that overall she feels that depression and anxiety are improving, and agree with gradual improvement in PHQ9 and OREN 7 scores. Patient stated that the previous evening was in particular difficult due to having poor sleep the night before and the baby crying. She stated that her anxiety was anticipating bad outcomes such as the remainder of the evening would be hard and that she would do something wrong. Patient reported in the middle of the night when the baby was crying for more  than hour, she did feel as though she wished she could just drive away since she felt like the baby would be better off without her. Per patient, she shifted her focus on trying to soothe the baby, and found that the thoughts disappeared.  Patient and [facility] reviewed additional negative automatic thoughts from previous evening, and explored ways to ensure that the thoughts are accurate, complete, and not causing anxiety to worsen. Patient created goal of writing down helpful coping statements and restructuring techniques in a binder in order to be able to read and review them in the moments when anxiety is activated. Patient stated that she had and her  have started to implement some of the communication techniques that were discussed, and have felt that they have been helpful.     Progress on Treatment Objective(s) / Homework:  Satisfactory progress - ACTION (Actively working towards change); Intervened by reinforcing change plan / affirming steps taken    Motivational Interviewing    MI Intervention: Expressed Empathy/Understanding, Supported Autonomy, Collaboration, Evocation, Permission to raise concern or advise, Open-ended questions, Change talk (evoked) and Reframe     Change Talk Expressed by the Patient: Ability to change Reasons to change Need to change    Provider Response to Change Talk: E - Evoked more info from patient about behavior change, A - Affirmed patient's thoughts, decisions, or attempts at behavior change, R - Reflected patient's change talk and S - Summarized patient's change talk statements    Also provided psychoeducation about behavioral health condition, symptoms, and treatment options    Care Plan review completed: Yes    Medication Review:  No changes to psychiatric medications. Patient is currently prescribed 100 mg of Zoloft and 15 mg Buspar 2 times per day.    Medication Compliance:  Yes    Changes in Health Issues:   None reported    Chemical Use Review:   Substance Use:  Chemical use reviewed, no active concerns identified      Tobacco Use: No current tobacco use.      Assessment: Current Emotional / Mental Status (status of significant symptoms):  Risk status (Self / Other harm or suicidal ideation)  Patient has had a history of suicidal ideation: in correlation with stress, sent text on 4/13 about wanting to die, but no active SI. Per patient and , patient has had history of passive SI for more than 10 years and denies a history of suicide attempts, self-injurious behavior, homicidal ideation, homicidal behavior and and other safety concerns  Patient denies current fears or concerns for personal safety.  Patient reports the following current or recent suicidal ideation or behaviors: Patient reported that last night she felt that baby was better off without her. She reported that she wanted to get in the car and drive away. Patient denied thoughts of wanting to kill herself.   Patient denied having plan or intent when she had thoughts.  Patient denies current or recent homicidal ideation or behaviors.  Patient denies current or recent self injurious behavior or ideation.  Patient denies other safety concerns.  A safety and risk management plan has not been developed at this time, however patient was encouraged to call [facility] should there be a change in any of these risk factors.    Appearance:   Appropriate   Eye Contact:   Good   Psychomotor Behavior: Normal   Attitude:   Cooperative   Orientation:   All  Speech   Rate / Production: Normal    Volume:  Normal   Mood:    Normal  Affect:    Appropriate   Thought Content:  Clear   Thought Form:  Coherent  Logical   Insight:    Good     Diagnoses:  1. Moderate episode of recurrent major depressive disorder (H)    2. Generalized anxiety disorder        Collateral Reports Completed:  Not Applicable    Plan: (Homework, other):  Patient was given information about behavioral services and encouraged to schedule a follow  up appointment with the clinic [facility] in 1 week.  She was also given Cognitive Behavioral Therapy skills to practice when experiencing anxiety and depression. CD Recommendations: No indications of CD issues.  Stephanie Calabrese, JOSE, [facility]      ______________________________________________________________________    Integrated Primary Care Behavioral Health Treatment Plan    Patient's Name: Myrna Brothers  YOB: 1985    Date: 11/29/18    DSM-V Diagnoses: 296.32 (F33.1) Major Depressive Disorder, Recurrent Episode, Moderate _ or 300.02 (F41.1) Generalized Anxiety Disorder  Psychosocial / Contextual Factors: recently postpartum  WHODAS: TBD    Referral / Collaboration:  Patient had appointment with [facility]iary 11/16/18.    Anticipated number of session or this episode of care: 6-8      MeasurableTreatment Goal(s) related to diagnosis / functional impairment(s)  Goal 1: Patient will reduce feelings of depression as evidenced by decreased score on PHQ-9.    I will know I've met my goal when I feel less down and emotional.      Objective #A (Patient Action)    Patient will Decrease frequency and intensity of feeling down, depressed, hopeless.  Status: Continued - Date(s): 11/29/18    Intervention(s)  [facility] will assign homework to assist with behavioral activation/activity scheduling.    Objective #B  Patient will Identify negative self-talk and behaviors: challenge core beliefs, myths, and actions.  Status: Continued - Date(s):11/29/18     Intervention(s)  [facility] will teach about automatic negative thoughts, irrational core beliefs, and cognitive restructuring techniques.        Goal 2: Patient will reduce feelings of anxiety as evidenced by decreased score on GAD7.    I will know I've met my goal when I feel less anxious and worried.      Objective #A (Patient Action)    Status: Continued - Date(s):11/29/18     Patient will identify three distraction and diversion activities and use those activities to decrease  level of anxiety  .    Intervention(s)  [facility] will assign homework to practice distress tolerance and mindfulness techniques between session.    Objective #B  Patient will use cognitive strategies identified in therapy to challenge anxious thoughts.    Status: Continued - Date(s):11/29/18     Intervention(s)  [facility] will teach CBT techniques related to cognitive restructuring.    Objective #C  Patient will use relaxation strategies 1-2 times per day to reduce the physical symptoms of anxiety.  Status: Continued - Date(s):11/29/18     Intervention(s)  [facility] will role-play relaxation techniques.    Patient has reviewed and agreed to the above plan.      KVNG Webb  April 23, 2018

## 2018-12-06 NOTE — MR AVS SNAPSHOT
After Visit Summary   12/6/2018    Myrna Brothers    MRN: [id number]           Patient Information     Date Of Birth          1985        Visit Information        Provider Department      12/6/2018 9:00 AM [facility]        Today's Diagnoses     Moderate episode of recurrent major depressive disorder (H)    -  1    Generalized anxiety disorder           Follow-ups after your visit        Your next 10 appointments already scheduled     Dec 13, 2018 10:30 AM CST   [facility]    [address] [place] [id number] [phone]            Dec 20, 2018  9:00 AM CST   [facility]    [address] [place] [address] [id number] [phone]            Dec 27, 2018  9:00 AM CST   Return Visit with Stephanie Calabrese [facility])    [address] [place] [id number] [phone]              Who to contact     If you have questions or need follow up information about today's clinic visit or your schedule please contact [facility] [[email or web address]] directly at [phone].  Normal or non-critical lab and imaging results will be communicated to you by MyChart, letter or phone within 4 business days after the clinic has received the results. If you do not hear from us within 7 days, please contact the clinic through MyChart or phone. If you have a critical or abnormal lab result, we will notify you by phone as soon as possible.  Submit refill requests through [employer] or call your pharmacy and they will forward the refill request to us. Please allow 3 business days for your refill to be completed.          Additional Information About Your Visit        MyChart Information     [facility] gives  you secure access to your electronic health record. If you see a primary care provider, you can also send messages to your care team and make appointments. If you have questions, please call your primary care clinic.  If you do not have a primary care provider, please call [phone] and they will assist you.      [employer] [facility] is an electronic gateway that provides easy, online access to your medical records. With [employer], you can request a clinic appointment, read your test results, renew a prescription or communicate with your care team.     To access your existing account, please contact your [facility] or call [phone] for assistance.        Care EveryWhere ID     This is your Care EveryWhere ID. This could be used by other organizations to access your [place] medical records  [id number]        Your Vitals Were     Last Period                   02/03/2018 (Exact Date)            Blood Pressure from Last 3 Encounters:   11/29/18 105/64   11/15/18 115/60   11/12/18 98/61    Weight from Last 3 Encounters:   11/29/18 79.8 kg (176 lb)   11/15/18 80.6 kg (177 lb 9.6 oz)   11/12/18 79.4 kg (175 lb)              Today, you had the following     No orders found for display         Today's Medication Changes          These changes are accurate as of 12/6/18 10:18 AM.  If you have any questions, ask your nurse or doctor.               These medicines have changed or have updated prescriptions.        Dose/Directions    * busPIRone 15 MG tablet   Commonly known as:  BUSPAR   This may have changed:  Another medication with the same name was changed. Make sure you understand how and when to take each.        Dose:  5 mg   Take 5 mg by mouth   Refills:  0       * busPIRone 5 MG tablet   Commonly known as:  BUSPAR   This may have changed:  how much to take   Used for:  Generalized anxiety disorder        Dose:  5 mg   Take 1 tablet (5 mg) by mouth 2 times daily   Quantity:  180 tablet    Refills:  1       * Notice:  This list has 2 medication(s) that are the same as other medications prescribed for you. Read the directions carefully, and ask your doctor or other care provider to review them with you.             Primary Care Provider Office Phone # Fax #    DONY Mendiola [phone] [address]  [place] [address]        Equal Access to Services     MANNY OCONNOR : Hadii aad ku hadasho Soomaali, waaxda luqadaha, qaybta kaalmada adeegyada, waxay idiin hayaan adeeg kharavindsh laernestina . So [facility] [phone].    ATENCIÓN: Si lauren mercado, tiene a flowers disposición servicios gratuitos de asistencia lingüística. Llame al [phone].    We comply with applicable federal civil rights laws and [place] laws. We do not discriminate on the basis of race, color, national origin, age, disability, sex, sexual orientation, or gender identity.            Thank you!     Thank you for choosing [facility]  for your care. Our goal is always to provide you with excellent care. Hearing back from our patients is one way we can continue to improve our services. Please take a few minutes to complete the written survey that you may receive in the mail after your visit with us. Thank you!             Your Updated Medication List - Protect others around you: Learn how to safely use, store and throw away your medicines at www.disposemymeds.org.          This list is accurate as of 12/6/18 10:18 AM.  Always use your most recent med list.                   Brand Name Dispense Instructions for use Diagnosis    acetaminophen 500 MG tablet    TYLENOL     Take 500-1,000 mg by mouth as needed for mild pain        albuterol 108 (90 Base) MCG/ACT inhaler    PROAIR HFA/PROVENTIL HFA/VENTOLIN HFA     Inhale 2 puffs into the lungs every 6 hours        APNO Crea ointment     30 g    Apply 60 g topically every hour as needed for skin care    Mastitis, left, acute       *  busPIRone 15 MG tablet    BUSPAR     Take 5 mg by mouth        * busPIRone 5 MG tablet    BUSPAR    180 tablet    Take 1 tablet (5 mg) by mouth 2 times daily    Generalized anxiety disorder       norethindrone 0.35 MG tablet    MICRONOR    84 tablet    Take 1 tablet (0.35 mg) by mouth daily    General counseling for prescription of oral contraceptives       sertraline 100 MG tablet    ZOLOFT          * Notice:  This list has 2 medication(s) that are the same as other medications prescribed for you. Read the directions carefully, and ask your doctor or other care provider to review them with you.

## 2018-12-07 ASSESSMENT — ANXIETY QUESTIONNAIRES: GAD7 TOTAL SCORE: 9

## 2018-12-13 ENCOUNTER — OFFICE VISIT (OUTPATIENT)
Dept: PSYCHOLOGY | Facility: CLINIC | Age: 33
End: 2018-12-13
Payer: COMMERCIAL

## 2018-12-13 DIAGNOSIS — F33.1 MODERATE EPISODE OF RECURRENT MAJOR DEPRESSIVE DISORDER (H): Primary | ICD-10-CM

## 2018-12-13 DIAGNOSIS — F41.1 GENERALIZED ANXIETY DISORDER: ICD-10-CM

## 2018-12-13 PROCEDURE — 90834 PSYTX W PT 45 MINUTES: CPT | Performed by: SOCIAL WORKER

## 2018-12-13 ASSESSMENT — ANXIETY QUESTIONNAIRES
7. FEELING AFRAID AS IF SOMETHING AWFUL MIGHT HAPPEN: SEVERAL DAYS
1. FEELING NERVOUS, ANXIOUS, OR ON EDGE: SEVERAL DAYS
6. BECOMING EASILY ANNOYED OR IRRITABLE: SEVERAL DAYS
IF YOU CHECKED OFF ANY PROBLEMS ON THIS QUESTIONNAIRE, HOW DIFFICULT HAVE THESE PROBLEMS MADE IT FOR YOU TO DO YOUR WORK, TAKE CARE OF THINGS AT HOME, OR GET ALONG WITH OTHER PEOPLE: SOMEWHAT DIFFICULT
3. WORRYING TOO MUCH ABOUT DIFFERENT THINGS: SEVERAL DAYS
5. BEING SO RESTLESS THAT IT IS HARD TO SIT STILL: SEVERAL DAYS
GAD7 TOTAL SCORE: 7
2. NOT BEING ABLE TO STOP OR CONTROL WORRYING: SEVERAL DAYS

## 2018-12-13 ASSESSMENT — PATIENT HEALTH QUESTIONNAIRE - PHQ9
SUM OF ALL RESPONSES TO PHQ QUESTIONS 1-9: 9
5. POOR APPETITE OR OVEREATING: SEVERAL DAYS

## 2018-12-13 NOTE — PROGRESS NOTES
"[facility]  Integrated Behavioral Health  December 13, 2018    Behavioral Health Clinician Progress Note    Patient Name: Myrna Brothers           Service Type:  Individual      Service Location:   Face to Face in Clinic     Session Start Time:  10: 35 am Session End Time:  11: 22 am      Session Length: 38 - 52      Attendees: Patient    Visit Activities (Refresh list every visit): [facility] Only    Diagnostic Assessment Date: 4/12/18  Treatment Plan Review Date: 11/29/18  See Flowsheets for today's PHQ-9 and OREN-7 results  Previous PHQ-9:   PHQ-9 SCORE 11/29/2018 12/6/2018 12/13/2018   PHQ-9 Total Score 11 10 9     Previous OREN-7:   OREN-7 SCORE 11/29/2018 12/6/2018 12/13/2018   Total Score 11 9 7       LUKASZ LEVEL:  No flowsheet data found.    DATA  Extended Session (60+ minutes): No  Interactive Complexity: No  Crisis: No  [facility] Patient: No    Treatment Objective(s) Addressed in This Session:  Target Behavior(s): disease management/lifestyle changes , mental health management    Depressed Mood: Decrease frequency and intensity of feeling down, depressed, hopeless  Identify negative self-talk and behaviors: challenge core beliefs, myths, and actions  Anxiety: will experience a reduction in anxiety, will develop more effective coping skills to manage anxiety symptoms, will develop healthy cognitive patterns and beliefs and will increase ability to function adaptively    Current Stressors / Issues:  Patient reported that it has been a \"good week\". She stated that she has been feeling more confident with her ability to meet Max's needs, soothe him, and identify what he needs.  Per patient, when she has not been able to identify his need immediately, she is now viewing it as an opportunity to learn more about Max instead of focusing on her inability to immediately soothe. Patient denied any SI in the past week.  Patient began to process thoughts and emotions associated with returning to work. " She discussed some guilt with sending him to day care and worries about how their relationship may be impacted when she no longer is able to spend time together. Patient expressed interest in talking with her friends to learn about their experiences with their relationships with their children. Patient and [facility] began to explore strategies to balance feeling sad and worried with then transitioning to coping statements that may help. Patient also discussed anxiety associated with trying to are for Max in front of family, as she worries that they are judging her if she cannot soothe him immediately. CBT reviewed to identify potential distortions and strategies to help improve unhealthy thinking.    Progress on Treatment Objective(s) / Homework:  Satisfactory progress - ACTION (Actively working towards change); Intervened by reinforcing change plan / affirming steps taken    Motivational Interviewing    MI Intervention: Expressed Empathy/Understanding, Supported Autonomy, Collaboration, Evocation, Permission to raise concern or advise, Open-ended questions, Change talk (evoked) and Reframe     Change Talk Expressed by the Patient: Ability to change Reasons to change Need to change    Provider Response to Change Talk: E - Evoked more info from patient about behavior change, A - Affirmed patient's thoughts, decisions, or attempts at behavior change, R - Reflected patient's change talk and S - Summarized patient's change talk statements    Also provided psychoeducation about behavioral health condition, symptoms, and treatment options    Care Plan review completed: Yes    Medication Review:  No changes to psychiatric medications. Patient is currently prescribed 100 mg of Zoloft and 15 mg Buspar 2 times per day.    Medication Compliance:  Yes    Changes in Health Issues:   None reported    Chemical Use Review:   Substance Use: Chemical use reviewed, no active concerns identified      Tobacco Use: No current tobacco use.       Assessment: Current Emotional / Mental Status (status of significant symptoms):  Risk status (Self / Other harm or suicidal ideation)  Patient has had a history of suicidal ideation: in correlation with stress, sent text on 4/13 about wanting to die, but no active SI. Per patient and , patient has had history of passive SI for more than 10 years and denies a history of suicide attempts, self-injurious behavior, homicidal ideation, homicidal behavior and and other safety concerns  Patient denies current fears or concerns for personal safety.  Patient denies current or recent suicidal ideation or behaviors.  Patient denies current or recent homicidal ideation or behaviors.  Patient denies current or recent self injurious behavior or ideation.  Patient denies other safety concerns.  A safety and risk management plan has not been developed at this time, however patient was encouraged to call [place] [phone] should there be a change in any of these risk factors.    Appearance:   Appropriate   Eye Contact:   Good   Psychomotor Behavior: Normal   Attitude:   Cooperative   Orientation:   All  Speech   Rate / Production: Normal    Volume:  Normal   Mood:    Normal  Affect:    Appropriate   Thought Content:  Clear   Thought Form:  Coherent  Logical   Insight:    Good     Diagnoses:  1. Moderate episode of recurrent major depressive disorder (H)    2. Generalized anxiety disorder        Collateral Reports Completed:  Not Applicable    Plan: (Homework, other):  Patient was given information about behavioral services and encouraged to schedule a follow up appointment with the clinic [facility] in 1 week.  She was also given Cognitive Behavioral Therapy skills to practice when experiencing anxiety and depression. CD Recommendations: No indications of CD issues.  Stephanie Calabrese, KVNG, [facility]      ______________________________________________________________________    Integrated Primary Care Behavioral Health Treatment  Plan    Patient's Name: Myrna Brothers  YOB: 1985    Date: 11/29/18    DSM-V Diagnoses: 296.32 (F33.1) Major Depressive Disorder, Recurrent Episode, Moderate _ or 300.02 (F41.1) Generalized Anxiety Disorder  Psychosocial / Contextual Factors: recently postpartum  WHODAS: TBD    Referral / Collaboration:  Patient had appointment with psychiary 11/16/18.    Anticipated number of session or this episode of care: 6-8      MeasurableTreatment Goal(s) related to diagnosis / functional impairment(s)  Goal 1: Patient will reduce feelings of depression as evidenced by decreased score on PHQ-9.    I will know I've met my goal when I feel less down and emotional.      Objective #A (Patient Action)    Patient will Decrease frequency and intensity of feeling down, depressed, hopeless.  Status: Continued - Date(s): 11/29/18    Intervention(s)  [facility] will assign homework to assist with behavioral activation/activity scheduling.    Objective #B  Patient will Identify negative self-talk and behaviors: challenge core beliefs, myths, and actions.  Status: Continued - Date(s):11/29/18     Intervention(s)  [facility] will teach about automatic negative thoughts, irrational core beliefs, and cognitive restructuring techniques.        Goal 2: Patient will reduce feelings of anxiety as evidenced by decreased score on GAD7.    I will know I've met my goal when I feel less anxious and worried.      Objective #A (Patient Action)    Status: Continued - Date(s):11/29/18     Patient will identify three distraction and diversion activities and use those activities to decrease level of anxiety  .    Intervention(s)  [facility] will assign homework to practice distress tolerance and mindfulness techniques between session.    Objective #B  Patient will use cognitive strategies identified in therapy to challenge anxious thoughts.    Status: Continued - Date(s):11/29/18     Intervention(s)  [facility] will teach CBT techniques related to cognitive  restructuring.    Objective #C  Patient will use relaxation strategies 1-2 times per day to reduce the physical symptoms of anxiety.  Status: Continued - Date(s):11/29/18     Intervention(s)  [facility] will role-play relaxation techniques.    Patient has reviewed and agreed to the above plan.      KVNG Webb  April 23, 2018

## 2018-12-14 ASSESSMENT — ANXIETY QUESTIONNAIRES: GAD7 TOTAL SCORE: 7

## 2018-12-18 DIAGNOSIS — F32.5 MAJOR DEPRESSIVE DISORDER WITH SINGLE EPISODE, IN FULL REMISSION (H): ICD-10-CM

## 2018-12-18 DIAGNOSIS — F41.1 GENERALIZED ANXIETY DISORDER: ICD-10-CM

## 2018-12-19 NOTE — TELEPHONE ENCOUNTER
Routing refill request to provider for review/approval because:  Medication is reported/historical     Disp Refills Start End CORNELL   sertraline (ZOLOFT) 100 MG tablet  0 11/16/2018  --   Class: Historical     Last OV with KRISTIN Bronson CNP: 11/15/18    Next 5 appointments (look out 90 days)    Dec 20, 2018  9:00 AM CST  Return Visit with KVNG Bennett  [facility]) [id number] [address] [place] [id number]  [phone]   Dec 27, 2018  9:00 AM CST  Return Visit with KVNG Bennett  [facility]) [address] [place] [phone]  [phone]        PHQ-9 SCORE 11/29/2018 12/6/2018 12/13/2018   PHQ-9 Total Score 11 10 9     OREN-7 SCORE 11/29/2018 12/6/2018 12/13/2018   Total Score 11 9 7     Esther Rowland RN

## 2018-12-20 ENCOUNTER — OFFICE VISIT (OUTPATIENT)
Dept: PSYCHOLOGY | Facility: CLINIC | Age: 33
End: 2018-12-20
Payer: COMMERCIAL

## 2018-12-20 DIAGNOSIS — F33.1 MODERATE EPISODE OF RECURRENT MAJOR DEPRESSIVE DISORDER (H): Primary | ICD-10-CM

## 2018-12-20 DIAGNOSIS — F41.1 GENERALIZED ANXIETY DISORDER: ICD-10-CM

## 2018-12-20 PROCEDURE — 90832 PSYTX W PT 30 MINUTES: CPT | Performed by: SOCIAL WORKER

## 2018-12-20 ASSESSMENT — ANXIETY QUESTIONNAIRES
1. FEELING NERVOUS, ANXIOUS, OR ON EDGE: NEARLY EVERY DAY
3. WORRYING TOO MUCH ABOUT DIFFERENT THINGS: MORE THAN HALF THE DAYS
GAD7 TOTAL SCORE: 14
5. BEING SO RESTLESS THAT IT IS HARD TO SIT STILL: SEVERAL DAYS
IF YOU CHECKED OFF ANY PROBLEMS ON THIS QUESTIONNAIRE, HOW DIFFICULT HAVE THESE PROBLEMS MADE IT FOR YOU TO DO YOUR WORK, TAKE CARE OF THINGS AT HOME, OR GET ALONG WITH OTHER PEOPLE: SOMEWHAT DIFFICULT
6. BECOMING EASILY ANNOYED OR IRRITABLE: SEVERAL DAYS
2. NOT BEING ABLE TO STOP OR CONTROL WORRYING: NEARLY EVERY DAY
7. FEELING AFRAID AS IF SOMETHING AWFUL MIGHT HAPPEN: SEVERAL DAYS

## 2018-12-20 ASSESSMENT — PATIENT HEALTH QUESTIONNAIRE - PHQ9
5. POOR APPETITE OR OVEREATING: NEARLY EVERY DAY
SUM OF ALL RESPONSES TO PHQ QUESTIONS 1-9: 16

## 2018-12-20 NOTE — PROGRESS NOTES
"[facility]  Integrated Behavioral Health  December 20, 2018    Behavioral Health Clinician Progress Note    Patient Name: Myrna Brothers           Service Type:  Individual      Service Location:   Face to Face in Clinic     Session Start Time:  9: 15 am Session End Time:  9: 47 am      Session Length: 16 - 37      Attendees: Patient    Visit Activities (Refresh list every visit): [facility] Only    Diagnostic Assessment Date: 4/12/18  Treatment Plan Review Date: 11/29/18  See Flowsheets for today's PHQ-9 and OREN-7 results  Previous PHQ-9:   PHQ-9 SCORE 12/6/2018 12/13/2018 12/20/2018   PHQ-9 Total Score 10 9 16     Previous OREN-7:   OREN-7 SCORE 12/6/2018 12/13/2018 12/20/2018   Total Score 9 7 14       LUKASZ LEVEL:  No flowsheet data found.    DATA  Extended Session (60+ minutes): No  Interactive Complexity: No  Crisis: No  [facility] Patient: No    Treatment Objective(s) Addressed in This Session:  Target Behavior(s): disease management/lifestyle changes , mental health management    Depressed Mood: Decrease frequency and intensity of feeling down, depressed, hopeless  Identify negative self-talk and behaviors: challenge core beliefs, myths, and actions  Anxiety: will experience a reduction in anxiety, will develop more effective coping skills to manage anxiety symptoms, will develop healthy cognitive patterns and beliefs and will increase ability to function adaptively    Current Stressors / Issues:  Patient reported that this week was more \"challenging\". She stated that Kaushal received his shots at his 2 month [facility] and as a result has been more fussy. Patient stated that she started to note an increase in depression and anxiety because Kaushal was not always able to be soothe or responsive to her. Patient stated that she continues to feel as though Kaushal does not respond to her well since he will cry when he transitions from being cared for her  to her.  Patient also discussed how he also appears " to cry after outings and interactions with others. Patient stated that she feels as though her feelings are fact based because Kaushal consistently cries during these times.  Patient was receptive to exploring alternative explanations to why Max may be crying in those moments. Patient recognized that he may be crying when he transitions to her from her  since the transition occurs right before he eats and needs a diaper change. She stated that she also knows that outings can be stimulating for Max, and he may be crying because of that instead of her. Patient stated that it can be challenging to remember this in the moment, but that she hopes that she will write them down in her notebook to read and review.  Patient and [facility] continued to explored patient's tendency for patient's self esteem and mood to be linked with her ability to soothe Max. Patient and [facility] began to explore how patient can re-frame her sense of self related to Max and being a mom. Patient stated that she often forgets about how she is present with him and is able to help him to co-regulate in small ways, and smiled as she recognized how she is good a mother because she is present and trying.    Progress on Treatment Objective(s) / Homework:  Satisfactory progress - ACTION (Actively working towards change); Intervened by reinforcing change plan / affirming steps taken    Motivational Interviewing    MI Intervention: Expressed Empathy/Understanding, Supported Autonomy, Collaboration, Evocation, Permission to raise concern or advise, Open-ended questions, Change talk (evoked) and Reframe     Change Talk Expressed by the Patient: Ability to change Reasons to change Need to change    Provider Response to Change Talk: E - Evoked more info from patient about behavior change, A - Affirmed patient's thoughts, decisions, or attempts at behavior change, R - Reflected patient's change talk and S - Summarized patient's change talk statements    Also provided  psychoeducation about behavioral health condition, symptoms, and treatment options    Care Plan review completed: Yes    Medication Review:  No changes to psychiatric medications. Patient is currently prescribed 100 mg of Zoloft and 15 mg Buspar 2 times per day.    Medication Compliance:  Yes    Changes in Health Issues:   None reported    Chemical Use Review:   Substance Use: Chemical use reviewed, no active concerns identified      Tobacco Use: No current tobacco use.      Assessment: Current Emotional / Mental Status (status of significant symptoms):  Risk status (Self / Other harm or suicidal ideation)  Patient has had a history of suicidal ideation: in correlation with stress, sent text on 4/13 about wanting to die, but no active SI. Per patient and , patient has had history of passive SI for more than 10 years and denies a history of suicide attempts, self-injurious behavior, homicidal ideation, homicidal behavior and and other safety concerns  Patient denies current fears or concerns for personal safety.  Patient denies current or recent suicidal ideation or behaviors.  Patient denies current or recent homicidal ideation or behaviors.  Patient denies current or recent self injurious behavior or ideation.  Patient denies other safety concerns.  A safety and risk management plan has not been developed at this time, however patient was encouraged to call [facility] / [facility] should there be a change in any of these risk factors.    Appearance:   Appropriate   Eye Contact:   Good   Psychomotor Behavior: Normal   Attitude:   Cooperative   Orientation:   All  Speech   Rate / Production: Normal    Volume:  Normal   Mood:    Normal  Affect:    Appropriate   Thought Content:  Clear   Thought Form:  Coherent  Logical   Insight:    Good     Diagnoses:  1. Moderate episode of recurrent major depressive disorder (H)    2. Generalized anxiety disorder        Collateral Reports Completed:  Not Applicable    Plan:  (Homework, other):  Patient was given information about behavioral services and encouraged to schedule a follow up appointment with the clinic [facility] in 1 week.  She was also given Cognitive Behavioral Therapy skills to practice when experiencing anxiety and depression. CD Recommendations: No indications of CD issues.  Stehpanie Calabrese, KVNG, [facility]      ______________________________________________________________________    Integrated Primary Care Behavioral Health Treatment Plan    Patient's Name: Myrna Brothers  YOB: 1985    Date: 11/29/18    DSM-V Diagnoses: 296.32 (F33.1) Major Depressive Disorder, Recurrent Episode, Moderate _ or 300.02 (F41.1) Generalized Anxiety Disorder  Psychosocial / Contextual Factors: recently postpartum  WHODAS: TBD    Referral / Collaboration:  Patient had appointment with psychiary 11/16/18.    Anticipated number of session or this episode of care: 6-8      MeasurableTreatment Goal(s) related to diagnosis / functional impairment(s)  Goal 1: Patient will reduce feelings of depression as evidenced by decreased score on PHQ-9.    I will know I've met my goal when I feel less down and emotional.      Objective #A (Patient Action)    Patient will Decrease frequency and intensity of feeling down, depressed, hopeless.  Status: Continued - Date(s): 11/29/18    Intervention(s)  [facility] will assign homework to assist with behavioral activation/activity scheduling.    Objective #B  Patient will Identify negative self-talk and behaviors: challenge core beliefs, myths, and actions.  Status: Continued - Date(s):11/29/18     Intervention(s)  [facility] will teach about automatic negative thoughts, irrational core beliefs, and cognitive restructuring techniques.        Goal 2: Patient will reduce feelings of anxiety as evidenced by decreased score on GAD7.    I will know I've met my goal when I feel less anxious and worried.      Objective #A (Patient Action)    Status: Continued -  Date(s):11/29/18     Patient will identify three distraction and diversion activities and use those activities to decrease level of anxiety  .    Intervention(s)  [facility] will assign homework to practice distress tolerance and mindfulness techniques between session.    Objective #B  Patient will use cognitive strategies identified in therapy to challenge anxious thoughts.    Status: Continued - Date(s):11/29/18     Intervention(s)  [facility] will teach CBT techniques related to cognitive restructuring.    Objective #C  Patient will use relaxation strategies 1-2 times per day to reduce the physical symptoms of anxiety.  Status: Continued - Date(s):11/29/18     Intervention(s)  [facility] will role-play relaxation techniques.    Patient has reviewed and agreed to the above plan.      KVNG Webb  April 23, 2018

## 2018-12-21 ASSESSMENT — ANXIETY QUESTIONNAIRES: GAD7 TOTAL SCORE: 14

## 2018-12-22 ENCOUNTER — TELEPHONE (OUTPATIENT)
Dept: PEDIATRICS | Facility: CLINIC | Age: 33
End: 2018-12-22

## 2018-12-22 DIAGNOSIS — F41.1 GENERALIZED ANXIETY DISORDER: ICD-10-CM

## 2018-12-22 DIAGNOSIS — F32.5 MAJOR DEPRESSIVE DISORDER WITH SINGLE EPISODE, IN FULL REMISSION (H): ICD-10-CM

## 2018-12-22 NOTE — TELEPHONE ENCOUNTER
Reason for call: Per patient: Just wanted to know if I have any refills on sertraline (ZOLOFT) 100 MG tablet, the pharmacy is saying I don't have any but at my last visit they said I do. If not, can I get this medication refilled    Phone number to reach patient:  Home number on file [phone] (home)    Best Time:  Anytime    Can we leave a detailed message on this number?  YES

## 2018-12-26 NOTE — TELEPHONE ENCOUNTER
Notes per Mery Bronson NP, APRN CNP in refill encounter on 12/18/18:      Called patient on home number to review. Patient states she forgot her last prescription came from someone other than PCP and she has filled this at pharmacy already. No further action needed.  Caryn KEVIN, CMA

## 2018-12-27 ENCOUNTER — OFFICE VISIT (OUTPATIENT)
Dept: PSYCHOLOGY | Facility: CLINIC | Age: 33
End: 2018-12-27
Payer: COMMERCIAL

## 2018-12-27 DIAGNOSIS — F33.1 MODERATE EPISODE OF RECURRENT MAJOR DEPRESSIVE DISORDER (H): Primary | ICD-10-CM

## 2018-12-27 DIAGNOSIS — F41.1 GENERALIZED ANXIETY DISORDER: ICD-10-CM

## 2018-12-27 PROCEDURE — 90832 PSYTX W PT 30 MINUTES: CPT | Performed by: SOCIAL WORKER

## 2018-12-27 NOTE — PROGRESS NOTES
[facility]  Integrated Behavioral Health Services  December 27, 2018    Behavioral Health Clinician Progress Note    Patient Name: Myrna Brothers           Service Type:  Individual      Service Location:   Face to Face in Clinic     Session Start Time:  9: 10 am Session End Time:  9: 40 am      Session Length: 16 - 37      Attendees: Patient    Visit Activities (Refresh list every visit): [facility] Only    Diagnostic Assessment Date: 4/12/18  Treatment Plan Review Date: 11/29/18  See Flowsheets for today's PHQ-9 and OREN-7 results  Previous PHQ-9:   PHQ-9 SCORE 12/6/2018 12/13/2018 12/20/2018   PHQ-9 Total Score 10 9 16     Previous OREN-7:   OREN-7 SCORE 12/6/2018 12/13/2018 12/20/2018   Total Score 9 7 14       LUKASZ LEVEL:  No flowsheet data found.    DATA  Extended Session (60+ minutes): No  Interactive Complexity: No  Crisis: No  [facility] Patient: No    Treatment Objective(s) Addressed in This Session:  Target Behavior(s): disease management/lifestyle changes , mental health management    Depressed Mood: Decrease frequency and intensity of feeling down, depressed, hopeless  Identify negative self-talk and behaviors: challenge core beliefs, myths, and actions  Anxiety: will experience a reduction in anxiety, will develop more effective coping skills to manage anxiety symptoms, will develop healthy cognitive patterns and beliefs and will increase ability to function adaptively    Current Stressors / Issues:  Patient reported significant improvement in symptoms in the past week. She stated that she enjoyed Sheryl with her family. Per patient, Kaushal became overstimulated by the celebrations, but she did not personalize Kaushal's cries. She stated that she engaged in deep breathing and reminded herself the reasons why he was crying instead of blaming herself.  Patient stated that she celebrated her ability to not personalize his cries.  Patient shared that she and her  continue to utilize  time each night to connect, process,and problem solve through stress. She stated that this has also been helpful, and she is learning how to not personalize the comments/feedback he has. Patient discussed anxiety secondary to return to work in 2 weeks. Patient discussed worry that her relationship will be adversely impacted by her return to work, but recognized that she knows people who have positive relationships with their children while they work outside the home. She was also receptive to exploring her personal goals for herself, with attempts to establish realistic goals related to what she can achieve.     Progress on Treatment Objective(s) / Homework:  Satisfactory progress - ACTION (Actively working towards change); Intervened by reinforcing change plan / affirming steps taken    Motivational Interviewing    MI Intervention: Expressed Empathy/Understanding, Supported Autonomy, Collaboration, Evocation, Permission to raise concern or advise, Open-ended questions, Change talk (evoked) and Reframe     Change Talk Expressed by the Patient: Ability to change Reasons to change Need to change    Provider Response to Change Talk: E - Evoked more info from patient about behavior change, A - Affirmed patient's thoughts, decisions, or attempts at behavior change, R - Reflected patient's change talk and S - Summarized patient's change talk statements    Also provided psychoeducation about behavioral health condition, symptoms, and treatment options    Care Plan review completed: Yes    Medication Review:  No changes to psychiatric medications. Patient is currently prescribed 100 mg of Zoloft and 15 mg Buspar 2 times per day.    Medication Compliance:  Yes    Changes in Health Issues:   None reported    Chemical Use Review:   Substance Use: Chemical use reviewed, no active concerns identified      Tobacco Use: No current tobacco use.      Assessment: Current Emotional / Mental Status (status of significant  symptoms):  Risk status (Self / Other harm or suicidal ideation)  Patient has had a history of suicidal ideation: in correlation with stress, sent text on 4/13 about wanting to die, but no active SI. Per patient and , patient has had history of passive SI for more than 10 years and denies a history of suicide attempts, self-injurious behavior, homicidal ideation, homicidal behavior and and other safety concerns  Patient denies current fears or concerns for personal safety.  Patient denies current or recent suicidal ideation or behaviors.  Patient denies current or recent homicidal ideation or behaviors.  Patient denies current or recent self injurious behavior or ideation.  Patient denies other safety concerns.  A safety and risk management plan has not been developed at this time, however patient was encouraged to call [place] [phone] should there be a change in any of these risk factors.    Appearance:   Appropriate   Eye Contact:   Good   Psychomotor Behavior: Normal   Attitude:   Cooperative   Orientation:   All  Speech   Rate / Production: Normal    Volume:  Normal   Mood:    Normal  Affect:    Appropriate   Thought Content:  Clear   Thought Form:  Coherent  Logical   Insight:    Good     Diagnoses:  1. Moderate episode of recurrent major depressive disorder (H)    2. Generalized anxiety disorder        Collateral Reports Completed:  Not Applicable    Plan: (Homework, other):  Patient was given information about behavioral services and encouraged to schedule a follow up appointment with the clinic [facility] in 1 week.  She was also given Cognitive Behavioral Therapy skills to practice when experiencing anxiety and depression. CD Recommendations: No indications of CD issues.  Stephanie Calabrese, [facility], [facility]      ______________________________________________________________________    Integrated Primary Care Behavioral Health Treatment Plan    Patient's Name: Myrna Brothers  YOB: 1985    Date:  11/29/18    DSM-V Diagnoses: 296.32 (F33.1) Major Depressive Disorder, Recurrent Episode, Moderate _ or 300.02 (F41.1) Generalized Anxiety Disorder  Psychosocial / Contextual Factors: recently postpartum  WHODAS: TBD    Referral / Collaboration:  Patient had appointment with psychiary 11/16/18.    Anticipated number of session or this episode of care: 6-8      MeasurableTreatment Goal(s) related to diagnosis / functional impairment(s)  Goal 1: Patient will reduce feelings of depression as evidenced by decreased score on PHQ-9.    I will know I've met my goal when I feel less down and emotional.      Objective #A (Patient Action)    Patient will Decrease frequency and intensity of feeling down, depressed, hopeless.  Status: Continued - Date(s): 11/29/18    Intervention(s)  [facility] will assign homework to assist with behavioral activation/activity scheduling.    Objective #B  Patient will Identify negative self-talk and behaviors: challenge core beliefs, myths, and actions.  Status: Continued - Date(s):11/29/18     Intervention(s)  [facility] will teach about automatic negative thoughts, irrational core beliefs, and cognitive restructuring techniques.        Goal 2: Patient will reduce feelings of anxiety as evidenced by decreased score on GAD7.    I will know I've met my goal when I feel less anxious and worried.      Objective #A (Patient Action)    Status: Continued - Date(s):11/29/18     Patient will identify three distraction and diversion activities and use those activities to decrease level of anxiety  .    Intervention(s)  [facility] will assign homework to practice distress tolerance and mindfulness techniques between session.    Objective #B  Patient will use cognitive strategies identified in therapy to challenge anxious thoughts.    Status: Continued - Date(s):11/29/18     Intervention(s)  [facility] will teach CBT techniques related to cognitive restructuring.    Objective #C  Patient will use relaxation strategies 1-2 times per  day to reduce the physical symptoms of anxiety.  Status: Continued - Date(s):11/29/18     Intervention(s)  [facility] will role-play relaxation techniques.    Patient has reviewed and agreed to the above plan.      KVNG Webb  April 23, 2018

## 2019-01-03 ENCOUNTER — OFFICE VISIT (OUTPATIENT)
Dept: PSYCHOLOGY | Facility: CLINIC | Age: 34
End: 2019-01-03
Payer: COMMERCIAL

## 2019-01-03 DIAGNOSIS — F33.1 MODERATE EPISODE OF RECURRENT MAJOR DEPRESSIVE DISORDER (H): Primary | ICD-10-CM

## 2019-01-03 DIAGNOSIS — F41.1 GENERALIZED ANXIETY DISORDER: ICD-10-CM

## 2019-01-03 PROCEDURE — 90832 PSYTX W PT 30 MINUTES: CPT | Performed by: SOCIAL WORKER

## 2019-01-03 RX ORDER — SERTRALINE HYDROCHLORIDE 100 MG/1
TABLET, FILM COATED ORAL DAILY
Qty: 90 TABLET | Refills: 0 | OUTPATIENT
Start: 2019-01-03

## 2019-01-03 NOTE — PROGRESS NOTES
[facility]  Integrated Behavioral Health Services  January 3, 2019    Behavioral Health Clinician Progress Note    Patient Name: Myrna Brothers           Service Type:  Individual      Service Location:   Face to Face in Clinic     Session Start Time:  8:33  am Session End Time:  9: 10 am      Session Length: 16 - 37      Attendees: Patient    Visit Activities (Refresh list every visit): [facility] Only    Diagnostic Assessment Date: 4/12/18  Treatment Plan Review Date: 11/29/18  See Flowsheets for today's PHQ-9 and OREN-7 results  Previous PHQ-9:   PHQ-9 SCORE 12/6/2018 12/13/2018 12/20/2018   PHQ-9 Total Score 10 9 16     Previous OREN-7:   OREN-7 SCORE 12/6/2018 12/13/2018 12/20/2018   Total Score 9 7 14       LUKASZ LEVEL:  No flowsheet data found.    DATA  Extended Session (60+ minutes): No  Interactive Complexity: No  Crisis: No  [facility] Patient: No    Treatment Objective(s) Addressed in This Session:  Target Behavior(s): disease management/lifestyle changes , mental health management    Depressed Mood: Decrease frequency and intensity of feeling down, depressed, hopeless  Identify negative self-talk and behaviors: challenge core beliefs, myths, and actions  Anxiety: will experience a reduction in anxiety, will develop more effective coping skills to manage anxiety symptoms, will develop healthy cognitive patterns and beliefs and will increase ability to function adaptively    Current Stressors / Issues:  Patient reported that she is returning to work next week, and is anticipating it to be a difficult adjustment. She shared that she recently was able to talk to other working mothers about her worries about potential impacts on attachment and bonding, and stated that it was helpful to have her worries normalized and to learn that the relationship was not adversely impacted by their return to work.  Patient shared that she is going to try and focus on the relationship with her baby when she  returns home from work instead of other household chores.  Patient stated that the baby has been more fussy in the past week, and that she can blame herself when he does not latch well.  Patient and [facility] reviewed strategies to help her to disengage from unhealthy thought processes prior to situations that may trigger automatic negative thoughts. Overall, patient discussed at length the relationship and bond that she is developing with the infant, and patient expressed an increase in self-confidence in herself as a parent as evidence by her focusing what is going well as a parent instead of ruminating/highlighting the events when her baby is difficult to soothe.    Progress on Treatment Objective(s) / Homework:  Satisfactory progress - ACTION (Actively working towards change); Intervened by reinforcing change plan / affirming steps taken    Motivational Interviewing    MI Intervention: Expressed Empathy/Understanding, Supported Autonomy, Collaboration, Evocation, Permission to raise concern or advise, Open-ended questions, Change talk (evoked) and Reframe     Change Talk Expressed by the Patient: Ability to change Reasons to change Need to change    Provider Response to Change Talk: E - Evoked more info from patient about behavior change, A - Affirmed patient's thoughts, decisions, or attempts at behavior change, R - Reflected patient's change talk and S - Summarized patient's change talk statements    Also provided psychoeducation about behavioral health condition, symptoms, and treatment options    Care Plan review completed: Yes    Medication Review:  No changes to psychiatric medications. Patient is currently prescribed 100 mg of Zoloft and 15 mg Buspar 2 times per day.    Medication Compliance:  Yes    Changes in Health Issues:   None reported    Chemical Use Review:   Substance Use: Chemical use reviewed, no active concerns identified      Tobacco Use: No current tobacco use.      Assessment: Current Emotional /  Mental Status (status of significant symptoms):  Risk status (Self / Other harm or suicidal ideation)  Patient has had a history of suicidal ideation: in correlation with stress, sent text on 4/13 about wanting to die, but no active SI. Per patient and , patient has had history of passive SI for more than 10 years and denies a history of suicide attempts, self-injurious behavior, homicidal ideation, homicidal behavior and and other safety concerns  Patient denies current fears or concerns for personal safety.  Patient denies current or recent suicidal ideation or behaviors.  Patient denies current or recent homicidal ideation or behaviors.  Patient denies current or recent self injurious behavior or ideation.  Patient denies other safety concerns.  A safety and risk management plan has not been developed at this time, however patient was encouraged to call [place] [phone] should there be a change in any of these risk factors.    Appearance:   Appropriate   Eye Contact:   Good   Psychomotor Behavior: Normal   Attitude:   Cooperative   Orientation:   All  Speech   Rate / Production: Normal    Volume:  Normal   Mood:    Normal  Affect:    Appropriate   Thought Content:  Clear   Thought Form:  Coherent  Logical   Insight:    Good     Diagnoses:  1. Moderate episode of recurrent major depressive disorder (H)    2. Generalized anxiety disorder        Collateral Reports Completed:  Not Applicable    Plan: (Homework, other):  Patient was given information about behavioral services and encouraged to schedule a follow up appointment with the clinic [facility] in 1 week.  She was also given Cognitive Behavioral Therapy skills to practice when experiencing anxiety and depression. CD Recommendations: No indications of CD issues.  Stephanie Calabrese, [facility], [facility]      ______________________________________________________________________    Integrated Primary Care Behavioral Health Treatment Plan    Patient's Name: Myrna REYNOLD  Zev  YOB: 1985    Date: 11/29/18    DSM-V Diagnoses: 296.32 (F33.1) Major Depressive Disorder, Recurrent Episode, Moderate _ or 300.02 (F41.1) Generalized Anxiety Disorder  Psychosocial / Contextual Factors: recently postpartum  WHODAS: TBD    Referral / Collaboration:  Patient had appointment with psychiary 11/16/18.    Anticipated number of session or this episode of care: 6-8      MeasurableTreatment Goal(s) related to diagnosis / functional impairment(s)  Goal 1: Patient will reduce feelings of depression as evidenced by decreased score on PHQ-9.    I will know I've met my goal when I feel less down and emotional.      Objective #A (Patient Action)    Patient will Decrease frequency and intensity of feeling down, depressed, hopeless.  Status: Continued - Date(s): 11/29/18    Intervention(s)  [facility] will assign homework to assist with behavioral activation/activity scheduling.    Objective #B  Patient will Identify negative self-talk and behaviors: challenge core beliefs, myths, and actions.  Status: Continued - Date(s):11/29/18     Intervention(s)  [facility] will teach about automatic negative thoughts, irrational core beliefs, and cognitive restructuring techniques.        Goal 2: Patient will reduce feelings of anxiety as evidenced by decreased score on GAD7.    I will know I've met my goal when I feel less anxious and worried.      Objective #A (Patient Action)    Status: Continued - Date(s):11/29/18     Patient will identify three distraction and diversion activities and use those activities to decrease level of anxiety  .    Intervention(s)  [facility] will assign homework to practice distress tolerance and mindfulness techniques between session.    Objective #B  Patient will use cognitive strategies identified in therapy to challenge anxious thoughts.    Status: Continued - Date(s):11/29/18     Intervention(s)  [facility] will teach CBT techniques related to cognitive restructuring.    Objective #C  Patient  will use relaxation strategies 1-2 times per day to reduce the physical symptoms of anxiety.  Status: Continued - Date(s):11/29/18     Intervention(s)  [facility] will role-play relaxation techniques.    Patient has reviewed and agreed to the above plan.      KVNG Webb  April 23, 2018

## 2019-01-08 ENCOUNTER — TELEPHONE (OUTPATIENT)
Dept: OBGYN | Facility: CLINIC | Age: 34
End: 2019-01-08

## 2019-01-08 NOTE — LETTER
[facility]  [id number] [place] [id number]  Phone: [phone]    01/08/19    Myrna Brothers  [id number] [facility] [address]      To whom it may concern:     Myrna Brothers has been under my professional care.  Myrna may return to work on 1/8/19 with no restrictions.      Please contact our office if you have any questions.    Sincerely,      Ashley Bernardo, DO

## 2019-01-08 NOTE — TELEPHONE ENCOUNTER
Health Call Center    Phone Message    May a detailed message be left on voicemail: yes    Reason for Call: Form or Letter   Type or form/letter needing completion: Patient is calling for a workability letter approving her to return to work post pregnancy. Patient is scheduled to work tomorrow and needs letter. Patient will  at clinic.   Provider: Dr Rodrigues   Date form needed: asap  Once completed: Patient will  at .    Please call once request is complete.   Action Taken: Message routed to:  [facility] p [phone]

## 2019-01-08 NOTE — TELEPHONE ENCOUNTER
Letter written.  Spoke with patient- patient will  letter at  OB check in area [place].  Patient pleased.

## 2019-01-11 ENCOUNTER — OFFICE VISIT (OUTPATIENT)
Dept: PSYCHOLOGY | Facility: CLINIC | Age: 34
End: 2019-01-11
Payer: COMMERCIAL

## 2019-01-11 DIAGNOSIS — F41.1 GENERALIZED ANXIETY DISORDER: ICD-10-CM

## 2019-01-11 DIAGNOSIS — F33.1 MODERATE EPISODE OF RECURRENT MAJOR DEPRESSIVE DISORDER (H): Primary | ICD-10-CM

## 2019-01-11 PROCEDURE — 90832 PSYTX W PT 30 MINUTES: CPT | Performed by: SOCIAL WORKER

## 2019-01-11 ASSESSMENT — ANXIETY QUESTIONNAIRES
6. BECOMING EASILY ANNOYED OR IRRITABLE: MORE THAN HALF THE DAYS
IF YOU CHECKED OFF ANY PROBLEMS ON THIS QUESTIONNAIRE, HOW DIFFICULT HAVE THESE PROBLEMS MADE IT FOR YOU TO DO YOUR WORK, TAKE CARE OF THINGS AT HOME, OR GET ALONG WITH OTHER PEOPLE: SOMEWHAT DIFFICULT
3. WORRYING TOO MUCH ABOUT DIFFERENT THINGS: SEVERAL DAYS
GAD7 TOTAL SCORE: 10
2. NOT BEING ABLE TO STOP OR CONTROL WORRYING: SEVERAL DAYS
5. BEING SO RESTLESS THAT IT IS HARD TO SIT STILL: SEVERAL DAYS
1. FEELING NERVOUS, ANXIOUS, OR ON EDGE: MORE THAN HALF THE DAYS
7. FEELING AFRAID AS IF SOMETHING AWFUL MIGHT HAPPEN: SEVERAL DAYS

## 2019-01-11 ASSESSMENT — PATIENT HEALTH QUESTIONNAIRE - PHQ9
5. POOR APPETITE OR OVEREATING: MORE THAN HALF THE DAYS
SUM OF ALL RESPONSES TO PHQ QUESTIONS 1-9: 15

## 2019-01-11 NOTE — PROGRESS NOTES
[facility]  Integrated Behavioral Health Services  January 11, 2019    Behavioral Health Clinician Progress Note    Patient Name: Myrna Brothers           Service Type:  Individual      Service Location:   Face to Face in Clinic     Session Start Time:  11: 43  am Session End Time:  12: 10 pm      Session Length: 16 - 37      Attendees: Patient    Visit Activities (Refresh list every visit): [facility] Only    Diagnostic Assessment Date: 4/12/18  Treatment Plan Review Date: 11/29/18  See Flowsheets for today's PHQ-9 and OREN-7 results  Previous PHQ-9:   PHQ-9 SCORE 12/13/2018 12/20/2018 1/11/2019   PHQ-9 Total Score 9 16 15     Previous OREN-7:   OREN-7 SCORE 12/6/2018 12/13/2018 12/20/2018   Total Score 9 7 14       LUKASZ LEVEL:  No flowsheet data found.    DATA  Extended Session (60+ minutes): No  Interactive Complexity: No  Crisis: No  [facility] Patient: No    Treatment Objective(s) Addressed in This Session:  Target Behavior(s): disease management/lifestyle changes , mental health management    Depressed Mood: Decrease frequency and intensity of feeling down, depressed, hopeless  Identify negative self-talk and behaviors: challenge core beliefs, myths, and actions  Anxiety: will experience a reduction in anxiety, will develop more effective coping skills to manage anxiety symptoms, will develop healthy cognitive patterns and beliefs and will increase ability to function adaptively    Current Stressors / Issues:  Patient reported that she returned to work on 1/9, and that the transition back to work has gone well.  She stated that she enjoyed seeing her co-workers again and beginning to re-start her work.  Patient reported that she noted an increase in depression when she returned from work when her baby would not latch or when he would not laugh or smile for her.  She stated that she felt like her baby does not love her, like her, or feel attached to her.  Patient reported that she was trying to  view the situation logically by looking for facts and evidence (he is not smiling at me, therefore he does not like me).  [facility] identified the all or nothing thinking present, and patient stated that she had not considered alternative reasons for why her baby was not smiling at her.  [facility] continued to work toward helping patient to achieve cognitive flexibility by identifying reasons why the patient may be fussy, irritable, and to reduce expectations about how she feels patient should act and respond. Patient expressed goal of reviewing statements of cognitive flexibility on her way home from work.  Patient discussed need to talk to her  about schedule and routine as she no longer has her hour of personal time.  She expressed frustration related to her  counting the time in which she runs errands toward her alone time.  Patient and [facility] explored strategies to help with communication about how to express patient's needs.    Patient confirmed that she did experience thoughts that it would be better off if she was not around in the past week.  She stated she did not have a plan or intent. She stated that she is reminding herself that her depression is triggering these thoughts, that the thoughts are not true, and there is evidence that supports that she is a good mother. She shared that she is working toward remembering that her abilities, skills, and worth are not measured in her baby's mood (if he is crying, that does not mean that I'm a failure).    Progress on Treatment Objective(s) / Homework:  Satisfactory progress - ACTION (Actively working towards change); Intervened by reinforcing change plan / affirming steps taken    Motivational Interviewing    MI Intervention: Expressed Empathy/Understanding, Supported Autonomy, Collaboration, Evocation, Permission to raise concern or advise, Open-ended questions, Change talk (evoked) and Reframe     Change Talk Expressed by the Patient: Ability to change Reasons  to change Need to change    Provider Response to Change Talk: E - Evoked more info from patient about behavior change, A - Affirmed patient's thoughts, decisions, or attempts at behavior change, R - Reflected patient's change talk and S - Summarized patient's change talk statements    Also provided psychoeducation about behavioral health condition, symptoms, and treatment options    Care Plan review completed: Yes    Medication Review:  Patient seen by psychiatrist on 1/7, and reported that they increased her medications.    Medication Compliance:  Yes    Changes in Health Issues:   None reported    Chemical Use Review:   Substance Use: Chemical use reviewed, no active concerns identified      Tobacco Use: No current tobacco use.      Assessment: Current Emotional / Mental Status (status of significant symptoms):  Risk status (Self / Other harm or suicidal ideation)  Patient has had a history of suicidal ideation: in correlation with stress, sent text on 4/13 about wanting to die, but no active SI. Per patient and , patient has had history of passive SI for more than 10 years and denies a history of suicide attempts, self-injurious behavior, homicidal ideation, homicidal behavior and and other safety concerns  Patient denies current fears or concerns for personal safety.  Patient reports the following current or recent suicidal ideation or behaviors: felt that her baby would be better off without her during this past week. She denied plan or intent. .  Patient denies current or recent homicidal ideation or behaviors.  Patient denies current or recent self injurious behavior or ideation.  Patient denies other safety concerns.  A safety and risk management plan has not been developed at this time, however patient was encouraged to call [facility] should there be a change in any of these risk factors.    Appearance:   Appropriate   Eye Contact:   Good   Psychomotor Behavior: Normal    Attitude:   Cooperative   Orientation:   All  Speech   Rate / Production: Normal    Volume:  Normal   Mood:    Normal  Affect:    Appropriate   Thought Content:  Clear   Thought Form:  Coherent  Logical   Insight:    Good     Diagnoses:  1. Moderate episode of recurrent major depressive disorder (H)    2. Generalized anxiety disorder        Collateral Reports Completed:  Not Applicable    Plan: (Homework, other):  Patient was given information about behavioral services and encouraged to schedule a follow up appointment with the clinic [facility] in 1 week.  She was also given Cognitive Behavioral Therapy skills to practice when experiencing anxiety and depression. CD Recommendations: No indications of CD issues.  Stephanie Calabrese, [facility], [facility]      ______________________________________________________________________    Integrated Primary Care Behavioral Health Treatment Plan    Patient's Name: Myrna Brothers  YOB: 1985    Date: 11/29/18    DSM-V Diagnoses: 296.32 (F33.1) Major Depressive Disorder, Recurrent Episode, Moderate _ or 300.02 (F41.1) Generalized Anxiety Disorder  Psychosocial / Contextual Factors: recently postpartum  WHODAS: TBD    Referral / Collaboration:  Patient had appointment with psychiary 11/16/18.    Anticipated number of session or this episode of care: 6-8      MeasurableTreatment Goal(s) related to diagnosis / functional impairment(s)  Goal 1: Patient will reduce feelings of depression as evidenced by decreased score on PHQ-9.    I will know I've met my goal when I feel less down and emotional.      Objective #A (Patient Action)    Patient will Decrease frequency and intensity of feeling down, depressed, hopeless.  Status: Continued - Date(s): 11/29/18    Intervention(s)  [facility] will assign homework to assist with behavioral activation/activity scheduling.    Objective #B  Patient will Identify negative self-talk and behaviors: challenge core beliefs, myths, and actions.  Status:  Continued - Date(s):11/29/18     Intervention(s)  [facility] will teach about automatic negative thoughts, irrational core beliefs, and cognitive restructuring techniques.        Goal 2: Patient will reduce feelings of anxiety as evidenced by decreased score on GAD7.    I will know I've met my goal when I feel less anxious and worried.      Objective #A (Patient Action)    Status: Continued - Date(s):11/29/18     Patient will identify three distraction and diversion activities and use those activities to decrease level of anxiety  .    Intervention(s)  [facility] will assign homework to practice distress tolerance and mindfulness techniques between session.    Objective #B  Patient will use cognitive strategies identified in therapy to challenge anxious thoughts.    Status: Continued - Date(s):11/29/18     Intervention(s)  [facility] will teach CBT techniques related to cognitive restructuring.    Objective #C  Patient will use relaxation strategies 1-2 times per day to reduce the physical symptoms of anxiety.  Status: Continued - Date(s):11/29/18     Intervention(s)  [facility] will role-play relaxation techniques.    Patient has reviewed and agreed to the above plan.      Stephanie Calabrese, KVNG  April 23, 2018

## 2019-01-12 ASSESSMENT — ANXIETY QUESTIONNAIRES: GAD7 TOTAL SCORE: 10

## 2019-01-18 ENCOUNTER — OFFICE VISIT (OUTPATIENT)
Dept: PSYCHOLOGY | Facility: CLINIC | Age: 34
End: 2019-01-18
Payer: COMMERCIAL

## 2019-01-18 DIAGNOSIS — F41.1 GENERALIZED ANXIETY DISORDER: ICD-10-CM

## 2019-01-18 DIAGNOSIS — F33.1 MODERATE EPISODE OF RECURRENT MAJOR DEPRESSIVE DISORDER (H): Primary | ICD-10-CM

## 2019-01-18 PROCEDURE — 90832 PSYTX W PT 30 MINUTES: CPT | Performed by: SOCIAL WORKER

## 2019-01-18 ASSESSMENT — ANXIETY QUESTIONNAIRES
2. NOT BEING ABLE TO STOP OR CONTROL WORRYING: SEVERAL DAYS
6. BECOMING EASILY ANNOYED OR IRRITABLE: SEVERAL DAYS
7. FEELING AFRAID AS IF SOMETHING AWFUL MIGHT HAPPEN: SEVERAL DAYS
GAD7 TOTAL SCORE: 8
5. BEING SO RESTLESS THAT IT IS HARD TO SIT STILL: SEVERAL DAYS
3. WORRYING TOO MUCH ABOUT DIFFERENT THINGS: SEVERAL DAYS
IF YOU CHECKED OFF ANY PROBLEMS ON THIS QUESTIONNAIRE, HOW DIFFICULT HAVE THESE PROBLEMS MADE IT FOR YOU TO DO YOUR WORK, TAKE CARE OF THINGS AT HOME, OR GET ALONG WITH OTHER PEOPLE: SOMEWHAT DIFFICULT
1. FEELING NERVOUS, ANXIOUS, OR ON EDGE: MORE THAN HALF THE DAYS

## 2019-01-18 ASSESSMENT — PATIENT HEALTH QUESTIONNAIRE - PHQ9
SUM OF ALL RESPONSES TO PHQ QUESTIONS 1-9: 9
5. POOR APPETITE OR OVEREATING: SEVERAL DAYS

## 2019-01-18 NOTE — PROGRESS NOTES
"[facility]  Integrated Behavioral Health Services  January 18, 2019    Behavioral Health Clinician Progress Note    Patient Name: Myrna Brothers           Service Type:  Individual      Service Location:   Face to Face in Clinic     Session Start Time:  11: 41  am Session End Time:  12: 15 pm      Session Length: 16 - 37      Attendees: Patient    Visit Activities (Refresh list every visit): [facility] Only    Diagnostic Assessment Date: 4/12/18  Treatment Plan Review Date: 11/29/18  See Flowsheets for today's PHQ-9 and OREN-7 results  Previous PHQ-9:   PHQ-9 SCORE 12/20/2018 1/11/2019 1/18/2019   PHQ-9 Total Score 16 15 9     Previous OREN-7:   OREN-7 SCORE 12/20/2018 1/11/2019 1/18/2019   Total Score 14 10 8       LUKASZ LEVEL:  No flowsheet data found.    DATA  Extended Session (60+ minutes): No  Interactive Complexity: No  Crisis: No  [facility] Patient: No    Treatment Objective(s) Addressed in This Session:  Target Behavior(s): disease management/lifestyle changes , mental health management    Depressed Mood: Decrease frequency and intensity of feeling down, depressed, hopeless  Identify negative self-talk and behaviors: challenge core beliefs, myths, and actions  Anxiety: will experience a reduction in anxiety, will develop more effective coping skills to manage anxiety symptoms, will develop healthy cognitive patterns and beliefs and will increase ability to function adaptively    Current Stressors / Issues:  Patient reported that it has been a \"good week\".  She stated that she enjoys being back at work, and reported that she wants to spend as much time as possible with her baby once she returns.  She discussed that she is realizing the need to cut back on her activities since it takes her away from the time she wants to spend at home. Patient shared that she continues to cope with and adjust to these changes. She stated that she continues to feel as though her baby does not like her as much " as her , and described feeling envious.  Patient and [facility] began to explore patient's beliefs related to parenting and her relationship with her son. She was receptive to strategies to increasing cognitive flexibility, including identifying evidence that supports that a secure attachment is development, that the baby is responding to her, and potential reasons why a baby may or may not smiling (not just due to liking someone).      Progress on Treatment Objective(s) / Homework:  Satisfactory progress - ACTION (Actively working towards change); Intervened by reinforcing change plan / affirming steps taken    Motivational Interviewing    MI Intervention: Expressed Empathy/Understanding, Supported Autonomy, Collaboration, Evocation, Permission to raise concern or advise, Open-ended questions, Change talk (evoked) and Reframe     Change Talk Expressed by the Patient: Ability to change Reasons to change Need to change    Provider Response to Change Talk: E - Evoked more info from patient about behavior change, A - Affirmed patient's thoughts, decisions, or attempts at behavior change, R - Reflected patient's change talk and S - Summarized patient's change talk statements    Also provided psychoeducation about behavioral health condition, symptoms, and treatment options    Care Plan review completed: Yes    Medication Review:  Patient seen by psychiatrist on 1/7, and reported that they increased her medications.    Medication Compliance:  Yes    Changes in Health Issues:   None reported    Chemical Use Review:   Substance Use: Chemical use reviewed, no active concerns identified      Tobacco Use: No current tobacco use.      Assessment: Current Emotional / Mental Status (status of significant symptoms):  Risk status (Self / Other harm or suicidal ideation)  Patient has had a history of suicidal ideation: in correlation with stress, sent text on 4/13 about wanting to die, but no active SI. Per patient and ,  patient has had history of passive SI for more than 10 years and denies a history of suicide attempts, self-injurious behavior, homicidal ideation, homicidal behavior and and other safety concerns  Patient denies current fears or concerns for personal safety.  Patient reports the following current or recent suicidal ideation or behaviors: felt that her baby would be better off without her during this past week. She denied plan or intent. .  Patient denies current or recent homicidal ideation or behaviors.  Patient denies current or recent self injurious behavior or ideation.  Patient denies other safety concerns.  A safety and risk management plan has not been developed at this time, however patient was encouraged to call [place] [phone] should there be a change in any of these risk factors.    Appearance:   Appropriate   Eye Contact:   Good   Psychomotor Behavior: Normal   Attitude:   Cooperative   Orientation:   All  Speech   Rate / Production: Normal    Volume:  Normal   Mood:    Normal  Affect:    Appropriate   Thought Content:  Clear   Thought Form:  Coherent  Logical   Insight:    Good     Diagnoses:  1. Moderate episode of recurrent major depressive disorder (H)    2. Generalized anxiety disorder        Collateral Reports Completed:  Not Applicable    Plan: (Homework, other):  Patient was given information about behavioral services and encouraged to schedule a follow up appointment with the clinic [facility] in 1 week.  She was also given Cognitive Behavioral Therapy skills to practice when experiencing anxiety and depression. CD Recommendations: No indications of CD issues.  Stephanie Calabrese, KVNG, [facility]      ______________________________________________________________________    Integrated Primary Care Behavioral Health Treatment Plan    Patient's Name: Myrna Brothers  YOB: 1985    Date: 11/29/18    DSM-V Diagnoses: 296.32 (F33.1) Major Depressive Disorder, Recurrent Episode, Moderate _ or  300.02 (F41.1) Generalized Anxiety Disorder  Psychosocial / Contextual Factors: recently postpartum  WHODAS: TBD    Ref[facility] / Collaboration:  [facility]    Anticipated number of session or this episode of care: 6-8      MeasurableTreatment Goal(s) related to diagnosis / functional impairment(s)  Goal 1: Patient will reduce feelings of depression as evidenced by decreased score on PHQ-9.    I will know I've met my goal when I feel less down and emotional.      Objective #A (Patient Action)    Patient will Decrease frequency and intensity of feeling down, depressed, hopeless.  Status: Continued - Date(s): 11/29/18    Intervention(s)  [facility] will assign homework to assist with behavioral activation/activity scheduling.    Objective #B  Patient will Identify negative self-talk and behaviors: challenge core beliefs, myths, and actions.  Status: Continued - Date(s):11/29/18     Intervention(s)  [facility] will teach about automatic negative thoughts, irrational core beliefs, and cognitive restructuring techniques.        Goal 2: Patient will reduce feelings of anxiety as evidenced by decreased score on GAD7.    I will know I've met my goal when I feel less anxious and worried.      Objective #A (Patient Action)    Status: Continued - Date(s):11/29/18     Patient will identify three distraction and diversion activities and use those activities to decrease level of anxiety  .    Intervention(s)  [facility] will assign homework to practice distress tolerance and mindfulness techniques between session.    Objective #B  Patient will use cognitive strategies identified in therapy to challenge anxious thoughts.    Status: Continued - Date(s):11/29/18     Intervention(s)  [facility] will teach CBT techniques related to cognitive restructuring.    Objective #C  Patient will use relaxation strategies 1-2 times per day to reduce the physical symptoms of anxiety.  Status: Continued - Date(s):11/29/18      Intervention(s)  [facility] will role-play relaxation techniques.    Patient has reviewed and agreed to the above plan.      Stephanie Calabrese, [facility]  April 23, 2018

## 2019-01-19 ASSESSMENT — ANXIETY QUESTIONNAIRES: GAD7 TOTAL SCORE: 8

## 2019-01-25 ENCOUNTER — OFFICE VISIT (OUTPATIENT)
Dept: PSYCHOLOGY | Facility: CLINIC | Age: 34
End: 2019-01-25
Payer: COMMERCIAL

## 2019-01-25 DIAGNOSIS — F41.1 GENERALIZED ANXIETY DISORDER: ICD-10-CM

## 2019-01-25 DIAGNOSIS — F33.1 MODERATE EPISODE OF RECURRENT MAJOR DEPRESSIVE DISORDER (H): Primary | ICD-10-CM

## 2019-01-25 PROCEDURE — 90832 PSYTX W PT 30 MINUTES: CPT | Performed by: SOCIAL WORKER

## 2019-01-25 NOTE — PROGRESS NOTES
"[facility]  Integrated Behavioral Health Services  January 25, 2019    Behavioral Health Clinician Progress Note    Patient Name: Myrna Brothers           Service Type:  Individual      Service Location:   Face to Face in Clinic     Session Start Time:  11: 47  am Session End Time:  12: 17 pm      Session Length: 16 - 37      Attendees: Patient    Visit Activities (Refresh list every visit): [facility] Only    Diagnostic Assessment Date: 4/12/18  Treatment Plan Review Date: 11/29/18  See Flowsheets for today's PHQ-9 and OREN-7 results  Previous PHQ-9:   PHQ-9 SCORE 12/20/2018 1/11/2019 1/18/2019   PHQ-9 Total Score 16 15 9     Previous OREN-7:   OREN-7 SCORE 12/20/2018 1/11/2019 1/18/2019   Total Score 14 10 8       LUKASZ LEVEL:  No flowsheet data found.    DATA  Extended Session (60+ minutes): No  Interactive Complexity: No  Crisis: No  [facility] Patient: No    Treatment Objective(s) Addressed in This Session:  Target Behavior(s): disease management/lifestyle changes , mental health management    Depressed Mood: Decrease frequency and intensity of feeling down, depressed, hopeless  Identify negative self-talk and behaviors: challenge core beliefs, myths, and actions  Anxiety: will experience a reduction in anxiety, will develop more effective coping skills to manage anxiety symptoms, will develop healthy cognitive patterns and beliefs and will increase ability to function adaptively    Current Stressors / Issues:  Patient reported that symptoms have improved despite numerous stressors including difficulties obtaining medications, adjusting return to work, and unforseen delays in son's tongue tie surgery.  Patient processed previous evening when her son was crying and not responding to her attempts to soothe him. She shared that she utilized self-talk to help her the moment. Patient shared, \"I was exhausted afterwards, but I got through it\".  Patient stated that it was the first time that she was " "able to adjust her self-talk in the moment, and did not have cognitions that made her depression worse. Patient endorsed belief that cognitive rehearsal has helped for it \"to stick\".  Patient stated that she is feeling more confident in herself as a mother, even if there are moments when her son is not smiling back at her. Patient discussed anxiety associated with pumping at work. She stated that she worries that it is \"too much\" time, and her boss may be upset. Patient stated that she knows that it is likely just her anxiety assuming that it is \"too much\" since her boss has been supportive of her pumping, but reported that she wants to talk to her boss to disengage from the cognition.     Progress on Treatment Objective(s) / Homework:  Stable - ACTION (Actively working towards change); Intervened by reinforcing change plan / affirming steps taken    Motivational Interviewing    MI Intervention: Expressed Empathy/Understanding, Supported Autonomy, Collaboration, Evocation, Permission to raise concern or advise, Open-ended questions, Change talk (evoked) and Reframe     Change Talk Expressed by the Patient: Ability to change Reasons to change Need to change    Provider Response to Change Talk: E - Evoked more info from patient about behavior change, A - Affirmed patient's thoughts, decisions, or attempts at behavior change, R - Reflected patient's change talk and S - Summarized patient's change talk statements    Also provided psychoeducation about behavioral health condition, symptoms, and treatment options    Care Plan review completed: Yes    Medication Review:  Patient seen by psychiatrist on 1/7, and reported that they increased her medications.    Medication Compliance:  Yes    Changes in Health Issues:   None reported    Chemical Use Review:   Substance Use: Chemical use reviewed, no active concerns identified      Tobacco Use: No current tobacco use.      Assessment: Current Emotional / Mental Status (status " of significant symptoms):  Risk status (Self / Other harm or suicidal ideation)  Patient has had a history of suicidal ideation: in correlation with stress, sent text on 4/13 about wanting to die, but no active SI. Per patient and , patient has had history of passive SI for more than 10 years and denies a history of suicide attempts, self-injurious behavior, homicidal ideation, homicidal behavior and and other safety concerns  Patient denies current fears or concerns for personal safety.  Patient reports the following current or recent suicidal ideation or behaviors: felt that her baby would be better off without her during this past week. She denied plan or intent. .  Patient denies current or recent homicidal ideation or behaviors.  Patient denies current or recent self injurious behavior or ideation.  Patient denies other safety concerns.  A safety and risk management plan has not been developed at this time, however patient was encouraged to call [place] [phone] should there be a change in any of these risk factors.    Appearance:   Appropriate   Eye Contact:   Good   Psychomotor Behavior: Normal   Attitude:   Cooperative   Orientation:   All  Speech   Rate / Production: Normal    Volume:  Normal   Mood:    Normal  Affect:    Appropriate   Thought Content:  Clear   Thought Form:  Coherent  Logical   Insight:    Good     Diagnoses:  1. Moderate episode of recurrent major depressive disorder (H)    2. Generalized anxiety disorder        Collateral Reports Completed:  Not Applicable    Plan: (Homework, other):  Patient was given information about behavioral services and encouraged to schedule a follow up appointment with the clinic [facility] in 1 week.  She was also given Cognitive Behavioral Therapy skills to practice when experiencing anxiety and depression. CD Recommendations: No indications of CD issues.  Stephanie Calabrese, [facility],  [facility], [place]      ______________________________________________________________________    Integrated Primary Care Behavioral Health Treatment Plan    Patient's Name: Myrna Brothers  YOB: 1985    Date: 11/29/18    DSM-V Diagnoses: 296.32 (F33.1) Major Depressive Disorder, Recurrent Episode, Moderate _ or 300.02 (F41.1) Generalized Anxiety Disorder  Psychosocial / Contextual Factors: recently postpartum  WHODAS: TBD    Referral / Collaboration:  Patient had appointment with psychiary 11/16/18.    Anticipated number of session or this episode of care: 6-8      MeasurableTreatment Goal(s) related to diagnosis / functional impairment(s)  Goal 1: Patient will reduce feelings of depression as evidenced by decreased score on PHQ-9.    I will know I've met my goal when I feel less down and emotional.      Objective #A (Patient Action)    Patient will Decrease frequency and intensity of feeling down, depressed, hopeless.  Status: Continued - Date(s): 11/29/18    Intervention(s)  [facility] [place] will assign homework to assist with behavioral activation/activity scheduling.    Objective #B  Patient will Identify negative self-talk and behaviors: challenge core beliefs, myths, and actions.  Status: Continued - Date(s):11/29/18     Intervention(s)  [facility] [place] will teach about automatic negative thoughts, irrational core beliefs, and cognitive restructuring techniques.        Goal 2: Patient will reduce feelings of anxiety as evidenced by decreased score on GAD7.    I will know I've met my goal when I feel less anxious and worried.      Objective #A (Patient Action)    Status: Continued - Date(s):11/29/18     Patient will identify three distraction and diversion activities and use those activities to decrease level of anxiety  .    Intervention(s)  [facility] [place] will assign homework to practice distress tolerance and mindfulness techniques between session.    Objective #B  Patient will use cognitive strategies identified in therapy to challenge  anxious thoughts.    Status: Continued - Date(s):11/29/18     Intervention(s)  [facility] will teach CBT techniques related to cognitive restructuring.    Objective #C  Patient will use relaxation strategies 1-2 times per day to reduce the physical symptoms of anxiety.  Status: Continued - Date(s):11/29/18     Intervention(s)  [facility] will role-play relaxation techniques.    Patient has reviewed and agreed to the above plan.      Stephanie Calabrese, KVNG  April 23, 2018

## 2019-02-01 ENCOUNTER — OFFICE VISIT (OUTPATIENT)
Dept: PSYCHOLOGY | Facility: CLINIC | Age: 34
End: 2019-02-01
Payer: COMMERCIAL

## 2019-02-01 DIAGNOSIS — F33.1 MODERATE EPISODE OF RECURRENT MAJOR DEPRESSIVE DISORDER (H): Primary | ICD-10-CM

## 2019-02-01 DIAGNOSIS — F41.1 GENERALIZED ANXIETY DISORDER: ICD-10-CM

## 2019-02-01 PROCEDURE — 90832 PSYTX W PT 30 MINUTES: CPT | Performed by: SOCIAL WORKER

## 2019-02-01 NOTE — PROGRESS NOTES
[facility] Care [facility]  Integrated Behavioral Health Services  February 1, 2019    Behavioral Health Clinician Progress Note    Patient Name: Myrna Brothers           Service Type:  Individual      Service Location:   Face to Face in Clinic     Session Start Time:  11: 56  am Session End Time:  12: 27 pm      Session Length: 16 - 37      Attendees: Patient    Visit Activities (Refresh list every visit): [facility] Only    Diagnostic Assessment Date: 4/12/18  Treatment Plan Review Date: 11/29/18  See Flowsheets for today's PHQ-9 and OREN-7 results  Previous PHQ-9:   PHQ-9 SCORE 12/20/2018 1/11/2019 1/18/2019   PHQ-9 Total Score 16 15 9     Previous OREN-7:   OREN-7 SCORE 12/20/2018 1/11/2019 1/18/2019   Total Score 14 10 8       LUKASZ LEVEL:  No flowsheet data found.    DATA  Extended Session (60+ minutes): No  Interactive Complexity: No  Crisis: No  [facility] Patient: No    Treatment Objective(s) Addressed in This Session:  Target Behavior(s): disease management/lifestyle changes , mental health management    Depressed Mood: Decrease frequency and intensity of feeling down, depressed, hopeless  Identify negative self-talk and behaviors: challenge core beliefs, myths, and actions  Anxiety: will experience a reduction in anxiety, will develop more effective coping skills to manage anxiety symptoms, will develop healthy cognitive patterns and beliefs and will increase ability to function adaptively    Current Stressors / Issues:  Patient reported ongoing improvement in symptoms.  She stated that she has had more positive interactions with her son, and has been enjoying how the relationship has been unfolding.  Patient stated that she has been feeling sad as she anticipates her son's first day of day care on 2/4. She discussed how she wonders he will feel since he will be cared for by others and how it may impact their relationship. Patient discussed how she feels guilty about sending him to  due to concerns  about potential harm that may happen to him. Patient reported that she sometimes feels as though it would be better for her to stay at home in order to try to protect him.  Patient requesting feedback on how to approach first day of . Patient denied any SI in the past week.    Progress on Treatment Objective(s) / Homework:  Stable - ACTION (Actively working towards change); Intervened by reinforcing change plan / affirming steps taken    Motivational Interviewing    MI Intervention: Expressed Empathy/Understanding, Supported Autonomy, Collaboration, Evocation, Permission to raise concern or advise, Open-ended questions, Change talk (evoked) and Reframe     Change Talk Expressed by the Patient: Ability to change Reasons to change Need to change    Provider Response to Change Talk: E - Evoked more info from patient about behavior change, A - Affirmed patient's thoughts, decisions, or attempts at behavior change, R - Reflected patient's change talk and S - Summarized patient's change talk statements    Cognitive Behavioral Therapy: Discussed connection between thoughts, feelings, and behaviors. Taught patient how to identify cognitive distortions, and assisted patient to identify own cognitive distortions. Taught and engaged patient in cognitive restructuring techniques.    Also provided psychoeducation about behavioral health condition, symptoms, and treatment options    Care Plan review completed: Yes    Medication Review:  Patient seen by psychiatrist on 1/7, and reported that they increased her medications.    Medication Compliance:  Yes    Changes in Health Issues:   None reported    Chemical Use Review:   Substance Use: Chemical use reviewed, no active concerns identified      Tobacco Use: No current tobacco use.      Assessment: Current Emotional / Mental Status (status of significant symptoms):  Risk status (Self / Other harm or suicidal ideation)  Patient has had a history of suicidal ideation: in  correlation with stress, sent text on 4/13 about wanting to die, but no active SI. Per patient and , patient has had history of passive SI for more than 10 years and denies a history of suicide attempts, self-injurious behavior, homicidal ideation, homicidal behavior and and other safety concerns  Patient denies current fears or concerns for personal safety.  Patient denies current or recent suicidal ideation or behaviors.  Patient denies current or recent homicidal ideation or behaviors.  Patient denies current or recent self injurious behavior or ideation.  Patient denies other safety concerns.  A safety and risk management plan has not been developed at this time, however patient was encouraged to call [place] [phone] should there be a change in any of these risk factors.    Appearance:   Appropriate   Eye Contact:   Good   Psychomotor Behavior: Normal   Attitude:   Cooperative   Orientation:   All  Speech   Rate / Production: Normal    Volume:  Normal   Mood:    Normal  Affect:    Appropriate   Thought Content:  Clear   Thought Form:  Coherent  Logical   Insight:    Good     Diagnoses:  1. Moderate episode of recurrent major depressive disorder (H)    2. Generalized anxiety disorder        Collateral Reports Completed:  Not Applicable    Plan: (Homework, other):  Patient was given information about behavioral services and encouraged to schedule a follow up appointment with the clinic [facility] in 1 week.  She was also given Cognitive Behavioral Therapy skills to practice when experiencing anxiety and depression. CD Recommendations: No indications of CD issues.  Stephanie Calabrese, KVNG, [facility]      ______________________________________________________________________    Integrated Primary Care Behavioral Health Treatment Plan    Patient's Name: Myrna Brothers  YOB: 1985    Date: 11/29/18    DSM-V Diagnoses: 296.32 (F33.1) Major Depressive Disorder, Recurrent Episode, Moderate _ or 300.02  (F41.1) Generalized Anxiety Disorder  Psychosocial / Contextual Factors: recently postpartum  WHODAS: TBD    Ref[facility] / Collaboration:  [facility]    Anticipated number of session or this episode of care: 6-8      MeasurableTreatment Goal(s) related to diagnosis / functional impairment(s)  Goal 1: Patient will reduce feelings of depression as evidenced by decreased score on PHQ-9.    I will know I've met my goal when I feel less down and emotional.      Objective #A (Patient Action)    Patient will Decrease frequency and intensity of feeling down, depressed, hopeless.  Status: Continued - Date(s): 11/29/18    Intervention(s)  [facility] will assign homework to assist with behavioral activation/activity scheduling.    Objective #B  Patient will Identify negative self-talk and behaviors: challenge core beliefs, myths, and actions.  Status: Continued - Date(s):11/29/18     Intervention(s)  [facility] will teach about automatic negative thoughts, irrational core beliefs, and cognitive restructuring techniques.        Goal 2: Patient will reduce feelings of anxiety as evidenced by decreased score on GAD7.    I will know I've met my goal when I feel less anxious and worried.      Objective #A (Patient Action)    Status: Continued - Date(s):11/29/18     Patient will identify three distraction and diversion activities and use those activities to decrease level of anxiety  .    Intervention(s)  [facility] will assign homework to practice distress tolerance and mindfulness techniques between session.    Objective #B  Patient will use cognitive strategies identified in therapy to challenge anxious thoughts.    Status: Continued - Date(s):11/29/18     Intervention(s)  [facility] will teach CBT techniques related to cognitive restructuring.    Objective #C  Patient will use relaxation strategies 1-2 times per day to reduce the physical symptoms of anxiety.  Status: Continued - Date(s):11/29/18     Intervention(s)  [facility]  will role-play relaxation techniques.    Patient has reviewed and agreed to the above plan.      Stephanie Calabrese, [facility]  April 23, 2018

## 2019-02-08 ENCOUNTER — OFFICE VISIT (OUTPATIENT)
Dept: PSYCHOLOGY | Facility: CLINIC | Age: 34
End: 2019-02-08
Payer: COMMERCIAL

## 2019-02-08 DIAGNOSIS — F41.1 GENERALIZED ANXIETY DISORDER: ICD-10-CM

## 2019-02-08 DIAGNOSIS — F33.1 MODERATE EPISODE OF RECURRENT MAJOR DEPRESSIVE DISORDER (H): Primary | ICD-10-CM

## 2019-02-08 PROCEDURE — 90834 PSYTX W PT 45 MINUTES: CPT | Performed by: SOCIAL WORKER

## 2019-02-08 NOTE — PROGRESS NOTES
"[facility] Care [facility]  Integrated Behavioral Health Services  February 1, 2019    Behavioral Health Clinician Progress Note    Patient Name: Myrna Brothers           Service Type:  Individual      Service Location:   Face to Face in Clinic     Session Start Time:  11: 43 am Session End Time:  12: 30 pm      Session Length: 38 - 52      Attendees: Patient    Visit Activities (Refresh list every visit): [facility] Only    Diagnostic Assessment Date: 4/12/18  Treatment Plan Review Date: 11/29/18  See Flowsheets for today's PHQ-9 and OREN-7 results  Previous PHQ-9:   PHQ-9 SCORE 12/20/2018 1/11/2019 1/18/2019   PHQ-9 Total Score 16 15 9     Previous OREN-7:   OREN-7 SCORE 12/20/2018 1/11/2019 1/18/2019   Total Score 14 10 8       LUKASZ LEVEL:  No flowsheet data found.    DATA  Extended Session (60+ minutes): No  Interactive Complexity: No  Crisis: No  [facility] Patient: No    Treatment Objective(s) Addressed in This Session:  Target Behavior(s): disease management/lifestyle changes , mental health management    Depressed Mood: Decrease frequency and intensity of feeling down, depressed, hopeless  Identify negative self-talk and behaviors: challenge core beliefs, myths, and actions  Anxiety: will experience a reduction in anxiety, will develop more effective coping skills to manage anxiety symptoms, will develop healthy cognitive patterns and beliefs and will increase ability to function adaptively    Current Stressors / Issues:  Patient reported that it has been an \"exhausitng week\". She stated that it was her son's first week of . She discussed her perceptions that he did not seem \"happy\" to see her when she picked him up, and that he seemed sad to be leaving .  She discussed how he was \"jarrett\" when they arrived home, and it was difficult to get him to smile.  Patient wonders if this belief is due to her anxiety and depression, and was receptive to exploring potential other reasons why her son's " behaviors occurred.  She discussed additional anxiety about picking him up from  since she does not know always what needs to be picked up and brought home (since she does not do drop offs), and is afraid to ask questions about his day and how he did. Patient shared that she has started to feel disconnected from her son since she does not know as much about his day.  Patient reported that prior to him starting , the center was communicating that they want to have an open dialogue with parents, and shared that the teachers have been asking her questions about her son. Patient recognized this as a cue that they are receptive to talking with parents.      Progress on Treatment Objective(s) / Homework:  Stable - ACTION (Actively working towards change); Intervened by reinforcing change plan / affirming steps taken    Motivational Interviewing    MI Intervention: Expressed Empathy/Understanding, Supported Autonomy, Collaboration, Evocation, Permission to raise concern or advise, Open-ended questions, Change talk (evoked) and Reframe     Change Talk Expressed by the Patient: Ability to change Reasons to change Need to change    Provider Response to Change Talk: E - Evoked more info from patient about behavior change, A - Affirmed patient's thoughts, decisions, or attempts at behavior change, R - Reflected patient's change talk and S - Summarized patient's change talk statements    Cognitive Behavioral Therapy: Discussed connection between thoughts, feelings, and behaviors. Taught patient how to identify cognitive distortions, and assisted patient to identify own cognitive distortions. Taught and engaged patient in cognitive restructuring techniques.    Also provided psychoeducation about behavioral health condition, symptoms, and treatment options    Care Plan review completed: Yes    Medication Review:  No changes.    Medication Compliance:  Yes    Changes in Health Issues:   None reported    Chemical Use  Review:   Substance Use: Chemical use reviewed, no active concerns identified      Tobacco Use: No current tobacco use.      Assessment: Current Emotional / Mental Status (status of significant symptoms):  Risk status (Self / Other harm or suicidal ideation)  Patient has had a history of suicidal ideation: in correlation with stress, sent text on 4/13 about wanting to die, but no active SI. Per patient and , patient has had history of passive SI for more than 10 years and denies a history of suicide attempts, self-injurious behavior, homicidal ideation, homicidal behavior and and other safety concerns  Patient denies current fears or concerns for personal safety.  Patient denies current or recent suicidal ideation or behaviors.  Patient denies current or recent homicidal ideation or behaviors.  Patient denies current or recent self injurious behavior or ideation.  Patient denies other safety concerns.  A safety and risk management plan has not been developed at this time, however patient was encouraged to call [place] [phone] should there be a change in any of these risk factors.    Appearance:   Appropriate   Eye Contact:   Good   Psychomotor Behavior: Normal   Attitude:   Cooperative   Orientation:   All  Speech   Rate / Production: Normal    Volume:  Normal   Mood:    Normal  Affect:    Appropriate   Thought Content:  Clear   Thought Form:  Coherent  Logical   Insight:    Good     Diagnoses:  1. Moderate episode of recurrent major depressive disorder (H)    2. Generalized anxiety disorder        Collateral Reports Completed:  Not Applicable    Plan: (Homework, other):  Patient was given information about behavioral services and encouraged to schedule a follow up appointment with the clinic [facility] in 2 weeks.  She was also given Cognitive Behavioral Therapy skills to practice when experiencing anxiety and depression. CD Recommendations: No indications of CD issues.  Stephanie Calabrese [facility] Hospital[facility]IV[facility],  [facility]      ______________________________________________________________________    Integrated Primary Care Behavioral Health Treatment Plan    Patient's Name: Myrna Brothers  YOB: 1985    Date: 11/29/18    DSM-V Diagnoses: 296.32 (F33.1) Major Depressive Disorder, Recurrent Episode, Moderate _ or 300.02 (F41.1) Generalized Anxiety Disorder  Psychosocial / Contextual Factors: recently postpartum  WHODAS: TBD    Referral / Collaboration:  Patient had appointment with [facility] 11[facility]/18.    Anticipated number of session or this episode of care: 6-8      MeasurableTreatment Goal(s) related to diagnosis / functional impairment(s)  Goal 1: Patient will reduce feelings of depression as evidenced by decreased score on PHQ-9.    I will know I've met my goal when I feel less down and emotional.      Objective #A (Patient Action)    Patient will Decrease frequency and intensity of feeling down, depressed, hopeless.  Status: Continued - Date(s): 11/29/18    Intervention(s)  [facility] will assign homework to assist with behavioral activation/activity scheduling.    Objective #B  Patient will Identify negative self-talk and behaviors: challenge core beliefs, myths, and actions.  Status: Continued - Date(s):11/29/18     Intervention(s)  [facility] will teach about automatic negative thoughts, irrational core beliefs, and cognitive restructuring techniques.        Goal 2: Patient will reduce feelings of anxiety as evidenced by decreased score on GAD7.    I will know I've met my goal when I feel less anxious and worried.      Objective #A (Patient Action)    Status: Continued - Date(s):11/29/18     Patient will identify three distraction and diversion activities and use those activities to decrease level of anxiety  .    Intervention(s)  [facility] will assign homework to practice distress tolerance and mindfulness techniques between session.    Objective #B  Patient will use cognitive strategies identified in therapy to challenge  anxious thoughts.    Status: Continued - Date(s):11/29/18     Intervention(s)  [facility] will teach CBT techniques related to cognitive restructuring.    Objective #C  Patient will use relaxation strategies 1-2 times per day to reduce the physical symptoms of anxiety.  Status: Continued - Date(s):11/29/18     Intervention(s)  [facility] will role-play relaxation techniques.    Patient has reviewed and agreed to the above plan.      Stephanie Calabrese, KVNG  April 23, 2018

## 2019-02-20 ENCOUNTER — OFFICE VISIT (OUTPATIENT)
Dept: PEDIATRICS | Facility: CLINIC | Age: 34
End: 2019-02-20
Payer: COMMERCIAL

## 2019-02-20 VITALS
DIASTOLIC BLOOD PRESSURE: 74 MMHG | TEMPERATURE: 97.1 F | BODY MASS INDEX: 28.19 KG/M2 | HEART RATE: 89 BPM | SYSTOLIC BLOOD PRESSURE: 112 MMHG | OXYGEN SATURATION: 96 % | WEIGHT: 172 LBS

## 2019-02-20 DIAGNOSIS — J02.9 SORE THROAT: ICD-10-CM

## 2019-02-20 DIAGNOSIS — J01.90 ACUTE SINUSITIS WITH SYMPTOMS > 10 DAYS: Primary | ICD-10-CM

## 2019-02-20 LAB
DEPRECATED S PYO AG THROAT QL EIA: NORMAL
SPECIMEN SOURCE: NORMAL

## 2019-02-20 PROCEDURE — 99213 OFFICE O/P EST LOW 20 MIN: CPT | Performed by: INTERNAL MEDICINE

## 2019-02-20 PROCEDURE — 87880 STREP A ASSAY W/OPTIC: CPT | Performed by: INTERNAL MEDICINE

## 2019-02-20 PROCEDURE — 87081 CULTURE SCREEN ONLY: CPT | Performed by: INTERNAL MEDICINE

## 2019-02-20 RX ORDER — AMOXICILLIN 500 MG/1
1000 CAPSULE ORAL 2 TIMES DAILY
Qty: 40 CAPSULE | Refills: 0 | Status: SHIPPED | OUTPATIENT
Start: 2019-02-20 | End: 2019-03-28

## 2019-02-20 NOTE — PATIENT INSTRUCTIONS
Medication(s) prescribed today:    Orders Placed This Encounter   Medications     amoxicillin (AMOXIL) 500 MG capsule     Sig: Take 2 capsules (1,000 mg) by mouth 2 times daily for 10 days     Dispense:  40 capsule     Refill:  0

## 2019-02-20 NOTE — PROGRESS NOTES
SUBJECTIVE:   Myrna Brothers is a 33 year old female who presents to clinic today for the following health issues:      RESPIRATORY SYMPTOMS      Duration: 2 weeks    Description  nasal congestion, sore throat, facial pain/pressure, cough, fever, chills, ear pain bilateral, headache, fatigue/malaise and myalgias    Severity: moderate    Accompanying signs and symptoms: None    History (predisposing factors): Had the stomach flu last week    Precipitating or alleviating factors: None    Therapies tried and outcome:  oral decongestant guaifenesin      Patient with history of recurrent sinusitis, current symptoms are similar to prior sinus infection.  Several coworkers at work also had strep infection last week.    ROS:  Constitutional, HEENT, cardiovascular, pulmonary, gi and gu systems are negative, except as otherwise noted.         Current Outpatient Medications on File Prior to Visit:  acetaminophen (TYLENOL) 500 MG tablet Take 500-1,000 mg by mouth as needed for mild pain    albuterol (PROAIR HFA/PROVENTIL HFA/VENTOLIN HFA) 108 (90 Base) MCG/ACT inhaler Inhale 2 puffs into the lungs every 6 hours   APNO CREA ointment Apply 60 g topically every hour as needed for skin care   busPIRone (BUSPAR) 15 MG tablet Take 5 mg by mouth   norethindrone (MICRONOR) 0.35 MG tablet Take 1 tablet (0.35 mg) by mouth daily   sertraline (ZOLOFT) 100 MG tablet    busPIRone (BUSPAR) 5 MG tablet Take 1 tablet (5 mg) by mouth 2 times daily (Patient not taking: Reported on 2019)   [] cephALEXin (KEFLEX) 500 MG capsule Take 1 capsule (500 mg) by mouth 4 times daily for 10 days   [] CONTOUR NEXT EZ (CONTOUR NEXT EZ W/DEVICE KIT) w/Device KIT 1 kit once for 1 dose   [] ondansetron (ZOFRAN ODT) 4 MG ODT tab Take 1 tablet (4 mg) by mouth every 8 hours as needed for nausea     No current facility-administered medications on file prior to visit.        Patient Active Problem List   Diagnosis     Moderate episode  of recurrent major depressive disorder (H)     Generalized anxiety disorder     Cervical cancer screening     Insulin controlled gestational diabetes mellitus (GDM) in third trimester     Past Surgical History:   Procedure Laterality Date     HEAD & NECK SURGERY      wisdom teeth       Social History     Tobacco Use     Smoking status: Never Smoker     Smokeless tobacco: Never Used   Substance Use Topics     Alcohol use: Yes     Comment: denies recent use     Family History   Problem Relation Age of Onset     Breast Cancer Mother      Stillborn Mother         had a still born     Other - See Comments Mother         2 miscarriage     Diabetes Father      Heart Disease Father      Anxiety Disorder Maternal Grandmother      Alzheimer Disease Maternal Grandmother      Asthma Maternal Grandfather      Prostate Cancer Maternal Grandfather      Hypertension Paternal Grandmother      Leukemia Paternal Grandfather              Problem list, Medication list, Allergies, and Medical/Social/Surgical histories reviewed in [facility] and updated as appropriate.    OBJECTIVE:                                                    /74   Pulse 89   Temp 97.1  F (36.2  C) (Temporal)   Wt 78 kg (172 lb)   SpO2 96%   BMI 28.19 kg/m      GENERAL: healthy, alert and no distress  HEENT: TMs normal bilaterally, oropharynx is clear, some postnasal drip.  Maxillary sinus tenderness  Neck: no adenopathy/mass/stiffness. Thyroid normal.  Lung: clear, no wheezing/rhonchi/crackles  Heart: RRR, normal S1/2, no murmur/gallop/rup        Diagnostic test results:  Results for orders placed or performed in visit on 02/20/19   Rapid strep screen   Result Value Ref Range    Specimen Description Throat     Rapid Strep A Screen       NEGATIVE: No Group A streptococcal antigen detected by immunoassay, await culture report.         ASSESSMENT/PLAN:                                                      33 year old female with the following diagnoses and  treatment plan:      ICD-10-CM    1. Acute sinusitis with symptoms > 10 days J01.90 amoxicillin (AMOXIL) 500 MG capsule   2. Sore throat J02.9 Rapid strep screen     Beta strep group A culture       Will call or return to clinic if worsening or symptoms not improving as discussed.  See Patient Instructions.      Augie Ochoa MD-PhD  [facility]    (Note: Chart documentation was done in part with Dragon Voice Recognition software. Although reviewed after completion, some word and grammatical errors may remain.)

## 2019-02-21 ENCOUNTER — MYC MEDICAL ADVICE (OUTPATIENT)
Dept: PEDIATRICS | Facility: CLINIC | Age: 34
End: 2019-02-21

## 2019-02-21 ENCOUNTER — TELEPHONE (OUTPATIENT)
Dept: PEDIATRICS | Facility: CLINIC | Age: 34
End: 2019-02-21

## 2019-02-21 LAB
BACTERIA SPEC CULT: NORMAL
SPECIMEN SOURCE: NORMAL

## 2019-02-21 NOTE — LETTER
February 21, 2019      Myrna Brothers  [id number]  [place] [address]              To Whom It May Concern,    Myrna Brothers was seen in clinic on 2/20/2019.     Should you have any questions, please feel free to contact me at the address above.            Sincerely,        Augie Ochoa MD PhD

## 2019-02-21 NOTE — TELEPHONE ENCOUNTER
[facility] Call Center    Phone Message    May a detailed message be left on voicemail: yes    Reason for Call: Form or Letter   Type or form/letter needing completion: Patient is requesting a doctors notes stating that she was seen yesterday to excuse her from work yesterday and today. Please advise.   Provider: Dr. Ochoa  Date form needed: asap  Once completed: Patient will  at .      Action Taken: Message routed to:  Primary Care p [phone]

## 2019-02-25 NOTE — TELEPHONE ENCOUNTER
Work note revised dated 02/24/19 faxed to fax #: [phone]. Received confirmation from [employer] that fax was sent successfully.  Lexi Barrett CMA

## 2019-02-25 NOTE — TELEPHONE ENCOUNTER
Signed letter found on Dr. Ochoa's desk.    Called patient to inform that letter has been completed. Patient states that should like letter faxed to her employer. Patient will call back with fax number.    Letter on this MA's desk until fax number is obtained.  Lexi Barrett CMA

## 2019-02-25 NOTE — TELEPHONE ENCOUNTER
Pt states fax number is [phone]. Pt states this is for the 2nd letter that was typed up. Please call pt with any questions.

## 2019-02-25 NOTE — TELEPHONE ENCOUNTER
Letter printed and placed on Dr. Ochoa's desk to sign when back in clinic tomorrow, 02/26.  Lexi Barrett, CMA

## 2019-03-01 ENCOUNTER — OFFICE VISIT (OUTPATIENT)
Dept: PSYCHOLOGY | Facility: CLINIC | Age: 34
End: 2019-03-01
Payer: COMMERCIAL

## 2019-03-01 DIAGNOSIS — F33.1 MODERATE EPISODE OF RECURRENT MAJOR DEPRESSIVE DISORDER (H): Primary | ICD-10-CM

## 2019-03-01 DIAGNOSIS — F41.1 GENERALIZED ANXIETY DISORDER: ICD-10-CM

## 2019-03-01 PROCEDURE — 90832 PSYTX W PT 30 MINUTES: CPT | Performed by: SOCIAL WORKER

## 2019-03-01 NOTE — PROGRESS NOTES
[facility]  Integrated Behavioral Health Services  March 1, 2019    Behavioral Health Clinician Progress Note    Patient Name: Myrna Brothers           Service Type:  Individual      Service Location:   Face to Face in Clinic     Session Start Time:  11: 53 am Session End Time:  12: 33  pm      Session Length: 16 - 37      Attendees: Patient    Visit Activities (Refresh list every visit): [facility] Only    Diagnostic Assessment Date: 4/12/18  Treatment Plan Review Date: 11/29/18  See Flowsheets for today's PHQ-9 and OREN-7 results  Previous PHQ-9:   PHQ-9 SCORE 12/20/2018 1/11/2019 1/18/2019   PHQ-9 Total Score 16 15 9     Previous OREN-7:   OREN-7 SCORE 12/20/2018 1/11/2019 1/18/2019   Total Score 14 10 8       LUKASZ LEVEL:  No flowsheet data found.    DATA  Extended Session (60+ minutes): No  Interactive Complexity: No  Crisis: No  [facility] Patient: No    Treatment Objective(s) Addressed in This Session:  Target Behavior(s): disease management/lifestyle changes , mental health management    Depressed Mood: Decrease frequency and intensity of feeling down, depressed, hopeless  Identify negative self-talk and behaviors: challenge core beliefs, myths, and actions  Anxiety: will experience a reduction in anxiety, will develop more effective coping skills to manage anxiety symptoms, will develop healthy cognitive patterns and beliefs and will increase ability to function adaptively    Current Stressors / Issues:  Patient reported ongoing stressors related to all members of the family having a sinus infection, her 's recovery from surgery, her 's allergic reaction to antibiotics, and her son's recent tongue tie surgery.  Despite stressors, patient denied any concerns about depression. She stated that she has felt sad and nervous, but reported that the emotions have seemed appropriate and in proportion of the event.  Patient reported that she is feeling better about  and asking  questions. She stated that they have seemed receptive to her questions, and is less overwhelmed with the  and drop off process.  Patient discussed anxiety at work due to working being busy and not feeling like she has enough time to pump.  Patient reported that her supply has dropped as a result of skipping pump sessions, and voiced frustration with herself.  Patient stated that she values pumping and giving her son breast milk, and was requesting feedback on how to manage stress at work.   Patient was receptive to exploring new helpful cognitions related to her work to help her to take time for herself and pumping.    Progress on Treatment Objective(s) / Homework:  Stable - ACTION (Actively working towards change); Intervened by reinforcing change plan / affirming steps taken    Motivational Interviewing    MI Intervention: Expressed Empathy/Understanding, Supported Autonomy, Collaboration, Evocation, Permission to raise concern or advise, Open-ended questions, Change talk (evoked) and Reframe     Change Talk Expressed by the Patient: Ability to change Reasons to change Need to change    Provider Response to Change Talk: E - Evoked more info from patient about behavior change, A - Affirmed patient's thoughts, decisions, or attempts at behavior change, R - Reflected patient's change talk and S - Summarized patient's change talk statements    Cognitive Behavioral Therapy: Discussed connection between thoughts, feelings, and behaviors. Taught patient how to identify cognitive distortions, and assisted patient to identify own cognitive distortions. Taught and engaged patient in cognitive restructuring techniques.    Also provided psychoeducation about behavioral health condition, symptoms, and treatment options    Care Plan review completed: Yes    Medication Review:  No changes.    Medication Compliance:  Yes    Changes in Health Issues:   None reported    Chemical Use Review:   Substance Use: Chemical use  reviewed, no active concerns identified      Tobacco Use: No current tobacco use.      Assessment: Current Emotional / Mental Status (status of significant symptoms):  Risk status (Self / Other harm or suicidal ideation)  Patient has had a history of suicidal ideation: in correlation with stress, sent text on 4/13 about wanting to die, but no active SI. Per patient and , patient has had history of passive SI for more than 10 years and denies a history of suicide attempts, self-injurious behavior, homicidal ideation, homicidal behavior and and other safety concerns  Patient denies current fears or concerns for personal safety.  Patient denies current or recent suicidal ideation or behaviors.  Patient denies current or recent homicidal ideation or behaviors.  Patient denies current or recent self injurious behavior or ideation.  Patient denies other safety concerns.  A safety and risk management plan has not been developed at this time, however patient was encouraged to call [place] [phone] should there be a change in any of these risk factors.    Appearance:   Appropriate   Eye Contact:   Good   Psychomotor Behavior: Normal   Attitude:   Cooperative   Orientation:   All  Speech   Rate / Production: Normal    Volume:  Normal   Mood:    Normal  Affect:    Appropriate   Thought Content:  Clear   Thought Form:  Coherent  Logical   Insight:    Good     Diagnoses:  1. Moderate episode of recurrent major depressive disorder (H)    2. Generalized anxiety disorder        Collateral Reports Completed:  Not Applicable    Plan: (Homework, other):  Patient was given information about behavioral services and encouraged to schedule a follow up appointment with the clinic [facility] in 1 week.  She was also given Cognitive Behavioral Therapy skills to practice when experiencing anxiety and depression. CD Recommendations: No indications of CD issues.  Stephanie Calabrese, [facility],  [facility]      ______________________________________________________________________    Integrated Primary Care Behavioral Health Treatment Plan    Patient's Name: Myrna Brothers  YOB: 1985    Date: 11/29/18    DSM-V Diagnoses: 296.32 (F33.1) Major Depressive Disorder, Recurrent Episode, Moderate _ or 300.02 (F41.1) Generalized Anxiety Disorder  Psychosocial / Contextual Factors: recently postpartum  WHODAS: TBD    Referral / Collaboration:  Patient had appointment with psychiary 11/16/18.    Anticipated number of session or this episode of care: 6-8      MeasurableTreatment Goal(s) related to diagnosis / functional impairment(s)  Goal 1: Patient will reduce feelings of depression as evidenced by decreased score on PHQ-9.    I will know I've met my goal when I feel less down and emotional.      Objective #A (Patient Action)    Patient will Decrease frequency and intensity of feeling down, depressed, hopeless.  Status: Continued - Date(s): 11/29/18    Intervention(s)  [facility] will assign homework to assist with behavioral activation/activity scheduling.    Objective #B  Patient will Identify negative self-talk and behaviors: challenge core beliefs, myths, and actions.  Status: Continued - Date(s):11/29/18     Intervention(s)  [facility] will teach about automatic negative thoughts, irrational core beliefs, and cognitive restructuring techniques.        Goal 2: Patient will reduce feelings of anxiety as evidenced by decreased score on GAD7.    I will know I've met my goal when I feel less anxious and worried.      Objective #A (Patient Action)    Status: Continued - Date(s):11/29/18     Patient will identify three distraction and diversion activities and use those activities to decrease level of anxiety  .    Intervention(s)  [facility] will assign homework to practice distress tolerance and mindfulness techniques between session.    Objective #B  Patient will use cognitive strategies identified in therapy to challenge  anxious thoughts.    Status: Continued - Date(s):11/29/18     Intervention(s)  [facility] will teach CBT techniques related to cognitive restructuring.    Objective #C  Patient will use relaxation strategies 1-2 times per day to reduce the physical symptoms of anxiety.  Status: Continued - Date(s):11/29/18     Intervention(s)  [facility] will role-play relaxation techniques.    Patient has reviewed and agreed to the above plan.      Stephanie Calabrese, KVNG  April 23, 2018

## 2019-03-15 ENCOUNTER — OFFICE VISIT (OUTPATIENT)
Dept: PSYCHOLOGY | Facility: CLINIC | Age: 34
End: 2019-03-15
Payer: COMMERCIAL

## 2019-03-15 DIAGNOSIS — F41.1 GENERALIZED ANXIETY DISORDER: ICD-10-CM

## 2019-03-15 DIAGNOSIS — F33.1 MODERATE EPISODE OF RECURRENT MAJOR DEPRESSIVE DISORDER (H): Primary | ICD-10-CM

## 2019-03-15 PROCEDURE — 90832 PSYTX W PT 30 MINUTES: CPT | Performed by: SOCIAL WORKER

## 2019-03-15 NOTE — PROGRESS NOTES
"[facility]  Integrated Behavioral Health Services  March 15, 2019    Behavioral Health Clinician Progress Note    Patient Name: Myrna Brothers           Service Type:  Individual      Service Location:   Face to Face in Clinic     Session Start Time:  11: 33 am Session End Time:  12: 10  pm      Session Length: 16 - 37      Attendees: Patient    Visit Activities (Refresh list every visit): [facility] Only    Diagnostic Assessment Date: 4/12/18  Treatment Plan Review Date: 11/29/18  See Flowsheets for today's PHQ-9 and OREN-7 results  Previous PHQ-9:   PHQ-9 SCORE 12/20/2018 1/11/2019 1/18/2019   PHQ-9 Total Score 16 15 9     Previous OREN-7:   OREN-7 SCORE 12/20/2018 1/11/2019 1/18/2019   Total Score 14 10 8       LUKASZ LEVEL:  No flowsheet data found.    DATA  Extended Session (60+ minutes): No  Interactive Complexity: No  Crisis: No  [facility] Patient: No    Treatment Objective(s) Addressed in This Session:  Target Behavior(s): disease management/lifestyle changes , mental health management    Depressed Mood: Decrease frequency and intensity of feeling down, depressed, hopeless  Identify negative self-talk and behaviors: challenge core beliefs, myths, and actions  Anxiety: will experience a reduction in anxiety, will develop more effective coping skills to manage anxiety symptoms, will develop healthy cognitive patterns and beliefs and will increase ability to function adaptively    Current Stressors / Issues:  Patient reported ongoing stressor related to her 's health. She stated that he continues to have complications, and will soon be traveling for work. She discussed anxiety secondary to his travels outside of the country, with worries that he will try to do \"too much\" and that he may not have access to quality healthcare while traveling. Patient reported that she will be home alone with her baby while he is traveling, and expressed anxiety about being alone and not having support from " her .  Patient discussed how she sometimes feels like she is having rational thoughts in the moment, but then her  will tell her that he thinks that her anxiety/depression is impacting her thoughts. Patient requesting more feedback on how to identify thought patterns, and was receptive to exploring emotions and physical symptoms that may indicate that thought patterns may be impacted by emotions and then suspending judgement on thought patterns until she notes a reduction in intense emotions.    Progress on Treatment Objective(s) / Homework:  Stable - ACTION (Actively working towards change); Intervened by reinforcing change plan / affirming steps taken    Motivational Interviewing    MI Intervention: Expressed Empathy/Understanding, Supported Autonomy, Collaboration, Evocation, Permission to raise concern or advise, Open-ended questions, Change talk (evoked) and Reframe     Change Talk Expressed by the Patient: Ability to change Reasons to change Need to change    Provider Response to Change Talk: E - Evoked more info from patient about behavior change, A - Affirmed patient's thoughts, decisions, or attempts at behavior change, R - Reflected patient's change talk and S - Summarized patient's change talk statements    Cognitive Behavioral Therapy: Discussed connection between thoughts, feelings, and behaviors. Taught patient how to identify cognitive distortions, and assisted patient to identify own cognitive distortions. Taught and engaged patient in cognitive restructuring techniques.    Also provided psychoeducation about behavioral health condition, symptoms, and treatment options    Care Plan review completed: Yes    Medication Review:  No changes.    Medication Compliance:  Yes    Changes in Health Issues:   None reported    Chemical Use Review:   Substance Use: Chemical use reviewed, no active concerns identified      Tobacco Use: No current tobacco use.      Assessment: Current Emotional /  Mental Status (status of significant symptoms):  Risk status (Self / Other harm or suicidal ideation)  Patient has had a history of suicidal ideation: in correlation with stress, sent text on 4/13 about wanting to die, but no active SI. Per patient and , patient has had history of passive SI for more than 10 years and denies a history of suicide attempts, self-injurious behavior, homicidal ideation, homicidal behavior and and other safety concerns  Patient denies current fears or concerns for personal safety.  Patient denies current or recent suicidal ideation or behaviors.  Patient denies current or recent homicidal ideation or behaviors.  Patient denies current or recent self injurious behavior or ideation.  Patient denies other safety concerns.  A safety and risk management plan has not been developed at this time, however patient was encouraged to call [place] [phone] should there be a change in any of these risk factors.    Appearance:   Appropriate   Eye Contact:   Good   Psychomotor Behavior: Normal   Attitude:   Cooperative   Orientation:   All  Speech   Rate / Production: Normal    Volume:  Normal   Mood:    Normal  Affect:    Appropriate   Thought Content:  Clear   Thought Form:  Coherent  Logical   Insight:    Good     Diagnoses:  1. Moderate episode of recurrent major depressive disorder (H)    2. Generalized anxiety disorder        Collateral Reports Completed:  Not Applicable    Plan: (Homework, other):  Patient was given information about behavioral services and encouraged to schedule a follow up appointment with the clinic [facility] in 2 weeks.  She was also given Cognitive Behavioral Therapy skills to practice when experiencing anxiety and depression. CD Recommendations: No indications of CD issues.  Stephanie Calabrese, [facility], [facility]      ______________________________________________________________________    Integrated Primary Care Behavioral Health Treatment Plan    Patient's Name: Myrna REYNOLD  Zev  YOB: 1985    Date: 11/29/18    DSM-V Diagnoses: 296.32 (F33.1) Major Depressive Disorder, Recurrent Episode, Moderate _ or 300.02 (F41.1) Generalized Anxiety Disorder  Psychosocial / Contextual Factors: recently postpartum  WHODAS: TBD    Referral / Collaboration:  Patient had appointment with psychiary 11/16/18.    Anticipated number of session or this episode of care: 6-8      MeasurableTreatment Goal(s) related to diagnosis / functional impairment(s)  Goal 1: Patient will reduce feelings of depression as evidenced by decreased score on PHQ-9.    I will know I've met my goal when I feel less down and emotional.      Objective #A (Patient Action)    Patient will Decrease frequency and intensity of feeling down, depressed, hopeless.  Status: Continued - Date(s): 11/29/18    Intervention(s)  [facility] will assign homework to assist with behavioral activation/activity scheduling.    Objective #B  Patient will Identify negative self-talk and behaviors: challenge core beliefs, myths, and actions.  Status: Continued - Date(s):11/29/18     Intervention(s)  [facility] will teach about automatic negative thoughts, irrational core beliefs, and cognitive restructuring techniques.        Goal 2: Patient will reduce feelings of anxiety as evidenced by decreased score on GAD7.    I will know I've met my goal when I feel less anxious and worried.      Objective #A (Patient Action)    Status: Continued - Date(s):11/29/18     Patient will identify three distraction and diversion activities and use those activities to decrease level of anxiety  .    Intervention(s)  [facility] will assign homework to practice distress tolerance and mindfulness techniques between session.    Objective #B  Patient will use cognitive strategies identified in therapy to challenge anxious thoughts.    Status: Continued - Date(s):11/29/18     Intervention(s)  [facility] will teach CBT techniques related to cognitive restructuring.    Objective #C  Patient  will use relaxation strategies 1-2 times per day to reduce the physical symptoms of anxiety.  Status: Continued - Date(s):11/29/18     Intervention(s)  [facility] will role-play relaxation techniques.    Patient has reviewed and agreed to the above plan.      KVNG Webb  April 23, 2018

## 2019-03-28 ENCOUNTER — OFFICE VISIT (OUTPATIENT)
Dept: PEDIATRICS | Facility: CLINIC | Age: 34
End: 2019-03-28
Payer: COMMERCIAL

## 2019-03-28 VITALS
SYSTOLIC BLOOD PRESSURE: 98 MMHG | WEIGHT: 167.8 LBS | OXYGEN SATURATION: 97 % | HEART RATE: 71 BPM | BODY MASS INDEX: 26.97 KG/M2 | DIASTOLIC BLOOD PRESSURE: 64 MMHG | TEMPERATURE: 98.1 F | HEIGHT: 66 IN

## 2019-03-28 DIAGNOSIS — H91.93 HEARING PROBLEM OF BOTH EARS: Primary | ICD-10-CM

## 2019-03-28 DIAGNOSIS — J32.9 CHRONIC SINUSITIS, UNSPECIFIED LOCATION: ICD-10-CM

## 2019-03-28 DIAGNOSIS — J02.9 SORE THROAT: ICD-10-CM

## 2019-03-28 PROCEDURE — 99213 OFFICE O/P EST LOW 20 MIN: CPT | Performed by: INTERNAL MEDICINE

## 2019-03-28 ASSESSMENT — MIFFLIN-ST. JEOR: SCORE: 1464.95

## 2019-03-28 NOTE — PROGRESS NOTES
SUBJECTIVE:   Myrna Brothers is a 33 year old female who presents to clinic today for the following health issues:      Acute Illness   Acute illness concerns: Acute sinusitis 02/2019 feels like the sinus infection has returned.  Onset: New symptoms started 1 week ago    Fever: no     Chills/Sweats: no     Headache (location?): YES    Sinus Pressure:YES- tender, post-nasal drainage and facial pain    Conjunctivitis:  no    Ear Pain: YES- bilateral/trouble hearing    Rhinorrhea: YES    Congestion: YES    Sore Throat: YES     Cough: YES-productive of green sputum    Wheeze: no     Decreased Appetite: YES    Nausea: no     Vomiting: no     Diarrhea:  no     Dysuria/Freq.: no     Fatigue/Achiness: YES- fatigue    Sick/Strep Exposure: YES- sone     Therapies Tried and outcome: tylenol for headaches, dayquil and nyquil    HPI  33-year-old young lady comes in complaining of sore throat headache, ear aching.  Trouble hearing, cough with greenish expectoration.  Stuffy nose and pressure in the sinuses.  She states that she has had a sinus infection for almost a month.  She was treated on February 20 with amoxicillin for sinusitis.  Her symptoms have some however lingered on.  She has no fever or chills.  In certain seasons she does get some shortness of breath tuft up feeling and has to use albuterol inhaler.      Problem list and histories reviewed & adjusted, as indicated.  Additional history: as documented    Patient Active Problem List   Diagnosis     Moderate episode of recurrent major depressive disorder (H)     Generalized anxiety disorder     Cervical cancer screening     Insulin controlled gestational diabetes mellitus (GDM) in third trimester     Past Surgical History:   Procedure Laterality Date     HEAD & NECK SURGERY      wisdom teeth       Social History     Tobacco Use     Smoking status: Never Smoker     Smokeless tobacco: Never Used   Substance Use Topics     Alcohol use: Yes     Comment: denies  "recent use     Family History   Problem Relation Age of Onset     Breast Cancer Mother      Stillborn Mother         had a still born     Other - See Comments Mother         2 miscarriage     Diabetes Father      Heart Disease Father      Anxiety Disorder Maternal Grandmother      Alzheimer Disease Maternal Grandmother      Asthma Maternal Grandfather      Prostate Cancer Maternal Grandfather      Hypertension Paternal Grandmother      Leukemia Paternal Grandfather          Current Outpatient Medications   Medication Sig Dispense Refill     acetaminophen (TYLENOL) 500 MG tablet Take 500-1,000 mg by mouth as needed for mild pain        albuterol (PROAIR HFA/PROVENTIL HFA/VENTOLIN HFA) 108 (90 Base) MCG/ACT inhaler Inhale 2 puffs into the lungs every 6 hours       APNO CREA ointment Apply 60 g topically every hour as needed for skin care 30 g 3     busPIRone (BUSPAR) 15 MG tablet Take 5 mg by mouth       norethindrone (MICRONOR) 0.35 MG tablet Take 1 tablet (0.35 mg) by mouth daily 84 tablet 3     sertraline (ZOLOFT) 100 MG tablet   0     busPIRone (BUSPAR) 5 MG tablet Take 1 tablet (5 mg) by mouth 2 times daily (Patient not taking: Reported on 2/20/2019) 180 tablet 1     Allergies   Allergen Reactions     Xylocaine [Lidocaine] Anaphylaxis       Reviewed and updated as needed this visit by clinical staff  Tobacco  Allergies  Meds  Med Hx  Surg Hx  Fam Hx  Soc Hx      Reviewed and updated as needed this visit by Provider         ROS:  Constitutional, HEENT, cardiovascular, pulmonary, GI, , musculoskeletal, neuro, skin, endocrine and psych systems are negative, except as otherwise noted.    OBJECTIVE:     BP 98/64 (BP Location: Right arm, Patient Position: Sitting, Cuff Size: Adult Large)   Pulse 71   Temp 98.1  F (36.7  C) (Temporal)   Ht 1.664 m (5' 5.5\")   Wt 76.1 kg (167 lb 12.8 oz)   SpO2 97%   BMI 27.50 kg/m    Body mass index is 27.5 kg/m .  GENERAL: healthy, alert and no distress  HENT: ear canals " and TM's normal, nose and mouth without ulcers or lesions.  Some mild sinus tenderness over the maxillary and ethmoidal sinus.  NECK: no adenopathy, no asymmetry, masses, or scars and thyroid normal to palpation  RESP: lungs clear to auscultation - no rales, rhonchi or wheezes  CV: regular rate and rhythm, normal S1 S2, no S3 or S4, no murmur, click or rub, no peripheral edema and peripheral pulses strong    Diagnostic Test Results:  none     ASSESSMENT/PLAN:     1.  Chronic sinusitis is a possibility since she has symptoms for over a month.  The options are to try a different antibiotic therapy or investigate further with a CT scan of the sinuses.  It could be part of her allergy issues as well.  So after further discussion she decided to try Flonase over-the-counter and see.  She will also use over-the-counter antihistamine such as Claritin or Zyrtec.  For seasonal respiratory symptoms she could consider using Singulair as well.  We discussed that.  2.  Sore throat with negative exam.  3.  Some problem hearing.  Her exam of the auditory canal and tympanic membrane was normal.  Asked if she would like to see an audiologist.  Patient declined.    Marek Champagne MD  [facility]

## 2019-03-29 ENCOUNTER — OFFICE VISIT (OUTPATIENT)
Dept: PSYCHOLOGY | Facility: CLINIC | Age: 34
End: 2019-03-29
Payer: COMMERCIAL

## 2019-03-29 DIAGNOSIS — F33.1 MODERATE EPISODE OF RECURRENT MAJOR DEPRESSIVE DISORDER (H): Primary | ICD-10-CM

## 2019-03-29 DIAGNOSIS — F41.1 GENERALIZED ANXIETY DISORDER: ICD-10-CM

## 2019-03-29 PROCEDURE — 90832 PSYTX W PT 30 MINUTES: CPT | Performed by: SOCIAL WORKER

## 2019-03-29 NOTE — PROGRESS NOTES
"[facility]  Integrated Behavioral Health Services  March 29, 2019    Behavioral Health Clinician Progress Note    Patient Name: Myrna Brothers           Service Type:  Individual      Service Location:   Face to Face in Clinic     Session Start Time:  11: 35 am Session End Time:  12: 09 pm      Session Length: 16 - 37      Attendees: Patient    Visit Activities (Refresh list every visit): [facility] Only    Diagnostic Assessment Date: 4/12/18  Treatment Plan Review Date: 11/29/18  See Flowsheets for today's PHQ-9 and OREN-7 results  Previous PHQ-9:   PHQ-9 SCORE 12/20/2018 1/11/2019 1/18/2019   PHQ-9 Total Score 16 15 9     Previous OREN-7:   OREN-7 SCORE 12/20/2018 1/11/2019 1/18/2019   Total Score 14 10 8       LUKASZ LEVEL:  No flowsheet data found.    DATA  Extended Session (60+ minutes): No  Interactive Complexity: No  Crisis: No  [facility] Patient: No    Treatment Objective(s) Addressed in This Session:  Target Behavior(s): disease management/lifestyle changes , mental health management    Depressed Mood: Decrease frequency and intensity of feeling down, depressed, hopeless  Identify negative self-talk and behaviors: challenge core beliefs, myths, and actions  Anxiety: will experience a reduction in anxiety, will develop more effective coping skills to manage anxiety symptoms, will develop healthy cognitive patterns and beliefs and will increase ability to function adaptively    Current Stressors / Issues:  Patient reported that she had a positive week with her baby while her  was away traveling for work. She stated that she has recently had an increase in symptoms due to her  losing her job earlier this week. Patient stated that his immediately let go, and he is now at home.  Patient discussed stress associated with not having sufficient money in savings due to the recent birth of their baby.  Patient discussed racing thoughts and the \"downward\" spiral that can occur.  Patient " reported return of suicidal thoughts, but denied plan or intent. She stated that she is now scared of the thoughts and finds no comfort in them. She stated that she knows that it would only cause more problems if she no longer was around.  Patient stated that she is sad now that they had to pull their son from their  after he was adjusting and doing well. She reported that they cannot afford the , and her  will now be caring for him until he finds a new job.  Patient stated that she has been trying to be present focused and focusing on what is within her control.     Progress on Treatment Objective(s) / Homework:  Stable - ACTION (Actively working towards change); Intervened by reinforcing change plan / affirming steps taken    Motivational Interviewing    MI Intervention: Expressed Empathy/Understanding, Supported Autonomy, Collaboration, Evocation, Permission to raise concern or advise, Open-ended questions, Change talk (evoked) and Reframe     Change Talk Expressed by the Patient: Ability to change Reasons to change Need to change    Provider Response to Change Talk: E - Evoked more info from patient about behavior change, A - Affirmed patient's thoughts, decisions, or attempts at behavior change, R - Reflected patient's change talk and S - Summarized patient's change talk statements    Cognitive Behavioral Therapy: Discussed connection between thoughts, feelings, and behaviors. Taught patient how to identify cognitive distortions, and assisted patient to identify own cognitive distortions. Taught and engaged patient in cognitive restructuring techniques.    Also provided psychoeducation about behavioral health condition, symptoms, and treatment options    Care Plan review completed: Yes    Medication Review:  No changes.    Medication Compliance:  Yes    Changes in Health Issues:   None reported    Chemical Use Review:   Substance Use: Chemical use reviewed, no active concerns identified       Tobacco Use: No current tobacco use.      Assessment: Current Emotional / Mental Status (status of significant symptoms):  Risk status (Self / Other harm or suicidal ideation)  Patient has had a history of suicidal ideation: in correlation with stress, sent text on 4/13 about wanting to die, but no active SI. Per patient and , patient has had history of passive SI for more than 10 years and denies a history of suicide attempts, self-injurious behavior, homicidal ideation, homicidal behavior and and other safety concerns  Patient denies current fears or concerns for personal safety.  Patient denies current or recent suicidal ideation or behaviors.  Patient denies current or recent homicidal ideation or behaviors.  Patient denies current or recent self injurious behavior or ideation.  Patient denies other safety concerns.  A safety and risk management plan has not been developed at this time, however patient was encouraged to call [place] [phone] should there be a change in any of these risk factors.    Appearance:   Appropriate   Eye Contact:   Good   Psychomotor Behavior: Normal   Attitude:   Cooperative   Orientation:   All  Speech   Rate / Production: Normal    Volume:  Normal   Mood:    Normal  Affect:    Appropriate   Thought Content:  Clear   Thought Form:  Coherent  Logical   Insight:    Good     Diagnoses:  No diagnosis found.    Collateral Reports Completed:  Not Applicable    Plan: (Homework, other):  Patient was given information about behavioral services and encouraged to schedule a follow up appointment with the clinic [facility] in 1 week.  She was also given Cognitive Behavioral Therapy skills to practice when experiencing anxiety and depression. CD Recommendations: No indications of CD issues.  Stephanie Calabrese, [facility], [facility]      ______________________________________________________________________    Integrated Primary Care Behavioral Health Treatment Plan    Patient's Name: Myrna REYNOLD  Zev  YOB: 1985    Date: 11/29/18    DSM-V Diagnoses: 296.32 (F33.1) Major Depressive Disorder, Recurrent Episode, Moderate _ or 300.02 (F41.1) Generalized Anxiety Disorder  Psychosocial / Contextual Factors: recently postpartum  WHODAS: TBD    Referral / Collaboration:  Patient had appointment with psychiary 11/16/18.    Anticipated number of session or this episode of care: 6-8      MeasurableTreatment Goal(s) related to diagnosis / functional impairment(s)  Goal 1: Patient will reduce feelings of depression as evidenced by decreased score on PHQ-9.    I will know I've met my goal when I feel less down and emotional.      Objective #A (Patient Action)    Patient will Decrease frequency and intensity of feeling down, depressed, hopeless.  Status: Continued - Date(s): 11/29/18    Intervention(s)  [facility] will assign homework to assist with behavioral activation/activity scheduling.    Objective #B  Patient will Identify negative self-talk and behaviors: challenge core beliefs, myths, and actions.  Status: Continued - Date(s):11/29/18     Intervention(s)  [facility] will teach about automatic negative thoughts, irrational core beliefs, and cognitive restructuring techniques.        Goal 2: Patient will reduce feelings of anxiety as evidenced by decreased score on GAD7.    I will know I've met my goal when I feel less anxious and worried.      Objective #A (Patient Action)    Status: Continued - Date(s):11/29/18     Patient will identify three distraction and diversion activities and use those activities to decrease level of anxiety  .    Intervention(s)  [facility] will assign homework to practice distress tolerance and mindfulness techniques between session.    Objective #B  Patient will use cognitive strategies identified in therapy to challenge anxious thoughts.    Status: Continued - Date(s):11/29/18     Intervention(s)  [facility] will teach CBT techniques related to cognitive restructuring.    Objective #C  Patient  will use relaxation strategies 1-2 times per day to reduce the physical symptoms of anxiety.  Status: Continued - Date(s):11/29/18     Intervention(s)  [facility] will role-play relaxation techniques.    Patient has reviewed and agreed to the above plan.      KVNG Webb  April 23, 2018

## 2019-04-03 ENCOUNTER — OFFICE VISIT (OUTPATIENT)
Dept: PSYCHOLOGY | Facility: CLINIC | Age: 34
End: 2019-04-03
Payer: COMMERCIAL

## 2019-04-03 DIAGNOSIS — F33.1 MODERATE EPISODE OF RECURRENT MAJOR DEPRESSIVE DISORDER (H): Primary | ICD-10-CM

## 2019-04-03 DIAGNOSIS — F41.1 GENERALIZED ANXIETY DISORDER: ICD-10-CM

## 2019-04-03 PROCEDURE — 90832 PSYTX W PT 30 MINUTES: CPT | Performed by: SOCIAL WORKER

## 2019-04-03 NOTE — PROGRESS NOTES
[facility]  Integrated Behavioral Health Services  April 3, 2019    Behavioral Health Clinician Progress Note    Patient Name: Myrna Brothers           Service Type:  Individual      Service Location:   Face to Face in Clinic     Session Start Time:  4: 36 pm Session End Time:  5: 06 pm      Session Length: 16 - 37      Attendees: Patient    Visit Activities (Refresh list every visit): [facility] Only    Diagnostic Assessment Date: 4/12/18  Treatment Plan Review Date: 4/3/19  See Flowsheets for today's PHQ-9 and OREN-7 results  Previous PHQ-9:   PHQ-9 SCORE 12/20/2018 1/11/2019 1/18/2019   PHQ-9 Total Score 16 15 9     Previous OREN-7:   OREN-7 SCORE 12/20/2018 1/11/2019 1/18/2019   Total Score 14 10 8       LUKASZ LEVEL:  No flowsheet data found.    DATA  Extended Session (60+ minutes): No  Interactive Complexity: No  Crisis: No  [facility] Patient: No    Treatment Objective(s) Addressed in This Session:  Target Behavior(s): disease management/lifestyle changes , mental health management    Depressed Mood: Decrease frequency and intensity of feeling down, depressed, hopeless  Identify negative self-talk and behaviors: challenge core beliefs, myths, and actions  Anxiety: will experience a reduction in anxiety, will develop more effective coping skills to manage anxiety symptoms, will develop healthy cognitive patterns and beliefs and will increase ability to function adaptively    Current Stressors / Issues:  Patient reported ongoing stress associated with her 's job loss.  She stated that she continues to worry that they only have enough money for one month of expenses.  Patient reported that she has informed her mother, and her mother responded in a supportive manner. She stated that her mother has reassured her that they can assist her to pay bills and that nothing bad will happen to them as a family. Patient reported that she continues to worry about not paying bills, how it may impact  their credit score, and then their financial security in the future if they cannot pay bills. Patient stated that she continues to feel guilty about pulling Max from  since he was doing well and thriving. She reported that she is concerned that people are not interested in her 's resume since he has not been receiving any feedback or responses in the past 10 days.     [facility] and patient reviewed potential automatic negative thoughts and cognitive distortions that may be worsening symptoms.  [facility] and patient explored thought stopping techniques and restructuring when she notes the future orientated worst case scenarios occurring.    Progress on Treatment Objective(s) / Homework:  Stable - ACTION (Actively working towards change); Intervened by reinforcing change plan / affirming steps taken    Motivational Interviewing    MI Intervention: Expressed Empathy/Understanding, Supported Autonomy, Collaboration, Evocation, Permission to raise concern or advise, Open-ended questions, Change talk (evoked) and Reframe     Change Talk Expressed by the Patient: Ability to change Reasons to change Need to change    Provider Response to Change Talk: E - Evoked more info from patient about behavior change, A - Affirmed patient's thoughts, decisions, or attempts at behavior change, R - Reflected patient's change talk and S - Summarized patient's change talk statements    Cognitive Behavioral Therapy: Discussed connection between thoughts, feelings, and behaviors. Taught patient how to identify cognitive distortions, and assisted patient to identify own cognitive distortions. Taught and engaged patient in cognitive restructuring techniques.    Also provided psychoeducation about behavioral health condition, symptoms, and treatment options    Care Plan review completed: Yes    Medication Review:  No changes.    Medication Compliance:  Yes    Changes in Health Issues:   None reported    Chemical Use Review:   Substance Use:  Chemical use reviewed, no active concerns identified      Tobacco Use: No current tobacco use.      Assessment: Current Emotional / Mental Status (status of significant symptoms):  Risk status (Self / Other harm or suicidal ideation)  Patient has had a history of suicidal ideation: in correlation with stress, sent text on 4/13 about wanting to die, but no active SI. Per patient and , patient has had history of passive SI for more than 10 years and denies a history of suicide attempts, self-injurious behavior, homicidal ideation, homicidal behavior and and other safety concerns  Patient denies current fears or concerns for personal safety.  Patient denies current or recent suicidal ideation or behaviors.  Patient denies current or recent homicidal ideation or behaviors.  Patient denies current or recent self injurious behavior or ideation.  Patient denies other safety concerns.  A safety and risk management plan has not been developed at this time, however patient was encouraged to call [place] [phone] should there be a change in any of these risk factors.    Appearance:   Appropriate   Eye Contact:   Good   Psychomotor Behavior: Normal   Attitude:   Cooperative   Orientation:   All  Speech   Rate / Production: Normal    Volume:  Normal   Mood:    Normal  Affect:    Appropriate   Thought Content:  Clear   Thought Form:  Coherent  Logical   Insight:    Good     Diagnoses:  1. Moderate episode of recurrent major depressive disorder (H)    2. Generalized anxiety disorder        Collateral Reports Completed:  Not Applicable    Plan: (Homework, other):  Patient was given information about behavioral services and encouraged to schedule a follow up appointment with the clinic [facility] in 1 week.  She was also given Cognitive Behavioral Therapy skills to practice when experiencing anxiety and depression. CD Recommendations: No indications of CD issues.  Stephanie Calabrese [facility],  [facility]      ______________________________________________________________________    Integrated Primary Care Behavioral Health Treatment Plan    Patient's Name: Myrna Brothers  YOB: 1985    Date: 4/3/19    DSM-V Diagnoses: 296.32 (F33.1) Major Depressive Disorder, Recurrent Episode, Moderate _ or 300.02 (F41.1) Generalized Anxiety Disorder  Psychosocial / Contextual Factors: recently postpartum  WHODAS: TBD    Referral / Collaboration:  No additional referrals needed at this time.    Anticipated number of session or this episode of care: 6-8      MeasurableTreatment Goal(s) related to diagnosis / functional impairment(s)  Goal 1: Patient will reduce feelings of depression as evidenced by decreased score on PHQ-9.    I will know I've met my goal when I feel less down and emotional.      Objective #A (Patient Action)    Patient will Decrease frequency and intensity of feeling down, depressed, hopeless.  Status: Continued - Date(s): 4/3/19    Intervention(s)  [facility] will assign homework to assist with behavioral activation/activity scheduling.    Objective #B  Patient will Identify negative self-talk and behaviors: challenge core beliefs, myths, and actions.  Status: Continued - Date(s): 4/3/19    Intervention(s)  [facility] will teach about automatic negative thoughts, irrational core beliefs, and cognitive restructuring techniques.        Goal 2: Patient will reduce feelings of anxiety as evidenced by decreased score on GAD7.    I will know I've met my goal when I feel less anxious and worried.      Objective #A (Patient Action)    Status: Continued - Date(s):4/3/19    Patient will identify three distraction and diversion activities and use those activities to decrease level of anxiety  .    Intervention(s)  [facility] will assign homework to practice distress tolerance and mindfulness techniques between session.    Objective #B  Patient will use cognitive strategies identified in therapy to challenge anxious  thoughts.    Status: Continued - Date(s): 4/3/19     Intervention(s)  [facility] will teach CBT techniques related to cognitive restructuring.    Objective #C  Patient will use relaxation strategies 1-2 times per day to reduce the physical symptoms of anxiety.  Status: Continued - Date(s): 4/3/19    Intervention(s)  [facility] will role-play relaxation techniques.    Patient has reviewed and agreed to the above plan.      KVNG Webb  April 4, 2019

## 2019-04-12 ENCOUNTER — OFFICE VISIT (OUTPATIENT)
Dept: PSYCHOLOGY | Facility: CLINIC | Age: 34
End: 2019-04-12
Payer: COMMERCIAL

## 2019-04-12 DIAGNOSIS — F33.1 MODERATE EPISODE OF RECURRENT MAJOR DEPRESSIVE DISORDER (H): Primary | ICD-10-CM

## 2019-04-12 DIAGNOSIS — F41.1 GENERALIZED ANXIETY DISORDER: ICD-10-CM

## 2019-04-12 PROCEDURE — 90832 PSYTX W PT 30 MINUTES: CPT | Performed by: SOCIAL WORKER

## 2019-04-12 NOTE — PROGRESS NOTES
[facility]  Integrated Behavioral Health Services  April 12, 2019    Behavioral Health Clinician Progress Note    Patient Name: Myrna Brothers           Service Type:  Individual      Service Location:   Face to Face in Clinic     Session Start Time:  11: 53 am Session End Time:  12: 20 pm      Session Length: 16 - 37      Attendees: Patient    Visit Activities (Refresh list every visit): [facility] Only    Diagnostic Assessment Date: 4/12/18  Treatment Plan Review Date: 4/3/19  See Flowsheets for today's PHQ-9 and OREN-7 results  Previous PHQ-9:   PHQ-9 SCORE 12/20/2018 1/11/2019 1/18/2019   PHQ-9 Total Score 16 15 9     Previous OREN-7:   OREN-7 SCORE 12/20/2018 1/11/2019 1/18/2019   Total Score 14 10 8       LUKASZ LEVEL:  No flowsheet data found.    DATA  Extended Session (60+ minutes): No  Interactive Complexity: No  Crisis: No  Merged with [facility] Patient: No    Treatment Objective(s) Addressed in This Session:  Target Behavior(s): disease management/lifestyle changes , mental health management    Depressed Mood: Decrease frequency and intensity of feeling down, depressed, hopeless  Identify negative self-talk and behaviors: challenge core beliefs, myths, and actions  Anxiety: will experience a reduction in anxiety, will develop more effective coping skills to manage anxiety symptoms, will develop healthy cognitive patterns and beliefs and will increase ability to function adaptively    Current Stressors / Issues:  Patient reported even though life stressors continue to be present, she feels like she is coping well and not experiencing any acute symptoms. Patient stated that she feels concerned about how her  is feeling secondary to his unemployment, but reported that she feels like she is doing the best she can to be supportive of him and to help as she can.  Patient reported that she has been able to focus on what is within her control to help him. She stated that she has been successful in  prioritizing pumping at work, and feels positive about the change. She shared that she is enjoying her participation in choir in the evenings, and feels like she has a balance in caring for herself and being present for her son. Patient reported that she can sometimes feel guilty that she is not always interacting with her son and is sometimes eager for him to go to sleep so she can decompress at the end of the day.    Progress on Treatment Objective(s) / Homework:  Stable - MAINTENANCE (Working to maintain change, with risk of relapse); Intervened by continuing to positively reinforce healthy behavior choice     Motivational Interviewing    MI Intervention: Expressed Empathy/Understanding, Supported Autonomy, Collaboration, Evocation, Permission to raise concern or advise, Open-ended questions, Change talk (evoked) and Reframe     Change Talk Expressed by the Patient: Ability to change Reasons to change Need to change    Provider Response to Change Talk: E - Evoked more info from patient about behavior change, A - Affirmed patient's thoughts, decisions, or attempts at behavior change, R - Reflected patient's change talk and S - Summarized patient's change talk statements    Cognitive Behavioral Therapy: Discussed connection between thoughts, feelings, and behaviors. Taught patient how to identify cognitive distortions, and assisted patient to identify own cognitive distortions. Taught and engaged patient in cognitive restructuring techniques.    Also provided psychoeducation about behavioral health condition, symptoms, and treatment options    Care Plan review completed: Yes    Medication Review:  No changes.    Medication Compliance:  Yes    Changes in Health Issues:   None reported    Chemical Use Review:   Substance Use: Chemical use reviewed, no active concerns identified      Tobacco Use: No current tobacco use.      Assessment: Current Emotional / Mental Status (status of significant symptoms):  Risk status  (Self / Other harm or suicidal ideation)  Patient has had a history of suicidal ideation: in correlation with stress, sent text on 4/13 about wanting to die, but no active SI. Per patient and , patient has had history of passive SI for more than 10 years and denies a history of suicide attempts, self-injurious behavior, homicidal ideation, homicidal behavior and and other safety concerns  Patient denies current fears or concerns for personal safety.  Patient denies current or recent suicidal ideation or behaviors.  Patient denies current or recent homicidal ideation or behaviors.  Patient denies current or recent self injurious behavior or ideation.  Patient denies other safety concerns.  A safety and risk management plan has not been developed at this time, however patient was encouraged to call [place] [phone] should there be a change in any of these risk factors.    Appearance:   Appropriate   Eye Contact:   Good   Psychomotor Behavior: Normal   Attitude:   Cooperative   Orientation:   All  Speech   Rate / Production: Normal    Volume:  Normal   Mood:    Normal  Affect:    Appropriate   Thought Content:  Clear   Thought Form:  Coherent  Logical   Insight:    Good     Diagnoses:  1. Moderate episode of recurrent major depressive disorder (H)    2. Generalized anxiety disorder        Collateral Reports Completed:  Not Applicable    Plan: (Homework, other):  Patient was given information about behavioral services and encouraged to schedule a follow up appointment with the clinic [facility] in 1 week.  She was also given Cognitive Behavioral Therapy skills to practice when experiencing anxiety and depression. CD Recommendations: No indications of CD issues.  Stephanie Calabrese, KVNG, [facility]      ______________________________________________________________________    Integrated Primary Care Behavioral Health Treatment Plan    Patient's Name: Myrna Brothers  YOB: 1985    Date: 4/3/19    DSM-V  Diagnoses: 296.32 (F33.1) Major Depressive Disorder, Recurrent Episode, Moderate _ or 300.02 (F41.1) Generalized Anxiety Disorder  Psychosocial / Contextual Factors: recently postpartum  WHODAS: TBD    Referral / Collaboration:  No additional referrals needed at this time.    Anticipated number of session or this episode of care: 6-8      MeasurableTreatment Goal(s) related to diagnosis / functional impairment(s)  Goal 1: Patient will reduce feelings of depression as evidenced by decreased score on PHQ-9.    I will know I've met my goal when I feel less down and emotional.      Objective #A (Patient Action)    Patient will Decrease frequency and intensity of feeling down, depressed, hopeless.  Status: Continued - Date(s): 4/3/19    Intervention(s)  [facility] will assign homework to assist with behavioral activation/activity scheduling.    Objective #B  Patient will Identify negative self-talk and behaviors: challenge core beliefs, myths, and actions.  Status: Continued - Date(s): 4/3/19    Intervention(s)  [facility] will teach about automatic negative thoughts, irrational core beliefs, and cognitive restructuring techniques.        Goal 2: Patient will reduce feelings of anxiety as evidenced by decreased score on GAD7.    I will know I've met my goal when I feel less anxious and worried.      Objective #A (Patient Action)    Status: Continued - Date(s):4/3/19    Patient will identify three distraction and diversion activities and use those activities to decrease level of anxiety  .    Intervention(s)  [facility] will assign homework to practice distress tolerance and mindfulness techniques between session.    Objective #B  Patient will use cognitive strategies identified in therapy to challenge anxious thoughts.    Status: Continued - Date(s): 4/3/19     Intervention(s)  [facility] will teach CBT techniques related to cognitive restructuring.    Objective #C  Patient will use relaxation strategies 1-2 times per day to reduce the physical  symptoms of anxiety.  Status: Continued - Date(s): 4/3/19    Intervention(s)  [facility] will role-play relaxation techniques.    Patient has reviewed and agreed to the above plan.      KVNG Webb  April 4, 2019

## 2019-04-19 ENCOUNTER — OFFICE VISIT (OUTPATIENT)
Dept: PSYCHOLOGY | Facility: CLINIC | Age: 34
End: 2019-04-19
Payer: COMMERCIAL

## 2019-04-19 DIAGNOSIS — F33.1 MODERATE EPISODE OF RECURRENT MAJOR DEPRESSIVE DISORDER (H): Primary | ICD-10-CM

## 2019-04-19 DIAGNOSIS — F41.1 GENERALIZED ANXIETY DISORDER: ICD-10-CM

## 2019-04-19 PROCEDURE — 90832 PSYTX W PT 30 MINUTES: CPT | Performed by: SOCIAL WORKER

## 2019-04-22 NOTE — PROGRESS NOTES
[facility]  Integrated Behavioral Health Services  April 19, 2019    Behavioral Health Clinician Progress Note    Patient Name: Myrna Brothers           Service Type:  Individual      Service Location:   Face to Face in Clinic     Session Start Time:  11: 50 am Session End Time:   12: 20 pm      Session Length: 16 - 37      Attendees: Patient    Visit Activities (Refresh list every visit): [facility] Only    Diagnostic Assessment Date: 4/12/18  Treatment Plan Review Date: 4/3/19  See Flowsheets for today's PHQ-9 and OREN-7 results  Previous PHQ-9:   PHQ-9 SCORE 12/20/2018 1/11/2019 1/18/2019   PHQ-9 Total Score 16 15 9     Previous OREN-7:   OREN-7 SCORE 12/20/2018 1/11/2019 1/18/2019   Total Score 14 10 8       LUKASZ LEVEL:  No flowsheet data found.    DATA  Extended Session (60+ minutes): No  Interactive Complexity: No  Crisis: No  [facility] Patient: No    Treatment Objective(s) Addressed in This Session:  Target Behavior(s): disease management/lifestyle changes , mental health management    Depressed Mood: Decrease frequency and intensity of feeling down, depressed, hopeless  Identify negative self-talk and behaviors: challenge core beliefs, myths, and actions  Anxiety: will experience a reduction in anxiety, will develop more effective coping skills to manage anxiety symptoms, will develop healthy cognitive patterns and beliefs and will increase ability to function adaptively    Current Stressors / Issues:  Patient reported worsening of symptoms secondary to ongoing stress with her 's unemployment. She stated that her  has started to talk to them more about their finances and not feeling hopeful about his job interviews. Patient reported that the circumstances have not changed, but she has a new perspective on everything. Patient stated that she is unsure if her 's cognitions are always fact based, and recognized how they may be influenced by his emotions.  Patient reported  "that prior to becoming anxious, she wants to \"check the facts\" of her 's thoughts.  Patient continued to discuss anxiety and depression secondary to not feeling like she can always make her son smile all the time. She discussed recent event when he was tired, teething, and at the end of his day when she felt like she could not entertain him.  Patient voiced frustration with recent intrusive suicidal thoughts. She stated that she does not want to act them, has not envisioned a plan. Ongoing restructuring and thought stopping techniques discussed.    Progress on Treatment Objective(s) / Homework:  Worsening - ACTION (Actively working towards change); Intervened by reinforcing change plan / affirming steps taken    Motivational Interviewing    MI Intervention: Expressed Empathy/Understanding, Supported Autonomy, Collaboration, Evocation, Permission to raise concern or advise, Open-ended questions, Change talk (evoked) and Reframe     Change Talk Expressed by the Patient: Ability to change Reasons to change Need to change    Provider Response to Change Talk: E - Evoked more info from patient about behavior change, A - Affirmed patient's thoughts, decisions, or attempts at behavior change, R - Reflected patient's change talk and S - Summarized patient's change talk statements    Cognitive Behavioral Therapy: Discussed connection between thoughts, feelings, and behaviors. Taught patient how to identify cognitive distortions, and assisted patient to identify own cognitive distortions. Taught and engaged patient in cognitive restructuring techniques.    Also provided psychoeducation about behavioral health condition, symptoms, and treatment options    Care Plan review completed: Yes    Medication Review:  No changes.    Medication Compliance:  Yes    Changes in Health Issues:   None reported    Chemical Use Review:   Substance Use: Chemical use reviewed, no active concerns identified      Tobacco Use: No current " tobacco use.      Assessment: Current Emotional / Mental Status (status of significant symptoms):  Risk status (Self / Other harm or suicidal ideation)  Patient has had a history of suicidal ideation: in correlation with stress, sent text on 4/13 about wanting to die, but no active SI. Per patient and , patient has had history of passive SI for more than 10 years and denies a history of suicide attempts, self-injurious behavior, homicidal ideation, homicidal behavior and and other safety concerns  Patient denies current fears or concerns for personal safety.  Patient denies current or recent suicidal ideation or behaviors.  Patient denies current or recent homicidal ideation or behaviors.  Patient denies current or recent self injurious behavior or ideation.  Patient denies other safety concerns.  A safety and risk management plan has not been developed at this time, however patient was encouraged to call [place] [phone] should there be a change in any of these risk factors.    Appearance:   Appropriate   Eye Contact:   Good   Psychomotor Behavior: Normal   Attitude:   Cooperative   Orientation:   All  Speech   Rate / Production: Normal    Volume:  Normal   Mood:    Normal  Affect:    Appropriate   Thought Content:  Clear   Thought Form:  Coherent  Logical   Insight:    Good     Diagnoses:  1. Moderate episode of recurrent major depressive disorder (H)    2. Generalized anxiety disorder        Collateral Reports Completed:  Not Applicable    Plan: (Homework, other):  Patient was given information about behavioral services and encouraged to schedule a follow up appointment with the clinic [facility] in 1 week.  She was also given Cognitive Behavioral Therapy skills to practice when experiencing anxiety and depression. CD Recommendations: No indications of CD issues.  Stephanie Calabrese, KVNG, [facility]      ______________________________________________________________________    Integrated Primary Care Behavioral Health  Treatment Plan    Patient's Name: Myrna Brothers  YOB: 1985    Date: 4/3/19    DSM-V Diagnoses: 296.32 (F33.1) Major Depressive Disorder, Recurrent Episode, Moderate _ or 300.02 (F41.1) Generalized Anxiety Disorder  Psychosocial / Contextual Factors: recently postpartum  WHODAS: TBD    Referral / Collaboration:  No additional referrals needed at this time.    Anticipated number of session or this episode of care: 6-8      MeasurableTreatment Goal(s) related to diagnosis / functional impairment(s)  Goal 1: Patient will reduce feelings of depression as evidenced by decreased score on PHQ-9.    I will know I've met my goal when I feel less down and emotional.      Objective #A (Patient Action)    Patient will Decrease frequency and intensity of feeling down, depressed, hopeless.  Status: Continued - Date(s): 4/3/19    Intervention(s)  [facility] will assign homework to assist with behavioral activation/activity scheduling.    Objective #B  Patient will Identify negative self-talk and behaviors: challenge core beliefs, myths, and actions.  Status: Continued - Date(s): 4/3/19    Intervention(s)  [facility] will teach about automatic negative thoughts, irrational core beliefs, and cognitive restructuring techniques.        Goal 2: Patient will reduce feelings of anxiety as evidenced by decreased score on GAD7.    I will know I've met my goal when I feel less anxious and worried.      Objective #A (Patient Action)    Status: Continued - Date(s):4/3/19    Patient will identify three distraction and diversion activities and use those activities to decrease level of anxiety  .    Intervention(s)  [facility] will assign homework to practice distress tolerance and mindfulness techniques between session.    Objective #B  Patient will use cognitive strategies identified in therapy to challenge anxious thoughts.    Status: Continued - Date(s): 4/3/19     Intervention(s)  [facility] will teach CBT techniques related to cognitive  restructuring.    Objective #C  Patient will use relaxation strategies 1-2 times per day to reduce the physical symptoms of anxiety.  Status: Continued - Date(s): 4/3/19    Intervention(s)  [facility] will role-play relaxation techniques.    Patient has reviewed and agreed to the above plan.      KVNG Webb  April 4, 2019

## 2019-05-17 ENCOUNTER — OFFICE VISIT (OUTPATIENT)
Dept: PSYCHOLOGY | Facility: CLINIC | Age: 34
End: 2019-05-17
Payer: COMMERCIAL

## 2019-05-17 DIAGNOSIS — F41.1 GENERALIZED ANXIETY DISORDER: ICD-10-CM

## 2019-05-17 DIAGNOSIS — F33.1 MODERATE EPISODE OF RECURRENT MAJOR DEPRESSIVE DISORDER (H): Primary | ICD-10-CM

## 2019-05-17 PROCEDURE — 90832 PSYTX W PT 30 MINUTES: CPT | Performed by: SOCIAL WORKER

## 2019-05-17 NOTE — PROGRESS NOTES
[facility]  Integrated Behavioral Health Services  May 17, 2019    Behavioral Health Clinician Progress Note    Patient Name: Myrna Brothers           Service Type:  Individual      Service Location:   Face to Face in Clinic     Session Start Time:  11: 43 am Session End Time:   12: 20 pm      Session Length: 16 - 37      Attendees: Patient    Visit Activities (Refresh list every visit): [facility] Only    Diagnostic Assessment Date: 4/12/18  Treatment Plan Review Date: 4/3/19  See Flowsheets for today's PHQ-9 and OREN-7 results  Previous PHQ-9:   PHQ-9 SCORE 12/20/2018 1/11/2019 1/18/2019   PHQ-9 Total Score 16 15 9     Previous OREN-7:   OREN-7 SCORE 12/20/2018 1/11/2019 1/18/2019   Total Score 14 10 8       LUKASZ LEVEL:  No flowsheet data found.    DATA  Extended Session (60+ minutes): No  Interactive Complexity: No  Crisis: No  [facility] Patient: No    Treatment Objective(s) Addressed in This Session:  Target Behavior(s): disease management/lifestyle changes , mental health management    Depressed Mood: Decrease frequency and intensity of feeling down, depressed, hopeless  Identify negative self-talk and behaviors: challenge core beliefs, myths, and actions  Anxiety: will experience a reduction in anxiety, will develop more effective coping skills to manage anxiety symptoms, will develop healthy cognitive patterns and beliefs and will increase ability to function adaptively    Current Stressors / Issues:  Patient reported that it has been a busy month. She stated that her  continues to put forth effort to secure employment, but has not yet been able to secure a job. Patient stated that he continues to interview, which she believes is a good sign that people have interest in him. Patient stated that they recently decided to not move out of their town home after patient's  met with a financial counselor. She reported that she feels better knowing that she will not have to move out of  "the place where she feels settled.  Patient discussed recent stress at work, and reported that she cannot tell if she is personalizing or if her thoughts are fact based.  Patient reviewed and applied CBT techniques, and patient was able to come to a helpful conclusion. She also discussed recent worry that her son is not always responding to her when she is trying to put him to bed, but she was also able to identify alternative solutions to why he was cry and other indicators that they have a positive relationship.  Despite stress in past month, patient stated that she feels \"okay\".  [facility] and patient reviewed strengths and progress.      Progress on Treatment Objective(s) / Homework:  Satisfactory progress - ACTION (Actively working towards change); Intervened by reinforcing change plan / affirming steps taken    Motivational Interviewing    MI Intervention: Expressed Empathy/Understanding, Supported Autonomy, Collaboration, Evocation, Permission to raise concern or advise, Open-ended questions, Change talk (evoked) and Reframe     Change Talk Expressed by the Patient: Ability to change Reasons to change Need to change    Provider Response to Change Talk: E - Evoked more info from patient about behavior change, A - Affirmed patient's thoughts, decisions, or attempts at behavior change, R - Reflected patient's change talk and S - Summarized patient's change talk statements    Cognitive Behavioral Therapy: Discussed connection between thoughts, feelings, and behaviors. Taught patient how to identify cognitive distortions, and assisted patient to identify own cognitive distortions. Taught and engaged patient in cognitive restructuring techniques.    Also provided psychoeducation about behavioral health condition, symptoms, and treatment options    Care Plan review completed: Yes    Medication Review:  No changes.    Medication Compliance:  Yes    Changes in Health Issues:   None reported    Chemical Use " Review:   Substance Use: Chemical use reviewed, no active concerns identified      Tobacco Use: No current tobacco use.      Assessment: Current Emotional / Mental Status (status of significant symptoms):  Risk status (Self / Other harm or suicidal ideation)  Patient has had a history of suicidal ideation: in correlation with stress, sent text on 4/13 about wanting to die, but no active SI. Per patient and , patient has had history of passive SI for more than 10 years and denies a history of suicide attempts, self-injurious behavior, homicidal ideation, homicidal behavior and and other safety concerns  Patient denies current fears or concerns for personal safety.  Patient denies current or recent suicidal ideation or behaviors.  Patient denies current or recent homicidal ideation or behaviors.  Patient denies current or recent self injurious behavior or ideation.  Patient denies other safety concerns.  A safety and risk management plan has not been developed at this time, however patient was encouraged to call [place] [phone] should there be a change in any of these risk factors.    Appearance:   Appropriate   Eye Contact:   Good   Psychomotor Behavior: Normal   Attitude:   Cooperative   Orientation:   All  Speech   Rate / Production: Normal    Volume:  Normal   Mood:    Normal  Affect:    Appropriate   Thought Content:  Clear   Thought Form:  Coherent  Logical   Insight:    Good     Diagnoses:  1. Moderate episode of recurrent major depressive disorder (H)    2. Generalized anxiety disorder        Collateral Reports Completed:  Not Applicable    Plan: (Homework, other):  Patient was given information about behavioral services and encouraged to schedule a follow up appointment with the clinic [facility] in 1 week.  She was also given Cognitive Behavioral Therapy skills to practice when experiencing anxiety and depression. CD Recommendations: No indications of CD issues.  Stephanie Calabrese [facility]IV[facility],  [facility]      ______________________________________________________________________    Integrated Primary Care Behavioral Health Treatment Plan    Patient's Name: Myrna Brothers  YOB: 1985    Date: 4/3/19    DSM-V Diagnoses: 296.32 (F33.1) Major Depressive Disorder, Recurrent Episode, Moderate _ or 300.02 (F41.1) Generalized Anxiety Disorder  Psychosocial / Contextual Factors: recently postpartum  WHODAS: TBD    Referral / Collaboration:  No additional referrals needed at this time.    Anticipated number of session or this episode of care: 6-8      MeasurableTreatment Goal(s) related to diagnosis / functional impairment(s)  Goal 1: Patient will reduce feelings of depression as evidenced by decreased score on PHQ-9.    I will know I've met my goal when I feel less down and emotional.      Objective #A (Patient Action)    Patient will Decrease frequency and intensity of feeling down, depressed, hopeless.  Status: Continued - Date(s): 4/3/19    Intervention(s)  [facility] will assign homework to assist with behavioral activation/activity scheduling.    Objective #B  Patient will Identify negative self-talk and behaviors: challenge core beliefs, myths, and actions.  Status: Continued - Date(s): 4/3/19    Intervention(s)  [facility] will teach about automatic negative thoughts, irrational core beliefs, and cognitive restructuring techniques.        Goal 2: Patient will reduce feelings of anxiety as evidenced by decreased score on GAD7.    I will know I've met my goal when I feel less anxious and worried.      Objective #A (Patient Action)    Status: Continued - Date(s):4/3/19    Patient will identify three distraction and diversion activities and use those activities to decrease level of anxiety  .    Intervention(s)  [facility] will assign homework to practice distress tolerance and mindfulness techniques between session.    Objective #B  Patient will use cognitive strategies identified in therapy to challenge anxious  thoughts.    Status: Continued - Date(s): 4/3/19     Intervention(s)  [facility] will teach CBT techniques related to cognitive restructuring.    Objective #C  Patient will use relaxation strategies 1-2 times per day to reduce the physical symptoms of anxiety.  Status: Continued - Date(s): 4/3/19    Intervention(s)  [facility] will role-play relaxation techniques.    Patient has reviewed and agreed to the above plan.      KVNG Webb  April 4, 2019

## 2019-05-21 ENCOUNTER — ANCILLARY PROCEDURE (OUTPATIENT)
Dept: GENERAL RADIOLOGY | Facility: CLINIC | Age: 34
End: 2019-05-21
Attending: FAMILY MEDICINE
Payer: COMMERCIAL

## 2019-05-21 ENCOUNTER — OFFICE VISIT (OUTPATIENT)
Dept: PEDIATRICS | Facility: CLINIC | Age: 34
End: 2019-05-21
Payer: COMMERCIAL

## 2019-05-21 VITALS
TEMPERATURE: 99.4 F | SYSTOLIC BLOOD PRESSURE: 108 MMHG | HEART RATE: 83 BPM | BODY MASS INDEX: 27.48 KG/M2 | DIASTOLIC BLOOD PRESSURE: 70 MMHG | WEIGHT: 167.7 LBS | OXYGEN SATURATION: 98 %

## 2019-05-21 DIAGNOSIS — M54.6 ACUTE MIDLINE THORACIC BACK PAIN: Primary | ICD-10-CM

## 2019-05-21 DIAGNOSIS — M54.6 ACUTE MIDLINE THORACIC BACK PAIN: ICD-10-CM

## 2019-05-21 PROCEDURE — 72070 X-RAY EXAM THORAC SPINE 2VWS: CPT | Performed by: RADIOLOGY

## 2019-05-21 PROCEDURE — 99213 OFFICE O/P EST LOW 20 MIN: CPT | Performed by: FAMILY MEDICINE

## 2019-05-21 RX ORDER — CYCLOBENZAPRINE HCL 5 MG
5 TABLET ORAL
Qty: 20 TABLET | Refills: 0 | Status: SHIPPED | OUTPATIENT
Start: 2019-05-21 | End: 2024-08-02

## 2019-05-21 RX ORDER — IBUPROFEN 600 MG/1
600 TABLET, FILM COATED ORAL EVERY 6 HOURS PRN
Qty: 30 TABLET | Refills: 0 | Status: SHIPPED | OUTPATIENT
Start: 2019-05-21 | End: 2024-08-02

## 2019-05-21 ASSESSMENT — PAIN SCALES - GENERAL: PAINLEVEL: EXTREME PAIN (9)

## 2019-05-21 NOTE — LETTER
[address] [address]  [phone]  [phone]      5/21/2019    Re: Myrna FLAHERTY Zev      TO WHOM IT MAY CONCERN:    Myrna Brothers  was seen at the above clinic for thoracic back pain. Please excuse her from work 5/21/2019.    Cordially    Valentina Sahu MD

## 2019-05-21 NOTE — PROGRESS NOTES
Subjective     Myrna Brothers is a 33 year old female who presents to clinic today for the following health issues:    HPI   Back Pain       Duration: 7 months        Specific cause: none    Description:   Location of pain: middle of back both  Character of pain: sharp and constant  Pain radiation:wraps around mid back and chest like a ring  New numbness or weakness in legs, not attributed to pain:  no     Intensity: Currently 9/10, moderate    History:   Pain interferes with job: YES, and unable to sleep last night - called in sick today Needs note for work  History of back problems: recurrent self limited episodes of low back pain in the past  Any previous MRI or X-rays: None  Sees a specialist for back pain:  No  Therapies tried without relief: acetaminophen (Tylenol) and heat    Alleviating factors:   Improved by: rest      Precipitating factors:  Worsened by: Bending and Standing          Accompanying Signs & Symptoms:  Risk of Fracture:  None  Risk of Cauda Equina:  None  Risk of Infection:  None  Risk of Cancer:  None  Risk of Ankylosing Spondylitis:  Onset at age <35, male, AND morning back stiffness. no       {  Patient Active Problem List   Diagnosis     Moderate episode of recurrent major depressive disorder (H)     Generalized anxiety disorder     Cervical cancer screening     Insulin controlled gestational diabetes mellitus (GDM) in third trimester     Past Surgical History:   Procedure Laterality Date     HEAD & NECK SURGERY      wisdom teeth       Social History     Tobacco Use     Smoking status: Never Smoker     Smokeless tobacco: Never Used   Substance Use Topics     Alcohol use: Yes     Comment: denies recent use     Family History   Problem Relation Age of Onset     Breast Cancer Mother      Stillborn Mother         had a still born     Other - See Comments Mother         2 miscarriage     Diabetes Father      Heart Disease Father      Anxiety Disorder Maternal Grandmother      Alzheimer  Disease Maternal Grandmother      Asthma Maternal Grandfather      Prostate Cancer Maternal Grandfather      Hypertension Paternal Grandmother      Leukemia Paternal Grandfather          Current Outpatient Medications   Medication Sig Dispense Refill     acetaminophen (TYLENOL) 500 MG tablet Take 500-1,000 mg by mouth as needed for mild pain        albuterol (PROAIR HFA/PROVENTIL HFA/VENTOLIN HFA) 108 (90 Base) MCG/ACT inhaler Inhale 2 puffs into the lungs every 6 hours       APNO CREA ointment Apply 60 g topically every hour as needed for skin care 30 g 3     busPIRone (BUSPAR) 15 MG tablet Take 5 mg by mouth       cyclobenzaprine (FLEXERIL) 5 MG tablet Take 1 tablet (5 mg) by mouth nightly as needed for muscle spasms 20 tablet 0     ibuprofen (ADVIL/MOTRIN) 600 MG tablet Take 1 tablet (600 mg) by mouth every 6 hours as needed for moderate pain 30 tablet 0     norethindrone (MICRONOR) 0.35 MG tablet Take 1 tablet (0.35 mg) by mouth daily 84 tablet 3     sertraline (ZOLOFT) 100 MG tablet   0     busPIRone (BUSPAR) 5 MG tablet Take 1 tablet (5 mg) by mouth 2 times daily (Patient not taking: Reported on 2/20/2019) 180 tablet 1     Allergies   Allergen Reactions     Xylocaine [Lidocaine] Anaphylaxis       Reviewed and updated as needed this visit by Provider  Tobacco  Allergies  Meds  Problems  Med Hx  Surg Hx  Fam Hx         Review of Systems   ROS COMP: Constitutional, HEENT, cardiovascular, pulmonary, gi and gu systems are negative, except as otherwise noted.      Objective    /70 (BP Location: Right arm, Patient Position: Sitting, Cuff Size: Adult Regular)   Pulse 83   Temp 99.4  F (37.4  C) (Oral)   Wt 76.1 kg (167 lb 11.2 oz)   SpO2 98%   Breastfeeding? Yes   BMI 27.48 kg/m    Body mass index is 27.48 kg/m .  Physical Exam   GENERAL: healthy, alert and no distress  NECK: no adenopathy, no asymmetry, masses, or scars and thyroid normal to palpation  RESP: lungs clear to auscultation - no rales,  rhonchi or wheezes  CV: regular rate and rhythm, normal S1 S2, no S3 or S4, no murmur, click or rub, no peripheral edema and peripheral pulses strong  ABDOMEN: soft, nontender, no hepatosplenomegaly, no masses and bowel sounds normal  Comprehensive back pain exam:  tenderness roung T6-7 with paraspinal muscle spasm, Range of motion not limited by pain, Lower extremity strength functional and equal on both sides, Lower extremity reflexes within normal limits bilaterally, Lower extremity sensation normal and equal on both sides and Straight leg raise negative bilaterally          Assessment & Plan     1. Acute midline thoracic back pain  - Use heat twice daily 10-15 minutes intermittently.   - ibuprofen (ADVIL/MOTRIN) 600 MG tablet; Take 1 tablet (600 mg) by mouth every 6 hours as needed for moderate pain  Dispense: 30 tablet; Refill: 0  - PHYSICAL THERAPY REFERRAL; Future  - cyclobenzaprine (FLEXERIL) 5 MG tablet; Take 1 tablet (5 mg) by mouth nightly as needed for muscle spasms  Dispense: 20 tablet; Refill: 0  - XR Thoracic Spine 2 Views; Future      Return in about 3 weeks (around 6/11/2019) for recheck back.    Valentina Sahu MD  [facility]

## 2019-05-21 NOTE — PATIENT INSTRUCTIONS
Patient Education     Back Pain (Acute or Chronic)    Back pain is one of the most common problems. The good news is that most people feel better in 1 to 2 weeks, and most of the rest in 1 to 2 months. Most people can remain active.  People experience and describe pain differently; not everyone is the same.    The pain can be sharp, stabbing, shooting, aching, cramping or burning.    Movement, standing, bending, lifting, sitting, or walking may worsen pain.    It can be localized to one spot or area, or it can be more generalized.    It can spread or radiate upwards, to the front, or go down your arms or legs (sciatica).    It can cause muscle spasm.  Most of the time, mechanical problems with the muscles or spine cause the pain. Mechanical problems are usually caused by an injury to the muscles or ligaments. While illness can cause back pain, it is usually not caused by a serious illness. Mechanical problems include:     Physical activity such as sports, exercise, work, or normal activity    Overexertion, lifting, pushing, pulling incorrectly or too aggressively    Sudden twisting, bending, or stretching from an accident, or accidental movement    Poor posture    Stretching or moving wrong, without noticing pain at the time    Poor coordination, lack of regular exercise (check with your doctor about this)    Spinal disc disease or arthritis    Stress  Pain can also be related to pregnancy, or illness like appendicitis, bladder or kidney infections, pelvic infections, and many other things.  Acute back pain usually gets better in 1 to 2 weeks. Back pain related to disk disease, arthritis in the spinal joints or spinal stenosis (narrowing of the spinal canal) can become chronic and last for months or years.  Unless you had a physical injury (for example, a car accident or fall) X-rays are usually not needed for the initial evaluation of back pain. If pain continues and does not respond to medical treatment, X-rays  and other tests may be needed.  Home care  Try these home care recommendations:    When in bed, try to find a position of comfort. A firm mattress is best. Try lying flat on your back with pillows under your knees. You can also try lying on your side with your knees bent up towards your chest and a pillow between your knees.    At first, do not try to stretch out the sore spots. If there is a strain, it is not like the good soreness you get after exercising without an injury. In this case, stretching may make it worse.    Avoid prolong sitting, long car rides, or travel. This puts more stress on the lower back than standing or walking.    During the first 24 to 72 hours after an acute injury or flare up of chronic back pain, apply an ice pack to the painful area for 20 minutes and then remove it for 20 minutes. Do this over a period of 60 to 90 minutes or several times a day. This will reduce swelling and pain. Wrap the ice pack in a thin towel or plastic to protect your skin.    You can start with ice, then switch to heat. Heat (hot shower, hot bath, or heating pad) reduces pain and works well for muscle spasms. Heat can be applied to the painful area for 20 minutes then remove it for 20 minutes. Do this over a period of 60 to 90 minutes or several times a day. Do not sleep on a heating pad. It can lead to skin burns or tissue damage.    You can alternate ice and heat therapy. Talk with your doctor about the best treatment for your back pain.    Therapeutic massage can help relax the back muscles without stretching them.    Be aware of safe lifting methods and do not lift anything without stretching first.  Medicines  Talk to your doctor before using medicine, especially if you have other medical problems or are taking other medicines.    You may use over-the-counter medicine as directed on the bottle to control pain, unless another pain medicine was prescribed. If you have chronic conditions like diabetes, liver  or kidney disease, stomach ulcers, or gastrointestinal bleeding, or are taking blood thinners, talk to your doctor before taking any medicine.    Be careful if you are given a prescription medicines, narcotics, or medicine for muscle spasms. They can cause drowsiness, affect your coordination, reflexes, and judgement. Do not drive or operate heavy machinery.  Follow-up care  Follow up with your healthcare provider, or as advised.   A radiologist will review any X-rays that were taken. Your provide will notify you of any new findings that may affect your care.  Call 911  Call emergency services if any of the following occur:    Trouble breathing    Confusion    Very drowsy or trouble awakening    Fainting or loss of consciousness    Rapid or very slow heart rate    Loss of bowel or bladder control  When to seek medical advice  Call your healthcare provider right away if any of these occur:     Pain becomes worse or spreads to your legs    Weakness or numbness in one or both legs    Numbness in the groin or genital area  Date Last Reviewed: 7/1/2016 2000-2017 [employer]. [address] [place] [id number]. All rights reserved. This information is not intended as a substitute for professional medical care. Always follow your healthcare professional's instructions.

## 2019-05-24 ENCOUNTER — OFFICE VISIT (OUTPATIENT)
Dept: PSYCHOLOGY | Facility: CLINIC | Age: 34
End: 2019-05-24
Payer: COMMERCIAL

## 2019-05-24 DIAGNOSIS — F33.1 MODERATE EPISODE OF RECURRENT MAJOR DEPRESSIVE DISORDER (H): Primary | ICD-10-CM

## 2019-05-24 DIAGNOSIS — F41.1 GENERALIZED ANXIETY DISORDER: ICD-10-CM

## 2019-05-24 PROCEDURE — 90832 PSYTX W PT 30 MINUTES: CPT | Performed by: SOCIAL WORKER

## 2019-05-24 NOTE — PROGRESS NOTES
[facility]  Integrated Behavioral Health Services  May 24, 2019    Behavioral Health Clinician Progress Note    Patient Name: Myrna Brothers           Service Type:  Individual      Service Location:   Face to Face in Clinic     Session Start Time:  12: 40 pm Session End Time:   1: 15 pm      Session Length: 16 - 37      Attendees: Patient    Visit Activities (Refresh list every visit): [facility] Only    Diagnostic Assessment Date: 4/12/18  Treatment Plan Review Date: 4/3/19  See Flowsheets for today's PHQ-9 and OREN-7 results  Previous PHQ-9:   PHQ-9 SCORE 12/20/2018 1/11/2019 1/18/2019   PHQ-9 Total Score 16 15 9     Previous OREN-7:   OREN-7 SCORE 12/20/2018 1/11/2019 1/18/2019   Total Score 14 10 8       LUKASZ LEVEL:  No flowsheet data found.    DATA  Extended Session (60+ minutes): No  Interactive Complexity: No  Crisis: No  [facility] Patient: No    Treatment Objective(s) Addressed in This Session:  Target Behavior(s): disease management/lifestyle changes , mental health management    Depressed Mood: Decrease frequency and intensity of feeling down, depressed, hopeless  Identify negative self-talk and behaviors: challenge core beliefs, myths, and actions  Anxiety: will experience a reduction in anxiety, will develop more effective coping skills to manage anxiety symptoms, will develop healthy cognitive patterns and beliefs and will increase ability to function adaptively    Current Stressors / Issues:  Patient reported that it has been a difficult week. She stated that she feels anxious and nervous at work since she has not been receiving as much positive feedback from her boss.  Patient reported that she is worried that it is indicative that she is at risk for losing her job.  Patient discussed additional anxiety and feeling like she is not good enough when at home since her son has been continuing to cry and not always responsive to her attempts to soothe him and put him to sleep at night.   Patient stated that she is having a difficult time identifying if her anxiety is causing her to personalize the situation, or if her thoughts/feelings are accurate and fact based. Patient reviewed conversations with her . Patient stated that she has high expectations for herself, and knows that sometimes they may not be realistic.     Progress on Treatment Objective(s) / Homework:  Satisfactory progress - ACTION (Actively working towards change); Intervened by reinforcing change plan / affirming steps taken    Motivational Interviewing    MI Intervention: Expressed Empathy/Understanding, Supported Autonomy, Collaboration, Evocation, Permission to raise concern or advise, Open-ended questions, Change talk (evoked) and Reframe     Change Talk Expressed by the Patient: Ability to change Reasons to change Need to change    Provider Response to Change Talk: E - Evoked more info from patient about behavior change, A - Affirmed patient's thoughts, decisions, or attempts at behavior change, R - Reflected patient's change talk and S - Summarized patient's change talk statements    Cognitive Behavioral Therapy: Discussed connection between thoughts, feelings, and behaviors. Taught patient how to identify cognitive distortions, and assisted patient to identify own cognitive distortions. Taught and engaged patient in cognitive restructuring techniques.    Also provided psychoeducation about behavioral health condition, symptoms, and treatment options    Care Plan review completed: Yes    Medication Review:  No changes.    Medication Compliance:  Yes    Changes in Health Issues:   None reported    Chemical Use Review:   Substance Use: Chemical use reviewed, no active concerns identified      Tobacco Use: No current tobacco use.      Assessment: Current Emotional / Mental Status (status of significant symptoms):  Risk status (Self / Other harm or suicidal ideation)  Patient has had a history of suicidal ideation: in  correlation with stress, sent text on 4/13 about wanting to die, but no active SI. Per patient and , patient has had history of passive SI for more than 10 years and denies a history of suicide attempts, self-injurious behavior, homicidal ideation, homicidal behavior and and other safety concerns  Patient denies current fears or concerns for personal safety.  Patient denies current or recent suicidal ideation or behaviors.  Patient denies current or recent homicidal ideation or behaviors.  Patient denies current or recent self injurious behavior or ideation.  Patient denies other safety concerns.  A safety and risk management plan has not been developed at this time, however patient was encouraged to call [place] [phone] should there be a change in any of these risk factors.    Appearance:   Appropriate   Eye Contact:   Good   Psychomotor Behavior: Normal   Attitude:   Cooperative   Orientation:   All  Speech   Rate / Production: Normal    Volume:  Normal   Mood:    Normal  Affect:    Appropriate   Thought Content:  Clear   Thought Form:  Coherent  Logical   Insight:    Good     Diagnoses:  1. Moderate episode of recurrent major depressive disorder (H)    2. Generalized anxiety disorder        Collateral Reports Completed:  Not Applicable    Plan: (Homework, other):  Patient was given information about behavioral services and encouraged to schedule a follow up appointment with the clinic [facility] in 1 week.  She was also given Cognitive Behavioral Therapy skills to practice when experiencing anxiety and depression. CD Recommendations: No indications of CD issues.  Stephanie Calabrese, KVNG, [facility]      ______________________________________________________________________    Integrated Primary Care Behavioral Health Treatment Plan    Patient's Name: Myrna Brothers  YOB: 1985    Date: 4/3/19    DSM-V Diagnoses: 296.32 (F33.1) Major Depressive Disorder, Recurrent Episode, Moderate _ or 300.02 (F41.1)  Generalized Anxiety Disorder  Psychosocial / Contextual Factors: recently postpartum  WHODAS: TBD    Referral / Collaboration:  No additional referrals needed at this time.    Anticipated number of session or this episode of care: 6-8      MeasurableTreatment Goal(s) related to diagnosis / functional impairment(s)  Goal 1: Patient will reduce feelings of depression as evidenced by decreased score on PHQ-9.    I will know I've met my goal when I feel less down and emotional.      Objective #A (Patient Action)    Patient will Decrease frequency and intensity of feeling down, depressed, hopeless.  Status: Continued - Date(s): 4/3/19    Intervention(s)  [facility] will assign homework to assist with behavioral activation/activity scheduling.    Objective #B  Patient will Identify negative self-talk and behaviors: challenge core beliefs, myths, and actions.  Status: Continued - Date(s): 4/3/19    Intervention(s)  [facility] will teach about automatic negative thoughts, irrational core beliefs, and cognitive restructuring techniques.        Goal 2: Patient will reduce feelings of anxiety as evidenced by decreased score on GAD7.    I will know I've met my goal when I feel less anxious and worried.      Objective #A (Patient Action)    Status: Continued - Date(s):4/3/19    Patient will identify three distraction and diversion activities and use those activities to decrease level of anxiety  .    Intervention(s)  [facility] will assign homework to practice distress tolerance and mindfulness techniques between session.    Objective #B  Patient will use cognitive strategies identified in therapy to challenge anxious thoughts.    Status: Continued - Date(s): 4/3/19     Intervention(s)  [facility] will teach CBT techniques related to cognitive restructuring.    Objective #C  Patient will use relaxation strategies 1-2 times per day to reduce the physical symptoms of anxiety.  Status: Continued - Date(s): 4/3/19    Intervention(s)  [facility] will role-play  relaxation techniques.    Patient has reviewed and agreed to the above plan.      Stephanie Calabrese, [facility]  April 4, 2019

## 2019-05-25 ENCOUNTER — THERAPY VISIT (OUTPATIENT)
Dept: PHYSICAL THERAPY | Facility: CLINIC | Age: 34
End: 2019-05-25
Attending: FAMILY MEDICINE
Payer: COMMERCIAL

## 2019-05-25 DIAGNOSIS — M54.6 ACUTE MIDLINE THORACIC BACK PAIN: ICD-10-CM

## 2019-05-25 DIAGNOSIS — M54.6 PAIN IN THORACIC SPINE: ICD-10-CM

## 2019-05-25 PROCEDURE — 97161 PT EVAL LOW COMPLEX 20 MIN: CPT | Mod: GP | Performed by: PHYSICAL THERAPIST

## 2019-05-25 PROCEDURE — 97110 THERAPEUTIC EXERCISES: CPT | Mod: GP | Performed by: PHYSICAL THERAPIST

## 2019-05-25 NOTE — PROGRESS NOTES
[place] [facility] Initial Evaluation  Subjective:                                     General health as reported by patient is good.        Current medications:  Anti-depressants, pain medication and muscle relaxants.  Current occupation is Admin Ass't.            Red flags:  Pain at night/rest.                        Objective:  System    Physical Exam    General     ROS    Assessment/Plan:

## 2019-05-25 NOTE — PROGRESS NOTES
[place] [facility] Initial Evaluation  Subjective:    Myrna Brothers is a 33 year old female with a lumbar condition.  Condition occurred with:  Other reason.  Condition occurred: for unknown reasons.  This is a chronic condition  Patient reports onset of mid back pain after her first pregnancy 7 months ago and it recently got worse in May 2019 without a specific mechanism of injury or change in daily routine.    Patient reports pain:  Lower thoracic spine.  Radiates to: wraps around her ribs anteiorly on both sides.  Pain is described as aching and is constant and reported as 8/10.  Associated symptoms:  Loss of motion/stiffness. Pain is worse during the day.  Symptoms are exacerbated by bending, sitting, lifting and carrying and relieved by heat (standing and walking).  Since onset symptoms are unchanged.  Special tests:  X-ray (see epic).      General health as reported by patient is good.                      Red flags:  None as reported by the patient.                        Objective:  Standing Alignment:    Cervical/Thoracic:  Forward head  Shoulder/UE:  Rounded shoulders                             Lumbar/SI Evaluation  ROM:    AROM Lumbar:   Flexion:            Hands to floor, mild pain  Ext:                    WNL, mild pain   Side Bend:        Left:  Hand to knee    Right:  Hand to knee  Rotation:           Left:  WNL    Right:  WNL  Side Glide:        Left:     Right:           Lumbar Myotomes:  Lumbar myotomes: grossly 4+/5 bilaterally.                  Neural Tension/Mobility:  Lumbar:  Normal                     Cervical/Thoracic Evaluation  Cervical AROM: normal         Headaches: cervical  Cervical Myotomes:  Cervical myotomes: grossly 4+/5 bilaterally.                        Cervical Palpation:  : T10/11/12 paraspinals and vertebae.          Spinal Segmental Conclusions:    Level:  Hypo at T4, T5, T6, T7, T8, T9 and T10                                                                        [place] Thoracic Evalution:      Cervical Differential:                Rep Flex:  During:  Produces  After: no effect  Mechanical Response:  No effect  Test Movements:  Flexion:  During:  Produces  After: no effect  Mechanical Response:  No effect  Rep Flexion:  During:  Produces  After: no effect  Mechanical Response: no effect  Extension:  During:  Produces  After: no effect  Mechanical Response: no effect  Rep Extension:  During:  Decreases and centralizing  After: centralizing  Mechanical Response: IncROM                                              ROS    Assessment/Plan:    Patient is a 33 year old female with thoracic complaints.    Patient has the following significant findings with corresponding treatment plan.                Diagnosis 1:  Thoracic back pain  Pain -  hot/cold therapy, manual therapy, self management, education, directional preference exercise and home program  Decreased strength - therapeutic exercise, therapeutic activities and home program  Impaired muscle performance - neuro re-education and home program  Decreased function - therapeutic activities and home program  Impaired posture - neuro re-education, therapeutic activities and home program    Therapy Evaluation Codes:   1) History comprised of:   Personal factors that impact the plan of care:      None.    Comorbidity factors that impact the plan of care are:      None.     Medications impacting care: None.  2) Examination of Body Systems comprised of:   Body structures and functions that impact the plan of care:      Thoracic Spine.   Activity limitations that impact the plan of care are:      Bathing, Bending, Dressing, Lifting and Sitting.  3) Clinical presentation characteristics are:   Stable/Uncomplicated.  4) Decision-Making    Low complexity using standardized patient assessment instrument and/or measureable assessment of functional outcome.  Cumulative Therapy Evaluation is: Low complexity.    Previous and current  functional limitations:  (See Goal Flow Sheet for this information)    Short term and Long term goals: (See Goal Flow Sheet for this information)     Communication ability:  Patient appears to be able to clearly communicate and understand verbal and written communication and follow directions correctly.  Treatment Explanation - The following has been discussed with the patient:   RX ordered/plan of care  Anticipated outcomes  Possible risks and side effects  This patient would benefit from PT intervention to resume normal activities.   Rehab potential is good.    Frequency:  1 X week, once daily  Duration:  for 6 weeks  Discharge Plan:  Achieve all LTG.  Independent in home treatment program.  Reach maximal therapeutic benefit.    Please refer to the daily flowsheet for treatment today, total treatment time and time spent performing 1:1 timed codes.

## 2019-06-01 ENCOUNTER — THERAPY VISIT (OUTPATIENT)
Dept: PHYSICAL THERAPY | Facility: CLINIC | Age: 34
End: 2019-06-01
Payer: COMMERCIAL

## 2019-06-01 DIAGNOSIS — M54.6 PAIN IN THORACIC SPINE: ICD-10-CM

## 2019-06-01 PROCEDURE — 97112 NEUROMUSCULAR REEDUCATION: CPT | Mod: GP | Performed by: PHYSICAL THERAPIST

## 2019-06-01 PROCEDURE — 97110 THERAPEUTIC EXERCISES: CPT | Mod: GP | Performed by: PHYSICAL THERAPIST

## 2019-06-01 PROCEDURE — 97140 MANUAL THERAPY 1/> REGIONS: CPT | Mod: GP | Performed by: PHYSICAL THERAPIST

## 2019-06-04 ENCOUNTER — TELEPHONE (OUTPATIENT)
Dept: PEDIATRICS | Facility: CLINIC | Age: 34
End: 2019-06-04

## 2019-06-04 NOTE — TELEPHONE ENCOUNTER
Left message for patient to return clinic call regarding scheduling. Patient may need a Return  appointment for back pain with Valentina Sahu MD around 6/11/19. Number to clinic and Mychart option given, please assist in scheduling once patient returns clinic call.     Call Center OKAY TO SCHEDULE.    Thanks,   Marcelle Shin  Primary Care   [facility] [place]

## 2019-06-07 ENCOUNTER — OFFICE VISIT (OUTPATIENT)
Dept: PSYCHOLOGY | Facility: CLINIC | Age: 34
End: 2019-06-07
Payer: COMMERCIAL

## 2019-06-07 DIAGNOSIS — F33.1 MODERATE EPISODE OF RECURRENT MAJOR DEPRESSIVE DISORDER (H): Primary | ICD-10-CM

## 2019-06-07 DIAGNOSIS — F41.1 GENERALIZED ANXIETY DISORDER: ICD-10-CM

## 2019-06-07 PROCEDURE — 90832 PSYTX W PT 30 MINUTES: CPT | Performed by: SOCIAL WORKER

## 2019-06-07 NOTE — PROGRESS NOTES
[facility]  Integrated Behavioral Health Services  June 7, 2019    Behavioral Health Clinician Progress Note    Patient Name: Myrna Brothers           Service Type:  Individual      Service Location:   Face to Face in Clinic     Session Start Time:  11: 49 am Session End Time:   12:20 pm      Session Length: 16 - 37      Attendees: Patient    Visit Activities (Refresh list every visit): [facility] Only    Diagnostic Assessment Date: 4/12/18  Treatment Plan Review Date: 4/3/19  See Flowsheets for today's PHQ-9 and OREN-7 results  Previous PHQ-9:   PHQ-9 SCORE 12/20/2018 1/11/2019 1/18/2019   PHQ-9 Total Score 16 15 9     Previous OREN-7:   OREN-7 SCORE 12/20/2018 1/11/2019 1/18/2019   Total Score 14 10 8       LUKASZ LEVEL:  No flowsheet data found.    DATA  Extended Session (60+ minutes): No  Interactive Complexity: No  Crisis: No  [facility] Patient: No    Treatment Objective(s) Addressed in This Session:  Target Behavior(s): disease management/lifestyle changes , mental health management    Depressed Mood: Decrease frequency and intensity of feeling down, depressed, hopeless  Identify negative self-talk and behaviors: challenge core beliefs, myths, and actions  Anxiety: will experience a reduction in anxiety, will develop more effective coping skills to manage anxiety symptoms, will develop healthy cognitive patterns and beliefs and will increase ability to function adaptively    Current Stressors / Issues:  Patient reported that she had her annual review at work and received positive feedback. She stated that she received positive feedback at work, and shared that it helped her to reduce her anxiety about her interactions with her supervisor. Patient reported that she has been experiencing frustration secondary to comments that her parents have made about her son in recent weeks. She stated that it makes her feel judged about her decisions regarding how much to feed him. Patient shared that her  "history shapes how she views her mother's comments, since historically her mother has not always had the best of intentions with her comments. Patient discussed how she and her  have made a decision on how to best react and respond to her comments if they occur again in the future.  Patient stated that her  is likely going to receive a job offer in [place]. She discussed how she is excited that he may be offered his \"dream job\", but shared the anxiety associated with moving away from family, friends, and her job. Patient expressed concern about how her mental health after the move since she has a history of worsening symptoms of depression after a move. She stated that she is trying to be proactive as they anticipate the move by researching opportunities for her to meet other parents. Patient was receptive to continuing to explore ways to be proactive by identifying providers and opportunities to meet and socialize with others.    Progress on Treatment Objective(s) / Homework:  Satisfactory progress - ACTION (Actively working towards change); Intervened by reinforcing change plan / affirming steps taken    Motivational Interviewing    MI Intervention: Expressed Empathy/Understanding, Supported Autonomy, Collaboration, Evocation, Permission to raise concern or advise, Open-ended questions, Change talk (evoked) and Reframe     Change Talk Expressed by the Patient: Ability to change Reasons to change Need to change    Provider Response to Change Talk: E - Evoked more info from patient about behavior change, A - Affirmed patient's thoughts, decisions, or attempts at behavior change, R - Reflected patient's change talk and S - Summarized patient's change talk statements    Cognitive Behavioral Therapy: Discussed connection between thoughts, feelings, and behaviors. Taught patient how to identify cognitive distortions, and assisted patient to identify own cognitive distortions. Taught and engaged patient in " cognitive restructuring techniques.    Also provided psychoeducation about behavioral health condition, symptoms, and treatment options    Care Plan review completed: Yes    Medication Review:  No changes.    Medication Compliance:  Yes    Changes in Health Issues:   None reported    Chemical Use Review:   Substance Use: Chemical use reviewed, no active concerns identified      Tobacco Use: No current tobacco use.      Assessment: Current Emotional / Mental Status (status of significant symptoms):  Risk status (Self / Other harm or suicidal ideation)  Patient has had a history of suicidal ideation: in correlation with stress, sent text on 4/13 about wanting to die, but no active SI. Per patient and , patient has had history of passive SI for more than 10 years and denies a history of suicide attempts, self-injurious behavior, homicidal ideation, homicidal behavior and and other safety concerns  Patient denies current fears or concerns for personal safety.  Patient denies current or recent suicidal ideation or behaviors.  Patient denies current or recent homicidal ideation or behaviors.  Patient denies current or recent self injurious behavior or ideation.  Patient denies other safety concerns.  A safety and risk management plan has not been developed at this time, however patient was encouraged to call [facility] should there be a change in any of these risk factors.    Appearance:   Appropriate   Eye Contact:   Good   Psychomotor Behavior: Normal   Attitude:   Cooperative   Orientation:   All  Speech   Rate / Production: Normal    Volume:  Normal   Mood:    Normal  Affect:    Appropriate   Thought Content:  Clear   Thought Form:  Coherent  Logical   Insight:    Good     Diagnoses:  1. Moderate episode of recurrent major depressive disorder (H)    2. Generalized anxiety disorder        Collateral Reports Completed:  Not Applicable    Plan: (Homework, other):  Patient was given information about  behavioral services and encouraged to schedule a follow up appointment with the clinic [facility] in 1 week.  She was also given Cognitive Behavioral Therapy skills to practice when experiencing anxiety and depression. CD Recommendations: No indications of CD issues.  KVNG Webb, [facility]      ______________________________________________________________________    Integrated Primary Care Behavioral Health Treatment Plan    Patient's Name: Myrna Brothers  YOB: 1985    Date: 4/3/19    DSM-V Diagnoses: 296.32 (F33.1) Major Depressive Disorder, Recurrent Episode, Moderate _ or 300.02 (F41.1) Generalized Anxiety Disorder  Psychosocial / Contextual Factors: recently postpartum  WHODAS: TBD    Referral / Collaboration:  No additional referrals needed at this time.    Anticipated number of session or this episode of care: 6-8      MeasurableTreatment Goal(s) related to diagnosis / functional impairment(s)  Goal 1: Patient will reduce feelings of depression as evidenced by decreased score on PHQ-9.    I will know I've met my goal when I feel less down and emotional.      Objective #A (Patient Action)    Patient will Decrease frequency and intensity of feeling down, depressed, hopeless.  Status: Continued - Date(s): 4/3/19    Intervention(s)  [facility] will assign homework to assist with behavioral activation/activity scheduling.    Objective #B  Patient will Identify negative self-talk and behaviors: challenge core beliefs, myths, and actions.  Status: Continued - Date(s): 4/3/19    Intervention(s)  [facility] will teach about automatic negative thoughts, irrational core beliefs, and cognitive restructuring techniques.        Goal 2: Patient will reduce feelings of anxiety as evidenced by decreased score on GAD7.    I will know I've met my goal when I feel less anxious and worried.      Objective #A (Patient Action)    Status: Continued - Date(s):4/3/19    Patient will identify three distraction and diversion  activities and use those activities to decrease level of anxiety  .    Intervention(s)  [facility] will assign homework to practice distress tolerance and mindfulness techniques between session.    Objective #B  Patient will use cognitive strategies identified in therapy to challenge anxious thoughts.    Status: Continued - Date(s): 4/3/19     Intervention(s)  [facility] will teach CBT techniques related to cognitive restructuring.    Objective #C  Patient will use relaxation strategies 1-2 times per day to reduce the physical symptoms of anxiety.  Status: Continued - Date(s): 4/3/19    Intervention(s)  [facility] will role-play relaxation techniques.    Patient has reviewed and agreed to the above plan.      KVNG Webb  April 4, 2019

## 2019-06-14 ENCOUNTER — OFFICE VISIT (OUTPATIENT)
Dept: PSYCHOLOGY | Facility: CLINIC | Age: 34
End: 2019-06-14
Payer: COMMERCIAL

## 2019-06-14 DIAGNOSIS — F41.1 GENERALIZED ANXIETY DISORDER: ICD-10-CM

## 2019-06-14 DIAGNOSIS — F33.1 MODERATE EPISODE OF RECURRENT MAJOR DEPRESSIVE DISORDER (H): Primary | ICD-10-CM

## 2019-06-14 PROCEDURE — 90832 PSYTX W PT 30 MINUTES: CPT | Performed by: SOCIAL WORKER

## 2019-06-14 NOTE — PROGRESS NOTES
"[facility]  Integrated Behavioral Health Services  June 14, 2019    Behavioral Health Clinician Progress Note    Patient Name: Myrna Brothers           Service Type:  Individual      Service Location:   Face to Face in Clinic     Session Start Time:  11: 48 am Session End Time:   12:20 pm      Session Length: 16 - 37      Attendees: Patient    Visit Activities (Refresh list every visit): [facility] Only    Diagnostic Assessment Date: 4/12/18  Treatment Plan Review Date: 4/3/19  See Flowsheets for today's PHQ-9 and OREN-7 results  Previous PHQ-9:   PHQ-9 SCORE 12/20/2018 1/11/2019 1/18/2019   PHQ-9 Total Score 16 15 9     Previous OREN-7:   OREN-7 SCORE 12/20/2018 1/11/2019 1/18/2019   Total Score 14 10 8       LUKASZ LEVEL:  No flowsheet data found.    DATA  Extended Session (60+ minutes): No  Interactive Complexity: No  Crisis: No  [facility] Patient: No    Treatment Objective(s) Addressed in This Session:  Target Behavior(s): disease management/lifestyle changes , mental health management    Depressed Mood: Decrease frequency and intensity of feeling down, depressed, hopeless  Identify negative self-talk and behaviors: challenge core beliefs, myths, and actions  Anxiety: will experience a reduction in anxiety, will develop more effective coping skills to manage anxiety symptoms, will develop healthy cognitive patterns and beliefs and will increase ability to function adaptively    Current Stressors / Issues:  Patient reported that it has been a \"rough\" week. She stated that her son is currently teething, and has been more fussy in the evenings. She reported that sometimes it can take about an hour for him to fall asleep at night. Patient shared that his Druze was over the weekend, and he cried the entire time. Patient stated that she felt like she was being judged as a bad mother because she could soothe him.  Patient reported that she also has had anxiety at work during a recent interaction at " work when she felt like her boss was frustrated with her not understanding something that her boss was trying to teach her.  Patient and [facility] reviewed and applied CBT techniques. Patient was able to identify how anxiety was impacting cognitions, and identify more helpful and accurate cognitions. Patient stated that it is frustrating that she continues to have anxiety and feels like she is not a good mother despite ability to engage in restructuring techniques.  [facility] and patient continued to explore how to restructure unhelpful beliefs with attempts of reducing overall anxiety level before anxiety is triggered.    Progress on Treatment Objective(s) / Homework:  Satisfactory progress - ACTION (Actively working towards change); Intervened by reinforcing change plan / affirming steps taken    Motivational Interviewing    MI Intervention: Expressed Empathy/Understanding, Supported Autonomy, Collaboration, Evocation, Permission to raise concern or advise, Open-ended questions, Change talk (evoked) and Reframe     Change Talk Expressed by the Patient: Ability to change Reasons to change Need to change    Provider Response to Change Talk: E - Evoked more info from patient about behavior change, A - Affirmed patient's thoughts, decisions, or attempts at behavior change, R - Reflected patient's change talk and S - Summarized patient's change talk statements    Cognitive Behavioral Therapy: Discussed connection between thoughts, feelings, and behaviors. Taught patient how to identify cognitive distortions, and assisted patient to identify own cognitive distortions. Taught and engaged patient in cognitive restructuring techniques.    Also provided psychoeducation about behavioral health condition, symptoms, and treatment options    Care Plan review completed: Yes    Medication Review:  No changes.    Medication Compliance:  Yes    Changes in Health Issues:   None reported    Chemical Use Review:   Substance Use: Chemical use  reviewed, no active concerns identified      Tobacco Use: No current tobacco use.      Assessment: Current Emotional / Mental Status (status of significant symptoms):  Risk status (Self / Other harm or suicidal ideation)  Patient has had a history of suicidal ideation: in correlation with stress, sent text on 4/13 about wanting to die, but no active SI. Per patient and , patient has had history of passive SI for more than 10 years and denies a history of suicide attempts, self-injurious behavior, homicidal ideation, homicidal behavior and and other safety concerns  Patient denies current fears or concerns for personal safety.  Patient denies current or recent suicidal ideation or behaviors.  Patient denies current or recent homicidal ideation or behaviors.  Patient denies current or recent self injurious behavior or ideation.  Patient denies other safety concerns.  A safety and risk management plan has not been developed at this time, however patient was encouraged to call [place] [phone] should there be a change in any of these risk factors.    Appearance:   Appropriate   Eye Contact:   Good   Psychomotor Behavior: Normal   Attitude:   Cooperative   Orientation:   All  Speech   Rate / Production: Normal    Volume:  Normal   Mood:    Normal  Affect:    Appropriate   Thought Content:  Clear   Thought Form:  Coherent  Logical   Insight:    Good     Diagnoses:  1. Moderate episode of recurrent major depressive disorder (H)    2. Generalized anxiety disorder        Collateral Reports Completed:  Not Applicable    Plan: (Homework, other):  Patient was given information about behavioral services and encouraged to schedule a follow up appointment with the clinic [facility] in 2 weeks.  She was also given Cognitive Behavioral Therapy skills to practice when experiencing anxiety and depression. CD Recommendations: No indications of CD issues.  Stephanie Calabrese, [facility],  [facility]      ______________________________________________________________________    Integrated Primary Care Behavioral Health Treatment Plan    Patient's Name: Myrna Brothers  YOB: 1985    Date: 4/3/19    DSM-V Diagnoses: 296.32 (F33.1) Major Depressive Disorder, Recurrent Episode, Moderate _ or 300.02 (F41.1) Generalized Anxiety Disorder  Psychosocial / Contextual Factors: recently postpartum  WHODAS: TBD    Referral / Collaboration:  No additional referrals needed at this time.    Anticipated number of session or this episode of care: 6-8      MeasurableTreatment Goal(s) related to diagnosis / functional impairment(s)  Goal 1: Patient will reduce feelings of depression as evidenced by decreased score on PHQ-9.    I will know I've met my goal when I feel less down and emotional.      Objective #A (Patient Action)    Patient will Decrease frequency and intensity of feeling down, depressed, hopeless.  Status: Continued - Date(s): 4/3/19    Intervention(s)  [facility] will assign homework to assist with behavioral activation/activity scheduling.    Objective #B  Patient will Identify negative self-talk and behaviors: challenge core beliefs, myths, and actions.  Status: Continued - Date(s): 4/3/19    Intervention(s)  [facility] will teach about automatic negative thoughts, irrational core beliefs, and cognitive restructuring techniques.        Goal 2: Patient will reduce feelings of anxiety as evidenced by decreased score on GAD7.    I will know I've met my goal when I feel less anxious and worried.      Objective #A (Patient Action)    Status: Continued - Date(s):4/3/19    Patient will identify three distraction and diversion activities and use those activities to decrease level of anxiety  .    Intervention(s)  [facility] will assign homework to practice distress tolerance and mindfulness techniques between session.    Objective #B  Patient will use cognitive strategies identified in therapy to challenge anxious  thoughts.    Status: Continued - Date(s): 4/3/19     Intervention(s)  [facility] will teach CBT techniques related to cognitive restructuring.    Objective #C  Patient will use relaxation strategies 1-2 times per day to reduce the physical symptoms of anxiety.  Status: Continued - Date(s): 4/3/19    Intervention(s)  [facility] will role-play relaxation techniques.    Patient has reviewed and agreed to the above plan.      KVNG Webb  April 4, 2019

## 2019-06-22 ENCOUNTER — THERAPY VISIT (OUTPATIENT)
Dept: PHYSICAL THERAPY | Facility: CLINIC | Age: 34
End: 2019-06-22
Payer: COMMERCIAL

## 2019-06-22 DIAGNOSIS — M54.6 PAIN IN THORACIC SPINE: ICD-10-CM

## 2019-06-22 PROCEDURE — 97110 THERAPEUTIC EXERCISES: CPT | Mod: GP | Performed by: PHYSICAL THERAPIST

## 2019-06-22 PROCEDURE — 97140 MANUAL THERAPY 1/> REGIONS: CPT | Mod: GP | Performed by: PHYSICAL THERAPIST

## 2019-06-28 ENCOUNTER — OFFICE VISIT (OUTPATIENT)
Dept: PSYCHOLOGY | Facility: CLINIC | Age: 34
End: 2019-06-28
Payer: COMMERCIAL

## 2019-06-28 DIAGNOSIS — F33.1 MODERATE EPISODE OF RECURRENT MAJOR DEPRESSIVE DISORDER (H): ICD-10-CM

## 2019-06-28 DIAGNOSIS — F41.1 GENERALIZED ANXIETY DISORDER: Primary | ICD-10-CM

## 2019-06-28 PROCEDURE — 90832 PSYTX W PT 30 MINUTES: CPT | Performed by: SOCIAL WORKER

## 2019-06-28 NOTE — PROGRESS NOTES
"[facility]  Integrated Behavioral Health Services  June 28, 2019    Behavioral Health Clinician Progress Note    Patient Name: Myrna Brothers           Service Type:  Individual      Service Location:   Face to Face in Clinic     Session Start Time:  12: 31 pm Session End Time:   1:02 pm      Session Length: 16 - 37      Attendees: Patient    Visit Activities (Refresh list every visit): [facility] Only    Diagnostic Assessment Date: 4/12/18  Treatment Plan Review Date: 4/3/19  See Flowsheets for today's PHQ-9 and OREN-7 results  Previous PHQ-9:   PHQ-9 SCORE 12/20/2018 1/11/2019 1/18/2019   PHQ-9 Total Score 16 15 9     Previous OREN-7:   OREN-7 SCORE 12/20/2018 1/11/2019 1/18/2019   Total Score 14 10 8       LUKASZ LEVEL:  No flowsheet data found.    DATA  Extended Session (60+ minutes): No  Interactive Complexity: No  Crisis: No  [facility] Patient: No    Treatment Objective(s) Addressed in This Session:  Target Behavior(s): disease management/lifestyle changes , mental health management    Depressed Mood: Decrease frequency and intensity of feeling down, depressed, hopeless  Identify negative self-talk and behaviors: challenge core beliefs, myths, and actions  Anxiety: will experience a reduction in anxiety, will develop more effective coping skills to manage anxiety symptoms, will develop healthy cognitive patterns and beliefs and will increase ability to function adaptively    Current Stressors / Issues:  Patient reported that overall it has been a \"good\" couple of weeks. She stated that her  recommended an alvarado to use on her phone that helps her to write down her thoughts, identify distortions, questions to help her to restructure unhelpful cognitions, and then create new ways of thinking about the situation. She reported that it is overall helpful, but shared that sometimes she has a difficult time believing the alternative cognitions. Patient reflected upon previous cognitions where she " felt like she was not a good Mom, that her son does think she is fun or does not feel safe with her. Patient stated that she has been trying to talk to her  about these cognitions, and discussed that it continues to not believe that they aren't true.  [facility] introduced alternative thought stopping techniques since restructuring techniques do not seem to be helpful.  [facility] and patient continued to explore how to help restructure unhelpful beliefs.  Patient stated that she has less anxiety about her 's unemployment, and began to problem solve through how to transfer coping skills from her 's unemployment to parenting.    Progress on Treatment Objective(s) / Homework:  Satisfactory progress - ACTION (Actively working towards change); Intervened by reinforcing change plan / affirming steps taken    Motivational Interviewing    MI Intervention: Expressed Empathy/Understanding, Supported Autonomy, Collaboration, Evocation, Permission to raise concern or advise, Open-ended questions, Change talk (evoked) and Reframe     Change Talk Expressed by the Patient: Ability to change Reasons to change Need to change    Provider Response to Change Talk: E - Evoked more info from patient about behavior change, A - Affirmed patient's thoughts, decisions, or attempts at behavior change, R - Reflected patient's change talk and S - Summarized patient's change talk statements    Cognitive Behavioral Therapy: Discussed connection between thoughts, feelings, and behaviors. Taught patient how to identify cognitive distortions, and assisted patient to identify own cognitive distortions. Taught and engaged patient in cognitive restructuring techniques.    Also provided psychoeducation about behavioral health condition, symptoms, and treatment options    Care Plan review completed: Yes    Medication Review:  No changes.    Medication Compliance:  Yes    Changes in Health Issues:   None reported    Chemical Use  Review:   Substance Use: Chemical use reviewed, no active concerns identified      Tobacco Use: No current tobacco use.      Assessment: Current Emotional / Mental Status (status of significant symptoms):  Risk status (Self / Other harm or suicidal ideation)  Patient has had a history of suicidal ideation: in correlation with stress, sent text on 4/13 about wanting to die, but no active SI. Per patient and , patient has had history of passive SI for more than 10 years and denies a history of suicide attempts, self-injurious behavior, homicidal ideation, homicidal behavior and and other safety concerns  Patient denies current fears or concerns for personal safety.  Patient denies current or recent suicidal ideation or behaviors.  Patient denies current or recent homicidal ideation or behaviors.  Patient denies current or recent self injurious behavior or ideation.  Patient denies other safety concerns.  A safety and risk management plan has not been developed at this time, however patient was encouraged to call [place] [phone] should there be a change in any of these risk factors.    Appearance:   Appropriate   Eye Contact:   Good   Psychomotor Behavior: Normal   Attitude:   Cooperative   Orientation:   All  Speech   Rate / Production: Normal    Volume:  Normal   Mood:    Normal  Affect:    Appropriate   Thought Content:  Clear   Thought Form:  Coherent  Logical   Insight:    Good     Diagnoses:  1. Generalized anxiety disorder    2. Moderate episode of recurrent major depressive disorder (H)        Collateral Reports Completed:  Not Applicable    Plan: (Homework, other):  Patient was given information about behavioral services and encouraged to schedule a follow up appointment with the clinic [facility] in 2 weeks.  She was also given Cognitive Behavioral Therapy skills to practice when experiencing anxiety and depression. CD Recommendations: No indications of CD issues.  Stephanie Calabrese [facility],  [facility]      ______________________________________________________________________    Integrated Primary Care Behavioral Health Treatment Plan    Patient's Name: Myrna Brothers  YOB: 1985    Date: 4/3/19    DSM-V Diagnoses: 296.32 (F33.1) Major Depressive Disorder, Recurrent Episode, Moderate _ or 300.02 (F41.1) Generalized Anxiety Disorder  Psychosocial / Contextual Factors: recently postpartum  WHODAS: TBD    Referral / Collaboration:  No additional referrals needed at this time.    Anticipated number of session or this episode of care: 6-8      MeasurableTreatment Goal(s) related to diagnosis / functional impairment(s)  Goal 1: Patient will reduce feelings of depression as evidenced by decreased score on PHQ-9.    I will know I've met my goal when I feel less down and emotional.      Objective #A (Patient Action)    Patient will Decrease frequency and intensity of feeling down, depressed, hopeless.  Status: Continued - Date(s): 4/3/19    Intervention(s)  [facility] will assign homework to assist with behavioral activation/activity scheduling.    Objective #B  Patient will Identify negative self-talk and behaviors: challenge core beliefs, myths, and actions.  Status: Continued - Date(s): 4/3/19    Intervention(s)  [facility] will teach about automatic negative thoughts, irrational core beliefs, and cognitive restructuring techniques.        Goal 2: Patient will reduce feelings of anxiety as evidenced by decreased score on GAD7.    I will know I've met my goal when I feel less anxious and worried.      Objective #A (Patient Action)    Status: Continued - Date(s):4/3/19    Patient will identify three distraction and diversion activities and use those activities to decrease level of anxiety  .    Intervention(s)  [facility] will assign homework to practice distress tolerance and mindfulness techniques between session.    Objective #B  Patient will use cognitive strategies identified in therapy to challenge anxious  thoughts.    Status: Continued - Date(s): 4/3/19     Intervention(s)  [facility] will teach CBT techniques related to cognitive restructuring.    Objective #C  Patient will use relaxation strategies 1-2 times per day to reduce the physical symptoms of anxiety.  Status: Continued - Date(s): 4/3/19    Intervention(s)  [facility] will role-play relaxation techniques.    Patient has reviewed and agreed to the above plan.      KVNG Webb  April 4, 2019

## 2019-07-12 ENCOUNTER — OFFICE VISIT (OUTPATIENT)
Dept: PSYCHOLOGY | Facility: CLINIC | Age: 34
End: 2019-07-12
Payer: COMMERCIAL

## 2019-07-12 DIAGNOSIS — F33.1 MODERATE EPISODE OF RECURRENT MAJOR DEPRESSIVE DISORDER (H): ICD-10-CM

## 2019-07-12 DIAGNOSIS — F41.1 GENERALIZED ANXIETY DISORDER: Primary | ICD-10-CM

## 2019-07-12 PROCEDURE — 90832 PSYTX W PT 30 MINUTES: CPT | Performed by: SOCIAL WORKER

## 2019-07-12 NOTE — PROGRESS NOTES
"[facility]  Integrated Behavioral Health Services  July 12, 2019    Behavioral Health Clinician Progress Note    Patient Name: Myrna Brothers           Service Type:  Individual      Service Location:   Face to Face in Clinic     Session Start Time:  12: 35 pm Session End Time:   1: 12 pm      Session Length: 16 - 37      Attendees: Patient    Visit Activities (Refresh list every visit): [facility] Only    Diagnostic Assessment Date: 4/12/18  Treatment Plan Review Date: 7/11//19  See Flowsheets for today's PHQ-9 and OREN-7 results  Previous PHQ-9:   PHQ-9 SCORE 12/20/2018 1/11/2019 1/18/2019   PHQ-9 Total Score 16 15 9     Previous OREN-7:   OREN-7 SCORE 12/20/2018 1/11/2019 1/18/2019   Total Score 14 10 8       LUKASZ LEVEL:  No flowsheet data found.    DATA  Extended Session (60+ minutes): No  Interactive Complexity: No  Crisis: No  [facility] Patient: No    Treatment Objective(s) Addressed in This Session:  Target Behavior(s): disease management/lifestyle changes , mental health management    Depressed Mood: Decrease frequency and intensity of feeling down, depressed, hopeless  Identify negative self-talk and behaviors: challenge core beliefs, myths, and actions  Anxiety: will experience a reduction in anxiety, will develop more effective coping skills to manage anxiety symptoms, will develop healthy cognitive patterns and beliefs and will increase ability to function adaptively    Current Stressors / Issues:  Patient reported stabilization of symptoms. Patient stated that her anxiety has increased as she anticipates an upcoming move to [place] for her 's work. She stated that she has anxiety about how the relationship between her son and her parents may be impacted, and processed feelings of guilt.  Patient discussed additional anxiety associated with unpredictable weather and natural disasters in [place]. Patient reported that she has learned helpful strategies to talk about her anxiety such as \"My " "anxiety needs to come out and have a talk\".  Patient stated that the process of externalization of anxiety has helped her to disengage from it and not believe that bad outcomes will occur. Patient reported that depressive symptoms were more present this past week due to work stress. Patient and [facility] explored strategies of externalization that she has previously used with anxiety to depressive symptoms.    Progress on Treatment Objective(s) / Homework:  Satisfactory progress - ACTION (Actively working towards change); Intervened by reinforcing change plan / affirming steps taken    Motivational Interviewing    MI Intervention: Expressed Empathy/Understanding, Supported Autonomy, Collaboration, Evocation, Permission to raise concern or advise, Open-ended questions, Change talk (evoked) and Reframe     Change Talk Expressed by the Patient: Ability to change Reasons to change Need to change    Provider Response to Change Talk: E - Evoked more info from patient about behavior change, A - Affirmed patient's thoughts, decisions, or attempts at behavior change, R - Reflected patient's change talk and S - Summarized patient's change talk statements    Cognitive Behavioral Therapy: Discussed connection between thoughts, feelings, and behaviors. Taught patient how to identify cognitive distortions, and assisted patient to identify own cognitive distortions. Taught and engaged patient in cognitive restructuring techniques.    Also provided psychoeducation about behavioral health condition, symptoms, and treatment options    Care Plan review completed: Yes    Medication Review:  No changes.    Medication Compliance:  Yes    Changes in Health Issues:   None reported    Chemical Use Review:   Substance Use: Chemical use reviewed, no active concerns identified      Tobacco Use: No current tobacco use.      Assessment: Current Emotional / Mental Status (status of significant symptoms):  Risk status (Self / Other harm or suicidal " ideation)  Patient has had a history of suicidal ideation: in correlation with stress, sent text on 4/13/18 about wanting to die, but no active SI. Per patient and , patient has had history of passive SI for more than 10 years and denies a history of suicide attempts, self-injurious behavior, homicidal ideation, homicidal behavior and and other safety concerns  Patient denies current fears or concerns for personal safety.  Patient reports the following current or recent suicidal ideation or behaviors: SI without plan or intent in correlation with work related stress in [place].  Patient denies current or recent homicidal ideation or behaviors.  Patient denies current or recent self injurious behavior or ideation.  Patient denies other safety concerns.  A safety and risk management plan has not been developed at this time, however patient was encouraged to call [place] [phone] should there be a change in any of these risk factors.    Appearance:   Appropriate   Eye Contact:   Good   Psychomotor Behavior: Normal   Attitude:   Cooperative   Orientation:   All  Speech   Rate / Production: Normal    Volume:  Normal   Mood:    Normal  Affect:    Appropriate   Thought Content:  Clear   Thought Form:  Coherent  Logical   Insight:    Good     Diagnoses:  1. Generalized anxiety disorder    2. Moderate episode of recurrent major depressive disorder (H)        Collateral Reports Completed:  Not Applicable    Plan: (Homework, other):  Patient was given information about behavioral services and encouraged to schedule a follow up appointment with the clinic [facility] in 1 week.  She was also given Cognitive Behavioral Therapy skills to practice when experiencing anxiety and depression. CD Recommendations: No indications of CD issues.  Stephanie Calabrese, [facility], [facility]      ______________________________________________________________________    Integrated Primary Care Behavioral Health Treatment Plan    Patient's Name: Myrna REYNOLD  Zev  YOB: 1985    Date: 7/11/19    DSM-V Diagnoses: 296.32 (F33.1) Major Depressive Disorder, Recurrent Episode, Moderate _ or 300.02 (F41.1) Generalized Anxiety Disorder  Psychosocial / Contextual Factors: recently postpartum  WHODAS: TBD    Referral / Collaboration:  No additional referrals needed at this time.    Anticipated number of session or this episode of care: 6-8      MeasurableTreatment Goal(s) related to diagnosis / functional impairment(s)  Goal 1: Patient will reduce feelings of depression as evidenced by decreased score on PHQ-9.    I will know I've met my goal when I feel less down and emotional.      Objective #A (Patient Action)    Patient will Decrease frequency and intensity of feeling down, depressed, hopeless.  Status: Continued - Date(s): 7/11/19    Intervention(s)  [facility] will assign homework to assist with behavioral activation/activity scheduling.    Objective #B  Patient will Identify negative self-talk and behaviors: challenge core beliefs, myths, and actions.  Status: Continued - Date(s): 7/11/19    Intervention(s)  [facility] will teach about automatic negative thoughts, irrational core beliefs, and cognitive restructuring techniques.        Goal 2: Patient will reduce feelings of anxiety as evidenced by decreased score on GAD7.    I will know I've met my goal when I feel less anxious and worried.      Objective #A (Patient Action)    Status: Continued - Date(s):7/11/19    Patient will identify three distraction and diversion activities and use those activities to decrease level of anxiety  .    Intervention(s)  [facility] will assign homework to practice distress tolerance and mindfulness techniques between session.    Objective #B  Patient will use cognitive strategies identified in therapy to challenge anxious thoughts.    Status: Continued - Date(s): 7/11/19    Intervention(s)  [facility] will teach CBT techniques related to cognitive restructuring.    Objective #C  Patient will use  relaxation strategies 1-2 times per day to reduce the physical symptoms of anxiety.  Status: Continued - Date(s): 7/11/19  Intervention(s)  [facility] will role-play relaxation techniques.    Patient has reviewed and agreed to the above plan.      KVNG Webb  April 4, 2019

## 2019-07-15 ENCOUNTER — HOSPITAL ENCOUNTER (EMERGENCY)
Facility: CLINIC | Age: 34
Discharge: HOME | End: 2019-07-15
Attending: EMERGENCY MEDICINE | Admitting: EMERGENCY MEDICINE
Payer: COMMERCIAL

## 2019-07-15 VITALS
HEIGHT: 65 IN | SYSTOLIC BLOOD PRESSURE: 116 MMHG | RESPIRATION RATE: 16 BRPM | OXYGEN SATURATION: 97 % | HEART RATE: 72 BPM | DIASTOLIC BLOOD PRESSURE: 62 MMHG | BODY MASS INDEX: 28.32 KG/M2 | WEIGHT: 170 LBS | TEMPERATURE: 98.3 F

## 2019-07-15 DIAGNOSIS — M54.6 BILATERAL THORACIC BACK PAIN, UNSPECIFIED CHRONICITY: ICD-10-CM

## 2019-07-15 LAB
ALBUMIN UR-MCNC: 10 MG/DL
APPEARANCE UR: CLEAR
BILIRUB UR QL STRIP: NEGATIVE
COLOR UR AUTO: YELLOW
GLUCOSE UR STRIP-MCNC: NEGATIVE MG/DL
HCG UR QL: NEGATIVE
HGB UR QL STRIP: NEGATIVE
KETONES UR STRIP-MCNC: NEGATIVE MG/DL
LEUKOCYTE ESTERASE UR QL STRIP: ABNORMAL
MUCOUS THREADS #/AREA URNS LPF: PRESENT /LPF
NITRATE UR QL: NEGATIVE
PH UR STRIP: 6 PH (ref 5–7)
RBC #/AREA URNS AUTO: 1 /HPF (ref 0–2)
SOURCE: ABNORMAL
SP GR UR STRIP: 1.03 (ref 1–1.03)
SQUAMOUS #/AREA URNS AUTO: 2 /HPF (ref 0–1)
TRANS CELLS #/AREA URNS HPF: <1 /HPF (ref 0–1)
UROBILINOGEN UR STRIP-MCNC: NORMAL MG/DL (ref 0–2)
WBC #/AREA URNS AUTO: 2 /HPF (ref 0–5)

## 2019-07-15 PROCEDURE — 87088 URINE BACTERIA CULTURE: CPT | Performed by: EMERGENCY MEDICINE

## 2019-07-15 PROCEDURE — 96372 THER/PROPH/DIAG INJ SC/IM: CPT | Performed by: EMERGENCY MEDICINE

## 2019-07-15 PROCEDURE — 87086 URINE CULTURE/COLONY COUNT: CPT | Performed by: EMERGENCY MEDICINE

## 2019-07-15 PROCEDURE — 99284 EMERGENCY DEPT VISIT MOD MDM: CPT | Mod: Z6 | Performed by: EMERGENCY MEDICINE

## 2019-07-15 PROCEDURE — 81025 URINE PREGNANCY TEST: CPT | Performed by: EMERGENCY MEDICINE

## 2019-07-15 PROCEDURE — 25000132 ZZH RX MED GY IP 250 OP 250 PS 637: Performed by: EMERGENCY MEDICINE

## 2019-07-15 PROCEDURE — 81001 URINALYSIS AUTO W/SCOPE: CPT | Performed by: EMERGENCY MEDICINE

## 2019-07-15 PROCEDURE — 99284 EMERGENCY DEPT VISIT MOD MDM: CPT | Performed by: EMERGENCY MEDICINE

## 2019-07-15 PROCEDURE — 25000128 H RX IP 250 OP 636: Performed by: EMERGENCY MEDICINE

## 2019-07-15 RX ORDER — CYCLOBENZAPRINE HCL 5 MG
10 TABLET ORAL ONCE
Status: COMPLETED | OUTPATIENT
Start: 2019-07-15 | End: 2019-07-15

## 2019-07-15 RX ORDER — KETOROLAC TROMETHAMINE 15 MG/ML
15 INJECTION, SOLUTION INTRAMUSCULAR; INTRAVENOUS ONCE
Status: COMPLETED | OUTPATIENT
Start: 2019-07-15 | End: 2019-07-15

## 2019-07-15 RX ORDER — HYDROCODONE BITARTRATE AND ACETAMINOPHEN 5; 325 MG/1; MG/1
1 TABLET ORAL ONCE
Status: COMPLETED | OUTPATIENT
Start: 2019-07-15 | End: 2019-07-15

## 2019-07-15 RX ADMIN — KETOROLAC TROMETHAMINE 15 MG: 15 INJECTION, SOLUTION INTRAMUSCULAR; INTRAVENOUS at 14:00

## 2019-07-15 RX ADMIN — CYCLOBENZAPRINE HYDROCHLORIDE 10 MG: 5 TABLET, FILM COATED ORAL at 14:00

## 2019-07-15 RX ADMIN — HYDROCODONE BITARTRATE AND ACETAMINOPHEN 1 TABLET: 5; 325 TABLET ORAL at 15:30

## 2019-07-15 ASSESSMENT — ENCOUNTER SYMPTOMS
FEVER: 0
DIFFICULTY URINATING: 0
NECK STIFFNESS: 0
ARTHRALGIAS: 0
CONFUSION: 0
WEAKNESS: 0
HEADACHES: 0
BACK PAIN: 1
ABDOMINAL PAIN: 0
NUMBNESS: 0
SHORTNESS OF BREATH: 0
COLOR CHANGE: 0
CONSTIPATION: 0
EYE REDNESS: 0

## 2019-07-15 ASSESSMENT — MIFFLIN-ST. JEOR: SCORE: 1466.99

## 2019-07-15 NOTE — ED PROVIDER NOTES
"    [facility] ([facility])  7/15/19  [facility]   History     Chief Complaint   Patient presents with     Back Pain     The history is provided by the patient.     Myrna Brothers is a 33 year old female with previous history of slipped disc in her thoracic spine who is here with increased pain.  Patient states that she has been having increased pain for the past several weeks has been seeing physical therapy for this.  Patient is also been taking ibuprofen and acetaminophen.  Patient states that her pain became worse today.  No known precipitating factors. Patient tried to use a foam roller at work with no relief of her symptoms.  Patient states that her symptoms feel similar to her previous back issues but pain is currently not controlled.  She denies any neuro deficits.  No numbness or tingling. No bowel or bladder dysfunction, no new injuries.  No other symptoms noted.     This part of the document was transcribed by Brandy Castillo Medical Scribe.      I have reviewed the Medications, Allergies, Past Medical and Surgical History, and Social History in the Epic system.    Review of Systems   Constitutional: Negative for fever.   HENT: Negative for congestion.    Eyes: Negative for redness.   Respiratory: Negative for shortness of breath.    Cardiovascular: Negative for chest pain.   Gastrointestinal: Negative for abdominal pain and constipation.   Genitourinary: Negative for difficulty urinating.   Musculoskeletal: Positive for back pain. Negative for arthralgias and neck stiffness.   Skin: Negative for color change.   Neurological: Negative for weakness, numbness and headaches.   Psychiatric/Behavioral: Negative for confusion.   All other systems reviewed and are negative.      Physical Exam   BP: 116/62  Pulse: 72  Temp: 98.3  F (36.8  C)  Resp: 18  Height: 165.1 cm (5' 5\")  Weight: 77.1 kg (170 lb)  SpO2: 100 %      Physical Exam   Constitutional: She is oriented to person, place, " and time. She appears well-developed and well-nourished. No distress.   HENT:   Head: Normocephalic and atraumatic.   Mouth/Throat: No oropharyngeal exudate.   Eyes: Pupils are equal, round, and reactive to light. Right eye exhibits no discharge. Left eye exhibits no discharge. No scleral icterus.   Neck: Normal range of motion. Neck supple.   Cardiovascular: Normal rate, regular rhythm, normal heart sounds and intact distal pulses. Exam reveals no gallop and no friction rub.   No murmur heard.  Pulmonary/Chest: Effort normal and breath sounds normal. No respiratory distress. She has no wheezes. She exhibits no tenderness.   Abdominal: Soft. Bowel sounds are normal. She exhibits no distension. There is no tenderness.   Musculoskeletal: Normal range of motion. She exhibits no edema, tenderness or deformity.        Arms:  Neurological: She is alert and oriented to person, place, and time. No cranial nerve deficit.   Full strength and sensation in upper and lower extremities bilaterally.    Skin: Skin is warm and dry. No rash noted. She is not diaphoretic. No erythema. No pallor.   Psychiatric: She has a normal mood and affect.       ED Course        Procedures             Critical Care time:  none             Labs Ordered and Resulted from Time of ED Arrival Up to the Time of Departure from the ED - No data to display         Assessments & Plan (with Medical Decision Making)   Myrna Brothers is a 33-year-old female with thoracic back pain which has been ongoing for several weeks and she is undergoing physical therapy for this.  On exam she has no focal neuro deficits. She does have paraspinal tenderness on her T-spine. UA showed no acute abnormalities, urine pregnancy was negtive. Patient was given ketorolac, cyclobenzaprine, and hydrocodone-acetaminophen in the emergency department with a decrease in her pain.  Discussed all results with patient. Will discharge home with return precautions. Discussed reasons to  return to the emergency department.  Patient understands and agrees with this plan.    This part of the document was transcribed by Gabriela Hartley Scribolayinka.      I have reviewed the nursing notes.    I have reviewed the findings, diagnosis, plan and need for follow up with the patient.       Medication List      There are no discharge medications for this visit.         Final diagnoses:   None       7/15/2019   [facility], [place], EMERGENCY DEPARTMENT     Ross Barrera,   07/15/19 2020

## 2019-07-15 NOTE — LETTER
July 15, 2019      To Whom It May Concern:      Myrna Brothers was seen in our Emergency Department today, 07/15/19.  I expect her condition to improve over the next 2 days.  She may return to work when improved.    Sincerely,        Ross Barrera, DO

## 2019-07-15 NOTE — ED AVS SNAPSHOT
[facility], [place], Emergency Department  [address] [place] [id number]  Phone:  [phone]                                    Myrna Brothers   MRN: [id number]    Department:  [facility], Emergency Department   Date of Visit:  7/15/2019           After Visit Summary Signature Page    I have received my discharge instructions, and my questions have been answered. I have discussed any challenges I see with this plan with the nurse or doctor.    ..........................................................................................................................................  Patient/Patient Representative Signature      ..........................................................................................................................................  Patient Representative Print Name and Relationship to Patient    ..................................................               ................................................  Date                                   Time    ..........................................................................................................................................  Reviewed by Signature/Title    ...................................................              ..............................................  Date                                               Time          22EPIC Rev 08/18

## 2019-07-15 NOTE — ED TRIAGE NOTES
"Triage Assessment & Note:    /62   Pulse 72   Temp 98.3  F (36.8  C) (Oral)   Resp 18   Ht 1.651 m (5' 5\")   Wt 77.1 kg (170 lb)   SpO2 100%   BMI 28.29 kg/m      Patient presents with: PT c/o mid thoracic back pain from a \"slipped disk\"PT reports over the weekend her pain has increased. She has been having PT for several weeks.     Home Treatments/Remedies:     Febrile / Afebrile?    Duration of C/o:     Willie Argueta  July 15, 2019      "

## 2019-07-16 ENCOUNTER — OFFICE VISIT (OUTPATIENT)
Dept: PEDIATRICS | Facility: CLINIC | Age: 34
End: 2019-07-16
Payer: COMMERCIAL

## 2019-07-16 ENCOUNTER — ANCILLARY PROCEDURE (OUTPATIENT)
Dept: ULTRASOUND IMAGING | Facility: CLINIC | Age: 34
End: 2019-07-16
Attending: NURSE PRACTITIONER
Payer: COMMERCIAL

## 2019-07-16 ENCOUNTER — TELEPHONE (OUTPATIENT)
Dept: PEDIATRICS | Facility: CLINIC | Age: 34
End: 2019-07-16

## 2019-07-16 VITALS
TEMPERATURE: 96.7 F | BODY MASS INDEX: 28.01 KG/M2 | OXYGEN SATURATION: 96 % | WEIGHT: 168.3 LBS | HEART RATE: 77 BPM | SYSTOLIC BLOOD PRESSURE: 100 MMHG | DIASTOLIC BLOOD PRESSURE: 60 MMHG

## 2019-07-16 DIAGNOSIS — R10.11 RIGHT UPPER QUADRANT ABDOMINAL PAIN: ICD-10-CM

## 2019-07-16 DIAGNOSIS — R10.13 EPIGASTRIC ABDOMINAL PAIN: ICD-10-CM

## 2019-07-16 DIAGNOSIS — R79.89 ELEVATED LFTS: ICD-10-CM

## 2019-07-16 DIAGNOSIS — R10.11 RIGHT UPPER QUADRANT ABDOMINAL PAIN: Primary | ICD-10-CM

## 2019-07-16 DIAGNOSIS — M54.6 ACUTE MIDLINE THORACIC BACK PAIN: ICD-10-CM

## 2019-07-16 DIAGNOSIS — N30.00 ACUTE CYSTITIS WITHOUT HEMATURIA: ICD-10-CM

## 2019-07-16 LAB
ALBUMIN SERPL-MCNC: 3.9 G/DL (ref 3.4–5)
ALP SERPL-CCNC: 338 U/L (ref 40–150)
ALT SERPL W P-5'-P-CCNC: 1044 U/L (ref 0–50)
ANION GAP SERPL CALCULATED.3IONS-SCNC: 5 MMOL/L (ref 3–14)
AST SERPL W P-5'-P-CCNC: 1279 U/L (ref 0–45)
BACTERIA SPEC CULT: ABNORMAL
BACTERIA SPEC CULT: ABNORMAL
BILIRUB SERPL-MCNC: 4.1 MG/DL (ref 0.2–1.3)
BUN SERPL-MCNC: 17 MG/DL (ref 7–30)
CALCIUM SERPL-MCNC: 8.9 MG/DL (ref 8.5–10.1)
CHLORIDE SERPL-SCNC: 109 MMOL/L (ref 94–109)
CO2 SERPL-SCNC: 27 MMOL/L (ref 20–32)
CREAT SERPL-MCNC: 0.76 MG/DL (ref 0.52–1.04)
ERYTHROCYTE [DISTWIDTH] IN BLOOD BY AUTOMATED COUNT: 13.7 % (ref 10–15)
GFR SERPL CREATININE-BSD FRML MDRD: >90 ML/MIN/{1.73_M2}
GLUCOSE SERPL-MCNC: 90 MG/DL (ref 70–99)
HCT VFR BLD AUTO: 43.5 % (ref 35–47)
HGB BLD-MCNC: 14.1 G/DL (ref 11.7–15.7)
Lab: ABNORMAL
MCH RBC QN AUTO: 28.7 PG (ref 26.5–33)
MCHC RBC AUTO-ENTMCNC: 32.4 G/DL (ref 31.5–36.5)
MCV RBC AUTO: 88 FL (ref 78–100)
PLATELET # BLD AUTO: 310 10E9/L (ref 150–450)
POTASSIUM SERPL-SCNC: 4.3 MMOL/L (ref 3.4–5.3)
PROT SERPL-MCNC: 8 G/DL (ref 6.8–8.8)
RADIOLOGIST FLAGS: ABNORMAL
RBC # BLD AUTO: 4.92 10E12/L (ref 3.8–5.2)
SODIUM SERPL-SCNC: 141 MMOL/L (ref 133–144)
SPECIMEN SOURCE: ABNORMAL
WBC # BLD AUTO: 6.4 10E9/L (ref 4–11)

## 2019-07-16 PROCEDURE — 86709 HEPATITIS A IGM ANTIBODY: CPT | Performed by: NURSE PRACTITIONER

## 2019-07-16 PROCEDURE — 76700 US EXAM ABDOM COMPLETE: CPT

## 2019-07-16 PROCEDURE — 86704 HEP B CORE ANTIBODY TOTAL: CPT | Performed by: NURSE PRACTITIONER

## 2019-07-16 PROCEDURE — 87340 HEPATITIS B SURFACE AG IA: CPT | Performed by: NURSE PRACTITIONER

## 2019-07-16 PROCEDURE — 36415 COLL VENOUS BLD VENIPUNCTURE: CPT | Performed by: NURSE PRACTITIONER

## 2019-07-16 PROCEDURE — 99214 OFFICE O/P EST MOD 30 MIN: CPT | Performed by: NURSE PRACTITIONER

## 2019-07-16 PROCEDURE — 86706 HEP B SURFACE ANTIBODY: CPT | Performed by: NURSE PRACTITIONER

## 2019-07-16 PROCEDURE — 80053 COMPREHEN METABOLIC PANEL: CPT | Performed by: NURSE PRACTITIONER

## 2019-07-16 PROCEDURE — 86803 HEPATITIS C AB TEST: CPT | Performed by: NURSE PRACTITIONER

## 2019-07-16 PROCEDURE — 85027 COMPLETE CBC AUTOMATED: CPT | Performed by: NURSE PRACTITIONER

## 2019-07-16 RX ORDER — CEPHALEXIN 500 MG/1
500 CAPSULE ORAL 2 TIMES DAILY
Qty: 14 CAPSULE | Refills: 0 | Status: ON HOLD | OUTPATIENT
Start: 2019-07-16 | End: 2019-07-27

## 2019-07-16 RX ORDER — NICOTINE POLACRILEX 4 MG/1
20 GUM, CHEWING ORAL DAILY
Qty: 30 TABLET | Refills: 1 | Status: SHIPPED | OUTPATIENT
Start: 2019-07-16 | End: 2019-07-22

## 2019-07-16 NOTE — TELEPHONE ENCOUNTER
[facility] Call Center    Phone Message    May a detailed message be left on voicemail: yes    Reason for Call: Patient called and stated omeprazole 20 MG tablet [[id number]] (Order [id number]) is not covered by insurance. Patient request a new prescription sent to pharmacy.  Please advise.      Action Taken: Message routed to:  Primary Care p [phone]

## 2019-07-16 NOTE — RESULT ENCOUNTER NOTE
Marjorie Brothers,    Attached are your test results.  -Normal red blood cell (hgb) levels, normal white blood cell count and normal platelet levels.   Please contact us if you have any questions.    Mery Bronson, CNP

## 2019-07-16 NOTE — NURSING NOTE
"Chief Complaint   Patient presents with     ER F/U     Follow-up ER       Initial /60 (BP Location: Right arm, Patient Position: Sitting, Cuff Size: Adult Regular)   Pulse 77   Temp 96.7  F (35.9  C) (Temporal)   Wt 76.3 kg (168 lb 4.8 oz)   SpO2 96%   Breastfeeding? Yes   BMI 28.01 kg/m   Estimated body mass index is 28.01 kg/m  as calculated from the following:    Height as of 7/15/19: 1.651 m (5' 5\").    Weight as of this encounter: 76.3 kg (168 lb 4.8 oz).  Medication Reconciliation: complete      DAHLIA Ruffin      "

## 2019-07-16 NOTE — PATIENT INSTRUCTIONS
PLAN:   1.   Symptomatic therapy suggested: will start on prilosec once a day.  Start on antibiotic as well   Increase fluids     2.  Orders Placed This Encounter   Medications     omeprazole 20 MG tablet     Sig: Take 1 tablet (20 mg) by mouth daily Take 30-60 minutes before a meal.     Dispense:  30 tablet     Refill:  1     cephALEXin (KEFLEX) 500 MG capsule     Sig: Take 1 capsule (500 mg) by mouth 2 times daily for 7 days     Dispense:  14 capsule     Refill:  0     Orders Placed This Encounter   Procedures     US Abdomen Complete     MR Abdomen MRCP w/o & w Contrast     CBC with platelets     Comprehensive metabolic panel     Hepatitis A Antibody IgG     Hepatitis B Surface Antibody     HEPATITIS B CORE ANTIBODY     HEPATITIS B SURFACE ANTIGEN     HEPATITS C ANTIBODY     Hepatitis A antibody IgM       3. Patient needs to follow up in if no improvement,or sooner if worsening of symptoms or other symptoms develop.  FURTHER TESTING:       - abdomen  Ultrasound  Will need MRCP   Will follow up and/or notify patient of  results via My Chart to determine further need for followup

## 2019-07-16 NOTE — TELEPHONE ENCOUNTER
Notified patient of provider note below. Patient stated understanding and will purchase medication OTC if not covered by insurance.  Lexi Barrett, CMA

## 2019-07-16 NOTE — PROGRESS NOTES
Subjective     Myrna Brothers is a 33 year old female who presents to clinic today for the following health issues:    HPI   ED/UC Followup:    [facility]ility[facility]   Date of visit: 7/15/19  Reason for visit: back pain  Current Status: same, has some nausea yesterday. Having some pain under bilateral ribs, tingling in the back and left arm.   Pain in upper back for several months even during pregnancy   She is now 9 months postpartum   Continues to have the pain and worsening in the last 2 months  Has been going to physical therapy   She is taking ibuprofen and tylenol and occasionally muscle relaxant   States the worse goes all around her whole rib cage and feels like something is pushing out from under ribs       Patient Active Problem List   Diagnosis     Moderate episode of recurrent major depressive disorder (H)     Generalized anxiety disorder     Cervical cancer screening     Insulin controlled gestational diabetes mellitus (GDM) in third trimester     Pain in thoracic spine     Past Surgical History:   Procedure Laterality Date     HEAD & NECK SURGERY      wisdom teeth       Social History     Tobacco Use     Smoking status: Never Smoker     Smokeless tobacco: Never Used   Substance Use Topics     Alcohol use: Yes     Comment: denies recent use     Family History   Problem Relation Age of Onset     Breast Cancer Mother      Stillborn Mother         had a still born     Other - See Comments Mother         2 miscarriage     Diabetes Father      Heart Disease Father      Anxiety Disorder Maternal Grandmother      Alzheimer Disease Maternal Grandmother      Asthma Maternal Grandfather      Prostate Cancer Maternal Grandfather      Hypertension Paternal Grandmother      Leukemia Paternal Grandfather          Current Outpatient Medications   Medication Sig Dispense Refill     acetaminophen (TYLENOL) 500 MG tablet Take 500-1,000 mg by mouth as needed for mild pain        albuterol (PROAIR HFA/PROVENTIL HFA/VENTOLIN  HFA) 108 (90 Base) MCG/ACT inhaler Inhale 2 puffs into the lungs every 6 hours       busPIRone (BUSPAR) 5 MG tablet Take 1 tablet (5 mg) by mouth 2 times daily 180 tablet 1     cyclobenzaprine (FLEXERIL) 5 MG tablet Take 1 tablet (5 mg) by mouth nightly as needed for muscle spasms 20 tablet 0     ibuprofen (ADVIL/MOTRIN) 600 MG tablet Take 1 tablet (600 mg) by mouth every 6 hours as needed for moderate pain 30 tablet 0     norethindrone (MICRONOR) 0.35 MG tablet Take 1 tablet (0.35 mg) by mouth daily 84 tablet 3     sertraline (ZOLOFT) 100 MG tablet   0     APNO CREA ointment Apply 60 g topically every hour as needed for skin care (Patient not taking: Reported on 7/16/2019) 30 g 3     busPIRone (BUSPAR) 15 MG tablet Take 5 mg by mouth       Allergies   Allergen Reactions     Xylocaine [Lidocaine] Anaphylaxis     BP Readings from Last 3 Encounters:   07/16/19 100/60   07/15/19 116/62   05/21/19 108/70    Wt Readings from Last 3 Encounters:   07/16/19 76.3 kg (168 lb 4.8 oz)   07/15/19 77.1 kg (170 lb)   05/21/19 76.1 kg (167 lb 11.2 oz)            Reviewed and updated as needed this visit by Provider         Review of Systems   ROS COMP: CONSTITUTIONAL:NEGATIVE for fever, chills, change in weight  INTEGUMENTARY/SKIN: NEGATIVE for rash   ENT/MOUTH: NEGATIVE for ear, mouth and throat problems  RESP:NEGATIVE for significant cough or SOB  CV: POSITIVE for chest pain/chest pressure intermittently across midchest. When she has it during the day. Sharp feeling.  and NEGATIVE for cyanosis, diaphoresis, dyspnea on exertion, irregular heart beat, lower extremity edema, palpitations and syncope or near-syncope  GI: NEGATIVE for abdominal pain , gas or bloating and heartburn or reflux  Did have some nausea with the pain the last couple day   : negative for, dysuria, frequency  and hematuria  MUSCULOSKELETAL: POSITIVE  for upper back pain  and NEGATIVE for joint swelling  and joint warmth   NEURO: POSITIVE for paresthesias in  back and arm yesterday  and NEGATIVE for involuntary movements, gait disturbance and syncope  PSYCHIATRIC: NEGATIVE for changes in mood or affect      Objective    /60 (BP Location: Right arm, Patient Position: Sitting, Cuff Size: Adult Regular)   Pulse 77   Temp 96.7  F (35.9  C) (Temporal)   Wt 76.3 kg (168 lb 4.8 oz)   SpO2 96%   Breastfeeding? Yes   BMI 28.01 kg/m    Body mass index is 28.01 kg/m .  Physical Exam   GENERAL APPEARANCE: alert, active and no distress  HENT: nose and mouth without ulcers or lesions and oral mucous membranes moist  NECK: normal and supple      RESP: lungs clear to auscultation - no rales, rhonchi or wheezes  CV: regular rates and rhythm, no murmur, click or rub and no irregular beats  ABDOMEN: mild and moderate tenderness in the epigastric and RUQ area, no rebound tenderness, no guarding or rigidity, no CVA tenderness, no herniae noted, no masses noted      MS: extremities normal- no gross deformities noted  ORTHO: Lumber/Thoracic Spine Exam: Tender:  left parathoracic muscles, right parathoracic muscles  Non-tender:  thoracic spinous processes, thoracic facet joints, lumbar spinous processes  Range of Motion:  left lateral thoracic bending   full, right lateral thoracic bending  full  Strength:  normal  SKIN: Skin color, texture, turgor normal. No rashes or lesions., negatives: color normal  NEURO: Normal strength and tone, mentation intact and speech normal  PSYCH: mentation appears normal and affect normal/bright  MENTAL STATUS EXAM:  Appearance/Behavior: No apparent distress, Casually groomed and Dressed appropriately for weather  Speech: Normal  Mood/Affect: normal affect  Insight: Adequate    Diagnostic Test Results:  Results for orders placed or performed in visit on 05/21/19   XR Thoracic Spine 2 Views    Narrative    Exam: XR THORACIC SPINE 2 VW, 5/21/2019 11:22 AM    Indication: Acute midline thoracic back pain    Comparison: None    Findings:   12 rib bearing  vertebral bodies are identified. No acute fracture or  aggressive appearing bone lesion. Visualized lungs are clear.      Impression    Impression: No acute osseous abnormality.    MIKE ERWIN MD     Recent Results (from the past 24 hour(s))   US Abdomen Complete   Result Value    Radiologist flags (Urgent)     Mildly prominent common bile duct and cholelithiasis    Narrative    Abdominal ultrasound    Comparisons: CT of the abdomen and pelvis 11/12/2018    HISTORY:    Right upper quadrant pain radiating to the back. Elevated  LFTs.    FINDINGS:    The liver measures a craniocaudal dimension of 15.7 cm.  No focal liver lesion. Liver echogenicity is normal. The spleen  measures 13.6 cm. There are multiple small mobile stones in the  gallbladder. There is no gallbladder wall thickening or  pericholecystic fluid. No sonographic Albert's sign was elicited. The  diameter of the common bile duct measures 6mm. On prior CT, common  bile duct measures up to approximately 4 mm. The pancreas is partially  obscured but otherwise appears unremarkable. The aorta and IVC are  visualized. The right and left kidneys measure 10.0cm and 10.6 cm ,  respectively. No solid renal mass, stone or hydronephrosis.      Impression    IMPRESSION:      1. Cholelithiasis without evidence of cholecystitis. Common bile duct  measures at the upper limit of normal, slightly larger than on prior  CT. MRCP could be considered for further assessment of possible  choledocholithiasis.  2. Mild splenomegaly.    [Urgent Result: Mildly prominent common bile duct and cholelithiasis]    Finding was identified on 7/16/2019 1:20 PM.     Mary Bronson was contacted by Dr. Krueger at 7/16/2019 1:34 PM and  verbalized understanding of the urgent finding.     PATRICA KRUEGER MD     Recent Results (from the past 24 hour(s))   MR Abdomen MRCP w/o & w Contrast    Narrative    MR ABDOMEN MAGNETIC RESONANCE CHOLANGIOPANCREATOGRAPHY WITHOUT AND  WITH CONTRAST   7/17/2019 12:09 PM    HISTORY: Right upper quadrant pain. Elevated liver function tests.  Symptoms started 8 weeks ago.    COMPARISON: 7/18/2019 right upper quadrant ultrasound.    TECHNIQUE: Multisequence multiplanar MR abdomen with and without IV  contrast. 7 mL IV Gadavist administered. MRCP images and coronal 3-D  thin section T2-weighted MRCP images through the biliary tree. 3-D  image reformatting was performed on the acquisition scanner.    FINDINGS: Multiple tiny layering gallstones in the gallbladder near  the gallbladder neck. There are multiple, approximately four, tiny  round filling defects in the common bile duct consistent with  choledocholithiasis, the largest approximately 0.3 cm in size. Common  bile duct is 0.4 cm in diameter. No evidence for intra or extrahepatic  biliary duct dilatation.    Normal-appearing liver, spleen, pancreas, adrenal glands and kidneys.  No periaortic adenopathy or acute bowel abnormality in the upper  abdomen.      Impression    IMPRESSION: Cholelithiasis and choledocholithiasis without biliary  duct dilatation. No evidence for gallbladder wall thickening or  pericholecystic inflammation to suggest acute cholecystitis.    NGA MONTES MD       Results for orders placed or performed in visit on 07/16/19   CBC with platelets   Result Value Ref Range    WBC 6.4 4.0 - 11.0 10e9/L    RBC Count 4.92 3.8 - 5.2 10e12/L    Hemoglobin 14.1 11.7 - 15.7 g/dL    Hematocrit 43.5 35.0 - 47.0 %    MCV 88 78 - 100 fl    MCH 28.7 26.5 - 33.0 pg    MCHC 32.4 31.5 - 36.5 g/dL    RDW 13.7 10.0 - 15.0 %    Platelet Count 310 150 - 450 10e9/L   Comprehensive metabolic panel   Result Value Ref Range    Sodium 141 133 - 144 mmol/L    Potassium 4.3 3.4 - 5.3 mmol/L    Chloride 109 94 - 109 mmol/L    Carbon Dioxide 27 20 - 32 mmol/L    Anion Gap 5 3 - 14 mmol/L    Glucose 90 70 - 99 mg/dL    Urea Nitrogen 17 7 - 30 mg/dL    Creatinine 0.76 0.52 - 1.04 mg/dL    GFR Estimate >90 >60  mL/min/[1.73_m2]    GFR Estimate If Black >90 >60 mL/min/[1.73_m2]    Calcium 8.9 8.5 - 10.1 mg/dL    Bilirubin Total 4.1 (H) 0.2 - 1.3 mg/dL    Albumin 3.9 3.4 - 5.0 g/dL    Protein Total 8.0 6.8 - 8.8 g/dL    Alkaline Phosphatase 338 (H) 40 - 150 U/L    ALT 1,044 (HH) 0 - 50 U/L    AST 1,279 (HH) 0 - 45 U/L   Hepatitis B Surface Antibody   Result Value Ref Range    Hepatitis B Surface Antibody 7.97 <8.00 m[IU]/mL   HEPATITIS B CORE ANTIBODY   Result Value Ref Range    Hepatitis B Core Naida Nonreactive NR^Nonreactive   HEPATITIS B SURFACE ANTIGEN   Result Value Ref Range    Hep B Surface Agn Nonreactive NR^Nonreactive   HEPATITS C ANTIBODY   Result Value Ref Range    Hepatitis C Antibody Nonreactive NR^Nonreactive   Hepatitis A antibody IgM   Result Value Ref Range    Hepatitis A IgM Naida Nonreactive NR^Nonreactive           Assessment & Plan     Myrna was seen today for er f/u.    Diagnoses and all orders for this visit:    Right upper quadrant abdominal pain  -     US Abdomen Complete; Future  -     CBC with platelets  -     Comprehensive metabolic panel  -     MR Abdomen MRCP w/o & w Contrast; Future  Discussed with radiologist and no choleycystitis but recommend MRCP as concern for stone present. Discussed with patient and scheduled MRCP for tomorrow   Will follow up and/or notify patient of  results via My Chart to determine further need for followup  Worsening symptoms in the meantime need to go to ER  Repeat LFTs tomorrow    Epigastric abdominal pain  -     omeprazole 20 MG tablet; Take 1 tablet (20 mg) by mouth daily Take 30-60 minutes before a meal.  -     CBC with platelets  -     Comprehensive metabolic panel    Elevated LFTs  -     Cancel: Hepatitis A Antibody IgG  -     Hepatitis B Surface Antibody  -     HEPATITIS B CORE ANTIBODY  -     HEPATITIS B SURFACE ANTIGEN  -     HEPATITS C ANTIBODY  -     Hepatitis A antibody IgM  -     MR Abdomen MRCP w/o & w Contrast; Future  -     Hepatitis Antibody A  IgG; Future  -     Hepatic panel; Future    Acute cystitis without hematuria  -     cephALEXin (KEFLEX) 500 MG capsule; Take 1 capsule (500 mg) by mouth 2 times daily for 7 days  Will start on antibiotic as culture from ER visit just came back today as positive   Acute midline thoracic back pain  Will try the muscle relaxant in the evening      See Patient Instructions  Patient Instructions     PLAN:   1.   Symptomatic therapy suggested: will start on prilosec once a day.  Start on antibiotic as well   Increase fluids     2.  Orders Placed This Encounter   Medications     omeprazole 20 MG tablet     Sig: Take 1 tablet (20 mg) by mouth daily Take 30-60 minutes before a meal.     Dispense:  30 tablet     Refill:  1     cephALEXin (KEFLEX) 500 MG capsule     Sig: Take 1 capsule (500 mg) by mouth 2 times daily for 7 days     Dispense:  14 capsule     Refill:  0     Orders Placed This Encounter   Procedures     US Abdomen Complete     MR Abdomen MRCP w/o & w Contrast     CBC with platelets     Comprehensive metabolic panel     Hepatitis A Antibody IgG     Hepatitis B Surface Antibody     HEPATITIS B CORE ANTIBODY     HEPATITIS B SURFACE ANTIGEN     HEPATITS C ANTIBODY     Hepatitis A antibody IgM       3. Patient needs to follow up in if no improvement,or sooner if worsening of symptoms or other symptoms develop.  FURTHER TESTING:       - abdomen  Ultrasound  Will need MRCP   Will follow up and/or notify patient of  results via My Chart to determine further need for followup    Addendum 7/18/2019  MRCP results noted today showing choledocholithiasis and will need ERCP and eventual cholecystectomy   Discussed with Dr Charles Gutierrez MD   Appropriate to proceed with proposed procedure, without further diagnostic evaluation.  Mery Bronson, NP, APRN CNP      No follow-ups on file.    Mery Bronson APRN CNP  [facility]

## 2019-07-17 ENCOUNTER — TELEPHONE (OUTPATIENT)
Dept: PEDIATRICS | Facility: CLINIC | Age: 34
End: 2019-07-17

## 2019-07-17 ENCOUNTER — ANCILLARY PROCEDURE (OUTPATIENT)
Dept: MRI IMAGING | Facility: CLINIC | Age: 34
End: 2019-07-17
Attending: NURSE PRACTITIONER
Payer: COMMERCIAL

## 2019-07-17 ENCOUNTER — MYC MEDICAL ADVICE (OUTPATIENT)
Dept: PEDIATRICS | Facility: CLINIC | Age: 34
End: 2019-07-17

## 2019-07-17 DIAGNOSIS — R79.89 ELEVATED LFTS: ICD-10-CM

## 2019-07-17 DIAGNOSIS — R10.11 RIGHT UPPER QUADRANT ABDOMINAL PAIN: ICD-10-CM

## 2019-07-17 LAB
ALBUMIN SERPL-MCNC: 4 G/DL (ref 3.4–5)
ALP SERPL-CCNC: 423 U/L (ref 40–150)
ALT SERPL W P-5'-P-CCNC: 1147 U/L (ref 0–50)
AST SERPL W P-5'-P-CCNC: 788 U/L (ref 0–45)
BILIRUB DIRECT SERPL-MCNC: 3 MG/DL (ref 0–0.2)
BILIRUB SERPL-MCNC: 4.1 MG/DL (ref 0.2–1.3)
HAV IGM SERPL QL IA: NONREACTIVE
HBV CORE AB SERPL QL IA: NONREACTIVE
HBV SURFACE AB SERPL IA-ACNC: 7.97 M[IU]/ML
HBV SURFACE AG SERPL QL IA: NONREACTIVE
HCV AB SERPL QL IA: NONREACTIVE
PROT SERPL-MCNC: 8.4 G/DL (ref 6.8–8.8)

## 2019-07-17 PROCEDURE — 83690 ASSAY OF LIPASE: CPT | Performed by: NURSE PRACTITIONER

## 2019-07-17 PROCEDURE — 80076 HEPATIC FUNCTION PANEL: CPT | Performed by: NURSE PRACTITIONER

## 2019-07-17 PROCEDURE — 86708 HEPATITIS A ANTIBODY: CPT | Performed by: NURSE PRACTITIONER

## 2019-07-17 PROCEDURE — 36415 COLL VENOUS BLD VENIPUNCTURE: CPT | Performed by: NURSE PRACTITIONER

## 2019-07-17 PROCEDURE — A9585 GADOBUTROL INJECTION: HCPCS | Mod: JW

## 2019-07-17 PROCEDURE — 82150 ASSAY OF AMYLASE: CPT | Performed by: NURSE PRACTITIONER

## 2019-07-17 PROCEDURE — 74183 MRI ABD W/O CNTR FLWD CNTR: CPT | Mod: TC

## 2019-07-17 RX ORDER — GADOBUTROL 604.72 MG/ML
7.5 INJECTION INTRAVENOUS ONCE
Status: COMPLETED | OUTPATIENT
Start: 2019-07-17 | End: 2019-07-17

## 2019-07-17 RX ADMIN — GADOBUTROL 7 ML: 604.72 INJECTION INTRAVENOUS at 11:50

## 2019-07-17 NOTE — TELEPHONE ENCOUNTER
Left VM to call back to the clinic.  Patient should recheck liver tests while at appt today.   Maryjane Diaz RN

## 2019-07-17 NOTE — TELEPHONE ENCOUNTER
[facility] Call Center    Phone Message    May a detailed message be left on voicemail: yes    Reason for Call: Patient requesting a call back to discuss clarification of ordered test and to discuss new/worsening  symptoms related to gal bladder. Please advise. Patient states she is going for an MRI at 10:45 am today and will be unavailable during that time.       Action Taken: Message routed to:  Primary Care p [phone]

## 2019-07-17 NOTE — TELEPHONE ENCOUNTER
Patient states last night she developed heartburn and bloating.  She will gets labs done today at [facility] after MRI.   Maryjane Diaz RN

## 2019-07-17 NOTE — RESULT ENCOUNTER NOTE
Marjorie Brothers,    Attached are your test results.  -Normal red blood cell (hgb) levels, normal white blood cell count and normal platelet levels.  -Liver and gallbladder tests (ALT,AST, Alk phos,bilirubin) are significantly elevated. ADVISE: further testing - Can we have you recheck your liver tests when you come in for the MRCP as well  I will place the order   -Kidney function (GFR) is normal.  -Sodium is normal.  -Potassium is normal.  -Calcium is normal.  -Glucose is normal.   Please contact us if you have any questions.    Mery Bronson, CNP

## 2019-07-17 NOTE — RESULT ENCOUNTER NOTE
Marjorie Brothers,    Attached are your test results.  Hepatitis labs negative   Please get liver rechecked while you are there for test   I have the lab order waiting for you   Take care     Please contact us if you have any questions.    Mery Bronson, CNP

## 2019-07-18 ENCOUNTER — PATIENT OUTREACH (OUTPATIENT)
Dept: GASTROENTEROLOGY | Facility: CLINIC | Age: 34
End: 2019-07-18

## 2019-07-18 DIAGNOSIS — K80.50 CHOLEDOCHOLITHIASIS: Primary | ICD-10-CM

## 2019-07-18 LAB
AMYLASE SERPL-CCNC: 79 U/L (ref 30–110)
HAV IGG SER QL IA: REACTIVE
LIPASE SERPL-CCNC: 468 U/L (ref 73–393)

## 2019-07-18 NOTE — RESULT ENCOUNTER NOTE
Marjorie Brothers,    Attached are your test results.  Your liver function tests are still elevated   Pancreas level also mildly elevated  Referred you to gastroenterology and they should be contacting you    Please contact us if you have any questions.    Mery Bronson, CNP

## 2019-07-18 NOTE — PROGRESS NOTES
Patient called and is amenable to plan: ERCP with Dr. Camarena and aware of the following:  Procedure explained to patient.      Can expect a call for date and time for procedure.     Will need a , someone to stay with them for 24 hours and stay in town for 24 hours (within 45 min of Hospital) post procedure, rational explained.       7/16/19 clinic note should suffice for preop- see chart.    Blood thinner -  no  ASA - no  Diabetic - no  NSAIDS: no    A pre-op nurse will call 1-2 days prior to the procedure.     Is advised to be NPO/no solid food 8 hours before the procedure. Ok to drink clear liquids (Water, Apple Juice or Gatorade) up to 2 hours prior to procedure.     Post Procedure: start with clear liquids and then advance as tolerated.     Verbalized understanding of all instructions. All questions answered.     Ami Franco, RN Care Coordinator

## 2019-07-18 NOTE — ADDENDUM NOTE
Addended by: CORAL LAY on: 7/18/2019 09:14 AM     Modules accepted: Orders    
Addended by: CORAL LAY on: 7/18/2019 10:06 AM     Modules accepted: Orders    
No

## 2019-07-19 ENCOUNTER — OFFICE VISIT (OUTPATIENT)
Dept: PSYCHOLOGY | Facility: CLINIC | Age: 34
End: 2019-07-19
Payer: COMMERCIAL

## 2019-07-19 ENCOUNTER — TELEPHONE (OUTPATIENT)
Dept: GASTROENTEROLOGY | Facility: CLINIC | Age: 34
End: 2019-07-19

## 2019-07-19 DIAGNOSIS — F33.1 MODERATE EPISODE OF RECURRENT MAJOR DEPRESSIVE DISORDER (H): ICD-10-CM

## 2019-07-19 DIAGNOSIS — F41.1 GENERALIZED ANXIETY DISORDER: Primary | ICD-10-CM

## 2019-07-19 PROCEDURE — 90832 PSYTX W PT 30 MINUTES: CPT | Performed by: SOCIAL WORKER

## 2019-07-19 NOTE — PROGRESS NOTES
[facility]  Integrated Behavioral Health Services  July 19, 2019    Behavioral Health Clinician Progress Note    Patient Name: Myrna Brothers           Service Type:  Individual      Service Location:   Face to Face in Clinic     Session Start Time:  12: 37 pm Session End Time:   1: 07 pm      Session Length: 16 - 37      Attendees: Patient    Visit Activities (Refresh list every visit): [facility] Emergency Department Only    Diagnostic Assessment Date: 4/12/18  Treatment Plan Review Date: 7/11//19  See Flowsheets for today's PHQ-9 and OREN-7 results  Previous PHQ-9:   PHQ-9 SCORE 12/20/2018 1/11/2019 1/18/2019   PHQ-9 Total Score 16 15 9     Previous OREN-7:   OREN-7 SCORE 12/20/2018 1/11/2019 1/18/2019   Total Score 14 10 8       LUKASZ LEVEL:  No flowsheet data found.    DATA  Extended Session (60+ minutes): No  Interactive Complexity: No  Crisis: No  [facility] Patient: No    Treatment Objective(s) Addressed in This Session:  Target Behavior(s): disease management/lifestyle changes , mental health management    Depressed Mood: Decrease frequency and intensity of feeling down, depressed, hopeless  Identify negative self-talk and behaviors: challenge core beliefs, myths, and actions  Anxiety: will experience a reduction in anxiety, will develop more effective coping skills to manage anxiety symptoms, will develop healthy cognitive patterns and beliefs and will increase ability to function adaptively    Current Stressors / Issues:  Patient reported that it has been a roller coaster of a week. She stated that she went to the ED on Monday due to pain, and she learned that she needs to have surgery next week to have gall bladder stones removed. She reported that she is looking forward to feeling better. Patient stated that her  was offered the job in [place], and he is likely accepting later this afternoon. Patient reported that she had a productive conversation with her mother about the move, and is now feeling better  about the move.  Patient discussed heightened anxiety at work due to recent meeting with her boss. She stated that she was provided feedback that was difficult for her to hear. Patient reported that she was upset after the meeting, but then spoke with her mentor. She stated that she was able to then begin to frame the feedback as an opportunity to learn and create a plan to grow and develop as an employee.    Progress on Treatment Objective(s) / Homework:  Satisfactory progress - ACTION (Actively working towards change); Intervened by reinforcing change plan / affirming steps taken    Motivational Interviewing    MI Intervention: Expressed Empathy/Understanding, Supported Autonomy, Collaboration, Evocation, Permission to raise concern or advise, Open-ended questions, Change talk (evoked) and Reframe     Change Talk Expressed by the Patient: Ability to change Reasons to change Need to change    Provider Response to Change Talk: E - Evoked more info from patient about behavior change, A - Affirmed patient's thoughts, decisions, or attempts at behavior change, R - Reflected patient's change talk and S - Summarized patient's change talk statements    Cognitive Behavioral Therapy: Discussed connection between thoughts, feelings, and behaviors. Taught patient how to identify cognitive distortions, and assisted patient to identify own cognitive distortions. Taught and engaged patient in cognitive restructuring techniques.    Also provided psychoeducation about behavioral health condition, symptoms, and treatment options    Care Plan review completed: Yes    Medication Review:  No changes.    Medication Compliance:  Yes    Changes in Health Issues:   None reported    Chemical Use Review:   Substance Use: Chemical use reviewed, no active concerns identified      Tobacco Use: No current tobacco use.      Assessment: Current Emotional / Mental Status (status of significant symptoms):  Risk status (Self / Other harm or suicidal  ideation)  Patient has had a history of suicidal ideation: in correlation with stress, sent text on 4/13/18 about wanting to die, but no active SI. Per patient and , patient has had history of passive SI for more than 10 years and denies a history of suicide attempts, self-injurious behavior, homicidal ideation, homicidal behavior and and other safety concerns  Patient denies current fears or concerns for personal safety.  Patient reports the following current or recent suicidal ideation or behaviors: SI without plan or intent in correlation with work related stress in [place].  Patient denies current or recent homicidal ideation or behaviors.  Patient denies current or recent self injurious behavior or ideation.  Patient denies other safety concerns.  A safety and risk management plan has not been developed at this time, however patient was encouraged to call [place] [phone] should there be a change in any of these risk factors.    Appearance:   Appropriate   Eye Contact:   Good   Psychomotor Behavior: Normal   Attitude:   Cooperative   Orientation:   All  Speech   Rate / Production: Normal    Volume:  Normal   Mood:    Normal  Affect:    Appropriate   Thought Content:  Clear   Thought Form:  Coherent  Logical   Insight:    Good     Diagnoses:  No diagnosis found.    Collateral Reports Completed:  Not Applicable    Plan: (Homework, other):  Patient was given information about behavioral services and encouraged to schedule a follow up appointment with the clinic [facility] in 2 weeks.  She was also given Cognitive Behavioral Therapy skills to practice when experiencing anxiety and depression. CD Recommendations: No indications of CD issues.  Stephanie Calabrese, KVNG, [facility]      ______________________________________________________________________    Integrated Primary Care Behavioral Health Treatment Plan    Patient's Name: Myrna Brothers  YOB: 1985    Date: 7/11/19    DSM-V Diagnoses: 296.32  (F33.1) Major Depressive Disorder, Recurrent Episode, Moderate _ or 300.02 (F41.1) Generalized Anxiety Disorder  Psychosocial / Contextual Factors: recently postpartum  WHODAS: TBD    Referral / Collaboration:  No additional referrals needed at this time.    Anticipated number of session or this episode of care: 6-8      MeasurableTreatment Goal(s) related to diagnosis / functional impairment(s)  Goal 1: Patient will reduce feelings of depression as evidenced by decreased score on PHQ-9.    I will know I've met my goal when I feel less down and emotional.      Objective #A (Patient Action)    Patient will Decrease frequency and intensity of feeling down, depressed, hopeless.  Status: Continued - Date(s): 7/11/19    Intervention(s)  [facility] will assign homework to assist with behavioral activation/activity scheduling.    Objective #B  Patient will Identify negative self-talk and behaviors: challenge core beliefs, myths, and actions.  Status: Continued - Date(s): 7/11/19    Intervention(s)  [facility] will teach about automatic negative thoughts, irrational core beliefs, and cognitive restructuring techniques.        Goal 2: Patient will reduce feelings of anxiety as evidenced by decreased score on GAD7.    I will know I've met my goal when I feel less anxious and worried.      Objective #A (Patient Action)    Status: Continued - Date(s):7/11/19    Patient will identify three distraction and diversion activities and use those activities to decrease level of anxiety  .    Intervention(s)  [facility] will assign homework to practice distress tolerance and mindfulness techniques between session.    Objective #B  Patient will use cognitive strategies identified in therapy to challenge anxious thoughts.    Status: Continued - Date(s): 7/11/19    Intervention(s)  [facility] will teach CBT techniques related to cognitive restructuring.    Objective #C  Patient will use relaxation strategies 1-2 times per day to reduce the physical symptoms of  anxiety.  Status: Continued - Date(s): 7/11/19  Intervention(s)  [facility] will role-play relaxation techniques.    Patient has reviewed and agreed to the above plan.      KVNG Webb  April 4, 2019

## 2019-07-19 NOTE — TELEPHONE ENCOUNTER
Spoke to patient in regards to scheduled procedure. Informed patient she is scheduled with Dr. Camarena on 7/24/19. Informed patient she will need an updated pre-op physical within 30 days of her procedure. Patient stated she is going to have this done locally. Informed patient she will need a  and someone to monitor her for 24 hours after the procedure. Informed patient all scheduling details will be sent to her Mychart per her request.     7/19/19

## 2019-07-23 ENCOUNTER — ANESTHESIA EVENT (OUTPATIENT)
Dept: SURGERY | Facility: CLINIC | Age: 34
End: 2019-07-23
Payer: COMMERCIAL

## 2019-07-23 NOTE — ANESTHESIA PREPROCEDURE EVALUATION
Anesthesia Pre-Procedure Evaluation    Patient: Myrna Brothers   MRN:     [id number] Gender:   female   Age:    33 year old :      1985        Preoperative Diagnosis: Choledocholithiasis   Procedure(s):  Endoscopic Retrograde Cholangiopancreatogram     Past Medical History:   Diagnosis Date     Diabetes (H)     gestational     Generalized anxiety disorder 2018     Hyperlipidemia      Major depressive disorder with single episode, in full remission (H) 2018     Sleep apnea      Uncomplicated asthma       Past Surgical History:   Procedure Laterality Date     HEAD & NECK SURGERY      wisdom teeth          Anesthesia Evaluation     . Pt has not had prior anesthetic            ROS/MED HX    ENT/Pulmonary:     (+)Mild Persistent asthma , . .    Neurologic:       Cardiovascular:     (+) ----. : . . . :. . No previous cardiac testing       METS/Exercise Tolerance:  >4 METS   Hematologic:         Musculoskeletal:   (+)  other musculoskeletal- thoracic spine pain recently evaluated in ER 2019      GI/Hepatic: Comment: Choledocholithiasis without cholecystitis.  Elevated LFTs, Bili, and AlkPhos    (+) cholecystitis/cholelithiasis,       Renal/Genitourinary:         Endo:         Psychiatric:     (+) psychiatric history depression and anxiety      Infectious Disease:         Malignancy:         Other:                     [facility] AN PHYSICAL EXAM    Lab Results   Component Value Date    WBC 6.4 2019    HGB 14.1 2019    HCT 43.5 2019     2019     2019    POTASSIUM 4.3 2019    CHLORIDE 109 2019    CO2 27 2019    BUN 17 2019    CR 0.76 2019    GLC 90 2019    MAGGIE 8.9 2019    ALBUMIN 4.0 2019    PROTTOTAL 8.4 2019    ALT 1,147 (HH) 2019     (HH) 2019    ALKPHOS 423 (H) 2019    BILITOTAL 4.1 (H) 2019    LIPASE 468 (H) 2019    AMYLASE 79 2019    TSH 0.63 2018    HCG  "Negative 07/15/2019       Preop Vitals  BP Readings from Last 3 Encounters:   07/16/19 100/60   07/15/19 116/62   05/21/19 108/70    Pulse Readings from Last 3 Encounters:   07/16/19 77   07/15/19 72   05/21/19 83      Resp Readings from Last 3 Encounters:   07/15/19 16   11/12/18 16   03/05/18 18    SpO2 Readings from Last 3 Encounters:   07/16/19 96%   07/15/19 97%   05/21/19 98%      Temp Readings from Last 1 Encounters:   07/16/19 35.9  C (96.7  F) (Temporal)    Ht Readings from Last 1 Encounters:   07/15/19 1.651 m (5' 5\")      Wt Readings from Last 1 Encounters:   07/16/19 76.3 kg (168 lb 4.8 oz)    Estimated body mass index is 28.01 kg/m  as calculated from the following:    Height as of 7/15/19: 1.651 m (5' 5\").    Weight as of 7/16/19: 76.3 kg (168 lb 4.8 oz).     LDA:  Peripheral IV 07/17/19 Right (Active)   Number of days: 6            Assessment:   ASA SCORE: 2    NPO Status: > 2 hours since completed Clear Liquids; > 6 hours since completed Solid Foods   Documentation: H&P complete; Preop Testing complete; Consents complete   Proceeding: Proceed without further delay  Tobacco Use:  NO Active use of Tobacco/UNKNOWN Tobacco use status     Plan:   Anes. Type:  General   Pre-Induction: Midazolam IV   Induction:  IV (Standard)   Airway: Oral ETT   Access/Monitoring: PIV   Maintenance: Balanced   Emergence: Procedure Site   Logistics: Same Day Surgery     Postop Pain/Sedation Strategy:  Standard-Options: Opioids PRN     PONV Management:  Adult Risk Factors: Female, Non-Smoker, Postop Opioids  Prevention: Ondansetron; Benzodiazepines     CONSENT: Direct conversation   Plan and risks discussed with: Patient   Blood Products: Consent Deferred (Minimal Blood Loss)       Comments for Plan/Consent:  9 months post-partum, breast-feeding.  Professional daly.                         Sylvia Wilkins MD  "

## 2019-07-24 ENCOUNTER — HOSPITAL ENCOUNTER (INPATIENT)
Facility: CLINIC | Age: 34
LOS: 2 days | Discharge: HOME | End: 2019-07-27
Attending: INTERNAL MEDICINE | Admitting: INTERNAL MEDICINE
Payer: COMMERCIAL

## 2019-07-24 ENCOUNTER — APPOINTMENT (OUTPATIENT)
Dept: GENERAL RADIOLOGY | Facility: CLINIC | Age: 34
End: 2019-07-24
Attending: INTERNAL MEDICINE
Payer: COMMERCIAL

## 2019-07-24 ENCOUNTER — ANESTHESIA (OUTPATIENT)
Dept: SURGERY | Facility: CLINIC | Age: 34
End: 2019-07-24
Payer: COMMERCIAL

## 2019-07-24 DIAGNOSIS — G89.18 POST-OPERATIVE PAIN: Primary | ICD-10-CM

## 2019-07-24 DIAGNOSIS — K80.50 CHOLEDOCHOLITHIASIS: ICD-10-CM

## 2019-07-24 PROBLEM — K85.90 POST-ERCP ACUTE PANCREATITIS: Status: ACTIVE | Noted: 2019-07-24

## 2019-07-24 PROBLEM — K91.89 POST-ERCP ACUTE PANCREATITIS: Status: ACTIVE | Noted: 2019-07-24

## 2019-07-24 LAB
ALBUMIN SERPL-MCNC: 4.1 G/DL (ref 3.4–5)
ALP SERPL-CCNC: 227 U/L (ref 40–150)
ALT SERPL W P-5'-P-CCNC: 169 U/L (ref 0–50)
AMYLASE SERPL-CCNC: 118 U/L (ref 30–110)
AMYLASE SERPL-CCNC: 90 U/L (ref 30–110)
ANION GAP SERPL CALCULATED.3IONS-SCNC: 5 MMOL/L (ref 3–14)
AST SERPL W P-5'-P-CCNC: 22 U/L (ref 0–45)
BILIRUB SERPL-MCNC: 0.4 MG/DL (ref 0.2–1.3)
BUN SERPL-MCNC: 21 MG/DL (ref 7–30)
CALCIUM SERPL-MCNC: 8.8 MG/DL (ref 8.5–10.1)
CHLORIDE SERPL-SCNC: 107 MMOL/L (ref 94–109)
CO2 SERPL-SCNC: 26 MMOL/L (ref 20–32)
CREAT SERPL-MCNC: 0.67 MG/DL (ref 0.52–1.04)
ERCP: NORMAL
ERYTHROCYTE [DISTWIDTH] IN BLOOD BY AUTOMATED COUNT: 13.9 % (ref 10–15)
GFR SERPL CREATININE-BSD FRML MDRD: >90 ML/MIN/{1.73_M2}
GLUCOSE BLDC GLUCOMTR-MCNC: 95 MG/DL (ref 70–99)
GLUCOSE SERPL-MCNC: 77 MG/DL (ref 70–99)
HCG UR QL: NEGATIVE
HCT VFR BLD AUTO: 44.8 % (ref 35–47)
HGB BLD-MCNC: 13.6 G/DL (ref 11.7–15.7)
LIPASE SERPL-CCNC: 1098 U/L (ref 73–393)
LIPASE SERPL-CCNC: 234 U/L (ref 73–393)
MCH RBC QN AUTO: 27.9 PG (ref 26.5–33)
MCHC RBC AUTO-ENTMCNC: 30.4 G/DL (ref 31.5–36.5)
MCV RBC AUTO: 92 FL (ref 78–100)
PLATELET # BLD AUTO: 282 10E9/L (ref 150–450)
POTASSIUM SERPL-SCNC: 3.7 MMOL/L (ref 3.4–5.3)
PROT SERPL-MCNC: 8.3 G/DL (ref 6.8–8.8)
RBC # BLD AUTO: 4.87 10E12/L (ref 3.8–5.2)
SODIUM SERPL-SCNC: 138 MMOL/L (ref 133–144)
WBC # BLD AUTO: 7.5 10E9/L (ref 4–11)

## 2019-07-24 PROCEDURE — 37000009 ZZH ANESTHESIA TECHNICAL FEE, EACH ADDTL 15 MIN: Performed by: INTERNAL MEDICINE

## 2019-07-24 PROCEDURE — 37000008 ZZH ANESTHESIA TECHNICAL FEE, 1ST 30 MIN: Performed by: INTERNAL MEDICINE

## 2019-07-24 PROCEDURE — 25000566 ZZH SEVOFLURANE, EA 15 MIN: Performed by: INTERNAL MEDICINE

## 2019-07-24 PROCEDURE — 40000170 ZZH STATISTIC PRE-PROCEDURE ASSESSMENT II: Performed by: INTERNAL MEDICINE

## 2019-07-24 PROCEDURE — 0F7D8DZ DILATION OF PANCREATIC DUCT WITH INTRALUMINAL DEVICE, VIA NATURAL OR ARTIFICIAL OPENING ENDOSCOPIC: ICD-10-PCS | Performed by: INTERNAL MEDICINE

## 2019-07-24 PROCEDURE — 25000125 ZZHC RX 250: Performed by: NURSE ANESTHETIST, CERTIFIED REGISTERED

## 2019-07-24 PROCEDURE — 36000061 ZZH SURGERY LEVEL 3 W FLUORO 1ST 30 MIN - UMMC: Performed by: INTERNAL MEDICINE

## 2019-07-24 PROCEDURE — 00000146 ZZHCL STATISTIC GLUCOSE BY METER IP

## 2019-07-24 PROCEDURE — 71000014 ZZH RECOVERY PHASE 1 LEVEL 2 FIRST HR: Performed by: INTERNAL MEDICINE

## 2019-07-24 PROCEDURE — 40000277 XR SURGERY CARM FLUORO LESS THAN 5 MIN W STILLS: Mod: TC

## 2019-07-24 PROCEDURE — 25000128 H RX IP 250 OP 636: Performed by: STUDENT IN AN ORGANIZED HEALTH CARE EDUCATION/TRAINING PROGRAM

## 2019-07-24 PROCEDURE — 25800030 ZZH RX IP 258 OP 636: Performed by: STUDENT IN AN ORGANIZED HEALTH CARE EDUCATION/TRAINING PROGRAM

## 2019-07-24 PROCEDURE — 36415 COLL VENOUS BLD VENIPUNCTURE: CPT | Performed by: INTERNAL MEDICINE

## 2019-07-24 PROCEDURE — 96361 HYDRATE IV INFUSION ADD-ON: CPT

## 2019-07-24 PROCEDURE — 25000125 ZZHC RX 250: Performed by: INTERNAL MEDICINE

## 2019-07-24 PROCEDURE — C1726 CATH, BAL DIL, NON-VASCULAR: HCPCS | Performed by: INTERNAL MEDICINE

## 2019-07-24 PROCEDURE — 96376 TX/PRO/DX INJ SAME DRUG ADON: CPT

## 2019-07-24 PROCEDURE — 80053 COMPREHEN METABOLIC PANEL: CPT | Performed by: INTERNAL MEDICINE

## 2019-07-24 PROCEDURE — 83690 ASSAY OF LIPASE: CPT | Performed by: INTERNAL MEDICINE

## 2019-07-24 PROCEDURE — 25800030 ZZH RX IP 258 OP 636: Performed by: NURSE ANESTHETIST, CERTIFIED REGISTERED

## 2019-07-24 PROCEDURE — 25500064 ZZH RX 255 OP 636: Performed by: INTERNAL MEDICINE

## 2019-07-24 PROCEDURE — G0378 HOSPITAL OBSERVATION PER HR: HCPCS

## 2019-07-24 PROCEDURE — 81025 URINE PREGNANCY TEST: CPT | Performed by: ANESTHESIOLOGY

## 2019-07-24 PROCEDURE — 25000128 H RX IP 250 OP 636: Performed by: NURSE ANESTHETIST, CERTIFIED REGISTERED

## 2019-07-24 PROCEDURE — 36000059 ZZH SURGERY LEVEL 3 EA 15 ADDTL MIN UMMC: Performed by: INTERNAL MEDICINE

## 2019-07-24 PROCEDURE — 25000128 H RX IP 250 OP 636: Performed by: ANESTHESIOLOGY

## 2019-07-24 PROCEDURE — 82150 ASSAY OF AMYLASE: CPT | Performed by: INTERNAL MEDICINE

## 2019-07-24 PROCEDURE — C1877 STENT, NON-COAT/COV W/O DEL: HCPCS | Performed by: INTERNAL MEDICINE

## 2019-07-24 PROCEDURE — 96375 TX/PRO/DX INJ NEW DRUG ADDON: CPT

## 2019-07-24 PROCEDURE — C1769 GUIDE WIRE: HCPCS | Performed by: INTERNAL MEDICINE

## 2019-07-24 PROCEDURE — 85027 COMPLETE CBC AUTOMATED: CPT | Performed by: INTERNAL MEDICINE

## 2019-07-24 PROCEDURE — 71000015 ZZH RECOVERY PHASE 1 LEVEL 2 EA ADDTL HR: Performed by: INTERNAL MEDICINE

## 2019-07-24 PROCEDURE — 99222 1ST HOSP IP/OBS MODERATE 55: CPT | Mod: AI | Performed by: PEDIATRICS

## 2019-07-24 PROCEDURE — 71000027 ZZH RECOVERY PHASE 2 EACH 15 MINS: Performed by: INTERNAL MEDICINE

## 2019-07-24 PROCEDURE — 27210794 ZZH OR GENERAL SUPPLY STERILE: Performed by: INTERNAL MEDICINE

## 2019-07-24 PROCEDURE — 96374 THER/PROPH/DIAG INJ IV PUSH: CPT

## 2019-07-24 PROCEDURE — 25000132 ZZH RX MED GY IP 250 OP 250 PS 637: Performed by: STUDENT IN AN ORGANIZED HEALTH CARE EDUCATION/TRAINING PROGRAM

## 2019-07-24 DEVICE — STENT FREEMAN PANCREA FLEX 4FRX11CM W/O FLANGE SGL PIGTAIL: Type: IMPLANTABLE DEVICE | Site: PANCREATIC DUCT | Status: FUNCTIONAL

## 2019-07-24 RX ORDER — NALOXONE HYDROCHLORIDE 0.4 MG/ML
.1-.4 INJECTION, SOLUTION INTRAMUSCULAR; INTRAVENOUS; SUBCUTANEOUS
Status: ACTIVE | OUTPATIENT
Start: 2019-07-24 | End: 2019-07-25

## 2019-07-24 RX ORDER — NALOXONE HYDROCHLORIDE 0.4 MG/ML
.1-.4 INJECTION, SOLUTION INTRAMUSCULAR; INTRAVENOUS; SUBCUTANEOUS
Status: DISCONTINUED | OUTPATIENT
Start: 2019-07-24 | End: 2019-07-27 | Stop reason: HOSPADM

## 2019-07-24 RX ORDER — NALOXONE HYDROCHLORIDE 0.4 MG/ML
.1-.4 INJECTION, SOLUTION INTRAMUSCULAR; INTRAVENOUS; SUBCUTANEOUS
Status: DISCONTINUED | OUTPATIENT
Start: 2019-07-24 | End: 2019-07-24 | Stop reason: HOSPADM

## 2019-07-24 RX ORDER — MEPERIDINE HYDROCHLORIDE 25 MG/ML
12.5 INJECTION INTRAMUSCULAR; INTRAVENOUS; SUBCUTANEOUS
Status: DISCONTINUED | OUTPATIENT
Start: 2019-07-24 | End: 2019-07-24 | Stop reason: HOSPADM

## 2019-07-24 RX ORDER — SODIUM CHLORIDE, SODIUM LACTATE, POTASSIUM CHLORIDE, CALCIUM CHLORIDE 600; 310; 30; 20 MG/100ML; MG/100ML; MG/100ML; MG/100ML
INJECTION, SOLUTION INTRAVENOUS CONTINUOUS PRN
Status: DISCONTINUED | OUTPATIENT
Start: 2019-07-24 | End: 2019-07-24

## 2019-07-24 RX ORDER — ACETAMINOPHEN 650 MG/1
650 SUPPOSITORY RECTAL EVERY 4 HOURS PRN
Status: DISCONTINUED | OUTPATIENT
Start: 2019-07-24 | End: 2019-07-27 | Stop reason: HOSPADM

## 2019-07-24 RX ORDER — FENTANYL CITRATE 50 UG/ML
25-50 INJECTION, SOLUTION INTRAMUSCULAR; INTRAVENOUS EVERY 5 MIN PRN
Status: DISCONTINUED | OUTPATIENT
Start: 2019-07-24 | End: 2019-07-24 | Stop reason: HOSPADM

## 2019-07-24 RX ORDER — ALBUTEROL SULFATE 90 UG/1
2 AEROSOL, METERED RESPIRATORY (INHALATION) EVERY 6 HOURS PRN
Status: DISCONTINUED | OUTPATIENT
Start: 2019-07-24 | End: 2019-07-27 | Stop reason: HOSPADM

## 2019-07-24 RX ORDER — SODIUM CHLORIDE, SODIUM LACTATE, POTASSIUM CHLORIDE, CALCIUM CHLORIDE 600; 310; 30; 20 MG/100ML; MG/100ML; MG/100ML; MG/100ML
INJECTION, SOLUTION INTRAVENOUS CONTINUOUS
Status: DISCONTINUED | OUTPATIENT
Start: 2019-07-24 | End: 2019-07-24

## 2019-07-24 RX ORDER — PROCHLORPERAZINE 25 MG
25 SUPPOSITORY, RECTAL RECTAL EVERY 12 HOURS PRN
Status: DISCONTINUED | OUTPATIENT
Start: 2019-07-24 | End: 2019-07-27 | Stop reason: HOSPADM

## 2019-07-24 RX ORDER — FLUMAZENIL 0.1 MG/ML
0.2 INJECTION, SOLUTION INTRAVENOUS
Status: ACTIVE | OUTPATIENT
Start: 2019-07-24 | End: 2019-07-24

## 2019-07-24 RX ORDER — INDOMETHACIN 50 MG/1
100 SUPPOSITORY RECTAL
Status: COMPLETED | OUTPATIENT
Start: 2019-07-24 | End: 2019-07-24

## 2019-07-24 RX ORDER — ONDANSETRON 2 MG/ML
INJECTION INTRAMUSCULAR; INTRAVENOUS PRN
Status: DISCONTINUED | OUTPATIENT
Start: 2019-07-24 | End: 2019-07-24

## 2019-07-24 RX ORDER — ACETAMINOPHEN 325 MG/1
650 TABLET ORAL EVERY 4 HOURS PRN
Status: DISCONTINUED | OUTPATIENT
Start: 2019-07-24 | End: 2019-07-27 | Stop reason: HOSPADM

## 2019-07-24 RX ORDER — SODIUM CHLORIDE, SODIUM LACTATE, POTASSIUM CHLORIDE, CALCIUM CHLORIDE 600; 310; 30; 20 MG/100ML; MG/100ML; MG/100ML; MG/100ML
INJECTION, SOLUTION INTRAVENOUS CONTINUOUS
Status: DISCONTINUED | OUTPATIENT
Start: 2019-07-24 | End: 2019-07-24 | Stop reason: HOSPADM

## 2019-07-24 RX ORDER — FENTANYL CITRATE 50 UG/ML
INJECTION, SOLUTION INTRAMUSCULAR; INTRAVENOUS PRN
Status: DISCONTINUED | OUTPATIENT
Start: 2019-07-24 | End: 2019-07-24

## 2019-07-24 RX ORDER — IOPAMIDOL 510 MG/ML
INJECTION, SOLUTION INTRAVASCULAR PRN
Status: DISCONTINUED | OUTPATIENT
Start: 2019-07-24 | End: 2019-07-24 | Stop reason: HOSPADM

## 2019-07-24 RX ORDER — NALOXONE HYDROCHLORIDE 0.4 MG/ML
.1-.4 INJECTION, SOLUTION INTRAMUSCULAR; INTRAVENOUS; SUBCUTANEOUS
Status: DISCONTINUED | OUTPATIENT
Start: 2019-07-24 | End: 2019-07-24

## 2019-07-24 RX ORDER — HYDROMORPHONE HYDROCHLORIDE 1 MG/ML
.3-.5 INJECTION, SOLUTION INTRAMUSCULAR; INTRAVENOUS; SUBCUTANEOUS EVERY 10 MIN PRN
Status: DISCONTINUED | OUTPATIENT
Start: 2019-07-24 | End: 2019-07-24 | Stop reason: HOSPADM

## 2019-07-24 RX ORDER — ONDANSETRON 4 MG/1
4 TABLET, ORALLY DISINTEGRATING ORAL EVERY 6 HOURS PRN
Status: DISCONTINUED | OUTPATIENT
Start: 2019-07-24 | End: 2019-07-27 | Stop reason: HOSPADM

## 2019-07-24 RX ORDER — SODIUM CHLORIDE, SODIUM LACTATE, POTASSIUM CHLORIDE, CALCIUM CHLORIDE 600; 310; 30; 20 MG/100ML; MG/100ML; MG/100ML; MG/100ML
INJECTION, SOLUTION INTRAVENOUS CONTINUOUS
Status: DISCONTINUED | OUTPATIENT
Start: 2019-07-24 | End: 2019-07-26

## 2019-07-24 RX ORDER — PROPOFOL 10 MG/ML
INJECTION, EMULSION INTRAVENOUS PRN
Status: DISCONTINUED | OUTPATIENT
Start: 2019-07-24 | End: 2019-07-24

## 2019-07-24 RX ORDER — DEXAMETHASONE SODIUM PHOSPHATE 4 MG/ML
INJECTION, SOLUTION INTRA-ARTICULAR; INTRALESIONAL; INTRAMUSCULAR; INTRAVENOUS; SOFT TISSUE PRN
Status: DISCONTINUED | OUTPATIENT
Start: 2019-07-24 | End: 2019-07-24

## 2019-07-24 RX ORDER — DIMENHYDRINATE 50 MG/ML
25 INJECTION, SOLUTION INTRAMUSCULAR; INTRAVENOUS
Status: DISCONTINUED | OUTPATIENT
Start: 2019-07-24 | End: 2019-07-24 | Stop reason: HOSPADM

## 2019-07-24 RX ORDER — PROCHLORPERAZINE MALEATE 5 MG
10 TABLET ORAL EVERY 6 HOURS PRN
Status: DISCONTINUED | OUTPATIENT
Start: 2019-07-24 | End: 2019-07-27 | Stop reason: HOSPADM

## 2019-07-24 RX ORDER — FLUMAZENIL 0.1 MG/ML
0.2 INJECTION, SOLUTION INTRAVENOUS
Status: DISCONTINUED | OUTPATIENT
Start: 2019-07-24 | End: 2019-07-24

## 2019-07-24 RX ORDER — LIDOCAINE 40 MG/G
CREAM TOPICAL
Status: DISCONTINUED | OUTPATIENT
Start: 2019-07-24 | End: 2019-07-24 | Stop reason: HOSPADM

## 2019-07-24 RX ORDER — ALBUTEROL SULFATE 90 UG/1
2 AEROSOL, METERED RESPIRATORY (INHALATION) EVERY 6 HOURS
Status: DISCONTINUED | OUTPATIENT
Start: 2019-07-24 | End: 2019-07-24

## 2019-07-24 RX ORDER — ONDANSETRON 4 MG/1
4 TABLET, ORALLY DISINTEGRATING ORAL EVERY 30 MIN PRN
Status: DISCONTINUED | OUTPATIENT
Start: 2019-07-24 | End: 2019-07-24 | Stop reason: HOSPADM

## 2019-07-24 RX ORDER — HYDROMORPHONE HYDROCHLORIDE 1 MG/ML
.3-.5 INJECTION, SOLUTION INTRAMUSCULAR; INTRAVENOUS; SUBCUTANEOUS EVERY 4 HOURS PRN
Status: DISCONTINUED | OUTPATIENT
Start: 2019-07-24 | End: 2019-07-26

## 2019-07-24 RX ORDER — ONDANSETRON 2 MG/ML
4 INJECTION INTRAMUSCULAR; INTRAVENOUS EVERY 6 HOURS PRN
Status: DISCONTINUED | OUTPATIENT
Start: 2019-07-24 | End: 2019-07-27 | Stop reason: HOSPADM

## 2019-07-24 RX ORDER — ONDANSETRON 2 MG/ML
4 INJECTION INTRAMUSCULAR; INTRAVENOUS EVERY 30 MIN PRN
Status: DISCONTINUED | OUTPATIENT
Start: 2019-07-24 | End: 2019-07-24 | Stop reason: HOSPADM

## 2019-07-24 RX ADMIN — ONDANSETRON 4 MG: 2 INJECTION INTRAMUSCULAR; INTRAVENOUS at 14:44

## 2019-07-24 RX ADMIN — HYDROMORPHONE HYDROCHLORIDE 0.5 MG: 1 INJECTION, SOLUTION INTRAMUSCULAR; INTRAVENOUS; SUBCUTANEOUS at 19:25

## 2019-07-24 RX ADMIN — FENTANYL CITRATE 25 MCG: 50 INJECTION INTRAMUSCULAR; INTRAVENOUS at 08:39

## 2019-07-24 RX ADMIN — ACETAMINOPHEN 650 MG: 325 TABLET, FILM COATED ORAL at 18:01

## 2019-07-24 RX ADMIN — HYDROMORPHONE HYDROCHLORIDE 0.5 MG: 1 INJECTION, SOLUTION INTRAMUSCULAR; INTRAVENOUS; SUBCUTANEOUS at 14:54

## 2019-07-24 RX ADMIN — HYDROMORPHONE HYDROCHLORIDE 0.3 MG: 1 INJECTION, SOLUTION INTRAMUSCULAR; INTRAVENOUS; SUBCUTANEOUS at 10:00

## 2019-07-24 RX ADMIN — PROPOFOL 150 MG: 10 INJECTION, EMULSION INTRAVENOUS at 07:16

## 2019-07-24 RX ADMIN — PROCHLORPERAZINE EDISYLATE 10 MG: 5 INJECTION INTRAMUSCULAR; INTRAVENOUS at 18:02

## 2019-07-24 RX ADMIN — PROCHLORPERAZINE EDISYLATE 5 MG: 5 INJECTION INTRAMUSCULAR; INTRAVENOUS at 11:02

## 2019-07-24 RX ADMIN — HYDROMORPHONE HYDROCHLORIDE 0.3 MG: 1 INJECTION, SOLUTION INTRAMUSCULAR; INTRAVENOUS; SUBCUTANEOUS at 09:33

## 2019-07-24 RX ADMIN — DEXAMETHASONE SODIUM PHOSPHATE 6 MG: 4 INJECTION, SOLUTION INTRA-ARTICULAR; INTRALESIONAL; INTRAMUSCULAR; INTRAVENOUS; SOFT TISSUE at 07:36

## 2019-07-24 RX ADMIN — FENTANYL CITRATE 100 MCG: 50 INJECTION, SOLUTION INTRAMUSCULAR; INTRAVENOUS at 07:15

## 2019-07-24 RX ADMIN — PROCHLORPERAZINE EDISYLATE 5 MG: 5 INJECTION INTRAMUSCULAR; INTRAVENOUS at 11:25

## 2019-07-24 RX ADMIN — HYDROMORPHONE HYDROCHLORIDE 0.4 MG: 1 INJECTION, SOLUTION INTRAMUSCULAR; INTRAVENOUS; SUBCUTANEOUS at 09:43

## 2019-07-24 RX ADMIN — FENTANYL CITRATE 25 MCG: 50 INJECTION INTRAMUSCULAR; INTRAVENOUS at 08:49

## 2019-07-24 RX ADMIN — SODIUM CHLORIDE, POTASSIUM CHLORIDE, SODIUM LACTATE AND CALCIUM CHLORIDE: 600; 310; 30; 20 INJECTION, SOLUTION INTRAVENOUS at 14:59

## 2019-07-24 RX ADMIN — SUGAMMADEX 200 MG: 100 INJECTION, SOLUTION INTRAVENOUS at 08:14

## 2019-07-24 RX ADMIN — FENTANYL CITRATE 50 MCG: 50 INJECTION INTRAMUSCULAR; INTRAVENOUS at 08:58

## 2019-07-24 RX ADMIN — ROCURONIUM BROMIDE 50 MG: 10 INJECTION INTRAVENOUS at 07:19

## 2019-07-24 RX ADMIN — MIDAZOLAM 2 MG: 1 INJECTION INTRAMUSCULAR; INTRAVENOUS at 07:07

## 2019-07-24 RX ADMIN — ALBUTEROL SULFATE 2 PUFF: 90 INHALANT RESPIRATORY (INHALATION) at 17:45

## 2019-07-24 RX ADMIN — FENTANYL CITRATE 50 MCG: 50 INJECTION, SOLUTION INTRAMUSCULAR; INTRAVENOUS at 08:28

## 2019-07-24 RX ADMIN — SODIUM CHLORIDE, POTASSIUM CHLORIDE, SODIUM LACTATE AND CALCIUM CHLORIDE: 600; 310; 30; 20 INJECTION, SOLUTION INTRAVENOUS at 07:07

## 2019-07-24 RX ADMIN — SODIUM CHLORIDE, POTASSIUM CHLORIDE, SODIUM LACTATE AND CALCIUM CHLORIDE: 600; 310; 30; 20 INJECTION, SOLUTION INTRAVENOUS at 08:03

## 2019-07-24 RX ADMIN — ONDANSETRON 4 MG: 2 INJECTION INTRAMUSCULAR; INTRAVENOUS at 09:02

## 2019-07-24 RX ADMIN — ONDANSETRON 4 MG: 2 INJECTION INTRAMUSCULAR; INTRAVENOUS at 08:10

## 2019-07-24 ASSESSMENT — MIFFLIN-ST. JEOR: SCORE: 1466.88

## 2019-07-24 ASSESSMENT — PAIN DESCRIPTION - DESCRIPTORS: DESCRIPTORS: SHARP

## 2019-07-24 NOTE — PROGRESS NOTES
Report from Vale RN @ [phone] updated  and pt on plan of care.   Report to Juany on 6D @ 9201. Pt going to room 18 bed 1.

## 2019-07-24 NOTE — OR NURSING
Hand-off report to SUZAN Escamilla RN.  Outstanding issues: lab results, patient has questions for MD before she signs consent, H/P not complete,(Dr. Camarena sent a text), wants to speak to anesthesia about intubation.

## 2019-07-24 NOTE — PLAN OF CARE
Pt arrived to unit at 1400 for pain management and fluids. Pt s/p ERCP today. Lipase elevated post ERCP. Pt alert and oriented. Up with sba. No void since post op. NPO. Zofran given x 1 for nausea. Dilaudid given for abdominal pain.  at bedside. Pt breastfeeding. Pumping/dumping. Continue with poc.     -diagnostic tests and consults completed and resulted: labs will be rechecked    -vital signs normal or at patient baseline: AVSS  -tolerating oral intake to maintain hydration: NPO   -adequate pain control on oral analgesics: IV dilaudid given

## 2019-07-24 NOTE — BRIEF OP NOTE
[facility] Brief Operative Note    Pre-operative diagnosis: Choledocholithiasis   Post-operative diagnosis Cholelithiasis without choledocholithiasis   Procedure: Procedure(s):  Endoscopic Retrograde Cholangiopancreatography with Bile Duct Sphincterotomy and Pancreatic Duct Stent Placement   Surgeon: RYAN ROSALES   Assistants(s): Corby Matos MD   Estimated blood loss: None    Specimens: None   Impression:   - Cholelithiasis without choledocholithiasis with                        decompressed normal biliary system                        - Uncomplicated biliary sphincterotomy                        - As dual wire cannulation was required, a prophylactic                        pancreatic stent was placed     Recommendation:                             - General anesthesia recovery with probable discharge                        home this morning after two hour observation and                        laboratories to ensure avoidance of pancreatitis                        - Plain film in 4 weeks to ensure spontaneous passage of                        the pancreatic stent; if this passesd, no endoscopic                        procedures planned                        - Referral to a surgeron for cholecystectomy, preferably                        before the move to [place]                        - Avoid antithrombotics for at least three days                        - The findings and recommendations were discussed with                        the patient and their family       Corby Matos MD  Interventional Endoscopy Fellow

## 2019-07-24 NOTE — OR NURSING
Dr Camarena at bedside and pt will be admitted to observation from phase 2. Awaiting transfer orders.

## 2019-07-24 NOTE — ANESTHESIA CARE TRANSFER NOTE
Patient: Myrna Brothers    Procedure(s):  Endoscopic Retrograde Cholangiopancreatography with Bile Duct Sphincterotomy and Pancreatic Duct Stent Placement    Diagnosis: Choledocholithiasis  Diagnosis Additional Information: No value filed.    Anesthesia Type:   No value filed.     Note:  Airway :Face Mask  Patient transferred to:PACU  Comments: Patient transferred to PACU. VSS. Denies pain, denies nausea. Report given to receiving RN.Handoff Report: Identifed the Patient, Identified the Reponsible Provider, Reviewed the pertinent medical history, Discussed the surgical course, Reviewed Intra-OP anesthesia mangement and issues during anesthesia, Set expectations for post-procedure period and Allowed opportunity for questions and acknowledgement of understanding      Vitals: (Last set prior to Anesthesia Care Transfer)    CRNA VITALS  7/24/2019 0748 - 7/24/2019 0822      7/24/2019             Resp Rate (observed):  22                Electronically Signed By: DONY Lynn CRNA  July 24, 2019  8:22 AM

## 2019-07-24 NOTE — DISCHARGE INSTRUCTIONS
[facility]  Same-Day Surgery   Adult Discharge Orders & Instructions     For 24 hours after surgery    1. Get plenty of rest.  A responsible adult must stay with you for at least 24 hours after you leave the hospital.   2. Do not drive or use heavy equipment.  If you have weakness or tingling, don't drive or use heavy equipment until this feeling goes away.  3. Do not drink alcohol.  4. Avoid strenuous or risky activities.  Ask for help when climbing stairs.   5. You may feel lightheaded.  IF so, sit for a few minutes before standing.  Have someone help you get up.   6. If you have nausea (feel sick to your stomach): Drink only clear liquids such as apple juice, ginger ale, broth or 7-Up.  Rest may also help.  Be sure to drink enough fluids.  Move to a regular diet as you feel able.  7. You may have a slight fever. Call the doctor if your fever is over 100 F (37.7 C) (taken under the tongue) or lasts longer than 24 hours.  8. You may have a dry mouth, a sore throat, muscle aches or trouble sleeping.  These should go away after 24 hours.  9. Do not make important or legal decisions.   Call your doctor for any of the followin.  Signs of infection (fever, growing tenderness at the surgery site, a large amount of drainage or bleeding, severe pain, foul-smelling drainage, redness, swelling).    2. It has been over 8 to 10 hours since surgery and you are still not able to urinate (pass water).    3.  Headache for over 24 hours.      To contact a doctor, call Dr Camarena at [phone] (clinic) or:    x   [phone] and ask for the resident on call for   Gastroenterology (answered 24 hours a day)  x   Emergency Department:    [facility]: [phone]       (TTY for hearing impaired: [phone])

## 2019-07-24 NOTE — PLAN OF CARE
Observation goals    -diagnostic tests and consults completed and resulted: Pending. Labs will be rechecked  -vital signs normal or at patient baseline: Yes, VSS  -tolerating oral intake to maintain hydration: No, on bowel rest. NPO. Receiving LR @200 mL/hr. IV compazine given x1  -adequate pain control on oral analgesics: No. IV dilaudid given. Tylenol also given x1

## 2019-07-24 NOTE — OR NURSING
Dr Camarena's fellow at bedside to assess patient and is aware of Lipase level, will speak to Dr Camarena. No new orders at this time.

## 2019-07-24 NOTE — ANESTHESIA POSTPROCEDURE EVALUATION
Anesthesia POST Procedure Evaluation    Patient: Myrna Brothers   MRN:     [id number] Gender:   female   Age:    33 year old :      1985        Preoperative Diagnosis: Choledocholithiasis   Procedure(s):  Endoscopic Retrograde Cholangiopancreatography with Bile Duct Sphincterotomy and Pancreatic Duct Stent Placement   Postop Comments: No value filed.       Anesthesia Type:  General  General    Reportable Event: NO     PAIN: Uncomplicated   Sign Out status: Comfortable, Well controlled pain     PONV: No PONV   Sign Out status:  No Nausea or Vomiting     Neuro/Psych: Uneventful perioperative course   Sign Out Status: Preoperative baseline; Age appropriate mentation     Airway/Resp.: Uneventful perioperative course   Sign Out Status: Non labored breathing, age appropriate RR; Resp. Status within EXPECTED Parameters     CV: Uneventful perioperative course   Sign Out status: Appropriate BP and perfusion indices; Appropriate HR/Rhythm     Disposition:   Sign Out in:  PACU  Disposition:  Phase II; Home  Recovery Course: Uneventful  Follow-Up: Not required           Last Anesthesia Record Vitals:  CRNA VITALS  2019 0748 - 2019 0848      2019             Resp Rate (observed):  22          Last PACU Vitals:  Vitals Value Taken Time   /78 2019  9:20 AM   Temp 36.7  C (98  F) 2019  8:52 AM   Pulse 66 2019  9:20 AM   Resp 9 2019  9:00 AM   SpO2 100 % 2019  9:28 AM   Temp src     NIBP     Pulse     SpO2     Resp     Temp     Ht Rate     Temp 2     Vitals shown include unvalidated device data.      Electronically Signed By: Sylvia Wilkins MD, 2019, 9:29 AM

## 2019-07-24 NOTE — H&P
[facility] [place]    History and Physical - [place] [address] Service   Date of Admission:  7/24/2019    Assessment & Plan   Myrna Brothers is a 33 year old female admitted on 7/24/2019. She has a history of anxiety, depression and recently diagnosed choledocholithiasis is admitted for post ERCP pancreatitis    # Abdominal pain   # C/f post ERCP pancreatitis   Abdominal pain in epigastric and RUQ areas, and elevated lipase (2-3x upper limit). No evidence of cholidocolithiasis during procedure. No signs of infection. DDx includes post ERCP pancreatitis, post procedure pain, and less likely bowel perforation s/p procedure. Will observe and support overnight with fluids, bowel rest and pain control.  - GI following and will see patient again in AM    - NPO; will advance diet as tolerated in AM   - LR @ 200cc/hr for 8 hours, the reduce to 150cc/hr   - Pain control with acetaminophen and IV dilaudid     # Generalized anxiety disorder  # Major depressive disorder   - holding Buspar and sertraline while npo         Diet: NPO for Medical/Clinical Reasons Except for: Ice Chips    Fluids: 200 cc/h of lactated Ringers  DVT Prophylaxis: Pneumatic Compression Devices- avoiding   Franks Catheter: not present  Code Status: Full code    Disposition Plan   Expected discharge: Tomorrow, recommended to prior living arrangement once adequate pain management/ tolerating PO medications.  Entered: Chilo Dudley 07/24/2019, 1:31 PM       The patient's care was discussed with the Attending Physician, Dr. Mix, Bedside Nurse and Patient.    Chilo Nichols 5 Service  [facility] [place]    ______________________________________________________________________    Chief Complaint   Abdominal pain post ERCP    History is obtained from the patient    History of Present Illness   Myrna Brothers is a 33 year old healthy female 9 months post partem and is admitted for post  ERCP pancreatitis.    Recently seen by PCP for RUQ pain, nausea, and bloating. Found to have cholelithiasis and choledocholithiasis on MRCP and referred to GI for outpatient ERCP. Underwent  procedure today with findings of cholelithiasis without choledocholithiasis. Biliary sphincterotomy and pancreatic stent placed without complication. Post procedure patient complaining of abdominal pain. Lipase elevated from 234 to 1100 after procedure. Admitted for observation post procedure.     Patient reports nausea with epigastric pain which radiates to the left upper quadrant and towards her back.  She denies any fever or chills, chest pain, shortness of breath.    Review of Systems    The 10 point Review of Systems is negative other than noted in the HPI or here.     Past Medical History    I have reviewed this patient's medical history and updated it with pertinent information if needed.   Past Medical History:   Diagnosis Date     Diabetes (H)     gestational     Generalized anxiety disorder 1/4/2018     Hyperlipidemia      Major depressive disorder with single episode, in full remission (H) 1/4/2018     Sleep apnea      Uncomplicated asthma         Past Surgical History   I have reviewed this patient's surgical history and updated it with pertinent information if needed.  Past Surgical History:   Procedure Laterality Date     HEAD & NECK SURGERY      wisdom teeth        Social History   I have reviewed this patient's social history and updated it with pertinent information if needed. Myrna FLAHERTY Zev  reports that she has never smoked. She has never used smokeless tobacco. She reports that she drinks alcohol. She reports that she does not use drugs.  Lives with her  and has 9-month-old baby boy at home    Family History   I have reviewed this patient's family history and updated it with pertinent information if needed.   Family History   Problem Relation Age of Onset     Breast Cancer Mother      Stillborn  Mother         had a still born     Other - See Comments Mother         2 miscarriage     Diabetes Father      Heart Disease Father      Anxiety Disorder Maternal Grandmother      Alzheimer Disease Maternal Grandmother      Asthma Maternal Grandfather      Prostate Cancer Maternal Grandfather      Hypertension Paternal Grandmother      Leukemia Paternal Grandfather        Prior to Admission Medications   Prior to Admission Medications   Prescriptions Last Dose Informant Patient Reported? Taking?   APNO CREA ointment   No No   Sig: Apply 60 g topically every hour as needed for skin care   Patient not taking: Reported on 7/16/2019   acetaminophen (TYLENOL) 500 MG tablet 7/23/2019 at 1900  Yes No   Sig: Take 500-1,000 mg by mouth as needed for mild pain    albuterol (PROAIR HFA/PROVENTIL HFA/VENTOLIN HFA) 108 (90 Base) MCG/ACT inhaler Past Week at Unknown time  Yes Yes   Sig: Inhale 2 puffs into the lungs every 6 hours   busPIRone (BUSPAR) 15 MG tablet 7/24/2019 at 0400  Yes Yes   Sig: Take 15 mg by mouth 2 times daily    cephALEXin (KEFLEX) 500 MG capsule 7/23/2019 at 1900  No Yes   Sig: Take 1 capsule (500 mg) by mouth 2 times daily for 7 days   cyclobenzaprine (FLEXERIL) 5 MG tablet Past Week at Unknown time  No Yes   Sig: Take 1 tablet (5 mg) by mouth nightly as needed for muscle spasms   ibuprofen (ADVIL/MOTRIN) 600 MG tablet Past Week at Unknown time  No Yes   Sig: Take 1 tablet (600 mg) by mouth every 6 hours as needed for moderate pain   norethindrone (MICRONOR) 0.35 MG tablet 7/24/2019 at 0400  No Yes   Sig: Take 1 tablet (0.35 mg) by mouth daily   omeprazole (PRILOSEC) 20 MG DR capsule 7/23/2019 at 1900  No Yes   Sig: Take 1 capsule (20 mg) by mouth daily   sertraline (ZOLOFT) 100 MG tablet 7/24/2019 at 0400  Yes Yes   Sig: Take by mouth daily       Facility-Administered Medications: None     Allergies   Allergies   Allergen Reactions     Xylocaine [Lidocaine] Anaphylaxis     Epidural Tray      Pt states with  epidural, she and the baby both had decreased heart rates     Novocain [Procaine]        Physical Exam   Vital Signs: Temp: 98.1  F (36.7  C) Temp src: Oral BP: 121/75 Pulse: 64 Heart Rate: 63 Resp: 14 SpO2: 96 % O2 Device: None (Room air) Oxygen Delivery: 2 LPM  Weight: 169 lbs 15.59 oz  General Appearance: Laying in bed, no acute distress, nontoxic-appearing  HEENT: Anicteric sclera, EOMI, moist mucous membranes  Respiratory: Clear to auscultation bilaterally without wheezes or crackles, no respiratory distress  Cardiovascular: Regular rate and rhythm, normal S1/S2, no murmur, rub or gallop  GI: Diminished bowel sounds, tenderness in epigastric and right upper quadrant, soft and nondistended.  No peritoneal signs  Lymph/Hematologic: No bruising or bleeding  Genitourinary: Deferred  Skin: No jaundice or rashes on limited skin exam  Musculoskeletal: Normal bulk and tone  Neurologic: Alert and oriented x3, fluent speech, answers questions appropriately, no focal neuro deficits  Psychiatric: Normal affect    Data   Data reviewed today: I reviewed all medications, new labs and imaging results over the last 24 hours.    Recent Labs   Lab 07/24/19  1022 07/24/19  0608   WBC  --  7.5   HGB  --  13.6   MCV  --  92   PLT  --  282   NA  --  138   POTASSIUM  --  3.7   CHLORIDE  --  107   CO2  --  26   BUN  --  21   CR  --  0.67   ANIONGAP  --  5   MAGGIE  --  8.8   GLC  --  77   ALBUMIN  --  4.1   PROTTOTAL  --  8.3   BILITOTAL  --  0.4   ALKPHOS  --  227*   ALT  --  169*   AST  --  22   LIPASE 1,098* 234       Physician Attestation   I, Jp Mix, saw this patient with the resident and agree with the resident/fellow's findings and plan of care as documented in the note.      I personally reviewed vital signs, medications, labs and imaging.    Jp Mix MD  Date of Service (when I saw the patient): 7/24/19

## 2019-07-25 LAB
ALBUMIN SERPL-MCNC: 3 G/DL (ref 3.4–5)
ALP SERPL-CCNC: 165 U/L (ref 40–150)
ALT SERPL W P-5'-P-CCNC: 102 U/L (ref 0–50)
ANION GAP SERPL CALCULATED.3IONS-SCNC: 6 MMOL/L (ref 3–14)
AST SERPL W P-5'-P-CCNC: 20 U/L (ref 0–45)
BILIRUB SERPL-MCNC: 0.8 MG/DL (ref 0.2–1.3)
BUN SERPL-MCNC: 14 MG/DL (ref 7–30)
CALCIUM SERPL-MCNC: 8.3 MG/DL (ref 8.5–10.1)
CHLORIDE SERPL-SCNC: 106 MMOL/L (ref 94–109)
CO2 SERPL-SCNC: 27 MMOL/L (ref 20–32)
CREAT SERPL-MCNC: 0.68 MG/DL (ref 0.52–1.04)
ERYTHROCYTE [DISTWIDTH] IN BLOOD BY AUTOMATED COUNT: 13.6 % (ref 10–15)
GFR SERPL CREATININE-BSD FRML MDRD: >90 ML/MIN/{1.73_M2}
GLUCOSE SERPL-MCNC: 111 MG/DL (ref 70–99)
HCT VFR BLD AUTO: 36.5 % (ref 35–47)
HGB BLD-MCNC: 11.3 G/DL (ref 11.7–15.7)
LIPASE SERPL-CCNC: 2618 U/L (ref 73–393)
MCH RBC QN AUTO: 28.3 PG (ref 26.5–33)
MCHC RBC AUTO-ENTMCNC: 31 G/DL (ref 31.5–36.5)
MCV RBC AUTO: 91 FL (ref 78–100)
PLATELET # BLD AUTO: 232 10E9/L (ref 150–450)
POTASSIUM SERPL-SCNC: 3.7 MMOL/L (ref 3.4–5.3)
PROT SERPL-MCNC: 6.1 G/DL (ref 6.8–8.8)
RBC # BLD AUTO: 4 10E12/L (ref 3.8–5.2)
SODIUM SERPL-SCNC: 140 MMOL/L (ref 133–144)
WBC # BLD AUTO: 10.3 10E9/L (ref 4–11)

## 2019-07-25 PROCEDURE — 83690 ASSAY OF LIPASE: CPT | Performed by: STUDENT IN AN ORGANIZED HEALTH CARE EDUCATION/TRAINING PROGRAM

## 2019-07-25 PROCEDURE — 36415 COLL VENOUS BLD VENIPUNCTURE: CPT | Performed by: STUDENT IN AN ORGANIZED HEALTH CARE EDUCATION/TRAINING PROGRAM

## 2019-07-25 PROCEDURE — 85027 COMPLETE CBC AUTOMATED: CPT | Performed by: STUDENT IN AN ORGANIZED HEALTH CARE EDUCATION/TRAINING PROGRAM

## 2019-07-25 PROCEDURE — 96376 TX/PRO/DX INJ SAME DRUG ADON: CPT

## 2019-07-25 PROCEDURE — 25000128 H RX IP 250 OP 636: Performed by: STUDENT IN AN ORGANIZED HEALTH CARE EDUCATION/TRAINING PROGRAM

## 2019-07-25 PROCEDURE — G0378 HOSPITAL OBSERVATION PER HR: HCPCS

## 2019-07-25 PROCEDURE — 12000001 ZZH R&B MED SURG/OB UMMC

## 2019-07-25 PROCEDURE — 99232 SBSQ HOSP IP/OBS MODERATE 35: CPT | Mod: GC | Performed by: PEDIATRICS

## 2019-07-25 PROCEDURE — 83690 ASSAY OF LIPASE: CPT | Performed by: PEDIATRICS

## 2019-07-25 PROCEDURE — 80053 COMPREHEN METABOLIC PANEL: CPT | Performed by: STUDENT IN AN ORGANIZED HEALTH CARE EDUCATION/TRAINING PROGRAM

## 2019-07-25 PROCEDURE — 25800030 ZZH RX IP 258 OP 636: Performed by: STUDENT IN AN ORGANIZED HEALTH CARE EDUCATION/TRAINING PROGRAM

## 2019-07-25 PROCEDURE — 25000132 ZZH RX MED GY IP 250 OP 250 PS 637: Performed by: STUDENT IN AN ORGANIZED HEALTH CARE EDUCATION/TRAINING PROGRAM

## 2019-07-25 PROCEDURE — 96361 HYDRATE IV INFUSION ADD-ON: CPT

## 2019-07-25 RX ORDER — SERTRALINE HYDROCHLORIDE 100 MG/1
100 TABLET, FILM COATED ORAL DAILY
Status: DISCONTINUED | OUTPATIENT
Start: 2019-07-25 | End: 2019-07-27 | Stop reason: HOSPADM

## 2019-07-25 RX ORDER — IBUPROFEN 200 MG
600 TABLET ORAL EVERY 6 HOURS PRN
Status: DISCONTINUED | OUTPATIENT
Start: 2019-07-25 | End: 2019-07-27 | Stop reason: HOSPADM

## 2019-07-25 RX ORDER — BUSPIRONE HYDROCHLORIDE 15 MG/1
15 TABLET ORAL 2 TIMES DAILY
Status: DISCONTINUED | OUTPATIENT
Start: 2019-07-25 | End: 2019-07-27 | Stop reason: HOSPADM

## 2019-07-25 RX ADMIN — BUSPIRONE HYDROCHLORIDE 15 MG: 15 TABLET ORAL at 20:37

## 2019-07-25 RX ADMIN — ACETAMINOPHEN 650 MG: 325 TABLET, FILM COATED ORAL at 09:14

## 2019-07-25 RX ADMIN — SODIUM CHLORIDE, POTASSIUM CHLORIDE, SODIUM LACTATE AND CALCIUM CHLORIDE: 600; 310; 30; 20 INJECTION, SOLUTION INTRAVENOUS at 06:40

## 2019-07-25 RX ADMIN — HYDROMORPHONE HYDROCHLORIDE 0.5 MG: 1 INJECTION, SOLUTION INTRAMUSCULAR; INTRAVENOUS; SUBCUTANEOUS at 12:46

## 2019-07-25 RX ADMIN — HYDROMORPHONE HYDROCHLORIDE 0.5 MG: 1 INJECTION, SOLUTION INTRAMUSCULAR; INTRAVENOUS; SUBCUTANEOUS at 04:15

## 2019-07-25 RX ADMIN — OMEPRAZOLE 20 MG: 20 CAPSULE, DELAYED RELEASE ORAL at 14:12

## 2019-07-25 RX ADMIN — SERTRALINE HYDROCHLORIDE 100 MG: 100 TABLET ORAL at 14:12

## 2019-07-25 RX ADMIN — IBUPROFEN 600 MG: 200 TABLET, FILM COATED ORAL at 14:13

## 2019-07-25 RX ADMIN — SODIUM CHLORIDE, POTASSIUM CHLORIDE, SODIUM LACTATE AND CALCIUM CHLORIDE: 600; 310; 30; 20 INJECTION, SOLUTION INTRAVENOUS at 22:06

## 2019-07-25 RX ADMIN — HYDROMORPHONE HYDROCHLORIDE 0.5 MG: 1 INJECTION, SOLUTION INTRAMUSCULAR; INTRAVENOUS; SUBCUTANEOUS at 17:59

## 2019-07-25 RX ADMIN — HYDROMORPHONE HYDROCHLORIDE 0.5 MG: 1 INJECTION, SOLUTION INTRAMUSCULAR; INTRAVENOUS; SUBCUTANEOUS at 22:06

## 2019-07-25 RX ADMIN — ACETAMINOPHEN 650 MG: 325 TABLET, FILM COATED ORAL at 20:37

## 2019-07-25 ASSESSMENT — ACTIVITIES OF DAILY LIVING (ADL)
RETIRED_COMMUNICATION: 0-->UNDERSTANDS/COMMUNICATES WITHOUT DIFFICULTY
BATHING: 0-->INDEPENDENT
FALL_HISTORY_WITHIN_LAST_SIX_MONTHS: NO
AMBULATION: 0-->INDEPENDENT
DRESS: 0-->INDEPENDENT
TRANSFERRING: 0-->INDEPENDENT
RETIRED_EATING: 0-->INDEPENDENT
TOILETING: 0-->INDEPENDENT
COGNITION: 0 - NO COGNITION ISSUES REPORTED
ADLS_ACUITY_SCORE: 10
SWALLOWING: 0-->SWALLOWS FOODS/LIQUIDS WITHOUT DIFFICULTY

## 2019-07-25 ASSESSMENT — PAIN DESCRIPTION - DESCRIPTORS: DESCRIPTORS: ACHING

## 2019-07-25 NOTE — PLAN OF CARE
Neuro: A&Ox4.   Cardiac: VSS.   Respiratory: Sating >92% on RA.  GI/: Adequate urine output. BM yesterday.  Diet/appetite: Tolerated CLD this morning and was asking for FLD this afternoon but after lipase levels came back elevated GI recommended continuing NPO status with chips. Denies nausea.  Activity:  Up ad bert.  Pain: At acceptable level on current regimen.  Attempted to treat LUQ abd pain with APAP only this morning but she reported increased pain with movement.  IV dilaudid given with relief.  Will alternate ibuprofen and tylenol with IV dilaudid for breakthrough pain.   Skin: No new deficits noted.  LDA's:  L hand PIV    Plan: NPO until tomorrow.  LR at 150.  Plan to re-evaluate in the morning.  Continue with POC. Notify primary team with changes.

## 2019-07-25 NOTE — PROGRESS NOTES
"Observation goals:     -diagnostic tests and consults completed and resulted:  No  Pt lipase up 1,098 and was 234 prior to ERCP.Providers aware    -vital signs normal or at patient baseline: Yes, VSS, /66 (BP Location: Left arm)   Pulse 66   Temp 98.2  F (36.8  C) (Oral)   Resp 16   Ht 1.651 m (5' 5\")   Wt 77.1 kg (169 lb 15.6 oz)   SpO2 96%  RA  BMI 28.29 kg    -tolerating oral intake to maintain hydration: No, on bowel rest. NPO, can have ice chip.  LR infused at 200 ml/hr and rate decreased to 150 ml/hr.    -adequate pain control on oral analgesics: Yes,. IV Dilaudid administered x 1 for pain control. Resting between cares    Potentially, Pt will d/c to prior living arrangement once adequate pain management/ tolerating PO medications.                     "

## 2019-07-25 NOTE — PLAN OF CARE
Observation goals:     -diagnostic tests and consults completed and resulted:  Yes, lipase elevated.  A.m labs scheduled for Friday.    -vital signs normal or at patient baseline: Yes, VSS    -tolerating oral intake to maintain hydration: No, GI rec is NPO with chips. LR at 150 mL/hr.    -adequate pain control on oral analgesics: No, on tylenol and ibuprofen. Needed IV dilaudid this afternoon.  Reports pain to upper abdomen that increases at times with movement.

## 2019-07-25 NOTE — PROGRESS NOTES
[facility]  Progress Note - [place] 5 Service   Date of Admission:  7/24/2019    Assessment & Plan   Myrna Brothers is a 33 year old female admitted on 7/24/2019. She has a history of anxiety, depression and recently diagnosed choledocholithiasis is admitted for post ERCP pancreatitis.      Post ERCP pancreatitis   Abdominal pain in epigastric and RUQ areas, and elevated lipase (2-3x upper limit). No evidence of cholidocolithiasis during procedure. No signs of infection. Clinical setting, elevated lipase, and abdominal pain all consistent with post ERCP pancreatitis. Tolerated clear liquids 7/25 but will resume bowel rest per GI.  Will support with fluids, bowel rest and pain control.  - GI following  - NPO; reassess tomorrow   - 150cc/hr LR until 7/26    - Pain control with acetaminophen, ibuprofen, and IV dilaudid for breakthrough      Generalized anxiety disorder  Major depressive disorder   Resume PTA Buspar and sertraline      Diet: NPO for Medical/Clinical Reasons Except for: Meds, Ice Chips    Fluids: LR at 150cc/hr until 7/26  Lines: PIV x1  DVT Prophylaxis: Low Risk/Ambulatory with no VTE prophylaxis indicated  Franks Catheter: not present  Code Status: Full Code      Disposition Plan   Expected discharge: Tomorrow, recommended to prior living arrangement once adequate pain management/ tolerating PO medications.  Entered: Chilo Dudley 07/25/2019, 1:42 PM       The patient's care was discussed with the Attending Physician, Dr. Mix, Bedside Nurse, Care Coordinator/ and Patient.    Chilo Nichols 5 Service  [facility]  Pager: [phone]  Please see sticky note for cross cover information  ______________________________________________________________________    Interval History   No acute events overnight, patient feeling better this AM. Able to tolerate clear liquids this AM, GI recommending continued  bowel rest for another day. Abdominal pain most noticeable with movement. No fever, chills, chest pain, shortness of breath. Normal stool yesterday.     Data reviewed today: I reviewed all medications, new labs and imaging results over the last 24 hours.    Physical Exam   Vital Signs: Temp: 98.2  F (36.8  C) Temp src: Oral BP: 98/63 Pulse: 63 Heart Rate: 64 Resp: 16 SpO2: 95 % O2 Device: None (Room air)    Weight: 169 lbs 15.59 oz  General Appearance: Laying in bed, no acute distress, nontoxic-appearing  HEENT: Anicteric sclera, EOMI, moist mucous membranes  Respiratory: Clear to auscultation bilaterally without wheezes or crackles, no respiratory distress  Cardiovascular: Regular rate and rhythm, normal S1/S2, no murmur, rub or gallop  GI: Bowel sounds present, tenderness in epigastric and right upper quadrant, soft and nondistended.  No peritoneal signs  Skin: No jaundice or rashes on limited skin exam  Neurologic: Alert and oriented x3, fluent speech, answers questions appropriately, no focal neuro deficits    Data   Recent Labs   Lab 07/25/19  0545 07/24/19  1022 07/24/19  0608   WBC 10.3  --  7.5   HGB 11.3*  --  13.6   MCV 91  --  92     --  282     --  138   POTASSIUM 3.7  --  3.7   CHLORIDE 106  --  107   CO2 27  --  26   BUN 14  --  21   CR 0.68  --  0.67   ANIONGAP 6  --  5   MAGGIE 8.3*  --  8.8   *  --  77   ALBUMIN 3.0*  --  4.1   PROTTOTAL 6.1*  --  8.3   BILITOTAL 0.8  --  0.4   ALKPHOS 165*  --  227*   *  --  169*   AST 20  --  22   LIPASE 2,618* 1,098* 234

## 2019-07-25 NOTE — PROGRESS NOTES
[facility]  GASTROENTEROLOGY FOLLOW-UP NOTE  Myrna Brothers [id number]   07/25/2019    Brief HPI:  32 yo F with anxiety disorder recently presented with abd pain and was found to have abnormal liver enzymes with cholelithiasis and choledocholithiasis. She underwent ERCP yesterday and was adm for post procedural pain likely secondary post ERCP pancreatitis.      Events: no acute events    Subjective:  Doing better. Continues to have mild to moderate sharp constant abd pain without nausea/vomiting.  She does have an appetite this morning.           Current Facility-Administered Medications:      acetaminophen (TYLENOL) Suppository 650 mg, 650 mg, Rectal, Q4H PRN, Chilo Dudley     acetaminophen (TYLENOL) tablet 650 mg, 650 mg, Oral, Q4H PRN, Chilo Dudley, 650 mg at 07/25/19 2037     albuterol (PROAIR HFA/PROVENTIL HFA/VENTOLIN HFA) 108 (90 Base) MCG/ACT inhaler 2 puff, 2 puff, Inhalation, Q6H PRN, Chilo Dudley     busPIRone (BUSPAR) tablet 15 mg, 15 mg, Oral, BID, Chilo Dudley, 15 mg at 07/25/19 2037     HYDROmorphone (PF) (DILAUDID) injection 0.3-0.5 mg, 0.3-0.5 mg, Intravenous, Q4H PRN, Chilo Dudley, 0.5 mg at 07/25/19 1759     ibuprofen (ADVIL/MOTRIN) tablet 600 mg, 600 mg, Oral, Q6H PRN, Chilo Dudley, 600 mg at 07/25/19 1413     lactated ringers infusion, , Intravenous, Continuous, Chilo Dudley, Last Rate: 150 mL/hr at 07/25/19 1300     May continue current IV fluid if patient has IV fluids infusing until discharge., , Does not apply, Continuous PRN, Corby Matos MD     naloxone (NARCAN) injection 0.1-0.4 mg, 0.1-0.4 mg, Intravenous, Q2 Min PRN, Chilo Dudley     omeprazole (priLOSEC) CR capsule 20 mg, 20 mg, Oral, Daily, Chilo Dudley, 20 mg at 07/25/19 1412     ondansetron (ZOFRAN-ODT) ODT tab 4 mg, 4 mg, Oral, Q6H PRN **OR** ondansetron (ZOFRAN) injection 4 mg, 4 mg, Intravenous, Q6H PRN, Chilo Dudley, 4 mg at 07/24/19 1444     prochlorperazine (COMPAZINE) injection 10  "mg, 10 mg, Intravenous, Q6H PRN, 10 mg at 07/24/19 1802 **OR** prochlorperazine (COMPAZINE) tablet 10 mg, 10 mg, Oral, Q6H PRN **OR** prochlorperazine (COMPAZINE) Suppository 25 mg, 25 mg, Rectal, Q12H PRN, Chilo Dudley     sertraline (ZOLOFT) tablet 100 mg, 100 mg, Oral, Daily, Chilo Dudley, 100 mg at 07/25/19 1412     sodium chloride (PF) 0.9% PF flush 3 mL, 3 mL, Intravenous, q1 min prn, Corby Matos MD      Review Of Systems  Skin: negative  Eyes: negative  Ears/Nose/Throat: negative  Respiratory: No shortness of breath, dyspnea on exertion, cough, or hemoptysis  Cardiovascular: negative  Gastrointestinal: as above  Genitourinary: negative  Musculoskeletal: negative  Neurologic: negative  Psychiatric: negative  Hematologic/Lymphatic/Immunologic: negative  Endocrine: negative      OBJECTIVE:  VS: /64 (BP Location: Left arm)   Pulse 63   Temp 98.1  F (36.7  C) (Oral)   Resp 16   Ht 1.651 m (5' 5\")   Wt 77.1 kg (169 lb 15.6 oz)   SpO2 96%   BMI 28.29 kg/m     GEN: A&Ox3, NAD, comfortable  CV:  RRR, no M/G/R  PULM:  CTA B/L  ABD: Normoactive bowel sounds, soft, ND, NT, no HSM  SKIN: No rashed      REVIEW OF LABORATORY, PATHOLOGY AND IMAGING RESULTS:  BMP  Recent Labs   Lab 07/25/19  0545 07/24/19  0608    138   POTASSIUM 3.7 3.7   CHLORIDE 106 107   MAGGIE 8.3* 8.8   CO2 27 26   BUN 14 21   CR 0.68 0.67   * 77       CBC  Recent Labs   Lab 07/25/19  0545 07/24/19  0608   WBC 10.3 7.5   RBC 4.00 4.87   HGB 11.3* 13.6   HCT 36.5 44.8   MCV 91 92   MCH 28.3 27.9   MCHC 31.0* 30.4*   RDW 13.6 13.9    282       INRNo lab results found in last 7 days.    LFTs  Recent Labs   Lab 07/25/19  0545 07/24/19  0608   ALKPHOS 165* 227*   AST 20 22   * 169*   BILITOTAL 0.8 0.4   PROTTOTAL 6.1* 8.3   ALBUMIN 3.0* 4.1        PANC  Recent Labs   Lab 07/25/19  0545 07/24/19  1022 07/24/19  0608   LIPASE 2,618* 1,098* 234   AMYLASE  --  118* 90       IMPRESSION:  Myrna Brothers is a 33 " year old female with post ERCP pancreatitis. While improved clinically she continues to have persistent and pain and worsening lipase.       1) Post ERCP Pancreatitis: Mild and improved  -Continue supportive care and pain control  -Resume light diet if she is hungry and albe to tolerate PO. Otherwise, make her npo  -Repeat labs in the morning.         Corby Matos MD  Therapeutic Endoscopy Fellow

## 2019-07-25 NOTE — PLAN OF CARE
[id number]  VSS. On RA. A&Ox4. Up independently. Dilaudid given x1 for abd pain. Tylenol and ibuprofen available PRN. NPO. Plan for labs in the morning.

## 2019-07-25 NOTE — PROGRESS NOTES
"Observation goals     -diagnostic tests and consults completed and resulted: Pending    -vital signs normal or at patient baseline: Yes, VSS, /75 (BP Location: Left arm)   Pulse 65   Temp 98.4  F (36.9  C) (Oral)   Resp 16   Ht 1.651 m (5' 5\")   Wt 77.1 kg (169 lb 15.6 oz)   SpO2 95%  RA  BMI 28.29 kg/m      -tolerating oral intake to maintain hydration: No, on bowel rest. NPO, can have ice chip. LR @200 mL/hr d/c.    -adequate pain control on oral analgesics: No. IV dilaudid given. Tylenol also given x1  "

## 2019-07-25 NOTE — PROGRESS NOTES
Observation goals:     -diagnostic tests and consults completed and resulted:  No, plan to add on lipase    -vital signs normal or at patient baseline: Yes, VSS    -tolerating oral intake to maintain hydration: No, starting clear liquid diet this morning.  LR at 150 mL/hr.    -adequate pain control on oral analgesics: Yes,. will attempt to control with APAP today.  Reports pain to upper abdomen that increases at times with movement.

## 2019-07-25 NOTE — PROGRESS NOTES
Observation goals:     -diagnostic tests and consults completed and resulted:  Yes, lipase elevated.  A.m labs scheduled for Friday.    -vital signs normal or at patient baseline: Yes, VSS    -tolerating oral intake to maintain hydration: No, GI rec is NPO with chips. LR at 150 mL/hr.    -adequate pain control on oral analgesics: Yes,. will attempt to control with APAP/ibuprofen with IV dilaudid for breakthrough pain.  Reports pain to upper abdomen that increases at times with movement.

## 2019-07-25 NOTE — PROGRESS NOTES
Observation goals     -diagnostic tests and consults completed and resulted: Pending    -vital signs normal or at patient baseline: Yes, VSS,     -tolerating oral intake to maintain hydration: No, on bowel rest. NPO, can have ice chip. LR @200 mL/hr d/c.    -adequate pain control on oral analgesics: Yes denies pain presently. IV dilaudid and Tylenol available to manage pain. Resting between cares

## 2019-07-26 LAB
ALBUMIN SERPL-MCNC: 3 G/DL (ref 3.4–5)
ALP SERPL-CCNC: 163 U/L (ref 40–150)
ALT SERPL W P-5'-P-CCNC: 82 U/L (ref 0–50)
AMYLASE SERPL-CCNC: 288 U/L (ref 30–110)
ANION GAP SERPL CALCULATED.3IONS-SCNC: 8 MMOL/L (ref 3–14)
AST SERPL W P-5'-P-CCNC: 13 U/L (ref 0–45)
BILIRUB SERPL-MCNC: 0.8 MG/DL (ref 0.2–1.3)
BUN SERPL-MCNC: 10 MG/DL (ref 7–30)
CALCIUM SERPL-MCNC: 8.6 MG/DL (ref 8.5–10.1)
CHLORIDE SERPL-SCNC: 105 MMOL/L (ref 94–109)
CO2 SERPL-SCNC: 28 MMOL/L (ref 20–32)
CREAT SERPL-MCNC: 0.63 MG/DL (ref 0.52–1.04)
GFR SERPL CREATININE-BSD FRML MDRD: >90 ML/MIN/{1.73_M2}
GLUCOSE SERPL-MCNC: 80 MG/DL (ref 70–99)
LIPASE SERPL-CCNC: 3113 U/L (ref 73–393)
POTASSIUM SERPL-SCNC: 3.5 MMOL/L (ref 3.4–5.3)
PROT SERPL-MCNC: 6.3 G/DL (ref 6.8–8.8)
SODIUM SERPL-SCNC: 141 MMOL/L (ref 133–144)

## 2019-07-26 PROCEDURE — 25000128 H RX IP 250 OP 636: Performed by: PEDIATRICS

## 2019-07-26 PROCEDURE — 25000128 H RX IP 250 OP 636: Performed by: STUDENT IN AN ORGANIZED HEALTH CARE EDUCATION/TRAINING PROGRAM

## 2019-07-26 PROCEDURE — 25000132 ZZH RX MED GY IP 250 OP 250 PS 637: Performed by: STUDENT IN AN ORGANIZED HEALTH CARE EDUCATION/TRAINING PROGRAM

## 2019-07-26 PROCEDURE — 82150 ASSAY OF AMYLASE: CPT | Performed by: INTERNAL MEDICINE

## 2019-07-26 PROCEDURE — 83690 ASSAY OF LIPASE: CPT | Performed by: INTERNAL MEDICINE

## 2019-07-26 PROCEDURE — 36415 COLL VENOUS BLD VENIPUNCTURE: CPT | Performed by: INTERNAL MEDICINE

## 2019-07-26 PROCEDURE — 80053 COMPREHEN METABOLIC PANEL: CPT | Performed by: INTERNAL MEDICINE

## 2019-07-26 PROCEDURE — 99232 SBSQ HOSP IP/OBS MODERATE 35: CPT | Mod: GC | Performed by: PEDIATRICS

## 2019-07-26 PROCEDURE — 12000001 ZZH R&B MED SURG/OB UMMC

## 2019-07-26 PROCEDURE — 25800030 ZZH RX IP 258 OP 636: Performed by: PEDIATRICS

## 2019-07-26 PROCEDURE — 25800030 ZZH RX IP 258 OP 636: Performed by: STUDENT IN AN ORGANIZED HEALTH CARE EDUCATION/TRAINING PROGRAM

## 2019-07-26 RX ORDER — OXYCODONE HYDROCHLORIDE 5 MG/1
5-10 TABLET ORAL EVERY 4 HOURS PRN
Status: DISCONTINUED | OUTPATIENT
Start: 2019-07-26 | End: 2019-07-27 | Stop reason: HOSPADM

## 2019-07-26 RX ORDER — POLYETHYLENE GLYCOL 3350 17 G/17G
17 POWDER, FOR SOLUTION ORAL DAILY
Status: DISCONTINUED | OUTPATIENT
Start: 2019-07-26 | End: 2019-07-27 | Stop reason: HOSPADM

## 2019-07-26 RX ORDER — SODIUM CHLORIDE, SODIUM LACTATE, POTASSIUM CHLORIDE, CALCIUM CHLORIDE 600; 310; 30; 20 MG/100ML; MG/100ML; MG/100ML; MG/100ML
INJECTION, SOLUTION INTRAVENOUS CONTINUOUS
Status: DISCONTINUED | OUTPATIENT
Start: 2019-07-27 | End: 2019-07-26

## 2019-07-26 RX ORDER — HYDROMORPHONE HYDROCHLORIDE 1 MG/ML
.3-.5 INJECTION, SOLUTION INTRAMUSCULAR; INTRAVENOUS; SUBCUTANEOUS
Status: DISCONTINUED | OUTPATIENT
Start: 2019-07-26 | End: 2019-07-27

## 2019-07-26 RX ORDER — SODIUM CHLORIDE, SODIUM LACTATE, POTASSIUM CHLORIDE, CALCIUM CHLORIDE 600; 310; 30; 20 MG/100ML; MG/100ML; MG/100ML; MG/100ML
INJECTION, SOLUTION INTRAVENOUS CONTINUOUS
Status: DISCONTINUED | OUTPATIENT
Start: 2019-07-26 | End: 2019-07-27

## 2019-07-26 RX ORDER — SENNOSIDES 8.6 MG
2 TABLET ORAL 2 TIMES DAILY PRN
Status: DISCONTINUED | OUTPATIENT
Start: 2019-07-26 | End: 2019-07-27 | Stop reason: HOSPADM

## 2019-07-26 RX ORDER — SODIUM CHLORIDE, SODIUM LACTATE, POTASSIUM CHLORIDE, CALCIUM CHLORIDE 600; 310; 30; 20 MG/100ML; MG/100ML; MG/100ML; MG/100ML
INJECTION, SOLUTION INTRAVENOUS
Status: DISCONTINUED
Start: 2019-07-26 | End: 2019-07-26 | Stop reason: HOSPADM

## 2019-07-26 RX ADMIN — SODIUM CHLORIDE, POTASSIUM CHLORIDE, SODIUM LACTATE AND CALCIUM CHLORIDE: 600; 310; 30; 20 INJECTION, SOLUTION INTRAVENOUS at 12:31

## 2019-07-26 RX ADMIN — OXYCODONE HYDROCHLORIDE 5 MG: 5 TABLET ORAL at 18:42

## 2019-07-26 RX ADMIN — SODIUM CHLORIDE, POTASSIUM CHLORIDE, SODIUM LACTATE AND CALCIUM CHLORIDE: 600; 310; 30; 20 INJECTION, SOLUTION INTRAVENOUS at 04:52

## 2019-07-26 RX ADMIN — HYDROMORPHONE HYDROCHLORIDE 0.5 MG: 1 INJECTION, SOLUTION INTRAMUSCULAR; INTRAVENOUS; SUBCUTANEOUS at 11:14

## 2019-07-26 RX ADMIN — HYDROMORPHONE HYDROCHLORIDE 0.5 MG: 1 INJECTION, SOLUTION INTRAMUSCULAR; INTRAVENOUS; SUBCUTANEOUS at 13:22

## 2019-07-26 RX ADMIN — OMEPRAZOLE 20 MG: 20 CAPSULE, DELAYED RELEASE ORAL at 08:04

## 2019-07-26 RX ADMIN — BUSPIRONE HYDROCHLORIDE 15 MG: 15 TABLET ORAL at 19:25

## 2019-07-26 RX ADMIN — HYDROMORPHONE HYDROCHLORIDE 0.5 MG: 1 INJECTION, SOLUTION INTRAMUSCULAR; INTRAVENOUS; SUBCUTANEOUS at 15:32

## 2019-07-26 RX ADMIN — HYDROMORPHONE HYDROCHLORIDE 0.5 MG: 1 INJECTION, SOLUTION INTRAMUSCULAR; INTRAVENOUS; SUBCUTANEOUS at 04:52

## 2019-07-26 RX ADMIN — HYDROMORPHONE HYDROCHLORIDE 0.5 MG: 1 INJECTION, SOLUTION INTRAMUSCULAR; INTRAVENOUS; SUBCUTANEOUS at 09:13

## 2019-07-26 RX ADMIN — HYDROMORPHONE HYDROCHLORIDE 0.5 MG: 1 INJECTION, SOLUTION INTRAMUSCULAR; INTRAVENOUS; SUBCUTANEOUS at 17:43

## 2019-07-26 RX ADMIN — IBUPROFEN 600 MG: 200 TABLET, FILM COATED ORAL at 03:45

## 2019-07-26 RX ADMIN — SERTRALINE HYDROCHLORIDE 100 MG: 100 TABLET ORAL at 08:04

## 2019-07-26 RX ADMIN — OXYCODONE HYDROCHLORIDE 10 MG: 5 TABLET ORAL at 23:48

## 2019-07-26 RX ADMIN — ACETAMINOPHEN 650 MG: 325 TABLET, FILM COATED ORAL at 08:04

## 2019-07-26 RX ADMIN — BUSPIRONE HYDROCHLORIDE 15 MG: 15 TABLET ORAL at 08:04

## 2019-07-26 ASSESSMENT — ACTIVITIES OF DAILY LIVING (ADL)
ADLS_ACUITY_SCORE: 10

## 2019-07-26 ASSESSMENT — PAIN DESCRIPTION - DESCRIPTORS
DESCRIPTORS: ACHING

## 2019-07-26 NOTE — PLAN OF CARE
Pt alert and oriented this shift. Vitals WNL. Pt has IV fluids infusing at 150ml/hr. Pt reports some pain and has utilized PO and IV pain meds that have been effective for pt. Pt ambulating independently and is voiding without any issues. Pt continued on NPO diet except meds. Able to make needs known. Call light within reach. Will continue to monitor.

## 2019-07-26 NOTE — PROGRESS NOTES
[facility]    Progress Note - Simone Leonard Service        Date of Admission:  7/24/2019    Assessment & Plan   Myrna Brothers is a 33 year old female admitted on 7/24/2019. She has a history of anxiety, depression and recently diagnosed choledocholithiasis is admitted for post ERCP pancreatitis.      Post ERCP pancreatitis   Abdominal pain in epigastric and RUQ areas, and elevated lipase (2-3x upper limit). No evidence of cholidocolithiasis during procedure. No signs of infection. Clinical setting, elevated lipase, and abdominal pain all consistent with post ERCP pancreatitis. Tolerated clear liquids 7/25 but will resume bowel rest per GI.  Will support with fluids, bowel rest and pain control.  - GI following  - NPO except for sips for now, will follow up after GI evaluation this AM  - 150cc/hr LR  - Pain control with acetaminophen, ibuprofen, and IV dilaudid for breakthrough   - Increase dilaudid to 0.3-0.5mg to Q2H prn     Generalized anxiety disorder  Major depressive disorder   Resume PTA Buspar and sertraline         Diet: NPO for Medical/Clinical Reasons Except for: Meds, Ice Chips    Fluids: IVF  Lines: PIV  DVT Prophylaxis: Low Risk/Ambulatory with no VTE prophylaxis indicated  Franks Catheter: not present  Code Status: Full Code      Disposition Plan   Expected discharge: 1-3 days, recommended to prior living arrangement once adequate pain management/ tolerating PO medications.  Entered: Jp Mix MD 07/26/2019, 9:25 AM       The patient's care was discussed with the Bedside Nurse, Care Coordinator/ and Patient.    MD Anahy GomezG[facility] 5 Service  [facility]  Pager: [phone]  Please see sticky note for cross cover information  ______________________________________________________________________    Interval History   Patient with worse pain overnight, diffuse over abdomen and going into back between  shoulder blades. Poor sleep. Continued just with sips with medications. Pain not exacerbated by food intake. No fevers or chills. Last BM 2 days ago. No SOB or chest pain. She does report some hunger.    Data reviewed today: I reviewed all medications, new labs and imaging results over the last 24 hours. I personally reviewed no images or EKG's today.    Physical Exam   Vital Signs: Temp: 97.9  F (36.6  C) Temp src: Oral BP: 115/72 Pulse: 67 Heart Rate: 67 Resp: 16 SpO2: 95 % O2 Device: None (Room air)    Weight: 169 lbs 15.59 oz    General: Patient lying in bed, NAD  Resp: CTAB, breathing comfortably  CV: RRR, no m/r/g  Abd: Soft, with diffuse tenderness to palpation, no rebound, non-distended  Ext: No doretha,a      Data   Recent Labs   Lab 07/26/19  0616 07/25/19  0545  07/24/19  0608   WBC  --  10.3  --  7.5   HGB  --  11.3*  --  13.6   MCV  --  91  --  92   PLT  --  232  --  282    140  --  138   POTASSIUM 3.5 3.7  --  3.7   CHLORIDE 105 106  --  107   CO2 28 27  --  26   BUN 10 14  --  21   CR 0.63 0.68  --  0.67   ANIONGAP 8 6  --  5   MAGGIE 8.6 8.3*  --  8.8   GLC 80 111*  --  77   ALBUMIN 3.0* 3.0*  --  4.1   PROTTOTAL 6.3* 6.1*  --  8.3   BILITOTAL 0.8 0.8  --  0.4   ALKPHOS 163* 165*  --  227*   ALT 82* 102*  --  169*   AST 13 20  --  22   LIPASE 3,113* 2,618*   < > 234    < > = values in this interval not displayed.     No results found for this or any previous visit (from the past 24 hour(s)).  Medications     [START ON 7/27/2019] lactated ringers       - MEDICATION INSTRUCTIONS -         busPIRone  15 mg Oral BID     omeprazole  20 mg Oral Daily     sertraline  100 mg Oral Daily

## 2019-07-26 NOTE — PLAN OF CARE
"Shift: 4970-0934 7/26  Vs: /72   Pulse 67   Temp 97.9  F (36.6  C) (Oral)   Resp 16   Ht 1.651 m (5' 5\")   Wt 77.1 kg (169 lb 15.6 oz)   SpO2 95%   BMI 28.29 kg/m    Pain: \"Aching\" pain in upper abdominal area. Pain improving with IV dilaudid 0.5 mg q2h. Pt reports tylenol and ibuprofen inadequate for pain management.   Neuro: A&Ox4, intact, calm and cooperative  Cardiac: Denies any chest pain. S1, S2 present, no S3, S4  Respiratory: Denies SOB. Clear and equal bilaterally.  GI/Diet/Appetite: NPO with exception of ice chips and medications  : Voiding without difficulty  LDA's: L PIV running  ml/hr  Skin: Intact, warm, dry  Activity: Independent. Ambulating without difficulty.  Tests/Procedures: ERCP 7/24  Pertinent Labs/Lab Collection: Lipase 3,113     Plan: Continue to manage and assess pain frequently. Continue  ml/hr. Plan to discharge once pain is managed and can tolerate PO meds.    "

## 2019-07-26 NOTE — PLAN OF CARE
Transferred to  at 1345 via wheelchair.  Patient states all personal belongings are at the bedside and accounted for.  Ambulating independently, voiding spontaneously, had BM today. Refused Miralax.  Last IV dilaudid given at 1330, reports adequate pain control at this time.  Says Tylenol and ibuprofen do not work well.  NPO x ice.  IVF @ 150cc/hr.  Breast pump at bedside.  Is discarding expressed milk.  GI consult pending.

## 2019-07-26 NOTE — PROGRESS NOTES
[facility]  GASTROENTEROLOGY FOLLOW-UP NOTE  Myrna Brothers [id number]   07/26/2019    Brief HPI:  32 yo F with anxiety disorder recently presented with abd pain and was found to have abnormal liver enzymes with cholelithiasis and choledocholithiasis. She underwent ERCP yesterday and was adm for post procedural pain likely secondary post ERCP pancreatitis.      Events: no acute events    Subjective:  Doing better. Continues to have previously reported mild to moderate sharp constant abd pain without nausea/vomiting.  She does have an appetite this morning.           Current Facility-Administered Medications:      acetaminophen (TYLENOL) Suppository 650 mg, 650 mg, Rectal, Q4H PRN, Chilo Dudley     acetaminophen (TYLENOL) tablet 650 mg, 650 mg, Oral, Q4H PRN, Chilo Dudley, 650 mg at 07/25/19 2037     albuterol (PROAIR HFA/PROVENTIL HFA/VENTOLIN HFA) 108 (90 Base) MCG/ACT inhaler 2 puff, 2 puff, Inhalation, Q6H PRN, Chilo Dudley     busPIRone (BUSPAR) tablet 15 mg, 15 mg, Oral, BID, Chilo Dudley, 15 mg at 07/25/19 2037     HYDROmorphone (PF) (DILAUDID) injection 0.3-0.5 mg, 0.3-0.5 mg, Intravenous, Q4H PRN, Chilo Dudley, 0.5 mg at 07/26/19 0452     ibuprofen (ADVIL/MOTRIN) tablet 600 mg, 600 mg, Oral, Q6H PRN, Chilo Dudley, 600 mg at 07/26/19 0345     lactated ringers infusion, , Intravenous, Continuous, Chilo Dudley, Last Rate: 150 mL/hr at 07/26/19 0452     May continue current IV fluid if patient has IV fluids infusing until discharge., , Does not apply, Continuous PRN, Corby Matos MD     naloxone (NARCAN) injection 0.1-0.4 mg, 0.1-0.4 mg, Intravenous, Q2 Min PRN, Chilo Dudley     omeprazole (priLOSEC) CR capsule 20 mg, 20 mg, Oral, Daily, Chilo Dudley, 20 mg at 07/25/19 1412     ondansetron (ZOFRAN-ODT) ODT tab 4 mg, 4 mg, Oral, Q6H PRN **OR** ondansetron (ZOFRAN) injection 4 mg, 4 mg, Intravenous, Q6H PRN, Chilo Dudley, 4 mg at 07/24/19 9086     prochlorperazine  "(COMPAZINE) injection 10 mg, 10 mg, Intravenous, Q6H PRN, 10 mg at 07/24/19 1802 **OR** prochlorperazine (COMPAZINE) tablet 10 mg, 10 mg, Oral, Q6H PRN **OR** prochlorperazine (COMPAZINE) Suppository 25 mg, 25 mg, Rectal, Q12H PRN, Chilo Dudley     sertraline (ZOLOFT) tablet 100 mg, 100 mg, Oral, Daily, Chilo Dudley, 100 mg at 07/25/19 1412     sodium chloride (PF) 0.9% PF flush 3 mL, 3 mL, Intravenous, q1 min prn, Corby Matos MD      Review Of Systems  Skin: negative  Eyes: negative  Ears/Nose/Throat: negative  Respiratory: No shortness of breath, dyspnea on exertion, cough, or hemoptysis  Cardiovascular: negative  Gastrointestinal: as above  Genitourinary: negative  Musculoskeletal: negative  Neurologic: negative  Psychiatric: negative  Hematologic/Lymphatic/Immunologic: negative  Endocrine: negative      OBJECTIVE:  VS: /74 (BP Location: Left arm)   Pulse 62   Temp 98  F (36.7  C) (Oral)   Resp 16   Ht 1.651 m (5' 5\")   Wt 77.1 kg (169 lb 15.6 oz)   SpO2 94%   BMI 28.29 kg/m     GEN: A&Ox3, NAD, comfortable  CV:  RRR, no M/G/R  PULM:  CTA B/L  ABD: Normoactive bowel sounds, soft, ND, NT, no HSM  SKIN: No rashed      REVIEW OF LABORATORY, PATHOLOGY AND IMAGING RESULTS:  BMP  Recent Labs   Lab 07/25/19  0545 07/24/19  0608    138   POTASSIUM 3.7 3.7   CHLORIDE 106 107   MAGGIE 8.3* 8.8   CO2 27 26   BUN 14 21   CR 0.68 0.67   * 77       CBC  Recent Labs   Lab 07/25/19  0545 07/24/19  0608   WBC 10.3 7.5   RBC 4.00 4.87   HGB 11.3* 13.6   HCT 36.5 44.8   MCV 91 92   MCH 28.3 27.9   MCHC 31.0* 30.4*   RDW 13.6 13.9    282       INRNo lab results found in last 7 days.    LFTs  Recent Labs   Lab 07/25/19  0545 07/24/19  0608   ALKPHOS 165* 227*   AST 20 22   * 169*   BILITOTAL 0.8 0.4   PROTTOTAL 6.1* 8.3   ALBUMIN 3.0* 4.1        PANC  Recent Labs   Lab 07/25/19  0545 07/24/19  1022 07/24/19  0608   LIPASE 2,618* 1,098* 234   AMYLASE  --  118* 90 "       IMPRESSION:  Myrna Brothers is a 33 year old female with post ERCP pancreatitis. While improved clinically she continues to have persistent and pain and worsening lipase.       1) Post ERCP Pancreatitis: continues to improve  -Agree with pain control  -Can advance diet as as tolerated (advance to clear liquid first)  -Monitor enzymes          Corby Matos MD  Therapeutic Endoscopy Fellow

## 2019-07-27 VITALS
HEIGHT: 65 IN | DIASTOLIC BLOOD PRESSURE: 66 MMHG | HEART RATE: 86 BPM | OXYGEN SATURATION: 96 % | BODY MASS INDEX: 28.32 KG/M2 | SYSTOLIC BLOOD PRESSURE: 120 MMHG | RESPIRATION RATE: 16 BRPM | TEMPERATURE: 98 F | WEIGHT: 169.97 LBS

## 2019-07-27 LAB
ALBUMIN SERPL-MCNC: 3.6 G/DL (ref 3.4–5)
ALBUMIN UR-MCNC: NEGATIVE MG/DL
ALP SERPL-CCNC: 181 U/L (ref 40–150)
ALT SERPL W P-5'-P-CCNC: 78 U/L (ref 0–50)
ANION GAP SERPL CALCULATED.3IONS-SCNC: 9 MMOL/L (ref 3–14)
APPEARANCE UR: CLEAR
AST SERPL W P-5'-P-CCNC: 15 U/L (ref 0–45)
BILIRUB SERPL-MCNC: 1.3 MG/DL (ref 0.2–1.3)
BILIRUB UR QL STRIP: NEGATIVE
BUN SERPL-MCNC: 6 MG/DL (ref 7–30)
CALCIUM SERPL-MCNC: 8.9 MG/DL (ref 8.5–10.1)
CHLORIDE SERPL-SCNC: 101 MMOL/L (ref 94–109)
CO2 SERPL-SCNC: 28 MMOL/L (ref 20–32)
COLOR UR AUTO: ABNORMAL
CREAT SERPL-MCNC: 0.7 MG/DL (ref 0.52–1.04)
GFR SERPL CREATININE-BSD FRML MDRD: >90 ML/MIN/{1.73_M2}
GLUCOSE SERPL-MCNC: 94 MG/DL (ref 70–99)
GLUCOSE UR STRIP-MCNC: NEGATIVE MG/DL
HGB UR QL STRIP: NEGATIVE
KETONES UR STRIP-MCNC: 40 MG/DL
LEUKOCYTE ESTERASE UR QL STRIP: ABNORMAL
LIPASE SERPL-CCNC: 438 U/L (ref 73–393)
NITRATE UR QL: NEGATIVE
PH UR STRIP: 7.5 PH (ref 5–7)
POTASSIUM SERPL-SCNC: 3.4 MMOL/L (ref 3.4–5.3)
PROT SERPL-MCNC: 7.4 G/DL (ref 6.8–8.8)
RBC #/AREA URNS AUTO: 1 /HPF (ref 0–2)
SODIUM SERPL-SCNC: 138 MMOL/L (ref 133–144)
SOURCE: ABNORMAL
SP GR UR STRIP: 1.01 (ref 1–1.03)
SQUAMOUS #/AREA URNS AUTO: 3 /HPF (ref 0–1)
TRANS CELLS #/AREA URNS HPF: <1 /HPF (ref 0–1)
UROBILINOGEN UR STRIP-MCNC: NORMAL MG/DL (ref 0–2)
WBC #/AREA URNS AUTO: 1 /HPF (ref 0–5)

## 2019-07-27 PROCEDURE — 83690 ASSAY OF LIPASE: CPT | Performed by: STUDENT IN AN ORGANIZED HEALTH CARE EDUCATION/TRAINING PROGRAM

## 2019-07-27 PROCEDURE — 40000556 ZZH STATISTIC PERIPHERAL IV START W US GUIDANCE

## 2019-07-27 PROCEDURE — 81001 URINALYSIS AUTO W/SCOPE: CPT | Performed by: STUDENT IN AN ORGANIZED HEALTH CARE EDUCATION/TRAINING PROGRAM

## 2019-07-27 PROCEDURE — 36415 COLL VENOUS BLD VENIPUNCTURE: CPT | Performed by: STUDENT IN AN ORGANIZED HEALTH CARE EDUCATION/TRAINING PROGRAM

## 2019-07-27 PROCEDURE — 99239 HOSP IP/OBS DSCHRG MGMT >30: CPT | Mod: GC | Performed by: PEDIATRICS

## 2019-07-27 PROCEDURE — 25000132 ZZH RX MED GY IP 250 OP 250 PS 637: Performed by: STUDENT IN AN ORGANIZED HEALTH CARE EDUCATION/TRAINING PROGRAM

## 2019-07-27 PROCEDURE — 25800030 ZZH RX IP 258 OP 636: Performed by: PEDIATRICS

## 2019-07-27 PROCEDURE — 80053 COMPREHEN METABOLIC PANEL: CPT | Performed by: STUDENT IN AN ORGANIZED HEALTH CARE EDUCATION/TRAINING PROGRAM

## 2019-07-27 RX ORDER — SENNOSIDES 8.6 MG
2 TABLET ORAL 2 TIMES DAILY PRN
Qty: 28 TABLET | Refills: 0 | Status: SHIPPED | OUTPATIENT
Start: 2019-07-27 | End: 2019-08-03

## 2019-07-27 RX ORDER — OXYCODONE HYDROCHLORIDE 5 MG/1
5-10 TABLET ORAL EVERY 4 HOURS PRN
Qty: 12 TABLET | Refills: 0 | Status: SHIPPED | OUTPATIENT
Start: 2019-07-27 | End: 2019-07-29

## 2019-07-27 RX ADMIN — SODIUM CHLORIDE, POTASSIUM CHLORIDE, SODIUM LACTATE AND CALCIUM CHLORIDE: 600; 310; 30; 20 INJECTION, SOLUTION INTRAVENOUS at 09:32

## 2019-07-27 RX ADMIN — OXYCODONE HYDROCHLORIDE 10 MG: 5 TABLET ORAL at 09:31

## 2019-07-27 RX ADMIN — OXYCODONE HYDROCHLORIDE 5 MG: 5 TABLET ORAL at 05:16

## 2019-07-27 RX ADMIN — OXYCODONE HYDROCHLORIDE 5 MG: 5 TABLET ORAL at 08:14

## 2019-07-27 RX ADMIN — SERTRALINE HYDROCHLORIDE 100 MG: 100 TABLET ORAL at 08:14

## 2019-07-27 RX ADMIN — OMEPRAZOLE 20 MG: 20 CAPSULE, DELAYED RELEASE ORAL at 08:14

## 2019-07-27 RX ADMIN — OXYCODONE HYDROCHLORIDE 10 MG: 5 TABLET ORAL at 13:59

## 2019-07-27 RX ADMIN — ACETAMINOPHEN 650 MG: 325 TABLET, FILM COATED ORAL at 09:34

## 2019-07-27 RX ADMIN — BUSPIRONE HYDROCHLORIDE 15 MG: 15 TABLET ORAL at 08:15

## 2019-07-27 ASSESSMENT — PAIN DESCRIPTION - DESCRIPTORS
DESCRIPTORS: ACHING

## 2019-07-27 ASSESSMENT — ACTIVITIES OF DAILY LIVING (ADL)
ADLS_ACUITY_SCORE: 10

## 2019-07-27 NOTE — PLAN OF CARE
AOx4, AVSS on ra. Up ad bert. +flatus, BM on days. Advanced to clear liq diet this evening, denies N/V. AUO. Abdominal and back pain managed with PRN IV Dilaudid for breakthrough pain and PRN PO Oxycodone started this evening. PIV infusing with IVMF. Breast pump @ bedside, pt discarding milk. Discharge when pain in adequally controlled and GI consult pending.

## 2019-07-27 NOTE — PLAN OF CARE
VSS on room air. Pain controlled with tylenol and oxycodone. Tolerating fill liquid diet. Voiding but not saving. Complained of pain with urination-UA/UC negative. Last bowel movement yesterday. Declined miralax. Up independently. Reviewed AVS with patient and patient verbalized understanding. Discharged home with .

## 2019-07-28 ENCOUNTER — PATIENT OUTREACH (OUTPATIENT)
Dept: CARE COORDINATION | Facility: CLINIC | Age: 34
End: 2019-07-28

## 2019-07-29 ENCOUNTER — OFFICE VISIT (OUTPATIENT)
Dept: PEDIATRICS | Facility: CLINIC | Age: 34
End: 2019-07-29
Payer: COMMERCIAL

## 2019-07-29 ENCOUNTER — TELEPHONE (OUTPATIENT)
Dept: PEDIATRICS | Facility: CLINIC | Age: 34
End: 2019-07-29

## 2019-07-29 ENCOUNTER — TELEPHONE (OUTPATIENT)
Dept: SURGERY | Facility: CLINIC | Age: 34
End: 2019-07-29

## 2019-07-29 ENCOUNTER — CARE COORDINATION (OUTPATIENT)
Dept: GASTROENTEROLOGY | Facility: CLINIC | Age: 34
End: 2019-07-29

## 2019-07-29 VITALS
TEMPERATURE: 98.7 F | BODY MASS INDEX: 27.56 KG/M2 | OXYGEN SATURATION: 100 % | HEIGHT: 65 IN | SYSTOLIC BLOOD PRESSURE: 123 MMHG | DIASTOLIC BLOOD PRESSURE: 73 MMHG | HEART RATE: 83 BPM | WEIGHT: 165.4 LBS

## 2019-07-29 DIAGNOSIS — K85.80 OTHER ACUTE PANCREATITIS, UNSPECIFIED COMPLICATION STATUS: ICD-10-CM

## 2019-07-29 DIAGNOSIS — Z98.890 POSTOPERATIVE NAUSEA: Primary | ICD-10-CM

## 2019-07-29 DIAGNOSIS — K80.50 CHOLEDOCHOLITHIASIS: Primary | ICD-10-CM

## 2019-07-29 DIAGNOSIS — G89.18 POST-OPERATIVE PAIN: ICD-10-CM

## 2019-07-29 DIAGNOSIS — Z46.89 ENCOUNTER FOR REMOVAL OF PANCREATIC STENT: ICD-10-CM

## 2019-07-29 DIAGNOSIS — R11.0 POSTOPERATIVE NAUSEA: Primary | ICD-10-CM

## 2019-07-29 PROBLEM — G47.9 RESTLESS SLEEPER: Status: ACTIVE | Noted: 2017-04-10

## 2019-07-29 PROBLEM — N60.19 FIBROCYSTIC DISEASE OF BREAST: Status: ACTIVE | Noted: 2019-07-29

## 2019-07-29 PROBLEM — M26.609 TEMPOROMANDIBULAR DYSFUNCTION SYNDROME: Status: ACTIVE | Noted: 2017-04-10

## 2019-07-29 PROBLEM — N92.6 IRREGULAR MENSTRUAL CYCLE: Status: ACTIVE | Noted: 2019-07-29

## 2019-07-29 LAB
ALBUMIN SERPL-MCNC: 4.2 G/DL (ref 3.4–5)
ALP SERPL-CCNC: 172 U/L (ref 40–150)
ALT SERPL W P-5'-P-CCNC: 61 U/L (ref 0–50)
ANION GAP SERPL CALCULATED.3IONS-SCNC: 6 MMOL/L (ref 3–14)
AST SERPL W P-5'-P-CCNC: 21 U/L (ref 0–45)
BASOPHILS # BLD AUTO: 0.1 10E9/L (ref 0–0.2)
BASOPHILS NFR BLD AUTO: 1.1 %
BILIRUB SERPL-MCNC: 0.4 MG/DL (ref 0.2–1.3)
BUN SERPL-MCNC: 13 MG/DL (ref 7–30)
CALCIUM SERPL-MCNC: 9.3 MG/DL (ref 8.5–10.1)
CHLORIDE SERPL-SCNC: 104 MMOL/L (ref 94–109)
CO2 SERPL-SCNC: 29 MMOL/L (ref 20–32)
CREAT SERPL-MCNC: 0.78 MG/DL (ref 0.52–1.04)
DIFFERENTIAL METHOD BLD: NORMAL
EOSINOPHIL # BLD AUTO: 0.2 10E9/L (ref 0–0.7)
EOSINOPHIL NFR BLD AUTO: 2.4 %
ERYTHROCYTE [DISTWIDTH] IN BLOOD BY AUTOMATED COUNT: 13.2 % (ref 10–15)
GFR SERPL CREATININE-BSD FRML MDRD: >90 ML/MIN/{1.73_M2}
GLUCOSE SERPL-MCNC: 99 MG/DL (ref 70–99)
HCT VFR BLD AUTO: 40.1 % (ref 35–47)
HGB BLD-MCNC: 13.1 G/DL (ref 11.7–15.7)
IMM GRANULOCYTES # BLD: 0 10E9/L (ref 0–0.4)
IMM GRANULOCYTES NFR BLD: 0.3 %
LIPASE SERPL-CCNC: 234 U/L (ref 73–393)
LYMPHOCYTES # BLD AUTO: 2.7 10E9/L (ref 0.8–5.3)
LYMPHOCYTES NFR BLD AUTO: 35.3 %
MCH RBC QN AUTO: 28.9 PG (ref 26.5–33)
MCHC RBC AUTO-ENTMCNC: 32.7 G/DL (ref 31.5–36.5)
MCV RBC AUTO: 88 FL (ref 78–100)
MONOCYTES # BLD AUTO: 0.6 10E9/L (ref 0–1.3)
MONOCYTES NFR BLD AUTO: 7.5 %
NEUTROPHILS # BLD AUTO: 4 10E9/L (ref 1.6–8.3)
NEUTROPHILS NFR BLD AUTO: 53.4 %
PLATELET # BLD AUTO: 380 10E9/L (ref 150–450)
POTASSIUM SERPL-SCNC: 4.1 MMOL/L (ref 3.4–5.3)
PROT SERPL-MCNC: 8.2 G/DL (ref 6.8–8.8)
RBC # BLD AUTO: 4.54 10E12/L (ref 3.8–5.2)
SODIUM SERPL-SCNC: 139 MMOL/L (ref 133–144)
WBC # BLD AUTO: 7.6 10E9/L (ref 4–11)

## 2019-07-29 PROCEDURE — 99214 OFFICE O/P EST MOD 30 MIN: CPT | Performed by: FAMILY MEDICINE

## 2019-07-29 PROCEDURE — 36415 COLL VENOUS BLD VENIPUNCTURE: CPT | Performed by: FAMILY MEDICINE

## 2019-07-29 PROCEDURE — 80053 COMPREHEN METABOLIC PANEL: CPT | Performed by: FAMILY MEDICINE

## 2019-07-29 PROCEDURE — 83690 ASSAY OF LIPASE: CPT | Performed by: FAMILY MEDICINE

## 2019-07-29 PROCEDURE — 85025 COMPLETE CBC W/AUTO DIFF WBC: CPT | Performed by: FAMILY MEDICINE

## 2019-07-29 RX ORDER — ONDANSETRON 4 MG/1
4 TABLET, ORALLY DISINTEGRATING ORAL EVERY 8 HOURS PRN
Qty: 15 TABLET | Refills: 0 | Status: SHIPPED | OUTPATIENT
Start: 2019-07-29 | End: 2024-08-02

## 2019-07-29 RX ORDER — OXYCODONE HYDROCHLORIDE 5 MG/1
5-10 TABLET ORAL EVERY 4 HOURS PRN
Qty: 10 TABLET | Refills: 0 | Status: SHIPPED | OUTPATIENT
Start: 2019-07-29 | End: 2024-08-02

## 2019-07-29 ASSESSMENT — ASTHMA QUESTIONNAIRES
QUESTION_5 LAST FOUR WEEKS HOW WOULD YOU RATE YOUR ASTHMA CONTROL: WELL CONTROLLED
ACT_TOTALSCORE: 18
ACUTE_EXACERBATION_TODAY: NO
QUESTION_1 LAST FOUR WEEKS HOW MUCH OF THE TIME DID YOUR ASTHMA KEEP YOU FROM GETTING AS MUCH DONE AT WORK, SCHOOL OR AT HOME: MOST OF THE TIME
QUESTION_3 LAST FOUR WEEKS HOW OFTEN DID YOUR ASTHMA SYMPTOMS (WHEEZING, COUGHING, SHORTNESS OF BREATH, CHEST TIGHTNESS OR PAIN) WAKE YOU UP AT NIGHT OR EARLIER THAN USUAL IN THE MORNING: ONCE OR TWICE
QUESTION_4 LAST FOUR WEEKS HOW OFTEN HAVE YOU USED YOUR RESCUE INHALER OR NEBULIZER MEDICATION (SUCH AS ALBUTEROL): ONCE A WEEK OR LESS
QUESTION_2 LAST FOUR WEEKS HOW OFTEN HAVE YOU HAD SHORTNESS OF BREATH: ONCE OR TWICE A WEEK

## 2019-07-29 ASSESSMENT — ANXIETY QUESTIONNAIRES
5. BEING SO RESTLESS THAT IT IS HARD TO SIT STILL: SEVERAL DAYS
6. BECOMING EASILY ANNOYED OR IRRITABLE: SEVERAL DAYS
1. FEELING NERVOUS, ANXIOUS, OR ON EDGE: MORE THAN HALF THE DAYS
7. FEELING AFRAID AS IF SOMETHING AWFUL MIGHT HAPPEN: SEVERAL DAYS
3. WORRYING TOO MUCH ABOUT DIFFERENT THINGS: SEVERAL DAYS
2. NOT BEING ABLE TO STOP OR CONTROL WORRYING: MORE THAN HALF THE DAYS
GAD7 TOTAL SCORE: 10

## 2019-07-29 ASSESSMENT — MIFFLIN-ST. JEOR: SCORE: 1446.13

## 2019-07-29 ASSESSMENT — PATIENT HEALTH QUESTIONNAIRE - PHQ9
SUM OF ALL RESPONSES TO PHQ QUESTIONS 1-9: 10
5. POOR APPETITE OR OVEREATING: MORE THAN HALF THE DAYS

## 2019-07-29 NOTE — TELEPHONE ENCOUNTER
[facility] Call Center    Phone Message    May a detailed message be left on voicemail: yes    Reason for Call: Other: Pt's  is requesting a call back to see if pt can be worked into Mary Bronson's schedule for a hospital follow up appt for today. Writer did schedule with Dr. Sahu for today but pt  would like a call back to discuss.      Action Taken: Message routed to:  Primary Care p [phone]

## 2019-07-29 NOTE — TELEPHONE ENCOUNTER
----- Message from Feliz Mckeon RN sent at 7/29/2019 11:53 AM CDT -----  Regarding: FW: Referral for clemente  Can you help get this pt scheduled for a consult? See message below.    ThanksFeliz    ----- Message -----  From: Carolyn Ragland  Sent: 7/29/2019  11:42 AM  To: Mica Contreras RN, Feliz Mckeon RN  Subject: Referral for clemente                               Hi all,     I placed a referral for clemente.     Per Dr. Camarena:  - Referral to a surgeron for cholecystectomy, preferably before the move to [place]     She will moving around Sept 7th.     Referrals for clemente- would you like a message sent to the both of you? Just Kamila? Let me know.     ThanksCarolyn

## 2019-07-29 NOTE — PROGRESS NOTES
Post ERCP (7/24/19) with Dr. Camarena: Follow-up     Post procedure recommendations: - Plain film in 4 weeks to ensure spontaneous passage of   the pancreatic stent; if this passesd, no endoscopic procedures planned    - Referral to a surgeron for cholecystectomy, preferably before the move to [place]     Orders placed: xray due  8/21                                                                                                                                                                                  Patient states she is feeling pretty good. She has only needed to take tylenol and no oxy. Minimal nausea but thinks it is from eating too much. Denies vomiting and fever.   Eating and drinking PO with issues. She is aware of the follow up and advises that she is moving around Sept 7th. Message sent to general surgery team.     Clinic contact and scheduling numbers verified for future questions/concerns.      [facility] [address] Dr. Camarena, & Dr. Land  [facility]  [phone]

## 2019-07-29 NOTE — TELEPHONE ENCOUNTER
Called [facility] and scheduled with PCP tomorrow. He will speak with patient and decide which appt they would like to keep.  Maryjane Diaz RN

## 2019-07-29 NOTE — TELEPHONE ENCOUNTER
Per task, this writer called the patient and left a message asking her to call to schedule a clinic consult.

## 2019-07-29 NOTE — PROGRESS NOTES
Patient has clinic visit within 24-48 hours of discharge so no post DC follow up call is needed.    Name: Myrna Brothers MRN: [id number]   Date: 7/29/2019 Status: Padma   Time: 3:10 PM Length: 20   Visit Type: RETURN [657] TAMIE:     Provider: Valentina Sahu MD Department: [facility] FP/IM/PEDS

## 2019-07-29 NOTE — [place]
[facility] [place]  [id number] [place] [id number]  [phone]  [phone]      7/29/2019    Re: Myrna Brothers      TO WHOM IT MAY CONCERN:    Myrna Brothers  was seen on 7/29/2019.  Please excuse her  From 7/29/2019 to 7/30/2019 due to medical reasons. She can return to work 8/1/2019.    Cordially    Valentina Sahu MD

## 2019-07-29 NOTE — PROGRESS NOTES
"Subjective     Myrna Brothers is a 33 year old female who presents to clinic today for the following health issues:    HPI   Patient here for hospital post ERCP pancreatitis. Doing well, still complains of abdominal pain, has only taken 3 tabs of oxycodone since discharge. She would like to return to work tomorrow. Appetite is fair and BM is normal, she denies fever or chills. ACT done today is 16 but might be low due to uncontrolled pain.      Reviewed and updated as needed this visit by Provider  Tobacco  Allergies  Meds  Problems  Med Hx  Surg Hx  Fam Hx         Review of Systems   ROS COMP: Constitutional, HEENT, cardiovascular, pulmonary, GI, , musculoskeletal, neuro, skin, endocrine and psych systems are negative, except as otherwise noted.      Objective    /73   Pulse 83   Temp 98.7  F (37.1  C)   Ht 1.651 m (5' 5\")   Wt 75 kg (165 lb 6.4 oz)   SpO2 100%   BMI 27.52 kg/m    Body mass index is 27.52 kg/m .  Physical Exam   GENERAL: healthy, alert and no distress  EYES: Eyes grossly normal to inspection, PERRL and conjunctivae and sclerae normal  HENT: ear canals and TM's normal, nose and mouth without ulcers or lesions  NECK: no adenopathy, no asymmetry, masses, or scars and thyroid normal to palpation  RESP: lungs clear to auscultation - no rales, rhonchi or wheezes  CV: regular rate and rhythm, normal S1 S2, no S3 or S4, no murmur, click or rub, no peripheral edema and peripheral pulses strong  ABDOMEN: soft with some tenderness around epigastrium, without hepatosplenomegaly or masses and bowel sounds normal  MS: no gross musculoskeletal defects noted, no edema  Results for orders placed or performed in visit on 07/29/19   Comprehensive metabolic panel   Result Value Ref Range    Sodium 139 133 - 144 mmol/L    Potassium 4.1 3.4 - 5.3 mmol/L    Chloride 104 94 - 109 mmol/L    Carbon Dioxide 29 20 - 32 mmol/L    Anion Gap 6 3 - 14 mmol/L    Glucose 99 70 - 99 mg/dL    Urea Nitrogen " 13 7 - 30 mg/dL    Creatinine 0.78 0.52 - 1.04 mg/dL    GFR Estimate >90 >60 mL/min/[1.73_m2]    GFR Estimate If Black >90 >60 mL/min/[1.73_m2]    Calcium 9.3 8.5 - 10.1 mg/dL    Bilirubin Total 0.4 0.2 - 1.3 mg/dL    Albumin 4.2 3.4 - 5.0 g/dL    Protein Total 8.2 6.8 - 8.8 g/dL    Alkaline Phosphatase 172 (H) 40 - 150 U/L    ALT 61 (H) 0 - 50 U/L    AST 21 0 - 45 U/L   CBC with platelets and differential   Result Value Ref Range    WBC 7.6 4.0 - 11.0 10e9/L    RBC Count 4.54 3.8 - 5.2 10e12/L    Hemoglobin 13.1 11.7 - 15.7 g/dL    Hematocrit 40.1 35.0 - 47.0 %    MCV 88 78 - 100 fl    MCH 28.9 26.5 - 33.0 pg    MCHC 32.7 31.5 - 36.5 g/dL    RDW 13.2 10.0 - 15.0 %    Platelet Count 380 150 - 450 10e9/L    Diff Method Automated Method     % Neutrophils 53.4 %    % Lymphocytes 35.3 %    % Monocytes 7.5 %    % Eosinophils 2.4 %    % Basophils 1.1 %    % Immature Granulocytes 0.3 %    Absolute Neutrophil 4.0 1.6 - 8.3 10e9/L    Absolute Lymphocytes 2.7 0.8 - 5.3 10e9/L    Absolute Monocytes 0.6 0.0 - 1.3 10e9/L    Absolute Eosinophils 0.2 0.0 - 0.7 10e9/L    Absolute Basophils 0.1 0.0 - 0.2 10e9/L    Abs Immature Granulocytes 0.0 0 - 0.4 10e9/L   Lipase   Result Value Ref Range    Lipase 234 73 - 393 U/L             Assessment & Plan     1. Post-operative pain  - oxyCODONE (ROXICODONE) 5 MG tablet; Take 1-2 tablets (5-10 mg) by mouth every 4 hours as needed for moderate to severe pain  Dispense: 10 tablet; Refill: 0  - her lipase have improved and LFT's have trended down nicely. I would still recommend good pain control so advised still taking 2 days off work, note given.  She has another appointment with her PCP in a week, we can follow up and do a thorough asthma action plan then but, continue bowel rest and pain control.  2. Postoperative nausea  - ondansetron (ZOFRAN-ODT) 4 MG ODT tab; Take 1 tablet (4 mg) by mouth every 8 hours as needed for nausea  Dispense: 15 tablet; Refill: 0    3. Other acute pancreatitis,  unspecified complication status  - Comprehensive metabolic panel  - CBC with platelets and differential  - Lipase         Return if symptoms worsen or fail to improve.    Valentina Sahu MD  [facility]

## 2019-07-30 ENCOUNTER — DOCUMENTATION ONLY (OUTPATIENT)
Dept: SURGERY | Facility: CLINIC | Age: 34
End: 2019-07-30

## 2019-07-30 ENCOUNTER — OFFICE VISIT (OUTPATIENT)
Dept: SURGERY | Facility: CLINIC | Age: 34
End: 2019-07-30
Attending: INTERNAL MEDICINE
Payer: COMMERCIAL

## 2019-07-30 ENCOUNTER — HOSPITAL ENCOUNTER (OUTPATIENT)
Facility: CLINIC | Age: 34
End: 2019-07-30
Attending: SURGERY | Admitting: SURGERY
Payer: COMMERCIAL

## 2019-07-30 VITALS
OXYGEN SATURATION: 97 % | BODY MASS INDEX: 27.76 KG/M2 | HEART RATE: 69 BPM | WEIGHT: 166.6 LBS | DIASTOLIC BLOOD PRESSURE: 65 MMHG | SYSTOLIC BLOOD PRESSURE: 102 MMHG | HEIGHT: 65 IN

## 2019-07-30 DIAGNOSIS — K80.20 GALLSTONES: Primary | ICD-10-CM

## 2019-07-30 RX ORDER — CEFAZOLIN SODIUM 2 G/50ML
2 SOLUTION INTRAVENOUS
Status: CANCELLED | OUTPATIENT
Start: 2019-07-30

## 2019-07-30 RX ORDER — INDOCYANINE GREEN AND WATER 25 MG
5 KIT INJECTION ONCE
Qty: 5 MG | Refills: 0
Start: 2019-08-26 | End: 2019-08-26

## 2019-07-30 RX ORDER — CEFAZOLIN SODIUM 1 G/50ML
1 INJECTION, SOLUTION INTRAVENOUS SEE ADMIN INSTRUCTIONS
Status: CANCELLED | OUTPATIENT
Start: 2019-07-30

## 2019-07-30 ASSESSMENT — ENCOUNTER SYMPTOMS
BOWEL INCONTINENCE: 0
FLANK PAIN: 0
LOSS OF CONSCIOUSNESS: 0
TROUBLE SWALLOWING: 0
MEMORY LOSS: 0
DECREASED APPETITE: 0
FEVER: 0
POLYDIPSIA: 0
SEIZURES: 0
RECTAL PAIN: 0
STIFFNESS: 0
PARALYSIS: 0
BACK PAIN: 1
HALLUCINATIONS: 0
HYPOTENSION: 0
LIGHT-HEADEDNESS: 1
FATIGUE: 1
SPEECH CHANGE: 0
SLEEP DISTURBANCES DUE TO BREATHING: 0
NERVOUS/ANXIOUS: 1
WEAKNESS: 1
POOR WOUND HEALING: 0
ALTERED TEMPERATURE REGULATION: 0
TREMORS: 0
WEIGHT GAIN: 0
DIFFICULTY URINATING: 0
NECK MASS: 0
INSOMNIA: 1
SINUS CONGESTION: 1
TASTE DISTURBANCE: 0
DIARRHEA: 1
CHILLS: 0
PALPITATIONS: 0
DEPRESSION: 1
HOARSE VOICE: 0
WEIGHT LOSS: 0
SYNCOPE: 0
DYSURIA: 0
ARTHRALGIAS: 0
SKIN CHANGES: 0
HEARTBURN: 1
JAUNDICE: 0
SMELL DISTURBANCE: 0
HYPERTENSION: 0
NECK PAIN: 1
HEADACHES: 1
TINGLING: 0
MUSCLE CRAMPS: 0
BLOOD IN STOOL: 0
PANIC: 1
INCREASED ENERGY: 1
DIZZINESS: 0
JOINT SWELLING: 0
NAUSEA: 1
SINUS PAIN: 0
DECREASED CONCENTRATION: 0
BLOATING: 1
VOMITING: 0
CONSTIPATION: 0
EXERCISE INTOLERANCE: 0
NUMBNESS: 0
ORTHOPNEA: 0
MYALGIAS: 1
NIGHT SWEATS: 1
SORE THROAT: 0
DISTURBANCES IN COORDINATION: 0
NAIL CHANGES: 0
LEG PAIN: 0
POLYPHAGIA: 0
HEMATURIA: 0
MUSCLE WEAKNESS: 1
ABDOMINAL PAIN: 1

## 2019-07-30 ASSESSMENT — PAIN SCALES - GENERAL: PAINLEVEL: NO PAIN (0)

## 2019-07-30 ASSESSMENT — ANXIETY QUESTIONNAIRES: GAD7 TOTAL SCORE: 10

## 2019-07-30 ASSESSMENT — ASTHMA QUESTIONNAIRES: ACT_TOTALSCORE: 18

## 2019-07-30 ASSESSMENT — MIFFLIN-ST. JEOR: SCORE: 1451.57

## 2019-07-30 NOTE — PATIENT INSTRUCTIONS
You met with Dr. Javon Espinosa.      Today's visit instructions:     If you have questions please contact Feliz RN or Mica RN during regular clinic hours, Monday through Friday 7:30 AM - 4:00 PM, or you can contact us via [employer] at anytime.       If you have urgent needs after-hours, weekends, or holidays please call the hospital at [phone] and ask to speak with our on-call General Surgery Team.    Appointment [phone], option #1   Nurse Advice (Feliz or Mica): [phone]   Surgery Scheduler (Kamila): [phone]  Fax: [phone]    Reminder:  Surgery Requirements  1. You will need to arrive 1 1/2 to 2 hours early based on the location of your surgery.  2. You will need someone to drive you home (over 18 years old) and stay with you for 24 hours after the procedure  3. You will need a preop physical with your regular doctor (or PAC if requested by your surgeon) within 30 days of surgery- closer is always better  4. Stop any blood thinners, vitamins, minerals, or herbal supplements 5 days before surgery.  If you are taking a prescribed blood thinner please let us know for specific instructions  5. Fasting- a nurse from [facility] will call you 1-2 days before surgery to confirm your procedure and tell you when to stop eating and drinking  6. Wash with the soap the night before surgery and morning of surgery. See instructions in the Surgery Packet.      After your Laparoscopic Robotic Cholecystectomy          Incision care     You may take a shower the day after surgery. Carefully wash your incision with soap and water. Do not submerge yourself in water (bath, whirlpool, hot tub, pool, lake) for 14 days after surgery.     Remove the bandage the day after surgery, but leave the medical tape (Steri-Strips) or glue in place. These will loosen and fall off on their own 5 to 7 days after surgery.       Always wash your hands before touching your incisions or removing bandages.     It is not  unusual to form a collection of fluid or blood under your incision that may feel firm or squishy- it can take several weeks to months for your body to reabsorb it.  At times, it may even drain.  If that should happen keep the area clean with soap, water,  and cover with a clean gauze dressing. You can change this daily or as needed.       Other medicines     Wait to start aspirin or blood thinners until the day after surgery. You can continue your regular medicines at your normal time the day after surgery.      Your pain medicine may cause constipation (hard, dry stools). To help with this, take the stool softener your doctor gave you or an over-the-counter stool softener or laxative. You can stop taking this when you are no longer taking pain medicine and your bowel movements are back to normal.      For pain or discomfort     Take the narcotic pain medicine your doctor gave you as needed and as instructed on the bottle. If you prefer to use over-the-counter medication, use acetaminophen (Tylenol) or ibuprofen (Advil, Motrin) as instructed on the box. Do not take Tylenol if it is in your narcotic pain medication.     Use an ice pack on your abdomen (belly) for 20 minutes at a time as needed for the first 24 hours. Be sure to protect your skin by putting a cloth between the ice pack and your skin.     After 24 hours you can switch to heat for 20 minutes as needed. Be sure to protect your skin by putting a cloth between the heat pack and your skin.       Activities     No driving until you feel it s safe to do so. Don t drive while taking narcotic pain medicine.     Don t lift anything heavier than 20 pounds for 3 to 4 weeks after surgery.      Special equipment     None     Diet     You can eat your regular meals after surgery.      When to call the doctor   Call your doctor if you have:     A fever above 101 F (38.3 C) (taken under the tongue), or a fever or chills lasting more than a day.     Redness at the  incision site.     Any fluid or blood draining from the incision, especially if it smells bad.      Severe pain that doesn t improve with pain medicine.          We will call you 2 to 4 days after surgery to review this handout, answer questions and help arrange after-surgery care. If you have questions or concerns, please call [phone] during regular office hours. If you need to call after business hours, call [phone] and ask to page the surgeon on-call.         Transversus Abdominis Plane (TAP) Pain Block      What is a TAP block?   A TAP block can help you manage your pain after surgery. TAP stands for transversus abdominis plane, which is a muscle layer in your abdomen (belly). The TAP block uses numbing medicine similar to Novocaine to block pain near the site of your surgery.       Why get a TAP block?     To better manage your pain after surgery. A tap block will help keep the pain from getting severe and out of control.     To block pain signals from the nerve, which helps decrease pain after surgery.     To help you sleep, easily breathe deeply, walk and visit with others.      How is it done?   You will lie still on a table. We will use an ultrasound machine to help us see the correct muscle layer of your abdomen. Then, we ll use a needle to inject the medicine. We may also give you some sleep medicine to lessen the pain of the injection.       The procedure takes between 5 and 15 minutes. It is usually done right before surgery, but will sometimes be after. It depends on your surgery and care needs.      What can I expect?     You may feel numbness, tingling or a heaviness in your abdomen.      You may have pain control up to 72 hours after surgery.     The TAP block may not lessen all of your surgery pain. But most patients feel 50 to 75 percent less pain than without the block.       Tell your nurse if you have:     Numbness or tingling in areas other than where the injection was     Blurry  vision     Ringing in your ears     A metallic taste in your mouth

## 2019-07-30 NOTE — NURSING NOTE
"Chief Complaint   Patient presents with     Consult     New patient consult, choledocholithiasis.       Vitals:    07/30/19 0818   BP: 102/65   BP Location: Left arm   Patient Position: Sitting   Cuff Size: Adult Regular   Pulse: 69   SpO2: 97%   Weight: 166 lb 9.6 oz   Height: 5' 5\"       Body mass index is 27.72 kg/m .    Izaiah Roman LPN                     "

## 2019-07-30 NOTE — DISCHARGE SUMMARY
[facility], [place]  Discharge Summary - Medicine & Pediatrics       Date of Admission:  7/24/2019  Date of Discharge:  7/27/2019  3:22 PM  Discharging Provider: Jp Mix   Discharge Service: Simone Leonard    Discharge Diagnoses   Cholelithiasis, history of choledocholithiasis  Post ERCP Pancreatitis  Generalized anxiety disorder  Major Depression    Follow-ups Needed After Discharge   Follow-up Appointments     Adult [facility] Follow-up and recommended labs and tests      Follow up with primary care provider, Mery Bronson, within 7 days   to evaluate treatment change and for hospital follow- up.  The following   labs/tests are recommended: Abdominal X-ray (4 weeks after the procedure)   to ensure that pancreatic stent has passed..      Appointments on [place] and/or [facility] (with [facility] or [facility]   provider or service). Call [phone] if you haven't heard regarding   these appointments within 7 days of discharge.             Unresulted Labs Ordered in the Past 30 Days of this Admission     No orders found from 6/24/2019 to 7/25/2019.      These results will be followed up by NA    Discharge Disposition   Discharged to home  Condition at discharge: Good    Hospital Course   Myrna Brothers was admitted on 7/24/2019 for abdominal pain following ERCP.  The following problems were addressed during her hospitalization:    Cholelithiasis, history of choledocholithiasis  Post ERCP pancreatitis   Patient had an MRCP on 7/17/19 demonstrating cholelithiasis and choledocholithiasis. Subsequently on 7/24 she had an ERCP and was found to have cholelithiasis without choledocholithiasis. A sphincterotomy was performed as well as a temporary prophylactic pancreatic stent. She developed back pain, abdominal pain in epigastric and RUQ areas, and elevated lipase that peaked at 3K. She was treated with fluids, pain medications and bowel rest. After a few days her symptoms improved  and lipase decreased. She was discharged with a short script for oxycodone for pain. She will follow up in 4 weeks with a plain film to evaluate stent position. She should arrange for a cholecystectomy performed in follow up.     Generalized anxiety disorder  Major depressive disorder   Resume PTA Buspar and sertraline    Consultations This Hospital Stay   VASCULAR ACCESS CARE ADULT IP CONSULT    Code Status   Full Code       Jp Mix MD  [facility] 5 Service  [facility], [place]  Pager: [phone]      I personally spent 35 minutes on discharge activities.    Jp Mix MD  Date of Service (when I saw the patient): 7/27/19    ______________________________________________________________________    Physical Exam   Vital Signs:                    Weight: 169 lbs 15.59 oz     Gen: Patient sitting up in bed, NAD  Resp: Breathing comfortably, CTAB  CV: RRR, no m/r/g  Abd: soft, mild tenderness to palpation diffusely, no rebound  Ext: Trace edema  Neuro: alert and oriented  [phone]      Primary Care Physician   Mery Bronson    Discharge Orders      General Surg Adult Referral      Discharge Instructions    Resume pre procedure diet     Discharge Instructions    Restart home medications.     Reason for your hospital stay    Dear Myrna Brothers    Your were hospitalized at [facility] with pancreatitis after your ERCP and treated with IV fluids, pain medications, and bowel rest.  Over your hospitalization your pain and pancreatitis improved and today you are ready to be discharged home.  If you continue slowly advance your diet you should continue to improve but if you develop fever, shortness of breath, light headedness, chest pain or abdominal pain/nausea/vomiting where you are unable to keep fluids down for a day, please seek medical attention.    It was a pleasure meeting with you today. Thank you for allowing me and my team the privilege  of caring for you today. You are the reason we are here, and I truly hope we provided you with the excellent service you deserve. Please let us know if there is anything else we can do for you so that we can be sure you are leaving completely satisfied with your care experience.    Your hospital unit at the time of discharge is 7C so if you have any questions please call the hospital at [phone] and ask to talk to a nurse on 7C.    Take care!  Chilo Dudley MD   Internal Medicine Resident     Discharge Instructions    - Continue to advance your diet as tolerated but stick with bland foods for the first few days. I anticipate you should not need any more of the opioid pain medications after the next 1-2 days.  - Follow up with your primary care doctor in the next 1-2 weeks for follow up. They should order an abdominal xray within 4 weeks after the procedure to ensure spontaneous passage of the pancreatic stent; if this passesd, no endoscopic procedures planned   - We placed a referral to a surgeron for cholecystectomy (gallbladder removal)     Activity    Your activity upon discharge: activity as tolerated; you should not drive while taking the oxycodone     Adult [facility] Follow-up and recommended labs and tests    Follow up with primary care provider, Mery Bronson, within 7 days to evaluate treatment change and for hospital follow- up.  The following labs/tests are recommended: Abdominal X-ray (4 weeks after the procedure) to ensure that pancreatic stent has passed..      Appointments on [place] and/or [facility] (with [facility] or [facility] provider or service). Call [phone] if you haven't heard regarding these appointments within 7 days of discharge.     Full Code     Transfer patient     Diet    Follow this diet upon discharge: full liquid diet, advance as tolerated       Significant Results and Procedures   Results for orders placed or performed during the hospital encounter of 07/24/19   XR  Surgery WILDA Fluoro L/T 5 Min w Stills    Narrative    This exam was marked as non-reportable because it will not be read by a   radiologist or a [facility] non-radiologist provider.                   Discharge Medications   Discharge Medication List as of 7/27/2019  3:01 PM      START taking these medications    Details   sennosides (SENOKOT) 8.6 MG tablet Take 2 tablets by mouth 2 times daily as needed for constipation, Disp-28 tablet, R-0, E-Prescribe      oxyCODONE (ROXICODONE) 5 MG tablet Take 1-2 tablets (5-10 mg) by mouth every 4 hours as needed for moderate to severe pain, Disp-12 tablet, R-0, Local Print         CONTINUE these medications which have NOT CHANGED    Details   acetaminophen (TYLENOL) 500 MG tablet Take 500-1,000 mg by mouth as needed for mild pain , Historical      albuterol (PROAIR HFA/PROVENTIL HFA/VENTOLIN HFA) 108 (90 Base) MCG/ACT inhaler Inhale 2 puffs into the lungs every 6 hours, Historical      APNO CREA ointment Apply 60 g topically every hour as needed for skin care, Disp-30 g, R-3, Local Print      busPIRone (BUSPAR) 15 MG tablet Take 15 mg by mouth 2 times daily , Historical      cyclobenzaprine (FLEXERIL) 5 MG tablet Take 1 tablet (5 mg) by mouth nightly as needed for muscle spasms, Disp-20 tablet, R-0, E-Prescribe      ibuprofen (ADVIL/MOTRIN) 600 MG tablet Take 1 tablet (600 mg) by mouth every 6 hours as needed for moderate pain, Disp-30 tablet, R-0, E-Prescribe      norethindrone (MICRONOR) 0.35 MG tablet Take 1 tablet (0.35 mg) by mouth daily, Disp-84 tablet, R-3, E-Prescribe      omeprazole (PRILOSEC) 20 MG DR capsule Take 1 capsule (20 mg) by mouth daily, Disp-30 capsule, R-1, E-Prescribe      sertraline (ZOLOFT) 100 MG tablet Take by mouth daily , R-0, Historical         STOP taking these medications       cephALEXin (KEFLEX) 500 MG capsule Comments:   Reason for Stopping:             Allergies   Allergies   Allergen Reactions     Xylocaine [Lidocaine] Anaphylaxis      Epidural Tray      Pt states with epidural, she and the baby both had decreased heart rates     Novocain [Procaine]

## 2019-07-30 NOTE — PROGRESS NOTES
Patient is scheduled for surgery with Dr. Javon Espinosa      Spoke with: Myrna Brothers and her spouse in clinic about surgery dates    Date of Surgery: 08/26/2019 at 5:10 PM with Dr. Javon Espinosa    Location:     [address] [address]      [phone]      [email or web address]    H&P: Scheduled with Mery Bronson or a partner. Patient is aware that she can call to schedule a pre-op with the [facility] ([facility]) if she is unable to schedule with her primary doctor.      Informed patient they will need an adult : Yes    Special Equipment: Robotic case    Surgery packet: Given in clinic on 07/30/2019.    Additional comments: She also knows to call general surgery if she experiences any cold or flu symptoms. Surgery teaching and soap were given to in clinic on 07/30/2019. She was asked to call [phone] if she needs to reschedule and [phone] if she experiences any symptoms.

## 2019-07-30 NOTE — PROGRESS NOTES
Myrna Brothers is a 33 year old female with a 1 month history of abdominal pain localized to the epigastric region.  Symptoms are associated with fatty food intake.  Associated symptoms: nausea  Seen in ER at which time common duct stones were diagnosed and admitted for ERCP. Post procedure pancreatitis presently resolving.   Common duct symptoms:  None presently  Diet tolerance:  good  Patient was seen in the Emergency Room for these symptoms.  LFT's:  abnormal    Image studies included:  Ultrasound  Findings:  Gallstones seen    Past medical and surgical history, medications, allergies, family history, and social history were reviewed with the patient. Has 9 month old child. Planning move to [place] early Sept.    ROS: 10 point review of systems negative except noted in HPI  PHYSICAL EXAM  General appearance- healthy, alert, and in no distress.  Skin- Skin color and turgor normal.  No obvious rashes.  Neck- Neck is supple without obvious adenopathy.  Lungs- Respiratory effort unlabored.  Gait- Normal.  Abdomen - soft non distended, non tender without obvious masses.    Impression:  Symptomatic cholelithiasis with resolving pancreatitis after pancreatic stent placement, clinically improved.  Recommendation:  Laparoscopic Cholecystectomy robot assisted and a low to nonfat diet until the patient undergoes surgery.    A full discussion regarding the alternatives, risks, goals, and potential complications for this surgery was completed today.  The patient understood that the potential problems included but are not limited to:  Infection, bleeding, bile leak, injury to structures about the gallbladder, possible common duct stone requiring further procedures. Most current review of literature confirm the more common specific risks related to laparoscopic cholecystectomy include bile duct injury (3/1000), bile leak (10/1000), retained common bile duct stone (10/1000), postcholecystectomy diarrhea (1-2%) and these  complications may require additional treatment.    The patient verbally expressed understanding, was given the opportunity for questions, and gives full informed consent for the procedure.      Today the patient was instructed on the need for a preoperative H&P, NPO status prior to surgery, and the need to stop anticoagulants prior to surgery.  Additional educational material, soap, and instructions will be mailed out to the patients home.    The total time spent with this patient was 30 minutes.  Of this time, greater than 50% was spent counseling and coordinating care.      Lovenox:  No    Answers for HPI/ROS submitted by the patient on 7/30/2019   General Symptoms: Yes  Skin Symptoms: Yes  HENT Symptoms: Yes  EYE SYMPTOMS: No  HEART SYMPTOMS: Yes  LUNG SYMPTOMS: No  INTESTINAL SYMPTOMS: Yes  URINARY SYMPTOMS: Yes  GYNECOLOGIC SYMPTOMS: No  BREAST SYMPTOMS: No  SKELETAL SYMPTOMS: Yes  BLOOD SYMPTOMS: No  NERVOUS SYSTEM SYMPTOMS: Yes  MENTAL HEALTH SYMPTOMS: Yes  Fever: No  Loss of appetite: No  Weight loss: No  Weight gain: No  Fatigue: Yes  Night sweats: Yes  Chills: No  Increased stress: No  Excessive hunger: No  Excessive thirst: No  Feeling hot or cold when others believe the temperature is normal: No  Loss of height: No  Post-operative complications: No  Surgical site pain: No  Hallucinations: No  Change in or Loss of Energy: Yes  Hyperactivity: No  Confusion: No  Changes in hair: No  Changes in moles/birth marks: No  Itching: No  Rashes: No  Changes in nails: No  Acne: No  Hair in places you don't want it: No  Change in facial hair: No  Warts: No  Non-healing sores: No  Scarring: No  Flaking of skin: Yes  Color changes of hands/feet in cold : No  Sun sensitivity: No  Skin thickening: No  Ear pain: Yes  Ear discharge: No  Hearing loss: No  Tinnitus: No  Nosebleeds: No  Congestion: Yes  Sinus pain: No  Trouble swallowing: No   Voice hoarseness: No  Mouth sores: No  Sore throat: No  Tooth pain: No  Gum  tenderness: No  Bleeding gums: No  Change in taste: No  Change in sense of smell: No  Dry mouth: No  Hearing aid used: No  Neck lump: No  Chest pain or pressure: Yes  Fast or irregular heartbeat: No  Pain in legs with walking: No  Trouble breathing while lying down: No  Fingers or toes appear blue: No  High blood pressure: No  Low blood pressure: No  Fainting: No  Murmurs: No  Pacemaker: No  Varicose veins: Yes  Edema or swelling: No  Wake up at night with shortness of breath: No  Light-headedness: Yes  Exercise intolerance: No  Heart burn or indigestion: Yes  Nausea: Yes  Vomiting: No  Abdominal pain: Yes  Bloating: Yes  Constipation: No  Diarrhea: Yes  Blood in stool: No  Black stools: No  Rectal or Anal pain: No  Fecal incontinence: No  Yellowing of skin or eyes: No  Vomit with blood: No  Change in stools: No  Trouble holding urine or incontinence: No  Pain or burning: No  Trouble starting or stopping: No  Increased frequency of urination: Yes  Blood in urine: No  Decreased frequency of urination: No  Frequent nighttime urination: Yes  Flank pain: No  Difficulty emptying bladder: No  Back pain: Yes  Muscle aches: Yes  Neck pain: Yes  Swollen joints: No  Joint pain: No  Bone pain: No  Muscle cramps: No  Muscle weakness: Yes  Joint stiffness: No  Bone fracture: No  Trouble with coordination: No  Dizziness or trouble with balance: No  Fainting or black-out spells: No  Memory loss: No  Headache: Yes  Seizures: No  Speech problems: No  Tingling: No  Tremor: No  Weakness: Yes  Difficulty walking: No  Paralysis: No  Numbness: No  Nervous or Anxious: Yes  Depression: Yes  Trouble sleeping: Yes  Trouble thinking or concentrating: No  Mood changes: No  Panic attacks: Yes

## 2019-07-30 NOTE — NURSING NOTE
Pre and Post op Patient Education/Teaching Flowsheet  Relevant Diagnosis:  gallstones  Teaching Topic:  Pre and post op teaching  Person(s) Involved in teaching:  Patient and      Motivation Level:  Asks Questions:  Yes  Eager to Learn:  Yes  Cooperative:  Yes  Receptive (willing/able to accept information):  Yes  Any cultural factors/Christianity beliefs that may influence understanding or compliance?  No    Patient/caregiver/family demonstrates understanding of the following:  Reason for the appointment, diagnosis, and treatment plan:  Yes  Patient demonstrates understanding of the following:  Pre-op bowel prep:  No  Post-op pain management recommendations (medications, ice compress, binder/athletic supporter (if applicable), etc.:  Yes  Inguinal hernia patients:  Post-op urinary retention- discussed signs/symptoms and visit to ER for Franks catheter placement and to stay in place for at least 48 hours:  NA  Restrictions:  Yes  Medications to take the day of surgery:  Per PCP  Blood thinner medications discussed and when to stop (if applicable):  Yes  Wound care:  Yes  Diabetes medication management (if applicable):  Per PCP  Which situations necessitate calling provider and whom to contact:  Discussed how to contact the hospital, nurse, and clinic scheduling staff if necessary      Date and time of surgery:  Yes  Location of surgery: [address] [place] [address]  History and Physical and any other testing necessary prior to surgery:  Yes  Required time line for completion of History and Physical and any pre-op testing:  Yes  Discuss need for someone to drive patient home and stay with them for 24 hours:  Yes  Pre-op showering/scrub information with Surgical Scrub:  Yes  NPO Guidelines:  NPO per Anesthesia Guidelines    Infection Prevention: Patient demonstrates understanding of the following:  Patient instructed on hand hygiene:  Yes  Surgical procedure site care will be taught and will be reviewed at the time of  discharge  Signs and symptoms of infection taught:  Yes  Wound care reviewed and will be taught at the time of discharge  Central venous catheter care will be taught at the time of discharge (if applicable)    Post-op follow-up:  Instructional materials used/given/mailed:  [place] Surgery Booklet, post op teaching sheet, Map, Soap, and arrival/location information    Surgical instructions mailed to patient

## 2019-07-30 NOTE — LETTER
7/30/2019       RE: Myrna Brothers  [address]  [facility] [address]     Dear Colleague,    Thank you for referring your patient, Myrna Brothers, to the [facility] GENERAL SURGERY at [facility]. Please see a copy of my visit note below.    Myrna Brothers is a 33 year old female with a 1 month history of abdominal pain localized to the epigastric region.  Symptoms are associated with fatty food intake.  Associated symptoms: nausea  Seen in ER at which time common duct stones were diagnosed and admitted for ERCP. Post procedure pancreatitis presently resolving.   Common duct symptoms:  None presently  Diet tolerance:  good  Patient was seen in the Emergency Room for these symptoms.  LFT's:  abnormal    Image studies included:  Ultrasound  Findings:  Gallstones seen    Past medical and surgical history, medications, allergies, family history, and social history were reviewed with the patient. Has 9 month old child. Planning move to [place] early Sept.    ROS: 10 point review of systems negative except noted in HPI  PHYSICAL EXAM  General appearance- healthy, alert, and in no distress.  Skin- Skin color and turgor normal.  No obvious rashes.  Neck- Neck is supple without obvious adenopathy.  Lungs- Respiratory effort unlabored.  Gait- Normal.  Abdomen - soft non distended, non tender without obvious masses.    Impression:  Symptomatic cholelithiasis with resolving pancreatitis after pancreatic stent placement, clinically improved.  Recommendation:  Laparoscopic Cholecystectomy robot assisted and a low to nonfat diet until the patient undergoes surgery.    A full discussion regarding the alternatives, risks, goals, and potential complications for this surgery was completed today.  The patient understood that the potential problems included but are not limited to:  Infection, bleeding, bile leak, injury to structures about the gallbladder, possible common duct stone  requiring further procedures. Most current review of literature confirm the more common specific risks related to laparoscopic cholecystectomy include bile duct injury (3/1000), bile leak (10/1000), retained common bile duct stone (10/1000), postcholecystectomy diarrhea (1-2%) and these complications may require additional treatment.    The patient verbally expressed understanding, was given the opportunity for questions, and gives full informed consent for the procedure.      Today the patient was instructed on the need for a preoperative H&P, NPO status prior to surgery, and the need to stop anticoagulants prior to surgery.  Additional educational material, soap, and instructions will be mailed out to the patients home.    The total time spent with this patient was 30 minutes.  Of this time, greater than 50% was spent counseling and coordinating care.      Lovenox:  No    Again, thank you for allowing me to participate in the care of your patient.      Sincerely,    Javon Espinosa MD

## 2019-08-02 ENCOUNTER — OFFICE VISIT (OUTPATIENT)
Dept: PEDIATRICS | Facility: CLINIC | Age: 34
End: 2019-08-02
Payer: COMMERCIAL

## 2019-08-02 ENCOUNTER — OFFICE VISIT (OUTPATIENT)
Dept: PSYCHOLOGY | Facility: CLINIC | Age: 34
End: 2019-08-02
Payer: COMMERCIAL

## 2019-08-02 VITALS
TEMPERATURE: 98.8 F | BODY MASS INDEX: 27.57 KG/M2 | DIASTOLIC BLOOD PRESSURE: 60 MMHG | OXYGEN SATURATION: 96 % | WEIGHT: 165.7 LBS | HEART RATE: 86 BPM | SYSTOLIC BLOOD PRESSURE: 100 MMHG

## 2019-08-02 DIAGNOSIS — Z13.1 SCREENING FOR DIABETES MELLITUS (DM): ICD-10-CM

## 2019-08-02 DIAGNOSIS — Z01.818 PREOP GENERAL PHYSICAL EXAM: ICD-10-CM

## 2019-08-02 DIAGNOSIS — Z13.6 CARDIOVASCULAR SCREENING; LDL GOAL LESS THAN 160: ICD-10-CM

## 2019-08-02 DIAGNOSIS — Z00.00 ROUTINE GENERAL MEDICAL EXAMINATION AT A HEALTH CARE FACILITY: Primary | ICD-10-CM

## 2019-08-02 DIAGNOSIS — F33.1 MODERATE EPISODE OF RECURRENT MAJOR DEPRESSIVE DISORDER (H): ICD-10-CM

## 2019-08-02 DIAGNOSIS — K80.20 CALCULUS OF GALLBLADDER WITHOUT CHOLECYSTITIS WITHOUT OBSTRUCTION: ICD-10-CM

## 2019-08-02 DIAGNOSIS — Z80.3 FAMILY HISTORY OF MALIGNANT NEOPLASM OF BREAST: ICD-10-CM

## 2019-08-02 DIAGNOSIS — F41.1 GENERALIZED ANXIETY DISORDER: Primary | ICD-10-CM

## 2019-08-02 DIAGNOSIS — Z13.29 SCREENING FOR THYROID DISORDER: ICD-10-CM

## 2019-08-02 DIAGNOSIS — K85.80 OTHER ACUTE PANCREATITIS, UNSPECIFIED COMPLICATION STATUS: ICD-10-CM

## 2019-08-02 PROCEDURE — 99214 OFFICE O/P EST MOD 30 MIN: CPT | Mod: 25 | Performed by: NURSE PRACTITIONER

## 2019-08-02 PROCEDURE — 90832 PSYTX W PT 30 MINUTES: CPT | Performed by: SOCIAL WORKER

## 2019-08-02 PROCEDURE — 99395 PREV VISIT EST AGE 18-39: CPT | Performed by: NURSE PRACTITIONER

## 2019-08-02 ASSESSMENT — ANXIETY QUESTIONNAIRES
GAD7 TOTAL SCORE: 4
1. FEELING NERVOUS, ANXIOUS, OR ON EDGE: SEVERAL DAYS
6. BECOMING EASILY ANNOYED OR IRRITABLE: SEVERAL DAYS
2. NOT BEING ABLE TO STOP OR CONTROL WORRYING: SEVERAL DAYS
5. BEING SO RESTLESS THAT IT IS HARD TO SIT STILL: NOT AT ALL
7. FEELING AFRAID AS IF SOMETHING AWFUL MIGHT HAPPEN: SEVERAL DAYS
IF YOU CHECKED OFF ANY PROBLEMS ON THIS QUESTIONNAIRE, HOW DIFFICULT HAVE THESE PROBLEMS MADE IT FOR YOU TO DO YOUR WORK, TAKE CARE OF THINGS AT HOME, OR GET ALONG WITH OTHER PEOPLE: SOMEWHAT DIFFICULT
3. WORRYING TOO MUCH ABOUT DIFFERENT THINGS: NOT AT ALL

## 2019-08-02 ASSESSMENT — PATIENT HEALTH QUESTIONNAIRE - PHQ9
SUM OF ALL RESPONSES TO PHQ QUESTIONS 1-9: 5
5. POOR APPETITE OR OVEREATING: NOT AT ALL

## 2019-08-02 NOTE — PROGRESS NOTES
[facility]  Integrated Behavioral Health Services  August 2, 2019    Behavioral Health Clinician Progress Note    Patient Name: Myrna Brothers           Service Type:  Individual      Service Location:   Face to Face in Clinic     Session Start Time:  12: 37 pm Session End Time:   1: 07 pm      Session Length: 16 - 37      Attendees: Patient    Visit Activities (Refresh list every visit): [facility] Only    Diagnostic Assessment Date: 4/12/18  Treatment Plan Review Date: 7/11//19  See Flowsheets for today's PHQ-9 and OREN-7 results  Previous PHQ-9:   PHQ-9 SCORE 1/11/2019 1/18/2019 7/29/2019   PHQ-9 Total Score 15 9 10     Previous OREN-7:   OREN-7 SCORE 1/11/2019 1/18/2019 7/29/2019   Total Score 10 8 10       LUKASZ LEVEL:  No flowsheet data found.    DATA  Extended Session (60+ minutes): No  Interactive Complexity: No  Crisis: No  [facility] Patient: No    Treatment Objective(s) Addressed in This Session:  Target Behavior(s): disease management/lifestyle changes , mental health management    Depressed Mood: Decrease frequency and intensity of feeling down, depressed, hopeless  Identify negative self-talk and behaviors: challenge core beliefs, myths, and actions  Anxiety: will experience a reduction in anxiety, will develop more effective coping skills to manage anxiety symptoms, will develop healthy cognitive patterns and beliefs and will increase ability to function adaptively    Current Stressors / Issues:  Patient stated that she and her  are moving to [place] at the beginning of September. She reported that she has started to look into choirs and other forms of social support in order help with her transition. Patient reported that she is looking forward to the move, and intends to seek out employment after she has times to move and settle into there new routine. Patient reported recent complications with gall stone surgery, and she now requires another surgery to remove the gall bladder.  Patient discussed how she sometimes continues to feel like she is not a good enough of a mother when her son cries when he leaves her , or when she cannot soothe him. Patient reported that she sometimes does not personalize his reaction/behaviors when she knows that he is teething. Patient processed recent event when she was able to identify alternative reasons for why he was crying, but sometimes has a hard time believing the alternative statements to be true.  Patient was provided with psycho-education on development of children, and continued to explore restructuring techniques. Patient stated that there are numerous examples that demonstrate that her son is responding to her.    Progress on Treatment Objective(s) / Homework:  Satisfactory progress - ACTION (Actively working towards change); Intervened by reinforcing change plan / affirming steps taken    Motivational Interviewing    MI Intervention: Expressed Empathy/Understanding, Supported Autonomy, Collaboration, Evocation, Permission to raise concern or advise, Open-ended questions, Change talk (evoked) and Reframe     Change Talk Expressed by the Patient: Ability to change Reasons to change Need to change    Provider Response to Change Talk: E - Evoked more info from patient about behavior change, A - Affirmed patient's thoughts, decisions, or attempts at behavior change, R - Reflected patient's change talk and S - Summarized patient's change talk statements    Cognitive Behavioral Therapy: Discussed connection between thoughts, feelings, and behaviors. Taught patient how to identify cognitive distortions, and assisted patient to identify own cognitive distortions. Taught and engaged patient in cognitive restructuring techniques.    Also provided psychoeducation about behavioral health condition, symptoms, and treatment options    Care Plan review completed: Yes    Medication Review:  No changes.    Medication Compliance:  Yes    Changes in Health  Issues:   None reported    Chemical Use Review:   Substance Use: Chemical use reviewed, no active concerns identified      Tobacco Use: No current tobacco use.      Assessment: Current Emotional / Mental Status (status of significant symptoms):  Risk status (Self / Other harm or suicidal ideation)  Patient has had a history of suicidal ideation: in correlation with stress, sent text on 4/13/18 about wanting to die, but no active SI. Per patient and , patient has had history of passive SI for more than 10 years and denies a history of suicide attempts, self-injurious behavior, homicidal ideation, homicidal behavior and and other safety concerns  Patient denies current fears or concerns for personal safety.  Patient reports the following current or recent suicidal ideation or behaviors: SI without plan or intent in correlation with work related stress in pas tweek.  Patient denies current or recent homicidal ideation or behaviors.  Patient denies current or recent self injurious behavior or ideation.  Patient denies other safety concerns.  A safety and risk management plan has not been developed at this time, however patient was encouraged to call [place] [phone] should there be a change in any of these risk factors.    Appearance:   Appropriate   Eye Contact:   Good   Psychomotor Behavior: Normal   Attitude:   Cooperative   Orientation:   All  Speech   Rate / Production: Normal    Volume:  Normal   Mood:    Normal  Affect:    Appropriate   Thought Content:  Clear   Thought Form:  Coherent  Logical   Insight:    Good     Diagnoses:  1. Generalized anxiety disorder    2. Moderate episode of recurrent major depressive disorder (H)        Collateral Reports Completed:  Not Applicable    Plan: (Homework, other):  Patient was given information about behavioral services and encouraged to schedule a follow up appointment with the clinic [facility] in 3 weeks.  She was also given Cognitive Behavioral Therapy skills to  practice when experiencing anxiety and depression. CD Recommendations: No indications of CD issues.  Stephanie Calabrese, [facility], [facility]      ______________________________________________________________________    Integrated Primary Care Behavioral Health Treatment Plan    Patient's Name: Myrna Brothers  YOB: 1985    Date: 7/11/19    DSM-V Diagnoses: 296.32 (F33.1) Major Depressive Disorder, Recurrent Episode, Moderate _ or 300.02 (F41.1) Generalized Anxiety Disorder  Psychosocial / Contextual Factors: recently postpartum  WHODAS: TBD    Referral / Collaboration:  No additional referrals needed at this time.    Anticipated number of session or this episode of care: 6-8      MeasurableTreatment Goal(s) related to diagnosis / functional impairment(s)  Goal 1: Patient will reduce feelings of depression as evidenced by decreased score on PHQ-9.    I will know I've met my goal when I feel less down and emotional.      Objective #A (Patient Action)    Patient will Decrease frequency and intensity of feeling down, depressed, hopeless.  Status: Continued - Date(s): 7/11/19    Intervention(s)  [facility] will assign homework to assist with behavioral activation/activity scheduling.    Objective #B  Patient will Identify negative self-talk and behaviors: challenge core beliefs, myths, and actions.  Status: Continued - Date(s): 7/11/19    Intervention(s)  [facility] will teach about automatic negative thoughts, irrational core beliefs, and cognitive restructuring techniques.        Goal 2: Patient will reduce feelings of anxiety as evidenced by decreased score on GAD7.    I will know I've met my goal when I feel less anxious and worried.      Objective #A (Patient Action)    Status: Continued - Date(s):7/11/19    Patient will identify three distraction and diversion activities and use those activities to decrease level of anxiety  .    Intervention(s)  [facility] will assign homework to practice distress tolerance and mindfulness  techniques between session.    Objective #B  Patient will use cognitive strategies identified in therapy to challenge anxious thoughts.    Status: Continued - Date(s): 7/11/19    Intervention(s)  [facility] will teach CBT techniques related to cognitive restructuring.    Objective #C  Patient will use relaxation strategies 1-2 times per day to reduce the physical symptoms of anxiety.  Status: Continued - Date(s): 7/11/19  Intervention(s)  C will role-play relaxation techniques.    Patient has reviewed and agreed to the above plan.      KVNG Webb  April 4, 2019

## 2019-08-02 NOTE — PATIENT INSTRUCTIONS
PLAN:   1.   Symptomatic therapy suggested:   Before your surgery:  7 days before: stop all over the counter supplements  1 week before: stop aspirin if you are taking aspirin.   stop ibuprofen, naproxen or similar antiinflammatory medications. You may use Tylenol for pain or headache.     On the day of your surgery:  Do not take any medication the morning of your surgery.     After surgery:    You may resume all your medications after the surgery unless your surgeon instructs you otherwise    2.  Orders Placed This Encounter   Procedures     Lipid panel reflex to direct LDL Fasting     JUST IN CASE     Comprehensive metabolic panel     TSH with free T4 reflex     CBC with platelets     Lipase     CANCER RISK MGMT/CANCER GENETIC COUNSELING REFERRAL       3. Patient needs to follow up in if no improvement,or sooner if worsening of symptoms or other symptoms develop.  FUTURE LABS:       - Schedule a fasting blood draw   Work on weight loss  Regular exercise  Will follow up and/or notify patient of  results via My Chart to determine further need for followup  Follow up office visit in one year for annual health maintenance exam, sooner PRN.    Before Your Surgery      Call your surgeon if there is any change in your health. This includes signs of a cold or flu (such as a sore throat, runny nose, cough, rash or fever).    Do not smoke, drink alcohol or take over the counter medicine (unless your surgeon or primary care doctor tells you to) for the 24 hours before and after surgery.    If you take prescribed drugs: Follow your doctor s orders about which medicines to take and which to stop until after surgery.    Eating and drinking prior to surgery: follow the instructions from your surgeon    Take a shower or bath the night before surgery. Use the soap your surgeon gave you to gently clean your skin. If you do not have soap from your surgeon, use your regular soap. Do not shave or scrub the surgery site.  Wear clean  estefani and have clean sheets on your bed.   Preventive Health Recommendations  Female Ages 26 - 39  Yearly exam:   See your health care provider every year in order to  Review health changes.   Discuss preventive care.    Review your medicines if you your doctor has prescribed any.    Until age 30: Get a Pap test every three years (more often if you have had an abnormal result).    After age 30: Talk to your doctor about whether you should have a Pap test every 3 years or have a Pap test with HPV screening every 5 years.   You do not need a Pap test if your uterus was removed (hysterectomy) and you have not had cancer.  You should be tested each year for STDs (sexually transmitted diseases), if you're at risk.   Talk to your provider about how often to have your cholesterol checked.  If you are at risk for diabetes, you should have a diabetes test (fasting glucose).  Shots: Get a flu shot each year. Get a tetanus shot every 10 years.   Nutrition:   Eat at least 5 servings of fruits and vegetables each day.  Eat whole-grain bread, whole-wheat pasta and brown rice instead of white grains and rice.  Get adequate Calcium and Vitamin D.     Lifestyle  Exercise at least 150 minutes a week (30 minutes a day, 5 days of the week). This will help you control your weight and prevent disease.  Limit alcohol to one drink per day.  No smoking.   Wear sunscreen to prevent skin cancer.  See your dentist every six months for an exam and cleaning.

## 2019-08-02 NOTE — PROGRESS NOTES
[address] [address]  [phone]  Dept: [phone]    PRE-OP EVALUATION:  Today's date: 2019    Myrna Brothers (: 1985) presents for pre-operative evaluation assessment as requested by Dr. Espinosa.  She requires evaluation and anesthesia risk assessment prior to undergoing surgery/procedure for treatment of gall bladder .    Proposed Surgery/ Procedure: Davinci Assisted Laparoscopic Cholecystomy  Date of Surgery/ Procedure: 19  Time of Surgery/ Procedure: 5:10PM  Hospital/Surgical Facility: [facility]  Fax number for surgical facility:   Primary Physician: Mery Bronson  Type of Anesthesia Anticipated: General    Patient has a Health Care Directive or Living Will:  NO    1. YES - Do you have a history of heart attack, stroke, stent, bypass or surgery on an artery in the head, neck, heart or legs? Stent in pancreas  2. NO - Do you ever have any pain or discomfort in your chest?   3. NO - Do you have a history of  Heart Failure?  4. NO - Are you troubled by shortness of breath when: walking on the level, up a slight hill or at night?  5. NO - Do you currently have a cold, bronchitis or other respiratory infection?  6. NO - Do you have a cough, shortness of breath or wheezing?  7. NO - Do you sometimes get pains in the calves of your legs when you walk?  8. NO - Do you or anyone in your family have previous history of blood clots?  9. NO - Do you or does anyone in your family have a serious bleeding problem such as prolonged bleeding following surgeries or cuts?  10. YES - Have you ever had problems with anemia or been told to take iron pills? Iron supplements with pregnancy   11. NO - Have you had any abnormal blood loss such as black, tarry or bloody stools, or abnormal vaginal bleeding?  12. NO - Have you ever had a blood transfusion?  13. YES - Have you or any of your relatives ever had problems with anesthesia?  Reaction to epidural but 30 minutes after the epidural her heart and babies heart rate decreased significantly   14. YES - Do you have sleep apnea, excessive snoring or daytime drowsiness?snoring  15. NO - Do you have any prosthetic heart valves?  16. NO - Do you have prosthetic joints?  17. NO - Is there any chance that you may be pregnant?      HPI:     HPI related to upcoming procedure: undergoing surgery/procedure for treatment of gall bladder .    Proposed Surgery/ Procedure: Davinci Assisted Laparoscopic Cholecystomy  Date of Surgery/ Procedure: 19        See problem list for active medical problems.  Problems all longstanding and stable, except as noted/documented.  See ROS for pertinent symptoms related to these conditions.    DEPRESSION - Patient has a long history of Depression of moderate severity requiring medication for control with recent symptoms being stable..Current symptoms of depression include none.       MEDICAL HISTORY:     Patient Active Problem List    Diagnosis Date Noted     Fibrocystic disease of breast 2019     Priority: Medium     Overview:   Created by Conversion       Irregular menstrual cycle 2019     Priority: Medium     Overview:   Created by Conversion       Post-operative pain 2019     Priority: Medium     Post-ERCP acute pancreatitis 2019     Priority: Medium     Pain in thoracic spine 2019     Priority: Medium      (normal spontaneous vaginal delivery) 10/16/2018     Priority: Medium      premature rupture of membranes (PPROM) with onset of labor within 24 hours of rupture in third trimester, antepartum 10/15/2018     Priority: Medium     Insulin controlled gestational diabetes mellitus (GDM) in third trimester 2018     Priority: Medium     20 units of Humulin at HS as of 18       Moderate episode of recurrent major depressive disorder (H) 2018     Priority: Medium     Generalized anxiety disorder 2018      Priority: Medium     Restless sleeper 04/10/2017     Priority: Medium     Temporomandibular dysfunction syndrome 04/10/2017     Priority: Medium     Overweight (BMI 25.0-29.9) 11/07/2016     Priority: Medium     Encounter for surveillance of contraceptive pills 11/02/2016     Priority: Medium     Other depression 11/02/2016     Priority: Medium     RAD (reactive airway disease) 05/02/2016     Priority: Medium     Anxiety 08/19/2015     Priority: Medium      Past Medical History:   Diagnosis Date     Diabetes (H)     gestational     Generalized anxiety disorder 1/4/2018     Hyperlipidemia      Major depressive disorder with single episode, in full remission (H) 1/4/2018     Sleep apnea      Uncomplicated asthma      Past Surgical History:   Procedure Laterality Date     ENDOSCOPIC RETROGRADE CHOLANGIOPANCREATOGRAM N/A 7/24/2019    Procedure: Endoscopic Retrograde Cholangiopancreatography with Bile Duct Sphincterotomy and Pancreatic Duct Stent Placement;  Surgeon: Doyle Camarena MD;  Location: [facility] OR     HEAD & NECK SURGERY      wisdom teeth     Current Outpatient Medications   Medication Sig Dispense Refill     acetaminophen (TYLENOL) 500 MG tablet Take 500-1,000 mg by mouth as needed for mild pain        albuterol (PROAIR HFA/PROVENTIL HFA/VENTOLIN HFA) 108 (90 Base) MCG/ACT inhaler Inhale 2 puffs into the lungs every 6 hours       busPIRone (BUSPAR) 15 MG tablet Take 15 mg by mouth 2 times daily        ibuprofen (ADVIL/MOTRIN) 600 MG tablet Take 1 tablet (600 mg) by mouth every 6 hours as needed for moderate pain 30 tablet 0     norethindrone (MICRONOR) 0.35 MG tablet Take 1 tablet (0.35 mg) by mouth daily 84 tablet 3     ondansetron (ZOFRAN-ODT) 4 MG ODT tab Take 1 tablet (4 mg) by mouth every 8 hours as needed for nausea 15 tablet 0     oxyCODONE (ROXICODONE) 5 MG tablet Take 1-2 tablets (5-10 mg) by mouth every 4 hours as needed for moderate to severe pain 10 tablet 0     sertraline (ZOLOFT) 100 MG tablet  Take by mouth daily   0     APNO CREA ointment Apply 60 g topically every hour as needed for skin care (Patient not taking: Reported on 8/2/2019) 30 g 3     cyclobenzaprine (FLEXERIL) 5 MG tablet Take 1 tablet (5 mg) by mouth nightly as needed for muscle spasms (Patient not taking: Reported on 8/2/2019) 20 tablet 0     [START ON 8/26/2019] indocyanine green (IC GREEN) 25 MG injection Inject 5 mg into the vein once for 1 dose (Patient not taking: Reported on 8/2/2019) 5 mg 0     omeprazole (PRILOSEC) 20 MG DR capsule Take 1 capsule (20 mg) by mouth daily (Patient not taking: Reported on 8/2/2019) 30 capsule 1     sennosides (SENOKOT) 8.6 MG tablet Take 2 tablets by mouth 2 times daily as needed for constipation (Patient not taking: Reported on 8/2/2019) 28 tablet 0     OTC products: no recent use of OTC ASA, NSAIDS or Steroids and herbals and vitamins - breast feeding     Allergies   Allergen Reactions     Xylocaine [Lidocaine] Anaphylaxis     Epidural Tray      Pt states with epidural, she and the baby both had decreased heart rates     Novocain [Procaine]       Latex Allergy: NO    Social History     Tobacco Use     Smoking status: Never Smoker     Smokeless tobacco: Never Used   Substance Use Topics     Alcohol use: Yes     Comment: denies recent use     History   Drug Use No       REVIEW OF SYSTEMS:   CONSTITUTIONAL: NEGATIVE for fever, chills, change in weight  INTEGUMENTARY/SKIN: NEGATIVE for rash   ENT/MOUTH: NEGATIVE for ear, mouth and throat problems  RESP: NEGATIVE for significant cough or SOB  BREAST: NEGATIVE for masses, tenderness or discharge  CV: NEGATIVE for chest pain, palpitations or peripheral edema  GI: POSITIVE for weight loss and recent episode of gallbladder issues  and NEGATIVE for hematemesis, hematochezia, jaundice and melena  : NEGATIVE for frequency, dysuria, or hematuria  MUSCULOSKELETAL: NEGATIVE for significant arthralgias or myalgia  NEURO: NEGATIVE for weakness, dizziness or  paresthesias  ENDOCRINE: NEGATIVE for temperature intolerance, skin/hair changes  HEME: NEGATIVE for bleeding problems  PSYCHIATRIC: NEGATIVE for changes in mood or affect    EXAM:   /60 (BP Location: Right arm, Patient Position: Sitting, Cuff Size: Adult Regular)   Pulse 86   Temp 98.8  F (37.1  C) (Oral)   Wt 75.2 kg (165 lb 11.2 oz)   SpO2 96%   Breastfeeding? Yes   BMI 27.57 kg/m      GENERAL APPEARANCE: healthy, alert and no distress     EYES: Eyes grossly normal to inspection and conjunctivae and sclerae normal     HENT: ear canals and TM's normal and nose and mouth without ulcers or lesions     NECK: no adenopathy, no asymmetry, masses, or scars and thyroid normal to palpation     RESP: lungs clear to auscultation - no rales, rhonchi or wheezes     CV: regular rates and rhythm, no murmur, click or rub and no irregular beats     ABDOMEN:  soft, nontender, no HSM or masses and bowel sounds normal     MS: extremities normal- no gross deformities noted, no evidence of inflammation in joints, FROM in all extremities.     SKIN: no suspicious lesions or rashes     NEURO: Normal strength and tone, sensory exam grossly normal, mentation intact and speech normal     PSYCH: mentation appears normal. and affect normal/bright     LYMPHATICS: No cervical adenopathy    DIAGNOSTICS:   EKG: Not indicated due to non-vascular surgery and low risk of event (age <65 and without cardiac risk factors)    Recent Labs   Lab Test 07/29/19  1602 07/27/19  0754  07/25/19  0545   HGB 13.1  --   --  11.3*     --   --  232    138   < > 140   POTASSIUM 4.1 3.4   < > 3.7   CR 0.78 0.70   < > 0.68    < > = values in this interval not displayed.        IMPRESSION:   Reason for surgery/procedure: undergoing surgery/procedure for treatment of gall bladder .    Proposed Surgery/ Procedure: Davinci Assisted Laparoscopic Cholecystomy  Date of Surgery/ Procedure: 8/26/19    Diagnosis/reason for consult: preop evaluation      The proposed surgical procedure is considered INTERMEDIATE risk.    REVISED CARDIAC RISK INDEX  The patient has the following serious cardiovascular risks for perioperative complications such as (MI, PE, VFib and 3  AV Block):  No serious cardiac risks  INTERPRETATION: 0 risks: Class I (very low risk - 0.4% complication rate)    The patient has the following additional risks for perioperative complications:  No identified additional risks      ICD-10-CM    1. Preop general physical exam Z01.818 Comprehensive metabolic panel     CBC with platelets   2. Routine general medical examination at a health care facility Z00.00 Lipid panel reflex to direct LDL Fasting     JUST IN CASE     Comprehensive metabolic panel     TSH with free T4 reflex     CBC with platelets   3. CARDIOVASCULAR SCREENING; LDL GOAL LESS THAN 160 Z13.6 Lipid panel reflex to direct LDL Fasting   4. Screening for diabetes mellitus (DM) Z13.1 JUST IN CASE     Comprehensive metabolic panel   5. Screening for thyroid disorder Z13.29 TSH with free T4 reflex   6. Other acute pancreatitis, unspecified complication status K85.80 Lipase   7. Calculus of gallbladder without cholecystitis without obstruction K80.20 Comprehensive metabolic panel     CBC with platelets   8. Family history of malignant neoplasm of breast Z80.3 CANCER RISK MGMT/CANCER GENETIC COUNSELING REFERRAL       RECOMMENDATIONS:       APPROVAL GIVEN to proceed with proposed procedure, without further diagnostic evaluation       Signed Electronically by: DONY Mendiola CNP    Copy of this evaluation report is provided to requesting physician.    [facility] Preop Guidelines    Revised Cardiac Risk Index   SUBJECTIVE:   CC: Myrna Brothers is an 33 year old woman who presents for preventive health visit.     Healthy Habits:    Do you get at least three servings of calcium containing foods daily (dairy, green leafy vegetables, etc.)? no, taking calcium and/or vitamin D  supplement: no    Amount of exercise or daily activities, outside of work: 5-6 day(s) per week, walking daily    Problems taking medications regularly No    Medication side effects: No    Have you had an eye exam in the past two years? yes    Do you see a dentist twice per year? yes    Do you have sleep apnea, excessive snoring or daytime drowsiness?yes      Today's PHQ-2 Score:   PHQ-2 (  [employer]) 2019   Q1: Little interest or pleasure in doing things 0 1   Q2: Feeling down, depressed or hopeless 0 1   PHQ-2 Score 0 2       Abuse: Current or Past(Physical, Sexual or Emotional)- No  Do you feel safe in your environment? Yes    Social History     Tobacco Use     Smoking status: Never Smoker     Smokeless tobacco: Never Used   Substance Use Topics     Alcohol use: Yes     Comment: denies recent use     If you drink alcohol do you typically have >3 drinks per day or >7 drinks per week? No                     Reviewed orders with patient.  Reviewed health maintenance and updated orders accordingly - Yes  Lab work is in process  Labs reviewed in EPIC  BP Readings from Last 3 Encounters:   19 100/60   19 102/65   19 123/73    Wt Readings from Last 3 Encounters:   19 75.2 kg (165 lb 11.2 oz)   19 75.6 kg (166 lb 9.6 oz)   19 75 kg (165 lb 6.4 oz)                  Patient Active Problem List   Diagnosis     Moderate episode of recurrent major depressive disorder (H)     Generalized anxiety disorder     Insulin controlled gestational diabetes mellitus (GDM) in third trimester     Pain in thoracic spine     Post-operative pain     Post-ERCP acute pancreatitis     Anxiety     Encounter for surveillance of contraceptive pills     Fibrocystic disease of breast     Irregular menstrual cycle      (normal spontaneous vaginal delivery)     Other depression     Overweight (BMI 25.0-29.9)      premature rupture of membranes (PPROM) with onset of labor within 24 hours of  rupture in third trimester, antepartum     RAD (reactive airway disease)     Restless sleeper     Temporomandibular dysfunction syndrome     Past Surgical History:   Procedure Laterality Date     ENDOSCOPIC RETROGRADE CHOLANGIOPANCREATOGRAM N/A 7/24/2019    Procedure: Endoscopic Retrograde Cholangiopancreatography with Bile Duct Sphincterotomy and Pancreatic Duct Stent Placement;  Surgeon: Doyle Camarena MD;  Location: [facility] OR     HEAD & NECK SURGERY      wisdom teeth       Social History     Tobacco Use     Smoking status: Never Smoker     Smokeless tobacco: Never Used   Substance Use Topics     Alcohol use: Yes     Comment: denies recent use     Family History   Problem Relation Age of Onset     Breast Cancer Mother      Stillborn Mother         had a still born     Other - See Comments Mother         2 miscarriage     Diabetes Father      Heart Disease Father      Anxiety Disorder Maternal Grandmother      Alzheimer Disease Maternal Grandmother      Asthma Maternal Grandfather      Prostate Cancer Maternal Grandfather      Hypertension Paternal Grandmother      Leukemia Paternal Grandfather          Current Outpatient Medications   Medication Sig Dispense Refill     acetaminophen (TYLENOL) 500 MG tablet Take 500-1,000 mg by mouth as needed for mild pain        albuterol (PROAIR HFA/PROVENTIL HFA/VENTOLIN HFA) 108 (90 Base) MCG/ACT inhaler Inhale 2 puffs into the lungs every 6 hours       busPIRone (BUSPAR) 15 MG tablet Take 15 mg by mouth 2 times daily        ibuprofen (ADVIL/MOTRIN) 600 MG tablet Take 1 tablet (600 mg) by mouth every 6 hours as needed for moderate pain 30 tablet 0     norethindrone (MICRONOR) 0.35 MG tablet Take 1 tablet (0.35 mg) by mouth daily 84 tablet 3     ondansetron (ZOFRAN-ODT) 4 MG ODT tab Take 1 tablet (4 mg) by mouth every 8 hours as needed for nausea 15 tablet 0     oxyCODONE (ROXICODONE) 5 MG tablet Take 1-2 tablets (5-10 mg) by mouth every 4 hours as needed for moderate to  severe pain 10 tablet 0     sertraline (ZOLOFT) 100 MG tablet Take by mouth daily   0     APNO CREA ointment Apply 60 g topically every hour as needed for skin care (Patient not taking: Reported on 8/2/2019) 30 g 3     cyclobenzaprine (FLEXERIL) 5 MG tablet Take 1 tablet (5 mg) by mouth nightly as needed for muscle spasms (Patient not taking: Reported on 8/2/2019) 20 tablet 0     [START ON 8/26/2019] indocyanine green (IC GREEN) 25 MG injection Inject 5 mg into the vein once for 1 dose (Patient not taking: Reported on 8/2/2019) 5 mg 0     omeprazole (PRILOSEC) 20 MG DR capsule Take 1 capsule (20 mg) by mouth daily (Patient not taking: Reported on 8/2/2019) 30 capsule 1     sennosides (SENOKOT) 8.6 MG tablet Take 2 tablets by mouth 2 times daily as needed for constipation (Patient not taking: Reported on 8/2/2019) 28 tablet 0     Allergies   Allergen Reactions     Xylocaine [Lidocaine] Anaphylaxis     Epidural Tray      Pt states with epidural, she and the baby both had decreased heart rates     Novocain [Procaine]        Mammogram not appropriate for this patient based on age.    Pertinent mammograms are reviewed under the imaging tab.  History of abnormal Pap smear: NO - age 30- 65 PAP every 3 years recommended  PAP / HPV Latest Ref Rng & Units 3/30/2018   PAP - NIL   HPV 16 DNA NEG:Negative Negative   HPV 18 DNA NEG:Negative Negative   OTHER HR HPV NEG:Negative Negative     Reviewed and updated as needed this visit by clinical staff  Tobacco  Allergies  Meds  Med Hx  Surg Hx  Fam Hx  Soc Hx        Reviewed and updated as needed this visit by Provider        Past Medical History:   Diagnosis Date     Diabetes (H)     gestational     Generalized anxiety disorder 1/4/2018     Hyperlipidemia      Major depressive disorder with single episode, in full remission (H) 1/4/2018     Sleep apnea      Uncomplicated asthma       Past Surgical History:   Procedure Laterality Date     ENDOSCOPIC RETROGRADE  CHOLANGIOPANCREATOGRAM N/A 7/24/2019    Procedure: Endoscopic Retrograde Cholangiopancreatography with Bile Duct Sphincterotomy and Pancreatic Duct Stent Placement;  Surgeon: Doyle Camarena MD;  Location: [facility] OR     HEAD & NECK SURGERY      wisdom teeth       ROS:  CONSTITUTIONAL:POSITIVE  for weight loss and NEGATIVE  for chills and fever   INTEGUMENTARY/SKIN: NEGATIVE for worrisome rashes, moles or lesions  EYES: NEGATIVE for vision changes or irritation  ENT: NEGATIVE for ear, mouth and throat problems  RESP:NEGATIVE for significant cough or SOB  BREAST: NEGATIVE for masses, tenderness or discharge  CV: NEGATIVE for chest pain, palpitations or peripheral edema  GI: NEGATIVE for abdominal pain , jaundice, melena and vomitingwill have an intermittent sharp chest pain about every couple months and will last about 30 minutes.    female: no unusual urinary symptoms, no unusual vaginal symptoms and menses: amenorrhea with breast feeding   MUSCULOSKELETAL:NEGATIVE for significant arthralgias or myalgia  NEURO: NEGATIVE for weakness, dizziness or paresthesias  ENDOCRINE: NEGATIVE for temperature intolerance, skin/hair changes  HEME/ALLERGY/IMMUNE: NEGATIVE for bleeding problems  PSYCHIATRIC: POSITIVE forHx anxiety and Hx depression and NEGATIVE forthoughts of hurting someone else and thoughts of self harm     OBJECTIVE:   /60 (BP Location: Right arm, Patient Position: Sitting, Cuff Size: Adult Regular)   Pulse 86   Temp 98.8  F (37.1  C) (Oral)   Wt 75.2 kg (165 lb 11.2 oz)   SpO2 96%   Breastfeeding? Yes   BMI 27.57 kg/m     Wt Readings from Last 4 Encounters:   08/02/19 75.2 kg (165 lb 11.2 oz)   07/30/19 75.6 kg (166 lb 9.6 oz)   07/29/19 75 kg (165 lb 6.4 oz)   07/24/19 77.1 kg (169 lb 15.6 oz)       EXAM:  GENERAL: healthy, alert and no distress  EYES: Eyes grossly normal to inspection, PERRL and conjunctivae and sclerae normal  HENT: ear canals and TM's normal, nose and mouth without ulcers or  lesions  NECK: no adenopathy, no asymmetry, masses, or scars and thyroid normal to palpation  RESP: lungs clear to auscultation - no rales, rhonchi or wheezes  BREAST: normal without masses, tenderness or nipple discharge and no palpable axillary masses or adenopathy  CV: regular rate and rhythm, normal S1 S2, no S3 or S4, no murmur, click or rub, no peripheral edema and peripheral pulses strong  ABDOMEN: soft, nontender, no hepatosplenomegaly, no masses and bowel sounds normal   (female): normal female external genitalia, normal urethral meatus, vaginal mucosa pink, moist, well rugated, and normal cervix/adnexa/uterus without masses or discharge  MS: no gross musculoskeletal defects noted, no edema  SKIN: no suspicious lesions or rashes  NEURO: Normal strength and tone, mentation intact and speech normal  PSYCH: mentation appears normal, affect normal/bright  LYMPH: no cervical, supraclavicular, axillary, or inguinal adenopathy    Diagnostic Test Results:  Results for orders placed or performed in visit on 07/29/19   Comprehensive metabolic panel   Result Value Ref Range    Sodium 139 133 - 144 mmol/L    Potassium 4.1 3.4 - 5.3 mmol/L    Chloride 104 94 - 109 mmol/L    Carbon Dioxide 29 20 - 32 mmol/L    Anion Gap 6 3 - 14 mmol/L    Glucose 99 70 - 99 mg/dL    Urea Nitrogen 13 7 - 30 mg/dL    Creatinine 0.78 0.52 - 1.04 mg/dL    GFR Estimate >90 >60 mL/min/[1.73_m2]    GFR Estimate If Black >90 >60 mL/min/[1.73_m2]    Calcium 9.3 8.5 - 10.1 mg/dL    Bilirubin Total 0.4 0.2 - 1.3 mg/dL    Albumin 4.2 3.4 - 5.0 g/dL    Protein Total 8.2 6.8 - 8.8 g/dL    Alkaline Phosphatase 172 (H) 40 - 150 U/L    ALT 61 (H) 0 - 50 U/L    AST 21 0 - 45 U/L   CBC with platelets and differential   Result Value Ref Range    WBC 7.6 4.0 - 11.0 10e9/L    RBC Count 4.54 3.8 - 5.2 10e12/L    Hemoglobin 13.1 11.7 - 15.7 g/dL    Hematocrit 40.1 35.0 - 47.0 %    MCV 88 78 - 100 fl    MCH 28.9 26.5 - 33.0 pg    MCHC 32.7 31.5 - 36.5 g/dL     RDW 13.2 10.0 - 15.0 %    Platelet Count 380 150 - 450 10e9/L    Diff Method Automated Method     % Neutrophils 53.4 %    % Lymphocytes 35.3 %    % Monocytes 7.5 %    % Eosinophils 2.4 %    % Basophils 1.1 %    % Immature Granulocytes 0.3 %    Absolute Neutrophil 4.0 1.6 - 8.3 10e9/L    Absolute Lymphocytes 2.7 0.8 - 5.3 10e9/L    Absolute Monocytes 0.6 0.0 - 1.3 10e9/L    Absolute Eosinophils 0.2 0.0 - 0.7 10e9/L    Absolute Basophils 0.1 0.0 - 0.2 10e9/L    Abs Immature Granulocytes 0.0 0 - 0.4 10e9/L   Lipase   Result Value Ref Range    Lipase 234 73 - 393 U/L       ASSESSMENT/PLAN:   Myrna was seen today for pre-op exam and physical.    Diagnoses and all orders for this visit:    Preop general physical exam  -     Comprehensive metabolic panel; Future  -     CBC with platelets; Future    Routine general medical examination at a health care facility  -     Lipid panel reflex to direct LDL Fasting; Future  -     JUST IN CASE; Future  -     Comprehensive metabolic panel; Future  -     TSH with free T4 reflex; Future  -     CBC with platelets; Future    CARDIOVASCULAR SCREENING; LDL GOAL LESS THAN 160  -     Lipid panel reflex to direct LDL Fasting; Future    Screening for diabetes mellitus (DM)  -     JUST IN CASE; Future  -     Comprehensive metabolic panel; Future    Screening for thyroid disorder  -     TSH with free T4 reflex; Future    Other acute pancreatitis, unspecified complication status  -     Lipase; Future    Calculus of gallbladder without cholecystitis without obstruction  -     Comprehensive metabolic panel; Future  -     CBC with platelets; Future    Family history of malignant neoplasm of breast  -     CANCER RISK MGMT/CANCER GENETIC COUNSELING REFERRAL        COUNSELING:   Reviewed preventive health counseling, as reflected in patient instructions  Special attention given to:        Regular exercise       Healthy diet/nutrition       Vision screening       Contraception       Family  "planning    Estimated body mass index is 27.57 kg/m  as calculated from the following:    Height as of 7/30/19: 1.651 m (5' 5\").    Weight as of this encounter: 75.2 kg (165 lb 11.2 oz).    Weight management plan: Discussed healthy diet and exercise guidelines     reports that she has never smoked. She has never used smokeless tobacco.      Counseling Resources:  ATP IV Guidelines  Pooled Cohorts Equation Calculator  Breast Cancer Risk Calculator  FRAX Risk Assessment  ICSI Preventive Guidelines  Dietary Guidelines for Americans, 2010  USDA's MyPlate  ASA Prophylaxis  Lung CA Screening    Mery Bronson, DONY CNP  [facility]  "

## 2019-08-02 NOTE — NURSING NOTE
"Chief Complaint   Patient presents with     Pre-Op Exam     Physical     AFE       Initial /60 (BP Location: Right arm, Patient Position: Sitting, Cuff Size: Adult Regular)   Pulse 86   Temp 98.8  F (37.1  C) (Oral)   Wt 75.2 kg (165 lb 11.2 oz)   SpO2 96%   Breastfeeding? Yes   BMI 27.57 kg/m   Estimated body mass index is 27.57 kg/m  as calculated from the following:    Height as of 7/30/19: 1.651 m (5' 5\").    Weight as of this encounter: 75.2 kg (165 lb 11.2 oz).  Medication Reconciliation: complete      DAHLIA Ruffin      "

## 2019-08-03 DIAGNOSIS — Z13.6 CARDIOVASCULAR SCREENING; LDL GOAL LESS THAN 160: ICD-10-CM

## 2019-08-03 DIAGNOSIS — Z00.00 ROUTINE GENERAL MEDICAL EXAMINATION AT A HEALTH CARE FACILITY: ICD-10-CM

## 2019-08-03 DIAGNOSIS — K85.80 OTHER ACUTE PANCREATITIS, UNSPECIFIED COMPLICATION STATUS: ICD-10-CM

## 2019-08-03 DIAGNOSIS — Z13.1 SCREENING FOR DIABETES MELLITUS (DM): ICD-10-CM

## 2019-08-03 DIAGNOSIS — Z13.29 SCREENING FOR THYROID DISORDER: ICD-10-CM

## 2019-08-03 DIAGNOSIS — Z01.818 PREOP GENERAL PHYSICAL EXAM: ICD-10-CM

## 2019-08-03 DIAGNOSIS — K80.20 CALCULUS OF GALLBLADDER WITHOUT CHOLECYSTITIS WITHOUT OBSTRUCTION: ICD-10-CM

## 2019-08-03 LAB
ALBUMIN SERPL-MCNC: 4.2 G/DL (ref 3.4–5)
ALP SERPL-CCNC: 159 U/L (ref 40–150)
ALT SERPL W P-5'-P-CCNC: 37 U/L (ref 0–50)
ANION GAP SERPL CALCULATED.3IONS-SCNC: 7 MMOL/L (ref 3–14)
AST SERPL W P-5'-P-CCNC: 18 U/L (ref 0–45)
BILIRUB SERPL-MCNC: 0.3 MG/DL (ref 0.2–1.3)
BUN SERPL-MCNC: 20 MG/DL (ref 7–30)
CALCIUM SERPL-MCNC: 9.4 MG/DL (ref 8.5–10.1)
CHLORIDE SERPL-SCNC: 103 MMOL/L (ref 94–109)
CHOLEST SERPL-MCNC: 263 MG/DL
CO2 SERPL-SCNC: 28 MMOL/L (ref 20–32)
CREAT SERPL-MCNC: 0.81 MG/DL (ref 0.52–1.04)
ERYTHROCYTE [DISTWIDTH] IN BLOOD BY AUTOMATED COUNT: 13.2 % (ref 10–15)
GFR SERPL CREATININE-BSD FRML MDRD: >90 ML/MIN/{1.73_M2}
GLUCOSE SERPL-MCNC: 114 MG/DL (ref 70–99)
HCT VFR BLD AUTO: 44.1 % (ref 35–47)
HDLC SERPL-MCNC: 46 MG/DL
HGB BLD-MCNC: 14.4 G/DL (ref 11.7–15.7)
LDLC SERPL CALC-MCNC: 184 MG/DL
LIPASE SERPL-CCNC: 309 U/L (ref 73–393)
MCH RBC QN AUTO: 28.9 PG (ref 26.5–33)
MCHC RBC AUTO-ENTMCNC: 32.7 G/DL (ref 31.5–36.5)
MCV RBC AUTO: 88 FL (ref 78–100)
NONHDLC SERPL-MCNC: 217 MG/DL
PLATELET # BLD AUTO: 406 10E9/L (ref 150–450)
POTASSIUM SERPL-SCNC: 4.2 MMOL/L (ref 3.4–5.3)
PROT SERPL-MCNC: 8.4 G/DL (ref 6.8–8.8)
RBC # BLD AUTO: 4.99 10E12/L (ref 3.8–5.2)
SODIUM SERPL-SCNC: 138 MMOL/L (ref 133–144)
TRIGL SERPL-MCNC: 165 MG/DL
TSH SERPL DL<=0.005 MIU/L-ACNC: 0.96 MU/L (ref 0.4–4)
WBC # BLD AUTO: 7.4 10E9/L (ref 4–11)

## 2019-08-03 PROCEDURE — 80061 LIPID PANEL: CPT | Performed by: NURSE PRACTITIONER

## 2019-08-03 PROCEDURE — 83690 ASSAY OF LIPASE: CPT | Performed by: NURSE PRACTITIONER

## 2019-08-03 PROCEDURE — 84443 ASSAY THYROID STIM HORMONE: CPT | Performed by: NURSE PRACTITIONER

## 2019-08-03 PROCEDURE — 80053 COMPREHEN METABOLIC PANEL: CPT | Performed by: NURSE PRACTITIONER

## 2019-08-03 PROCEDURE — 85027 COMPLETE CBC AUTOMATED: CPT | Performed by: NURSE PRACTITIONER

## 2019-08-03 PROCEDURE — 36415 COLL VENOUS BLD VENIPUNCTURE: CPT | Performed by: NURSE PRACTITIONER

## 2019-08-03 ASSESSMENT — ANXIETY QUESTIONNAIRES: GAD7 TOTAL SCORE: 4

## 2019-08-04 NOTE — RESULT ENCOUNTER NOTE
Marjorie Brothers,    Attached are your test results.  -Normal red blood cell (hgb) levels, normal white blood cell count and normal platelet levels.  -LDL(bad) cholesterol level is elevated, HDL(good) cholesterol level is low and your triglycerides are elevated which can increase your heart disease risk.  A diet high in fat and simple carbohydrates, genetics and being overweight can contribute to this. ADVISE: exercising 150 minutes of aerobic exercise per week (30 minutes for 5 days per week or 50 minutes for 3 days per week are options), eating a low saturated fat/low carbohydrate diet, and omega-3 fatty acids (fish oil) 3663-4149 mg daily are helpful to improve this. In 3 months, you should recheck your fasting cholesterol panel by scheduling a lab-only appointment.  -Liver and gallbladder tests (ALT,AST, Alk phos,bilirubin) are improved   -Kidney function (GFR) is normal.  -Sodium is normal.  -Potassium is normal.  -Calcium is normal.  -Glucose is slight elevated and may be a sign of early diabetes (prediabetes). ADVISE:: eating a low carbohydrate diet, exercising, trying to lose weight (if necessary) and rechecking your glucose level in 12 months.  -TSH (thyroid stimulating hormone) level is normal which indicates normal thyroid function.  Pancrease level is normal    Please contact us if you have any questions.    Mery Bronson, CNP

## 2019-08-05 ENCOUNTER — NURSE TRIAGE (OUTPATIENT)
Dept: NURSING | Facility: CLINIC | Age: 34
End: 2019-08-05

## 2019-08-05 ENCOUNTER — TRANSFERRED RECORDS (OUTPATIENT)
Dept: HEALTH INFORMATION MANAGEMENT | Facility: CLINIC | Age: 34
End: 2019-08-05

## 2019-08-05 ENCOUNTER — TELEPHONE (OUTPATIENT)
Dept: ONCOLOGY | Facility: CLINIC | Age: 34
End: 2019-08-05

## 2019-08-05 NOTE — TELEPHONE ENCOUNTER
Spouse states patient was recently hospitalized with gallstone and is set to have gallbladder removed later this month.  Has had constant abdominal pain that radiates to back since waking this morning.  Rates pain 8 out of 10.  Oxycodone ineffective.  Advised to go to ED now.

## 2019-08-05 NOTE — TELEPHONE ENCOUNTER
ONCOLOGY INTAKE: Records Information      APPT INFORMATION:  Referring provider:  Mery Bronson  [facility]  Reason for visit/diagnosis:  Family history of malignant neoplasm of breast [Z80.3]  Has patient been notified of appointment date and time?: NA    RECORDS INFORMATION:  Were the records received with the referral (via [employer])? Internal Referral    ADDITIONAL INFORMATION:  Left VM with hours and phone. Will try again on [id number] if Pt has not been scheduled. Referral in [facility].

## 2019-08-07 DIAGNOSIS — R10.11 RIGHT UPPER QUADRANT ABDOMINAL PAIN: ICD-10-CM

## 2019-08-07 DIAGNOSIS — R10.13 EPIGASTRIC ABDOMINAL PAIN: ICD-10-CM

## 2019-08-15 ENCOUNTER — TELEPHONE (OUTPATIENT)
Dept: ONCOLOGY | Facility: CLINIC | Age: 34
End: 2019-08-15

## 2019-08-15 NOTE — TELEPHONE ENCOUNTER
ONCOLOGY INTAKE: Records Information      APPT INFORMATION:  Referring provider:  Mery Bronson APRN  Referring provider s clinic:  [facility]   Reason for visit/diagnosis:  Family Hx Breast Cancer    ADDITIONAL INFORMATION:  NA

## 2019-08-20 PROBLEM — M54.6 PAIN IN THORACIC SPINE: Status: RESOLVED | Noted: 2019-05-25 | Resolved: 2019-08-20

## 2019-08-20 NOTE — PROGRESS NOTES
Discharge Note    Progress reporting period is from initial evaluation date (please see noted date below) to Jun 22, 2019.  No linked episodes      Myrna failed to follow up and current status is unknown.  Please see information below for last relevant information on current status.  Patient seen for 3 visits.    SUBJECTIVE  Subjective changes noted by patient:  Not consistent with HEP admittedly, notes however that she feels better when she does them.  .  Current pain level is  .     Previous pain level was  8/10.   Changes in function:  Yes (See Goal flowsheet attached for changes in current functional level)  Adverse reaction to treatment or activity: None    OBJECTIVE  Changes noted in objective findings: thoracic ext mobs - decr pain.     ASSESSMENT/PLAN  Diagnosis: Thoracic spine pain   STG/LTGs have been met or progress has been made towards goals:  Yes, please see goal flowsheet for most current information  Assessment of Progress: current status is unknown.    Last current status: Pt has not made progress   Self Management Plans:  HEP  I have re-evaluated this patient and find that the nature, scope, duration and intensity of the therapy is appropriate for the medical condition of the patient.  Myrna continues to require the following intervention to meet STG and LTG's:  HEP.    Recommendations:  Discharge with current home program.  Patient to follow up with MD as needed.    Please refer to the daily flowsheet for treatment today, total treatment time and time spent performing 1:1 timed codes.

## 2019-08-23 ENCOUNTER — OFFICE VISIT (OUTPATIENT)
Dept: PSYCHOLOGY | Facility: CLINIC | Age: 34
End: 2019-08-23
Payer: COMMERCIAL

## 2019-08-23 ENCOUNTER — TELEPHONE (OUTPATIENT)
Dept: SURGERY | Facility: CLINIC | Age: 34
End: 2019-08-23

## 2019-08-23 ENCOUNTER — TELEPHONE (OUTPATIENT)
Dept: PEDIATRICS | Facility: CLINIC | Age: 34
End: 2019-08-23

## 2019-08-23 ENCOUNTER — CARE COORDINATION (OUTPATIENT)
Dept: GASTROENTEROLOGY | Facility: CLINIC | Age: 34
End: 2019-08-23

## 2019-08-23 DIAGNOSIS — F41.1 GENERALIZED ANXIETY DISORDER: Primary | ICD-10-CM

## 2019-08-23 DIAGNOSIS — F33.1 MODERATE EPISODE OF RECURRENT MAJOR DEPRESSIVE DISORDER (H): ICD-10-CM

## 2019-08-23 PROCEDURE — 90832 PSYTX W PT 30 MINUTES: CPT | Performed by: SOCIAL WORKER

## 2019-08-23 NOTE — TELEPHONE ENCOUNTER
Message left that referral is no longer needed. Requested return call if requiring further assistance.  Yamilex Valle RN

## 2019-08-23 NOTE — PROGRESS NOTES
Patient stated that she is having a CT done at [place] on 8/26.     Advised that we only need a xray to check on the stent. She is going to call the clinic and see if there is a reason of why they have it scheduled as a CT vs AXR.     [facility] [address] Dr. Camarena, & Dr. Land  [facility]  [phone]

## 2019-08-23 NOTE — PROGRESS NOTES
[facility]  Integrated Behavioral Health Services  August 23, 2019    Behavioral Health Clinician Progress Note    Patient Name: Myrna Brothers           Service Type:  Individual      Service Location:   Face to Face in Clinic     Session Start Time:  12: 43 pm Session End Time:  12: 20 pm      Session Length: 16 - 37      Attendees: Patient    Visit Activities (Refresh list every visit): [facility] Only    Diagnostic Assessment Date: 4/12/18  Treatment Plan Review Date: 7/11//19  See Flowsheets for today's PHQ-9 and OREN-7 results  Previous PHQ-9:   PHQ-9 SCORE 1/18/2019 7/29/2019 8/2/2019   PHQ-9 Total Score 9 10 5     Previous OREN-7:   OREN-7 SCORE 1/18/2019 7/29/2019 8/2/2019   Total Score 8 10 4       LUKASZ LEVEL:  No flowsheet data found.    DATA  Extended Session (60+ minutes): No  Interactive Complexity: No  Crisis: No  [facility] Patient: No    Treatment Objective(s) Addressed in This Session:  Target Behavior(s): disease management/lifestyle changes , mental health management    Depressed Mood: Decrease frequency and intensity of feeling down, depressed, hopeless  Identify negative self-talk and behaviors: challenge core beliefs, myths, and actions  Anxiety: will experience a reduction in anxiety, will develop more effective coping skills to manage anxiety symptoms, will develop healthy cognitive patterns and beliefs and will increase ability to function adaptively    Current Stressors / Issues:  Patient reported frustration with herself as she continues to see how she is making mistakes at work even thought she is trying to be focused and providing attention to detail. She reported that she knows that she has a lot going on at home and with her health, but does not feel like she is thinking about that while she is at work. Patient expressed concern that she may not be a good fit for the type of work that she was once doing. She is wondering what can be done to try to prevent distraction  occurring at a future job once she moves to [place]. Patient discussed how she has felt fairly negatively about herself in the past few weeks. She reported that it would be easier if she was no longer alive, but denied wanting to kill self, denied plan, denied intent. Patient explored with [facility] the positive contributions she has made in recent weeks and month, what she has provided to her family and friends. Patient expressed awareness of her own resilience and was receptive to transferring CBT skills to assist with restructuring unhelpful beliefs about herself.    Progress on Treatment Objective(s) / Homework:  Satisfactory progress - ACTION (Actively working towards change); Intervened by reinforcing change plan / affirming steps taken    Motivational Interviewing    MI Intervention: Expressed Empathy/Understanding, Supported Autonomy, Collaboration, Evocation, Permission to raise concern or advise, Open-ended questions, Change talk (evoked) and Reframe     Change Talk Expressed by the Patient: Ability to change Reasons to change Need to change    Provider Response to Change Talk: E - Evoked more info from patient about behavior change, A - Affirmed patient's thoughts, decisions, or attempts at behavior change, R - Reflected patient's change talk and S - Summarized patient's change talk statements    Cognitive Behavioral Therapy: Discussed connection between thoughts, feelings, and behaviors. Taught patient how to identify cognitive distortions, and assisted patient to identify own cognitive distortions. Taught and engaged patient in cognitive restructuring techniques.    Also provided psychoeducation about behavioral health condition, symptoms, and treatment options    Care Plan review completed: Yes    Medication Review:  No changes.    Medication Compliance:  Yes    Changes in Health Issues:   None reported    Chemical Use Review:   Substance Use: Chemical use reviewed, no active concerns identified      Tobacco Use:  No current tobacco use.      Assessment: Current Emotional / Mental Status (status of significant symptoms):  Risk status (Self / Other harm or suicidal ideation)  Patient has had a history of suicidal ideation: in correlation with stress, sent text on 4/13/18 about wanting to die, but no active SI. Per patient and , patient has had history of passive SI for more than 10 years and denies a history of suicide attempts, self-injurious behavior, homicidal ideation, homicidal behavior and and other safety concerns  Patient denies current fears or concerns for personal safety.  Patient reports the following current or recent suicidal ideation or behaviors: SI without plan or intent in correlation with work related stress in pas tweek.  Patient denies current or recent homicidal ideation or behaviors.  Patient denies current or recent self injurious behavior or ideation.  Patient denies other safety concerns.  A safety and risk management plan has not been developed at this time, however patient was encouraged to call [facility] should there be a change in any of these risk factors.    Appearance:   Appropriate   Eye Contact:   Good   Psychomotor Behavior: Normal   Attitude:   Cooperative   Orientation:   All  Speech   Rate / Production: Normal    Volume:  Normal   Mood:    Normal  Affect:    Appropriate   Thought Content:  Clear   Thought Form:  Coherent  Logical   Insight:    Good     Diagnoses:  1. Generalized anxiety disorder    2. Moderate episode of recurrent major depressive disorder (H)        Collateral Reports Completed:  Not Applicable    Plan: (Homework, other):  Patient was given information about behavioral services and encouraged to schedule a follow up appointment with the clinic [facility] in 1 week.  She was also given Cognitive Behavioral Therapy skills to practice when experiencing anxiety and depression. [facility] also provided resources through [facility] in order to help her to identify a therapist with   mood disorder expertise in [place].  CD Recommendations: No indications of CD issues.  Stephanie Calabrese, [facility], [facility]      ______________________________________________________________________    Integrated Primary Care Behavioral Health Treatment Plan    Patient's Name: Myrna Brothers  YOB: 1985    Date: 19    DSM-V Diagnoses: 296.32 (F33.1) Major Depressive Disorder, Recurrent Episode, Moderate _ or 300.02 (F41.1) Generalized Anxiety Disorder  Psychosocial / Contextual Factors: recently postpartum  WHODAS: TBD    Referral / Collaboration:  No additional referrals needed at this time.    Anticipated number of session or this episode of care: 6-8      MeasurableTreatment Goal(s) related to diagnosis / functional impairment(s)  Goal 1: Patient will reduce feelings of depression as evidenced by decreased score on PHQ-9.    I will know I've met my goal when I feel less down and emotional.      Objective #A (Patient Action)    Patient will Decrease frequency and intensity of feeling down, depressed, hopeless.  Status: Continued - Date(s): 19    Intervention(s)  [facility] will assign homework to assist with behavioral activation/activity scheduling.    Objective #B  Patient will Identify negative self-talk and behaviors: challenge core beliefs, myths, and actions.  Status: Continued - Date(s): 19    Intervention(s)  [facility] will teach about automatic negative thoughts, irrational core beliefs, and cognitive restructuring techniques.        Goal 2: Patient will reduce feelings of anxiety as evidenced by decreased score on GAD7.    I will know I've met my goal when I feel less anxious and worried.      Objective #A (Patient Action)    Status: Continued - Date(s):19    Patient will identify three distraction and diversion activities and use those activities to decrease level of anxiety  .    Intervention(s)  [facility] will assign homework to practice distress tolerance and mindfulness techniques  between session.    Objective #B  Patient will use cognitive strategies identified in therapy to challenge anxious thoughts.    Status: Continued - Date(s): 7/11/19    Intervention(s)  [facility] will teach CBT techniques related to cognitive restructuring.    Objective #C  Patient will use relaxation strategies 1-2 times per day to reduce the physical symptoms of anxiety.  Status: Continued - Date(s): 7/11/19  Intervention(s)  C will role-play relaxation techniques.    Patient has reviewed and agreed to the above plan.      KVNG Webb  April 4, 2019

## 2019-08-23 NOTE — TELEPHONE ENCOUNTER
[facility] Call Center    Phone Message    May a detailed message be left on voicemail: yes    Reason for Call: Patient would like a call back to discuss reason provider referred patient to GI provider. Referral/ Order #: [id number]. Patient is scheduled to see GI provider on 08/26/19 and would like to discuss with PCP is appointment is necessary. Please call patient back to advise.    Action Taken: Message routed to:  Primary Care p [phone]

## 2019-08-23 NOTE — TELEPHONE ENCOUNTER
----- Message from iMca Contreras RN sent at 8/23/2019 12:50 PM CDT -----  Regarding: FW: This patient already had this procedure- should be calling your clinic today to raffi Treviño,    Looks like she had surgery done at [facility]. She still is on our schedule.    Mica  ----- Message -----  From: Pricila Segura RN  Sent: 8/23/2019  12:35 PM  To: Javon Espinosa MD  Subject: This patient already had this procedure- filippo#

## 2019-08-23 NOTE — TELEPHONE ENCOUNTER
Patient called and left a message letting this writer know she has had her gallbladder removed. She did not require a call back. This case has been cancelled with OR scheduling.

## 2019-08-26 ENCOUNTER — ANCILLARY PROCEDURE (OUTPATIENT)
Dept: GENERAL RADIOLOGY | Facility: CLINIC | Age: 34
End: 2019-08-26
Attending: INTERNAL MEDICINE
Payer: COMMERCIAL

## 2019-08-26 DIAGNOSIS — Z46.89 ENCOUNTER FOR REMOVAL OF PANCREATIC STENT: ICD-10-CM

## 2019-08-26 PROCEDURE — 74019 RADEX ABDOMEN 2 VIEWS: CPT | Performed by: RADIOLOGY

## 2019-08-30 ENCOUNTER — OFFICE VISIT (OUTPATIENT)
Dept: PSYCHOLOGY | Facility: CLINIC | Age: 34
End: 2019-08-30
Payer: COMMERCIAL

## 2019-08-30 DIAGNOSIS — F33.1 MODERATE EPISODE OF RECURRENT MAJOR DEPRESSIVE DISORDER (H): ICD-10-CM

## 2019-08-30 DIAGNOSIS — F41.1 GENERALIZED ANXIETY DISORDER: Primary | ICD-10-CM

## 2019-08-30 PROCEDURE — 90832 PSYTX W PT 30 MINUTES: CPT | Performed by: SOCIAL WORKER

## 2019-08-30 NOTE — PROGRESS NOTES
[facility]  Integrated Behavioral Health Services  August 30, 2019    Behavioral Health Clinician Progress Note    Patient Name: Myrna Brothers           Service Type:  Individual      Service Location:   Face to Face in Clinic     Session Start Time:  12: 39 pm Session End Time:  1:04 pm      Session Length: 16 - 37      Attendees: Patient    Visit Activities (Refresh list every visit): [facility] Only    Diagnostic Assessment Date: 4/12/18  Treatment Plan Review Date: 7/11//19  See Flowsheets for today's PHQ-9 and OREN-7 results  Previous PHQ-9:   PHQ-9 SCORE 1/18/2019 7/29/2019 8/2/2019   PHQ-9 Total Score 9 10 5     Previous OREN-7:   OREN-7 SCORE 1/18/2019 7/29/2019 8/2/2019   Total Score 8 10 4       LUKASZ LEVEL:  No flowsheet data found.    DATA  Extended Session (60+ minutes): No  Interactive Complexity: No  Crisis: No  [facility] Patient: No    Treatment Objective(s) Addressed in This Session:  Target Behavior(s): disease management/lifestyle changes , mental health management    Depressed Mood: Decrease frequency and intensity of feeling down, depressed, hopeless  Identify negative self-talk and behaviors: challenge core beliefs, myths, and actions  Anxiety: will experience a reduction in anxiety, will develop more effective coping skills to manage anxiety symptoms, will develop healthy cognitive patterns and beliefs and will increase ability to function adaptively    Current Stressors / Issues:  Last office visit with writer before patient moves to [place]. She stated that her last day of work is on Tuesday, and processed her range of emotions secondary to ending work. Patient discussed how she has plans to spend time with her family and friends this upcoming weekend, and that it is starting to feel real that she is moving across the country. Patient  Discussed looking forward to spending more time with her son before she seeks out new employment. Patient reported that she has been feeling more  confident this past week as a mother. Patient and [facility] explored potential anxiety as she flies with her son for the first time, and identified restructuring techniques that may be helpful given patient's history of personalization.  [facility] and patient reviewed progress and change during therapy.     Progress on Treatment Objective(s) / Homework:  Satisfactory progress - ACTION (Actively working towards change); Intervened by reinforcing change plan / affirming steps taken    Motivational Interviewing    MI Intervention: Expressed Empathy/Understanding, Supported Autonomy, Collaboration, Evocation, Permission to raise concern or advise, Open-ended questions, Change talk (evoked) and Reframe     Change Talk Expressed by the Patient: Ability to change Reasons to change Need to change    Provider Response to Change Talk: E - Evoked more info from patient about behavior change, A - Affirmed patient's thoughts, decisions, or attempts at behavior change, R - Reflected patient's change talk and S - Summarized patient's change talk statements    Cognitive Behavioral Therapy: Discussed connection between thoughts, feelings, and behaviors. Taught patient how to identify cognitive distortions, and assisted patient to identify own cognitive distortions. Taught and engaged patient in cognitive restructuring techniques.    Also provided psychoeducation about behavioral health condition, symptoms, and treatment options    Care Plan review completed: Yes    Medication Review:  No changes.    Medication Compliance:  Yes    Changes in Health Issues:   None reported    Chemical Use Review:   Substance Use: Chemical use reviewed, no active concerns identified      Tobacco Use: No current tobacco use.      Assessment: Current Emotional / Mental Status (status of significant symptoms):  Risk status (Self / Other harm or suicidal ideation)  Patient has had a history of suicidal ideation: in correlation with stress, sent text on 4/13/18 about  wanting to die, but no active SI. Per patient and , patient has had history of passive SI for more than 10 years and denies a history of suicide attempts, self-injurious behavior, homicidal ideation, homicidal behavior and and other safety concerns  Patient denies current fears or concerns for personal safety.  Patient reports the following current or recent suicidal ideation or behaviors: SI without plan or intent in correlation with work related stress in pas tweek.  Patient denies current or recent homicidal ideation or behaviors.  Patient denies current or recent self injurious behavior or ideation.  Patient denies other safety concerns.  A safety and risk management plan has not been developed at this time, however patient was encouraged to call [place] [phone] should there be a change in any of these risk factors.    Appearance:   Appropriate   Eye Contact:   Good   Psychomotor Behavior: Normal   Attitude:   Cooperative   Orientation:   All  Speech   Rate / Production: Normal    Volume:  Normal   Mood:    Normal  Affect:    Appropriate   Thought Content:  Clear   Thought Form:  Coherent  Logical   Insight:    Good     Diagnoses:  1. Generalized anxiety disorder    2. Moderate episode of recurrent major depressive disorder (H)        Collateral Reports Completed:  Not Applicable    Plan: (Homework, other):  Patient was given information about behavioral services and encouraged to schedule a follow up appointment with the clinic [facility] as needed. Patient moving to [place].  She was also given Cognitive Behavioral Therapy skills to practice when experiencing anxiety and depression. [facility] previously provided resources through [facility] in order to help her to identify a therapist with  mood disorder expertise in [place], and encouraged patient to schedule [facility] for [facility] to coordinate care once she establishes with a new therapist.  CD Recommendations: No indications of CD issues.  Stephanie Calabrese [facility],  [facility]      ______________________________________________________________________    Integrated Primary Care Behavioral Health Treatment Plan    Patient's Name: Myrna Brothers  YOB: 1985    Date: 7/11/19    DSM-V Diagnoses: 296.32 (F33.1) Major Depressive Disorder, Recurrent Episode, Moderate _ or 300.02 (F41.1) Generalized Anxiety Disorder  Psychosocial / Contextual Factors: recently postpartum  WHODAS: TBD    Referral / Collaboration:  No additional referrals needed at this time.    Anticipated number of session or this episode of care: 6-8      MeasurableTreatment Goal(s) related to diagnosis / functional impairment(s)  Goal 1: Patient will reduce feelings of depression as evidenced by decreased score on PHQ-9.    I will know I've met my goal when I feel less down and emotional.      Objective #A (Patient Action)    Patient will Decrease frequency and intensity of feeling down, depressed, hopeless.  Status: Continued - Date(s): 7/11/19    Intervention(s)  [facility] will assign homework to assist with behavioral activation/activity scheduling.    Objective #B  Patient will Identify negative self-talk and behaviors: challenge core beliefs, myths, and actions.  Status: Continued - Date(s): 7/11/19    Intervention(s)  [facility] will teach about automatic negative thoughts, irrational core beliefs, and cognitive restructuring techniques.        Goal 2: Patient will reduce feelings of anxiety as evidenced by decreased score on GAD7.    I will know I've met my goal when I feel less anxious and worried.      Objective #A (Patient Action)    Status: Continued - Date(s):7/11/19    Patient will identify three distraction and diversion activities and use those activities to decrease level of anxiety  .    Intervention(s)  [facility] will assign homework to practice distress tolerance and mindfulness techniques between session.    Objective #B  Patient will use cognitive strategies identified in therapy to challenge anxious  thoughts.    Status: Continued - Date(s): 7/11/19    Intervention(s)  [facility] will teach CBT techniques related to cognitive restructuring.    Objective #C  Patient will use relaxation strategies 1-2 times per day to reduce the physical symptoms of anxiety.  Status: Continued - Date(s): 7/11/19  Intervention(s)  [facility] will role-play relaxation techniques.    Patient has reviewed and agreed to the above plan.      KVNG Webb  April 4, 2019

## 2020-03-11 ENCOUNTER — HEALTH MAINTENANCE LETTER (OUTPATIENT)
Age: 35
End: 2020-03-11

## 2020-05-15 ENCOUNTER — VIRTUAL VISIT (OUTPATIENT)
Dept: PSYCHOLOGY | Facility: CLINIC | Age: 35
End: 2020-05-15
Payer: COMMERCIAL

## 2020-05-15 DIAGNOSIS — F33.1 MODERATE EPISODE OF RECURRENT MAJOR DEPRESSIVE DISORDER (H): Primary | ICD-10-CM

## 2020-05-15 PROCEDURE — 90791 PSYCH DIAGNOSTIC EVALUATION: CPT | Mod: 95 | Performed by: SOCIAL WORKER

## 2020-05-15 ASSESSMENT — COLUMBIA-SUICIDE SEVERITY RATING SCALE - C-SSRS
ATTEMPT PAST THREE MONTHS: NO
5. HAVE YOU STARTED TO WORK OUT OR WORKED OUT THE DETAILS OF HOW TO KILL YOURSELF? DO YOU INTEND TO CARRY OUT THIS PLAN?: NO
3. HAVE YOU BEEN THINKING ABOUT HOW YOU MIGHT KILL YOURSELF?: YES
ATTEMPT LIFETIME: NO
REASONS FOR IDEATION LIFETIME: COMPLETELY TO END OR STOP THE PAIN (YOU COULDN'T GO ON LIVING WITH THE PAIN OR HOW YOU WERE FEELING)
2. HAVE YOU ACTUALLY HAD ANY THOUGHTS OF KILLING YOURSELF?: NO
2. HAVE YOU ACTUALLY HAD ANY THOUGHTS OF KILLING YOURSELF LIFETIME?: YES
TOTAL  NUMBER OF INTERRUPTED ATTEMPTS PAST 3 MONTHS: NO
TOTAL  NUMBER OF ABORTED OR SELF INTERRUPTED ATTEMPTS PAST 3 MONTHS: NO
TOTAL  NUMBER OF ABORTED OR SELF INTERRUPTED ATTEMPTS PAST LIFETIME: NO
4. HAVE YOU HAD THESE THOUGHTS AND HAD SOME INTENTION OF ACTING ON THEM?: NO
4. HAVE YOU HAD THESE THOUGHTS AND HAD SOME INTENTION OF ACTING ON THEM?: NO
6. HAVE YOU EVER DONE ANYTHING, STARTED TO DO ANYTHING, OR PREPARED TO DO ANYTHING TO END YOUR LIFE?: NO
2. HAVE YOU ACTUALLY HAD ANY THOUGHTS OF KILLING YOURSELF?: MORE THAN 2 YEARS AGO
6. HAVE YOU EVER DONE ANYTHING, STARTED TO DO ANYTHING, OR PREPARED TO DO ANYTHING TO END YOUR LIFE?: NO
1. IN THE PAST MONTH, HAVE YOU WISHED YOU WERE DEAD OR WISHED YOU COULD GO TO SLEEP AND NOT WAKE UP?: NO
TOTAL  NUMBER OF INTERRUPTED ATTEMPTS LIFETIME: NO
1. IN THE PAST MONTH, HAVE YOU WISHED YOU WERE DEAD OR WISHED YOU COULD GO TO SLEEP AND NOT WAKE UP?: YES
5. HAVE YOU STARTED TO WORK OUT OR WORKED OUT THE DETAILS OF HOW TO KILL YOURSELF? DO YOU INTEND TO CARRY OUT THIS PLAN?: NO

## 2020-05-15 NOTE — PROGRESS NOTES
"[facility]: Integrated Behavioral Health  Integrated Behavioral Health Services   Diagnostic Assessment Update      PATIENT'S NAME: Myrna Brothers  MRN:   [id number]  :   1985  DATE OF SERVICE: May 15, 2020  SERVICE LOCATION: Phone call (patient / identified key support person reached)  Visit Activities: [facility] Only    Myrna Brothers is a 34 year old female who is being evaluated via a telephone visit.      The patient has been notified of the following:     \"We have found that certain health care needs can be provided without the need for a face to face visit.  This service lets us provide the care you need with a short phone conversation.      I will have full access to your [facility] medical record during this entire phone call.   I will be taking notes for your medical record.     Since this is like an office visit, we will bill your insurance company for this service.  Please check with your medical insurance if this type of telephone visit/virtual care is covered.  You may be responsible for the cost of this service if insurance coverage is denied.      There are potential benefits and risks of telephone visits (e.g. limits to patient confidentiality) that differ from in-person visits.?  Confidentiality still applies for telephone services, and nobody will record the visit.  It is important to be in a quiet, private space that is free of distractions (including cell phone or other devices) during the visit.??     If during the course of the call I believe a telephone visit is not appropriate, you will not be charged for this service\"    Consent has been obtained for this service by care team member: yes.    Start time: 11:08 am    End time: 11:55 am      Identifying Information:  Patient is a 34 year old year old, ,  female.  Patient attended the session alone.        Updates on Presenting Concern(s):  Patient reports the following reason(s) for continuing to " "receive behavioral services: Patient stated that she tried to establish with a therapist in [place] but did not feel like it was a good \"fit\".  Patient reported that her symptoms and concerns are worsening.  Patient attributed the status of her current symptoms to changes in their life stressors.   Patient stated that she noted worsening depression with onset of the pandemic and the stay at home order.     She discussed worsening mood, including numbness and irritability. She stated that she feels lack of motivation to complete daily activities and getting out of bed in the mornings.  She stated that she feels like she sleeps enough hours in the night, but does not feel rested. She stated that she can feel hopeless about the future, including what if she can never get a job,and how that impacts her family.  She stated that she can feel worsening self-worth since she is not working outside the home and bringing in income. Patient denied SI, but has had thoughts of life is not worth living if normalcy does not return after the pandemic. Patient denied intent, actions, or interrupted attempts since last visit with this writer.     Patient stated that she does worry about her career in the future. She reported that she has now been out of work for 6 months, and worries about what it will be like to re- enter. She stated that she likely had COVID-19, and now worries whenever she experiences a similar symptom that she may be sick again.     Overall, patient stated that she has gained more confidence as a mother and feels more positively about herself as a mother.     Patient stated that her symptoms have resulted in the following functional impairments: home life with  and son and management of the household and or completion of tasks    Patient reports that other professional(s) are involved in providing support / services.      Standard Screening tools completed, including PHQ9 and GAD7.  See Epic for today's " results.  Historical PHQ9:  PHQ-9 SCORE 1/18/2019 7/29/2019 8/2/2019   PHQ-9 Total Score 9 10 5     Historical GAD7:  OREN-7 SCORE 1/18/2019 7/29/2019 8/2/2019   Total Score 8 10 4       Review of Updates to Patient's Life Situation:  Patient reported experiencing relationship changes, which included continuing to meet new friends since she has moved to [place].  Patient identified some changes in the stability of their social connections and identified supports. Patient noted that their living situation changed within the last year, as a result of her 's job.  Patient lives with her  and son.  Patient reported that she and her family moved to [place] in September.     Patient's employment status did change.  Patient is currently unemployed.  She stated that she did not want to work when she first moved in order to get settled into her new home and create a new routine. Patient reported that she had just started to look into job opportunities when the pandemic began and all opportunities ceased.     Patient reported no changes or new involvment with the legal system.  There are no ethnic, cultural or Rastafarian factors that may be relevant for therapy.       Mental Health History:  Patient is currently receiving the following services: psychiatry.    Chemical Health History:   Patient is not currently receiving any chemical dependency treatment. Patient reports no problems as a result of their drinking / drug use.    Cage-AID score is: 0   Based on Cage-Aid score and clinical interview there  are not indications of drug or alcohol abuse.      Discussed the general effects of drugs and alcohol on health and well-being.      Significant Losses / Trauma / Abuse / Neglect Issues:  There are no new indications or report of: significant losses, trauma, abuse or neglect.    Issues of possible neglect are not present.      Medical History:   Patient Active Problem List   Diagnosis     Moderate episode of recurrent major  depressive disorder (H)     Generalized anxiety disorder     Insulin controlled gestational diabetes mellitus (GDM) in third trimester     Post-operative pain     Post-ERCP acute pancreatitis     Anxiety     Encounter for surveillance of contraceptive pills     Fibrocystic disease of breast     Irregular menstrual cycle      (normal spontaneous vaginal delivery)     Other depression     Overweight (BMI 25.0-29.9)      premature rupture of membranes (PPROM) with onset of labor within 24 hours of rupture in third trimester, antepartum     RAD (reactive airway disease)     Restless sleeper     Temporomandibular dysfunction syndrome       Medication Review:  Current Outpatient Medications   Medication     acetaminophen (TYLENOL) 500 MG tablet     albuterol (PROAIR HFA/PROVENTIL HFA/VENTOLIN HFA) 108 (90 Base) MCG/ACT inhaler     APNO CREA ointment     busPIRone (BUSPAR) 15 MG tablet     cyclobenzaprine (FLEXERIL) 5 MG tablet     ibuprofen (ADVIL/MOTRIN) 600 MG tablet     norethindrone (MICRONOR) 0.35 MG tablet     omeprazole (PRILOSEC) 20 MG DR capsule     ondansetron (ZOFRAN-ODT) 4 MG ODT tab     oxyCODONE (ROXICODONE) 5 MG tablet     sertraline (ZOLOFT) 100 MG tablet     No current facility-administered medications for this visit.        Medication Compliance:  Yes    Patient was provided recommendation to follow-up with physician.      Mental Status Assessment:  Appearance:   unable to assess due to telephone visit   Eye Contact:   unable to assess due to telephone visit   Psychomotor Behavior: Normal   Attitude:   Cooperative   Orientation:   All  Speech   Rate / Production: Normal    Volume:  Normal   Mood:    Anxious   Affect:    unable to assess due to telephone visit   Thought Content:  Clear   Thought Form:  Coherent  Logical   Insight:    Good       Safety Assessment:    Patient has had a history of suicidal ideation: passive thoughts of: life isn't worth living if life will continue to be this way.  History of passive SI on and off over the past 10 years and denies a history of suicide attempts, self-injurious behavior, homicidal ideation, homicidal behavior and and other safety concerns  Patient denies current or recent suicidal ideation or behaviors.  Patient denies current or recent homicidal ideation or behaviors.  Patient denies current or recent self injurious behavior or ideation.  Patient denies other safety concerns.  Patient reports there are no firearms in the house  Protective Factors Children in the home  and Sense of responsibility to family   Risk Factors Intense emotionality, Isolation and Lack of sleep      Plan for Safety and Risk Management:  A safety and risk management plan has been developed including: Patient consented to co-developed safety plan.  A safety and risk management plan was completed.  Patient agreed to use safety plan should any safety concerns arise.  A copy was given to the patient via My Chart.      Patient's Strengths and Limitations:  Patient identified the following strengths or resources that will help her succeed in counseling: commitment to health and well being, friends / good social support, family support, insight and intelligence. Patient identified the following supports: family and friends. Things that may interfere with the patient's success in counseling include:no barriers identified at this time.    Diagnostic Criteria:  Major Depressive Disorder, Recurrent Episode   - Depressed mood. Note: In children and adolescents, can be irritable mood.     - Diminished interest or pleasure in all, or almost all, activities.    - Decreased sleep.    - Psychomotor activity retardation.    - Fatigue or loss of energy.   B) The symptoms cause clinically significant distress or impairment in social, occupational, or other important areas of functioning  C) The episode is not attributable to the physiological effects of a substance or to another medical condition  D) The  occurence of major depressive episode is not better explained by other thought / psychotic disorders  E) There has never been a manic episode or hypomanic episode      Functional Status:  Patient's symptoms are causing reduced functional status in the following areas: Activities of Daily Living - parenting, household chores      DSM5 Diagnoses: (Sustained by DSM5 Criteria Listed Above)  Diagnoses: 296.32 (F33.1) Major Depressive Disorder, Recurrent Episode, Moderate With anxious distress  Psychosocial & Contextual Factors: moved to [place] in Sept 2019,  Stay at home parent during stay at home order/pandemic   WHODAS Score: deferred due to virtual visit    Preliminary Treatment Plan:    Treatment will focus on: Depressed Mood - lack of motivation/interest, numbness, decreased energy.    The Following referrals were discussed and initiated: No referrals needed at this time    Collaboration with other professionals is not indicated at this time.    Referral to another professional/service is not indicated at this time.    A Release of Information is not needed at this time.    Report to child or adult protection services was NA.    Stephanie Calabrese, [facility], Behavioral Health Clinician

## 2020-05-15 NOTE — PATIENT INSTRUCTIONS
"  Integrated Behavioral Health Services                                      Patient's Name: Myrna Brothers  May 15, 2020    SAFETY PLAN:  Step 1: Warning signs / cues (Thoughts, images, mood, situation, behavior) that a crisis may be developing:    Thoughts: \"I don't matter\", \"People would be better off without me\", \"I can't do this anymore\", \"I just want this to end\" and \"Nothing makes it better\"    Images: obsessive thoughts of death or dying: intrusive images    Thinking Processes: ruminations (can't stop thinking about my problems): ., racing thoughts and highly critical and negative thoughts: .    Mood: worsening depression, hopelessness and agitation    Behaviors: isolating/withdrawing , not taking care of myself, not taking care of my responsibilities and sleeping too much    Situations: relationship problems and financial stress   Step 2: Coping strategies - Things I can do to take my mind off of my problems without contacting another person (relaxation technique, physical activity):    Distress Tolerance Strategies:  listen to positive and upbeat music: ., sensory based activities/self-soothe with five senses: ., watch a funny movie: ., read a book: . and paced breathing/progressive muscle relaxation, coloring, writing in a journal    Physical Activities: go for a walk, yoga and meditation    Focus on helpful thoughts:  \"This is temporary\", \"I will get through this\" and \"It always passes\"  Step 3: People and social settings that provide distraction:  Willie, parents in law, parents    coffee shop and park   Step 4: Remind myself of people and things that are important to me and worth living for:    Kaushal Tapia, my parents, future goals that you have as a family!      Step 5: When I am in crisis, I can ask these people to help me use my safety plan:   Willie  Step 6: Making the environment safe:     dispose of old medications , remove things I could use to hurt myself: . and be around others  Step 7: " Professionals or agencies I can contact during a crisis:    Suicide Prevention Lifeline: 7-750-442-TALK (6134)  [facility] Crisis Services: [phone]    Call 911 or go to my nearest emergency department.   I helped develop this safety plan and agree to use it when needed.  I have been given a copy of this plan.      Patient signature: _______________________________________________________________  Today s date:  May 15, 2020  Adapted from Safety Plan Template 2008 Preethi Haley and Livan Diaz is reprinted with the express permission of the authors.  No portion of the Safety Plan Template may be reproduced without the express, written permission.  You can contact the authors at [email or web address] or [email or web address].

## 2020-05-29 ENCOUNTER — VIRTUAL VISIT (OUTPATIENT)
Dept: PSYCHOLOGY | Facility: CLINIC | Age: 35
End: 2020-05-29
Payer: COMMERCIAL

## 2020-05-29 DIAGNOSIS — F33.1 MODERATE EPISODE OF RECURRENT MAJOR DEPRESSIVE DISORDER (H): Primary | ICD-10-CM

## 2020-05-29 PROCEDURE — 90834 PSYTX W PT 45 MINUTES: CPT | Mod: 95 | Performed by: SOCIAL WORKER
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
## 2020-06-12 ENCOUNTER — VIRTUAL VISIT (OUTPATIENT)
Dept: PSYCHOLOGY | Facility: CLINIC | Age: 35
End: 2020-06-12
Payer: COMMERCIAL

## 2020-06-12 DIAGNOSIS — F33.1 MODERATE EPISODE OF RECURRENT MAJOR DEPRESSIVE DISORDER (H): Primary | ICD-10-CM

## 2020-06-12 PROCEDURE — 90834 PSYTX W PT 45 MINUTES: CPT | Mod: 95 | Performed by: SOCIAL WORKER

## 2020-06-12 NOTE — PROGRESS NOTES
Replaced by [facility]: Integrated Behavioral Health  June 12, 2020      Behavioral Health Clinician Progress Note    Patient Name: Myrna Brothers      Telemedicine Visit: The patient's condition can be safely assessed and treated via synchronous audio and visual telemedicine encounter.      Reason for Telemedicine Visit: Patient has requested telehealth visit and Services only offered telehealth due to COVID 19    Originating Site (Patient Location): Patient's home    Distant Site (Provider Location): Provider Remote Setting    Consent:  The patient/guardian has verbally consented to: the potential risks and benefits of telemedicine (video visit) versus in person care; bill my insurance or make self-payment for services provided; and responsibility for payment of non-covered services.     Mode of Communication:  Video Conference via [employer]    As the provider I attest to compliance with applicable laws and regulations related to telemedicine.         Service Type:  Individual     Session Start Time: 3:07 pm  Session End Time: 3: 56 pm      Session Length: 38 - 52      Attendees: Patient    Visit Activities (Refresh list every visit): [facility] Only    Diagnostic Assessment Date: 5/15/20  Treatment Plan Review Date: 5/29/20  See Flowsheets for today's PHQ-9 and OREN-7 results  Previous PHQ-9:   PHQ-9 SCORE 1/18/2019 7/29/2019 8/2/2019   PHQ-9 Total Score 9 10 5     Previous OREN-7:   OREN-7 SCORE 1/18/2019 7/29/2019 8/2/2019   Total Score 8 10 4       LUKASZ LEVEL:  No flowsheet data found.    DATA  Extended Session (60+ minutes): No  Interactive Complexity: No  Crisis: No  [facility] Patient: No    Treatment Objective(s) Addressed in This Session:  Target Behavior(s): disease management/lifestyle changes , mental health management    Depressed Mood: Increase interest, engagement, and pleasure in doing things  Decrease frequency and intensity of feeling down, depressed, hopeless    Current Stressors / Issues:  Patient  reported that overall she is doing well. Patient stated that she has found decreased motivation to complete daily tasks this past week, and has found that she then feelings negatively about herself because she was not able to accomplish tasks. Assisted patient to identify if it was a couple of days, or a significant period to indicate worsening depression. Patient stated that it was only a couple of days. [facility] validated and normalized changes in motivation, and potential impact that stress can have on motivation.      Patient reported increase in marital stress, and ongoing challenges related to how she feels when her  makes comments about parenting that she perceives as a critique. Patient stated that she feels like her childhood, and the ways in which her parents commented on what she did wrong, has led to her taking comments personally. Patient stated that at work she has learned to not personalize feedback, but has a more difficult time doing so with her .  Explored strategies to help patient to de-personalize her 's comments, and assisted patient to consider how to express how she feels to her .     Progress on Treatment Objective(s) / Homework:  Satisfactory progress - ACTION (Actively working towards change); Intervened by reinforcing change plan / affirming steps taken    Motivational Interviewing    MI Intervention: Expressed Empathy/Understanding, Supported Autonomy, Collaboration, Evocation, Open-ended questions, Reflections: simple and complex, Change talk (evoked) and Reframe     Change Talk Expressed by the Patient: Committment to change Activation Taking steps    Provider Response to Change Talk: E - Evoked more info from patient about behavior change, A - Affirmed patient's thoughts, decisions, or attempts at behavior change, R - Reflected patient's change talk and S - Summarized patient's change talk statements    Also provided psychoeducation about behavioral health  condition, symptoms, and treatment options    Care Plan review completed: Yes    Medication Review:  No changes to current psychiatric medication(s)    Medication Compliance:  Yes    Changes in Health Issues:   None reported    Chemical Use Review:   Substance Use: Chemical use reviewed, no active concerns identified      Tobacco Use: No current tobacco use.      Assessment: Current Emotional / Mental Status (status of significant symptoms):  Risk status (Self / Other harm or suicidal ideation)  Patient has had a history of suicidal ideation: passive thoughts of: life isn't worth living if life will continue to be this way. History of passive SI on and off over the past 10 years  and denies a history of suicide attempts, self-injurious behavior, homicidal ideation, homicidal behavior and and other safety concerns  Patient denies current fears or concerns for personal safety.  Patient denies current or recent suicidal ideation or behaviors.  Patient denies current or recent homicidal ideation or behaviors.  Patient denies current or recent self injurious behavior or ideation.  Patient denies other safety concerns.  A safety and risk management plan has been developed including: Patient consented to co-developed safety plan.  A safety and risk management plan was completed.  Patient agreed to use safety plan should any safety concerns arise.  A copy was given to the patient.    Appearance:   Appropriate   Eye Contact:   Good   Psychomotor Behavior: Normal   Attitude:   Cooperative   Orientation:   All  Speech   Rate / Production: Normal    Volume:  Normal   Mood:    Normal  Affect:    Appropriate   Thought Content:  Clear   Thought Form:  Coherent  Logical   Insight:    Good     Diagnoses:  1. Moderate episode of recurrent major depressive disorder (H)        Collateral Reports Completed:  Not Applicable    Plan: (Homework, other):  Patient was given information about behavioral services and encouraged to schedule a  follow up appointment with the clinic [facility] in 2 weeks.  She was also given Cognitive Behavioral Therapy skills to practice when experiencing anxiety and depression.  CD Recommendations: No indications of CD issues.  KVNG Webb, [facility]      ______________________________________________________________________    Integrated Primary Care Behavioral Health Treatment Plan    Patient's Name: Myrna Brothers  YOB: 1985    Date: 5/29/20    DSM-V Diagnoses: 296.32 (F33.1) Major Depressive Disorder, Recurrent Episode, Moderate _  Psychosocial / Contextual Factors: moved to [place] in Sept 2019,  Stay at home parent during stay at home order/pandemic   WHODAS: deferred. Needs completion.     Referral / Collaboration:  Referral to another professional/service is not indicated at this time..    Anticipated number of session or this episode of care: 10-12      MeasurableTreatment Goal(s) related to diagnosis / functional impairment(s)  Goal 1: Patient will reduce symptoms of depression as evidenced by decreased scores on PHQ9.     I will know I've met my goal when I feel like my mood is better and I'm less irritable.      Objective #A (Patient Action)    Patient will Decrease frequency and intensity of feeling down, depressed, hopeless.  Status: New - Date: 5/29/20     Intervention(s)  [facility] will teach emotion regulation skills.    Objective #B  Patient will Identify negative self-talk and behaviors: challenge core beliefs, myths, and actions.  Status: New - Date: 5/29/20     Intervention(s)  [facility] will teach strategies to identify cognitive distortions and unhealthy beliefs, and teach cognitive de-fusion and restructuring techniques .    Objective #C  Patient will Increase interest, engagement, and pleasure in doing things.  Status: New - Date: 5/29/20     Intervention(s)  [facility] will teach cognitive restructuring and defusion techniques.      KVNG Webb  May 29, 2020

## 2020-06-30 ENCOUNTER — VIRTUAL VISIT (OUTPATIENT)
Dept: PSYCHOLOGY | Facility: CLINIC | Age: 35
End: 2020-06-30
Payer: COMMERCIAL

## 2020-06-30 DIAGNOSIS — F41.1 GENERALIZED ANXIETY DISORDER: ICD-10-CM

## 2020-06-30 DIAGNOSIS — F33.1 MODERATE EPISODE OF RECURRENT MAJOR DEPRESSIVE DISORDER (H): Primary | ICD-10-CM

## 2020-06-30 PROCEDURE — 90834 PSYTX W PT 45 MINUTES: CPT | Mod: 95 | Performed by: SOCIAL WORKER

## 2020-06-30 NOTE — PROGRESS NOTES
[facility]: Integrated Behavioral Health  June 30, 2020    Behavioral Health Clinician Progress Note    Patient Name: Myrna Brothers      Telemedicine Visit: The patient's condition can be safely assessed and treated via synchronous audio and visual telemedicine encounter.      Reason for Telemedicine Visit: Patient has requested telehealth visit and Services only offered telehealth due to COVID 19    Originating Site (Patient Location): Patient's home    Distant Site (Provider Location): Provider Remote Setting    Consent:  The patient/guardian has verbally consented to: the potential risks and benefits of telemedicine (video visit) versus in person care; bill my insurance or make self-payment for services provided; and responsibility for payment of non-covered services.     Mode of Communication:  Video Conference via [employer]    As the provider I attest to compliance with applicable laws and regulations related to telemedicine.         Service Type:  Individual     Session Start Time: 3:03 pm  Session End Time: 3: 50 pm      Session Length: 38 - 52      Attendees: Patient    Visit Activities (Refresh list every visit): [facility] Only    Diagnostic Assessment Date: 5/15/20  Treatment Plan Review Date: 5/29/20  See Flowsheets for today's PHQ-9 and OREN-7 results  Previous PHQ-9:   PHQ-9 SCORE 1/18/2019 7/29/2019 8/2/2019   PHQ-9 Total Score 9 10 5     Previous OREN-7:   OREN-7 SCORE 1/18/2019 7/29/2019 8/2/2019   Total Score 8 10 4       LUKASZ LEVEL:  No flowsheet data found.    DATA  Extended Session (60+ minutes): No  Interactive Complexity: No  Crisis: No  [facility] Patient: No    Treatment Objective(s) Addressed in This Session:  Target Behavior(s): disease management/lifestyle changes , mental health management    Depressed Mood: Increase interest, engagement, and pleasure in doing things  Decrease frequency and intensity of feeling down, depressed, hopeless    Current Stressors / Issues:  Patient  reported overall she is doing well, but has noted that she is feeling more sad and down. She stated that she is noticing that it is more difficult for her to find the motivation to complete daily tasks that she once enjoyed. She stated that she is wondering about feeling fatigued and burnt out by the quarantine. She stated that she misses getting together with friends and other forms of socialization that she used to be able to do. Patient stated that she is trying to continue to do new activities with her son each day, and will be meeting up with friends and their children next week by going on a walk. Patient and [facility] continued to explore additional activities to do at home with her  to increase intentional/quality time together.     Patient stated that she continues to have frequent worries about her job, feelings of inadequacy, and noticing that she is worrying about her ability to secure employment in the future. Patient stated that she feels like she ruminates about the negative feedback she receives, and recognizes that she frequently forgets her strengths and what she excelled in.     Patient stated that it was recommended by her  to bring up the anxiety she feels at night and the relief she feels each morning when she sees her son in the morning. She stated that she continues to worry that he will die in the evenings, and discussed overarching fear that he will die young.  Patient recognizes that it is not likely, but continues to have the fear as she continues to hear about events and situation in the world.     Progress on Treatment Objective(s) / Homework:  Satisfactory progress - ACTION (Actively working towards change); Intervened by reinforcing change plan / affirming steps taken    Motivational Interviewing    MI Intervention: Expressed Empathy/Understanding, Supported Autonomy, Collaboration, Evocation, Open-ended questions, Reflections: simple and complex, Change talk (evoked) and  Reframe     Change Talk Expressed by the Patient: Committment to change Activation Taking steps    Provider Response to Change Talk: E - Evoked more info from patient about behavior change, A - Affirmed patient's thoughts, decisions, or attempts at behavior change, R - Reflected patient's change talk and S - Summarized patient's change talk statements    Also provided psychoeducation about behavioral health condition, symptoms, and treatment options    Care Plan review completed: Yes    Medication Review:  No changes to current psychiatric medication(s)    Medication Compliance:  Yes    Changes in Health Issues:   None reported    Chemical Use Review:   Substance Use: Chemical use reviewed, no active concerns identified      Tobacco Use: No current tobacco use.      Assessment: Current Emotional / Mental Status (status of significant symptoms):  Risk status (Self / Other harm or suicidal ideation)  Patient has had a history of suicidal ideation: passive thoughts of: life isn't worth living if life will continue to be this way. History of passive SI on and off over the past 10 years  and denies a history of suicide attempts, self-injurious behavior, homicidal ideation, homicidal behavior and and other safety concerns  Patient denies current fears or concerns for personal safety.  Patient denies current or recent suicidal ideation or behaviors.  Patient denies current or recent homicidal ideation or behaviors.  Patient denies current or recent self injurious behavior or ideation.  Patient denies other safety concerns.  A safety and risk management plan has been developed including: Patient consented to co-developed safety plan.  A safety and risk management plan was completed.  Patient agreed to use safety plan should any safety concerns arise.  A copy was given to the patient.    Appearance:   Appropriate   Eye Contact:   Good   Psychomotor Behavior: Normal   Attitude:   Cooperative    Orientation:   All  Speech   Rate / Production: Normal    Volume:  Normal   Mood:    Normal  Affect:    Appropriate   Thought Content:  Clear   Thought Form:  Coherent  Logical   Insight:    Good     Diagnoses:  1. Moderate episode of recurrent major depressive disorder (H)    2. Generalized anxiety disorder        Collateral Reports Completed:  Not Applicable    Plan: (Homework, other):  Patient was given information about behavioral services and encouraged to schedule a follow up appointment with the clinic [facility] in 2 weeks.  She was also given Cognitive Behavioral Therapy skills to practice when experiencing anxiety and depression.  CD Recommendations: No indications of CD issues.  Stephanie Calabrese, [facility], [facility]      ______________________________________________________________________    Integrated Primary Care Behavioral Health Treatment Plan    Patient's Name: Myrna Brothers  YOB: 1985    Date: 5/29/20    DSM-V Diagnoses: 296.32 (F33.1) Major Depressive Disorder, Recurrent Episode, Moderate _  Psychosocial / Contextual Factors: moved to [place] in Sept 2019,  Stay at home parent during stay at home order/pandemic   WHODAS: deferred. Needs completion.     Referral / Collaboration:  Referral to another professional/service is not indicated at this time..    Anticipated number of session or this episode of care: 10-12      MeasurableTreatment Goal(s) related to diagnosis / functional impairment(s)  Goal 1: Patient will reduce symptoms of depression as evidenced by decreased scores on PHQ9.     I will know I've met my goal when I feel like my mood is better and I'm less irritable.      Objective #A (Patient Action)    Patient will Decrease frequency and intensity of feeling down, depressed, hopeless.  Status: New - Date: 5/29/20     Intervention(s)  [facility] will teach emotion regulation skills.    Objective #B  Patient will Identify negative self-talk and behaviors: challenge core beliefs, myths, and  actions.  Status: New - Date: 5/29/20     Intervention(s)  [facility] will teach strategies to identify cognitive distortions and unhealthy beliefs, and teach cognitive de-fusion and restructuring techniques .    Objective #C  Patient will Increase interest, engagement, and pleasure in doing things.  Status: New - Date: 5/29/20     Intervention(s)  [facility] will teach cognitive restructuring and defusion techniques.      Stephanie Calabrese, [facility]  May 29, 2020

## 2020-07-14 ENCOUNTER — VIRTUAL VISIT (OUTPATIENT)
Dept: PSYCHOLOGY | Facility: CLINIC | Age: 35
End: 2020-07-14
Payer: COMMERCIAL

## 2020-07-14 DIAGNOSIS — F33.1 MODERATE EPISODE OF RECURRENT MAJOR DEPRESSIVE DISORDER (H): Primary | ICD-10-CM

## 2020-07-14 PROCEDURE — 90832 PSYTX W PT 30 MINUTES: CPT | Mod: 95 | Performed by: SOCIAL WORKER

## 2020-07-14 NOTE — PROGRESS NOTES
[facility]: Integrated Behavioral Health  July 14, 2020    Behavioral Health Clinician Progress Note    Patient Name: Myrna Brothers      Telemedicine Visit: The patient's condition can be safely assessed and treated via synchronous audio and visual telemedicine encounter.      Reason for Telemedicine Visit: Patient has requested telehealth visit and Services only offered telehealth due to COVID 19    Originating Site (Patient Location): Patient's home    Distant Site (Provider Location): Provider Remote Setting    Consent:  The patient/guardian has verbally consented to: the potential risks and benefits of telemedicine (video visit) versus in person care; bill my insurance or make self-payment for services provided; and responsibility for payment of non-covered services.     Mode of Communication:  Video Conference via u.sit    As the provider I attest to compliance with applicable laws and regulations related to telemedicine.         Service Type:  Individual     Session Start Time: 3:31 pm  Session End Time: 3: 56 pm      Session Length: 16 - 37      Attendees: Patient    Visit Activities (Refresh list every visit): [facility] Only    Diagnostic Assessment Date: 5/15/20  Treatment Plan Review Date: 5/29/20  See Flowsheets for today's PHQ-9 and OREN-7 results  Previous PHQ-9:   PHQ-9 SCORE 1/18/2019 7/29/2019 8/2/2019   PHQ-9 Total Score 9 10 5     Previous OREN-7:   OREN-7 SCORE 1/18/2019 7/29/2019 8/2/2019   Total Score 8 10 4       LUKASZ LEVEL:  No flowsheet data found.    DATA  Extended Session (60+ minutes): No  Interactive Complexity: No  Crisis: No  [facility] Patient: No    Treatment Objective(s) Addressed in This Session:  Target Behavior(s): disease management/lifestyle changes , mental health management    Depressed Mood: Increase interest, engagement, and pleasure in doing things  Decrease frequency and intensity of feeling down, depressed, hopeless    Current Stressors / Issues:  Patient  reported overall she is doing well. She stated that she feels like her medication adjustment was helpful, and feels like it is easier for her to manage her day to day. Patient stated that she was noticing that numerous stressors were impacting her, and she was able to effectively communicate the stressors to her , and that they have been able to problem solve through the stressors. Patient discussed relief as the state is starting to put in more restrictions related to the pandemic due to the recent increase in spread, but discussed anxiety about this fall if schools return in session.      [facility] provided update on need for patient to transfer to a therapist in state due to regulations and guidelines.  Patient verbalized understanding.  [facility] and patient processed through patient's successes and gains during office visits most recently and during all episodes of care. Patient expressed feeling proud of how she has gained confidence as a mother and was able to cope with and overcome her postpartum depression and anxiety.  Patient shared that she can reach out to her psychiatrist and ask for a specific recommendation for a patient. Patient and [facility] reviewed what patient appreciates and looks for in a therapist in order to help her be more specific in her request from her psychiatrist. Also encouraged patient to reach out to her PCP for recommendation and referral if psychiatrist is unable to assist her with a referral.     Progress on Treatment Objective(s) / Homework:  Satisfactory progress - ACTION (Actively working towards change); Intervened by reinforcing change plan / affirming steps taken    Motivational Interviewing    MI Intervention: Expressed Empathy/Understanding, Supported Autonomy, Collaboration, Evocation, Open-ended questions, Reflections: simple and complex, Change talk (evoked) and Reframe     Change Talk Expressed by the Patient: Committment to change Activation Taking steps    Provider Response  to Change Talk: E - Evoked more info from patient about behavior change, A - Affirmed patient's thoughts, decisions, or attempts at behavior change, R - Reflected patient's change talk and S - Summarized patient's change talk statements    Also provided psychoeducation about behavioral health condition, symptoms, and treatment options    Care Plan review completed: Yes    Medication Review:  No changes to current psychiatric medication(s)    Medication Compliance:  Yes    Changes in Health Issues:   None reported    Chemical Use Review:   Substance Use: Chemical use reviewed, no active concerns identified      Tobacco Use: No current tobacco use.      Assessment: Current Emotional / Mental Status (status of significant symptoms):  Risk status (Self / Other harm or suicidal ideation)  Patient has had a history of suicidal ideation: passive thoughts of: life isn't worth living if life will continue to be this way. History of passive SI on and off over the past 10 years  and denies a history of suicide attempts, self-injurious behavior, homicidal ideation, homicidal behavior and and other safety concerns  Patient denies current fears or concerns for personal safety.  Patient denies current or recent suicidal ideation or behaviors.  Patient denies current or recent homicidal ideation or behaviors.  Patient denies current or recent self injurious behavior or ideation.  Patient denies other safety concerns.  A safety and risk management plan has been developed including: Patient consented to co-developed safety plan.  A safety and risk management plan was completed.  Patient agreed to use safety plan should any safety concerns arise.  A copy was given to the patient.    Appearance:   Appropriate   Eye Contact:   Good   Psychomotor Behavior: Normal   Attitude:   Cooperative   Orientation:   All  Speech   Rate / Production: Normal    Volume:  Normal   Mood:    Normal  Affect:    Appropriate   Thought Content:  Clear    Thought Form:  Coherent  Logical   Insight:    Good     Diagnoses:  1. Moderate episode of recurrent major depressive disorder (H)        Collateral Reports Completed:  Not Applicable    Plan: (Homework, other):  Patient was given information about behavioral services and encouraged to schedule a follow up appointment with the clinic [facility] as needed. Patient will transfer to a therapist who is licensed in the state [facility] to follow up with patient in 2 weeks to determine status of referral, if she needs additional help with a referral, etc. CD Recommendations: No indications of CD issues.  Stephanie Calabrese, [facility], [facility]      ______________________________________________________________________    Integrated Primary Care Behavioral Health Treatment Plan    Patient's Name: Myrna Brothers  YOB: 1985    Date: 5/29/20    DSM-V Diagnoses: 296.32 (F33.1) Major Depressive Disorder, Recurrent Episode, Moderate _  Psychosocial / Contextual Factors: moved to [place] in Sept 2019,  Stay at home parent during stay at home order/pandemic   WHODAS: deferred. Needs completion.     Referral / Collaboration:  Referral to another professional/service is not indicated at this time..    Anticipated number of session or this episode of care: 10-12      MeasurableTreatment Goal(s) related to diagnosis / functional impairment(s)  Goal 1: Patient will reduce symptoms of depression as evidenced by decreased scores on PHQ9.     I will know I've met my goal when I feel like my mood is better and I'm less irritable.      Objective #A (Patient Action)    Patient will Decrease frequency and intensity of feeling down, depressed, hopeless.  Status: New - Date: 5/29/20     Intervention(s)  [facility] will teach emotion regulation skills.    Objective #B  Patient will Identify negative self-talk and behaviors: challenge core beliefs, myths, and actions.  Status: New - Date: 5/29/20     Intervention(s)  [facility] will teach strategies to identify  cognitive distortions and unhealthy beliefs, and teach cognitive de-fusion and restructuring techniques .    Objective #C  Patient will Increase interest, engagement, and pleasure in doing things.  Status: New - Date: 5/29/20     Intervention(s)  [facility] will teach cognitive restructuring and defusion techniques.      Stephanie Calabrese, [facility]  May 29, 2020

## 2020-10-08 NOTE — LETTER
February 24, 2019      Myrna Brothers  [address]  [place] [id number]              To Whom It May Concern,    Myrna Brothers was seen in clinic on 2/20/2019 and put on medications. Please excuse patient from work on 2/20/2019 and 2/21/2019.     Should you have any questions, please feel free to contact me at the address above.            Sincerely,        Augie Ochoa MD PhD    Detail Level: Zone Photo Preface (Leave Blank If You Do Not Want): Photographs were obtained today

## 2021-01-03 ENCOUNTER — HEALTH MAINTENANCE LETTER (OUTPATIENT)
Age: 36
End: 2021-01-03

## 2021-01-14 NOTE — TELEPHONE ENCOUNTER
15 days sent and visit needed for more.   Patient : Carline Peres Age: 30 year old Sex: female   MRN: [id number] Encounter Date: 1/13/2021      History     Chief Complaint   Patient presents with   • Chest Pain Adult   • Numbness     HPI   30-year-old female past medical history of diabetes presenting for evaluation of chest pain and shortness of breath.  Patient states that chest pain is midsternal somewhat radiates to the right rib cage area.  Patient states that that pain has been intermittent in nature over the last 3 days usually it lasts for a few hours and then resolves.  Chest pain is reproducible when pushing over the area is also worse with any movement.  States that it is not related to exertion, usually present at rest.  States that she takes Tylenol it does improve her pain and completely resolves it as well.  She states that she did have a cough last week however cough is improving.  She also denies any fevers or chills.  She had a loss of taste and smell however that is also improving.  States that she was diagnosed with Covid on 12/31 and believes her symptoms are likely related to Covid 1 to get checked out anyway.  No prior history of cardiac disease or known PEs.  No risk factors for DVT or PE.  Denies any leg swelling.  States that she also had a mild amount of numbness and tingling to the left hand which has since resolved.  At this time she has a minimal amount of chest pain.      Allergies   Allergen Reactions   • Citrus   (Food Or Med) HIVES and ANAPHYLAXIS       There are no discharge medications for this patient.      Past Medical History:   Diagnosis Date   • COVID-19    • Diabetes mellitus (CMS/[facility])        No past surgical history on file.    No family history on file.    Social History     Tobacco Use   • Smoking status: Not on file   Substance Use Topics   • Alcohol use: Not on file   • Drug use: Not on file       Review of Systems   Constitutional: Negative.    HENT: Negative for congestion, ear pain and sinus pain.    Eyes:  Negative for pain and redness.   Respiratory: Positive for shortness of breath. Negative for cough.    Cardiovascular: Positive for chest pain. Negative for leg swelling.   Gastrointestinal: Negative for abdominal pain, nausea and vomiting.   Genitourinary: Negative for dysuria and hematuria.   Skin: Negative for pallor and rash.   Neurological: Negative for dizziness and headaches.       Physical Exam     ED Triage Vitals [01/13/21 1756]   ED Triage Vitals Group      Temp 98.7 °F (37.1 °C)      Heart Rate (!) 119      Resp 17      BP (!) 154/85      SpO2 100 %      EtCO2 mmHg       Height       Weight 191 lb 9.3 oz (86.9 kg)      Weight Scale Used Standing scale      BMI (Calculated)       IBW/kg (Calculated)        Physical Exam  Vitals signs reviewed.   Constitutional:       General: She is not in acute distress.     Appearance: Normal appearance. She is not ill-appearing.   HENT:      Head: Normocephalic and atraumatic.      Nose: Nose normal.   Eyes:      Extraocular Movements: Extraocular movements intact.      Pupils: Pupils are equal, round, and reactive to light.   Neck:      Musculoskeletal: Normal range of motion and neck supple.   Cardiovascular:      Rate and Rhythm: Normal rate and regular rhythm.      Pulses: Normal pulses.           Radial pulses are 2+ on the right side and 2+ on the left side.   Pulmonary:      Effort: Pulmonary effort is normal. No tachypnea.      Breath sounds: Normal breath sounds.   Abdominal:      General: There is no distension.      Palpations: Abdomen is soft.      Tenderness: There is no abdominal tenderness. There is no guarding.   Musculoskeletal:      Right lower leg: No edema.      Left lower leg: No edema.   Skin:     General: Skin is warm and dry.   Neurological:      General: No focal deficit present.      Mental Status: She is alert and oriented to person, place, and time.      Cranial Nerves: No cranial nerve deficit.      Motor: No weakness.   Psychiatric:          Mood and Affect: Mood normal.         Behavior: Behavior normal.         ED Course     Procedures    Lab Results     Results for orders placed or performed during the hospital encounter of 01/13/21   Comprehensive Metabolic Panel   Result Value Ref Range    Fasting Status      Sodium 138 135 - 145 mmol/L    Potassium 3.6 3.4 - 5.1 mmol/L    Chloride 101 98 - 107 mmol/L    Carbon Dioxide 25 21 - 32 mmol/L    Anion Gap 16 10 - 20 mmol/L    Glucose 246 (H) 65 - 99 mg/dL    BUN 17 6 - 20 mg/dL    Creatinine 1.17 (H) 0.51 - 0.95 mg/dL    Glomerular Filtration Rate 71 (L) >90 mL/min/1.73m2    BUN/ Creatinine Ratio 15 7 - 25    Calcium 8.9 8.4 - 10.2 mg/dL    Bilirubin, Total 0.3 0.2 - 1.0 mg/dL    GOT/AST 18 <=37 Units/L    GPT/ALT 30 <64 Units/L    Alkaline Phosphatase 65 45 - 117 Units/L    Albumin 2.9 (L) 3.6 - 5.1 g/dL    Protein, Total 7.3 6.4 - 8.2 g/dL    Globulin 4.4 (H) 2.0 - 4.0 g/dL    A/G Ratio 0.7 (L) 1.0 - 2.4   Troponin I Ultra Sensitive   Result Value Ref Range    Troponin I, Ultra Sensitive <0.02 <=0.04 ng/mL   CBC with Automated Differential (performable only)   Result Value Ref Range    WBC 7.4 4.2 - 11.0 K/mcL    RBC 4.85 4.00 - 5.20 mil/mcL    HGB 10.5 (L) 12.0 - 15.5 g/dL    HCT 34.9 (L) 36.0 - 46.5 %    MCV 72.0 (L) 78.0 - 100.0 fl    MCH 21.6 (L) 26.0 - 34.0 pg    MCHC 30.1 (L) 32.0 - 36.5 g/dL    RDW-CV 14.6 11.0 - 15.0 %    RDW-SD 37.1 (L) 39.0 - 50.0 fL     (H) 140 - 450 K/mcL    NRBC 0 <=0 /100 WBC    Neutrophil, Percent 48 %    Lymphocytes, Percent 41 %    Mono, Percent 8 %    Eosinophils, Percent 3 %    Basophils, Percent 0 %    Immature Granulocytes 0 %    Absolute Neutrophils 3.6 1.8 - 7.7 K/mcL    Absolute Lymphocytes 3.1 1.0 - 4.8 K/mcL    Absolute Monocytes 0.6 0.3 - 0.9 K/mcL    Absolute Eosinophils  0.2 0.0 - 0.5 K/mcL    Absolute Basophils 0.0 0.0 - 0.3 K/mcL    Absolute Immmature Granulocytes 0.0 0.0 - 0.2 K/mcL   Troponin I Ultra Sensitive   Result Value Ref Range     Troponin I, Ultra Sensitive <0.02 <=0.04 ng/mL       EKG Results     EKG Interpretation  Normal sinus rhythm, rate of 96, no ST elevations or depressions, left atrial enlargement, normal intervals including QTC of 464, normal axis    EKG tracing interpreted by ED physician    Radiology Results     Imaging Results          XR CHEST PA AND LATERAL 2 VIEWS (Final result)  Result time 01/13/21 17:51:26    Final result                 Impression:    No acute disease               Electronically Signed by: ASIYA STRONG MD   Signed on: 1/13/2021 5:51 PM                Narrative:      HISTORY:Chest Pain.    Pt states chest pain and left arm numbness.  Hx asthma     PA and Lateral view of the chest.     comparison: July 27, 2020      Cardiac and mediastinal silhouette is normal.      There is no lobar consolidation.      There is no  pleural effusion.  No pneumothorax is seen.     Pulmonary vascularity is normal.   There are no pulmonary nodules or masses.    Bones and soft tissues are unremarkable.  Remainder of the exam is otherwise unremarkable.                                  ED Medication Orders (From admission, onward)    Ordered Start     Status Ordering Provider    01/13/21 2862 01/13/21 2320  ketorolac injection 15 mg  ONCE      Last MAR action: Given SANIA AHUJA               MDM   30-year-old female presenting for evaluation of chest pain.  On arrival patient is well-appearing, no acute distress, vitals are only notable for hypertension.  Patient did initially have some tachycardia however she states she just walked far distance when she checked in.  Patient tachycardia resolved.  Physical exam is unremarkable.  Concerns are most consistent with likely costochondritis or chest wall pain.  Most likely consistent with COVID-19 infection.  Not consistent with ACS, heart score of 2.  Patient is symptoms are also not consistent with PE or dissection.    Cardiac work-up was initiated and reviewed all within  normal limits occluding troponin x2 -.  Chest x-ray negative.  EKG is nonischemic.  Patient feels comfortable discharge home and following up with primary care doctor.  Instructed continue with ibuprofen and Tylenol as needed return with new or worsening symptoms.    Clinical Impression     ED Diagnosis   1. Chest wall pain     2. COVID-19 virus infection         Disposition        Discharge 1/14/2021 12:46 AM  Carline Peres discharge to home/self care.                         Tea Simon,   01/14/21 0047

## 2021-04-12 ENCOUNTER — TELEPHONE (OUTPATIENT)
Dept: FAMILY MEDICINE | Facility: CLINIC | Age: 36
End: 2021-04-12

## 2021-04-12 NOTE — TELEPHONE ENCOUNTER
Patient Quality Outreach      Summary:    Patient has the following on her problem list/HM:     Depression / Dysthymia review    6 Month Remission: 4-8 month window range:   12 Month Remission: 10-14 month window range:        PHQ-9 SCORE 1/18/2019 7/29/2019 8/2/2019   PHQ-9 Total Score 9 10 5       If PHQ-9 recheck is 5 or more, route to provider for next steps.    Patient is due/failing the following:   PHQ-9 Needed    Type of outreach:    Sent [employer] message.    Questions for provider review:    None                                                                                                                                     Stephanie Barnes

## 2021-05-30 VITALS — BODY MASS INDEX: 30.48 KG/M2 | WEIGHT: 186 LBS

## 2021-05-30 VITALS — WEIGHT: 182 LBS | BODY MASS INDEX: 29.83 KG/M2

## 2021-05-30 VITALS — WEIGHT: 188.8 LBS | BODY MASS INDEX: 30.34 KG/M2 | HEIGHT: 66 IN

## 2021-05-30 VITALS — BODY MASS INDEX: 29.01 KG/M2 | WEIGHT: 180.5 LBS | HEIGHT: 66 IN

## 2021-05-31 VITALS — HEIGHT: 66 IN | WEIGHT: 191 LBS | BODY MASS INDEX: 30.7 KG/M2

## 2021-05-31 VITALS — HEIGHT: 66 IN | BODY MASS INDEX: 30.05 KG/M2 | WEIGHT: 187 LBS
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
## 2021-06-10 NOTE — PROGRESS NOTES
[facility]   Initial Evaluation    Patient Name: Myrna Brothers  Date of evaluation: 4/18/2017  Referral Diagnosis: Temporomandibular dysfunction syndrome  Referring provider: Beatriz Hays MD  Visit Diagnosis:     ICD-10-CM    1. TMJ (temporomandibular joint disorder) M26.609    2. Poor posture R29.3        Assessment:      Impairments in  pain, posture, ROM, joint mobility, strength, motor function, singing  Patient's signs and symptoms are consistent with daysi TMJ joint dysfunction resulting in limited AROM, pain, and weakness. .  The POC is dynamic and will be modified on an ongoing basis.  Barriers to achieving goals as noted in the assessment section may affect outcome.  Prognosis to achieve goals is  good   Skilled PT is required to reduce TMJ pain and improve function.  Pt. is appropriate for skilled PT intervention as outlined in the Plan of Care (POC).  Pt. is a good candidate for skilled PT services to improve pain levels and function.    Goals:  Pt. will be independent with home exercise program in : 4 weeks (to self-manage pain and improve function)  Patient will : sing;for 30 minutes;with less pain;in other weeks (less than 3/10 pain)  other weeks: 8 weeks  Pt will: increase R and L laterotrusion TMJ AROM to assist with normal chewing motion in 8 weeks.    Patient's expectations/goals are realistic.    Barriers to Learning or Achieving Goals:  No Barriers.       Plan / Patient Instructions:        Plan of Care:   Communication with: Referral Source  Patient Related Instruction: Nature of Condition;Treatment plan and rationale;Self Care instruction;Basis of treatment;Body mechanics;Expected outcome;Posture;Precautions;Next steps  Times per Week: 1-2  Number of Weeks: 8  Number of Visits: 12  Therapeutic Exercise: ROM;Stretching;Strengthening  Neuromuscular Reeducation: kinesio tape;posture;core;other  Neuromuscular Re-education: re-train jaw opening/closing  Manual Therapy: soft  "tissue mobilization;myofascial release;joint mobilization;muscle energy  Modalities: ultrasound;cold pack;hot pack (as needed for pain)  Equipment: theraband    Plan for next visit: review 6x6 exercises, US, progress scapular strengthening/postural education     Subjective:       Social information:   Occupation:   Work Status:Working full time   Equipment Available: None    History of Present Illness:    Myrna is a 31 y.o. female who presents to therapy today with complaints of daysi TMJ joint pain. Date of onset/duration of symptoms is since childhood around 13 years old. Pt reports increased pain with dentist visits. Pt has had PT prior that did help with her pain. It was back when she was in high school. She remembers doing exercise and likely US. Pain has increased over the past few months. She has clicking but no grinding in her jaw. She feels her jaw \"catches\" when she opens wide and has difficulty closing . Singing can also be difficult. Right now she has tried heat/cold without relief. Also tried taking tylenol. Denies any hearing changes. Intermittent locking.    Pain Ratin  Pain rating at best: 3  Pain rating at worst: 9  Pain description: aching and dull    Functional limitations are described as occurring with:   Drink from glass  Chew/yawn  Dentist appts  singing         Objective:         Examination  1. TMJ (temporomandibular joint disorder)     2. Poor posture       Involved side: Bilateral  Posture Observation:      General sitting posture is  fair.  Cervical:  Mild forward head    TMJ ROM  Motion(normal values) mm Pain Comment (noise/ deviation)   Active Incisal opening (40-60 mm) 44 Mild to mod clicking   Right laterotrusion (7-12mm) 4 mm mild clicking   Left laterotrusion(7-12 mm) 4mm mild clicking   Protrusion (8-12mm) 2 mm mild clicking       Cervical ROM  Date: 2017     *Indicate scale AROM AROM AROM   Cervical Flexion WNL     Cervical Extension WNL      " Right Left Right Left Right Left   Cervical Sidebending WNL WNL       Cervical Rotation WNL WNL       Cervical Protraction      Cervical Retraction      Thoracic Flexion      Thoracic Extension      Thoracic Sidebending         Thoracic Rotation               Treatment Today     TREATMENT MINUTES COMMENTS   Evaluation 20 -TMJ    Self-care/ Home management     Manual therapy     Neuromuscular Re-education     Therapeutic Activity     Therapeutic Exercises 20 -see exercise flow sheet  -educated on POC, diagnosis/pathology and HEP with demo  -instructed to complete exercises 6 times a day   Gait training     Modality__________________                Total 40    Blank areas are intentional and mean the treatment did not include these items.     PT Evaluation Code: (Please list factors)  Patient History/Comorbidities: none  Examination: TMJ joint/cervical spine  Clinical Presentation: stable  Clinical Decision Making: low    Patient History/  Comorbidities Examination  (body structures and functions, activity limitations, and/or participation restrictions) Clinical Presentation Clinical Decision Making (Complexity)   No documented Comorbidities or personal factors 1-2 Elements Stable and/or uncomplicated Low   1-2 documented comorbidities or personal factor 3 Elements Evolving clinical presentation with changing characteristics Moderate   3-4 documented comorbidities or personal factors 4 or more Unstable and unpredictable High              Praveena Payne, PT, DPT  4/18/2017  5:13 PM

## 2021-06-10 NOTE — PROGRESS NOTES
"[facility] Daily Progress     Patient Name: Myrna Brothers  PRECAUTIONS/RESTRICTIONS:  None  Date: 2017  Visit #: 2  PTA visit #:  0  Referral Diagnosis: Temporomandibular dysfunction syndrome  Referring provider: Beatriz Hays MD  Visit Diagnosis:     ICD-10-CM    1. TMJ (temporomandibular joint disorder) M26.609    2. Poor posture R29.3          Assessment:     HEP/POC compliance is  good .  Patient demonstrates understanding/independence with home program.  Response to Intervention good with jaw feeling more relaxed, able to open jaw better and not clenching teeth as much.  Patient is benefitting from skilled physical therapy and is making steady progress toward functional goals.  Patient is appropriate to continue with skilled physical therapy intervention, as indicated by initial plan of care.   Meeting expectations    Goal Status:  Ongoing  Pt. will be independent with home exercise program in : 4 weeks (to self-manage pain and improve function)  Patient will : sing;for 30 minutes;with less pain;in other weeks (less than 3/10 pain)  other weeks: 8 weeks  Pt will: increase R and L laterotrusion TMJ AROM to assist with normal chewing motion in 8 weeks.    Plan / Patient Education:     Continue with initial plan of care.  Progress with home program as tolerated.   Continue with US if found beneficial.  Progress with Phase 2 jaw stabilization if able and monitor for \"z\" movements with jaw opening.Add row for core and scapular strengthening and review all exercises.      Subjective:     Pain Ratin  Clicking continues and sore bilaterally  Response to PT/benefits:good with jaw feeling more relaxed, able to open jaw better and not clenching teeth as much.    Continued difficulties:Drink from glass  Chew/yawn  Dentist appts  singing       Objective:     Palpation:  Tender bilat TMJ and masseter, Jaw openinng 35 mm noting \"z\" movement, laterotrusion bilat 8 mm with clicking noted " "right>left and protrusion 6 mm.    Today's Exercises:  OPT EXERCISES 4/28/2017   Exercise #1 resting position of tongue verbal review   Comment #1 scapular retraction verbal review   Exercise #2    Comment #2    Exercise #3 TMJ rotation with tongue on roof of month verbal review   Comment #3 rhythmic stabilization (isometrics) opening, closing, lateral deviation verbal review   Exercise #4 Posture:  elbow sweep standing with back against wall, 5x-H   Comment #4 Posture arms sweep at wall 10-2, 8-4, 5x-H   Exercise #5 Mandibular Stabilization Phase 1 tongue on roof of mouth with diagonal isometic, 5x5\" each-H         Treatment Today    TREATMENT MINUTES COMMENTS   Evaluation     Self-care/ Home management     Manual therapy     Neuromuscular Re-education     Therapeutic Activity     Therapeutic Exercises 20 See flow sheet   Gait training     Modality__US bilat TMJ and masseter at SL________________ 4 min each=8 min=2 set up each 3 MHZ, 20%, 0.5 w/c2 with small head              Total 32    Blank areas are intentional and mean the treatment did not include these items.       Preethi Blevins  4/28/2017    "

## 2021-06-10 NOTE — PROGRESS NOTES
"  Subjective:       Myrna Brothers is a 31 y.o. female who presents for follow-up of bilateral jaw pain.  She states she was diagnosed with temporomandibular dysfunction as a teenager.  She has undergone physical therapy in the past for this, but this was \"many years ago\".  A few weeks ago she moved her jaw in an abnormal way and heard a loud crack.  Since that time, she has had consistent cracking with chewing, feels some tension in her jaw area bilaterally, and has had some headaches related to this.  She is primarily interested in physical therapy for this again.  She has not been told by her dentist that she has signs of grinding her teeth.  She does feel that she clenches a lot throughout the day.  Second, patient states that she does not necessarily wake feeling rested.  She seems to move a lot during sleep.  She brings in some readings from her Fitbit showing that she wakes several times toward the end of the night.  She also thinks that she possibly has restless leg syndrome, describes some pain in her anterior leg and calf area, usually at night.  This happens once a week approximately.    The following portions of the patient's history were reviewed and updated as appropriate: allergies, current medications, past medical history, past social history and problem list.    Current Outpatient Prescriptions   Medication Sig     albuterol (PROAIR HFA) 90 mcg/actuation inhaler Inhale 1-2 puffs as needed. Every 4-6 hours.     ALTAVERA, 28, 0.15-0.03 mg per tablet Take 1 tablet by mouth daily. Skip placebo pills     escitalopram oxalate (LEXAPRO) 20 MG tablet Take 1 tablet (20 mg total) by mouth daily.     propranolol (INDERAL) 10 MG tablet TAKE 1 TO 2 TABLETS BY MOUTH TWICE DAILY 30 MINUTES TO 1 HOUR BEFORE SPEAKING OR PERFORMANCE     traMADol (ULTRAM) 50 mg tablet Take 1 tablet (50 mg total) by mouth every 6 (six) hours as needed for pain.         Review of Systems   Pertinent items are noted in HPI.    "   Objective:      /72 (Patient Site: Left Arm, Patient Position: Sitting, Cuff Size: Adult Regular)  Pulse 88  Temp 99.4  F (37.4  C) (Temporal)   Resp 20  Wt 182 lb (82.6 kg)  LMP 04/08/2017  Breastfeeding? No  BMI 29.83 kg/m2    General appearance: alert, appears stated age and cooperative  Head: Normocephalic, without obvious abnormality, atraumatic  Mouth: with palpation over the temporomandibular joint bilaterally, patient has significant clicking with opening of the jaw        Assessment/Plan:       1. Temporomandibular dysfunction syndrome  Patient was referred again to physical therapy which seemed to help significantly in the past.  We discussed the resting position of the mouth and trying to avoid clenching.  Also discussed other conservative recommendations like trying not to sleep with pressure on the jaw.  She is well aware of recommendations about avoiding specific foods or nonnutritive chewing.  - Ambulatory referral to PT/OT    2. Restless sleeper  Patient history does not seem consistent with restless leg syndrome, we discussed this in some detail today.  In addition, readings from her Fitbit actually show pretty normal sleep pattern with deep sleep in the early evening and REM sleep with occasional awakenings in the later evening.  Discussed a sleep study if she is interested, she will hold off for now.  She does not have the typical body habitus for obstructive sleep apnea.

## 2021-06-10 NOTE — PROGRESS NOTES
"[facility] Daily Progress     Patient Name: Myrna Brothers  PRECAUTIONS/RESTRICTIONS:  None  Date: 2017  Visit #: 3  PTA visit #:  0  Referral Diagnosis: Temporomandibular dysfunction syndrome  Referring provider: Beatriz Hays MD  Visit Diagnosis:     ICD-10-CM    1. TMJ (temporomandibular joint disorder) M26.609    2. Poor posture R29.3          Assessment:     HEP/POC compliance is  good .  Patient demonstrates understanding/independence with home program.  Response to Intervention good with jaw feeling more relaxed and loose with improvement in all functional goals  Patient is benefitting from skilled physical therapy and is making steady progress toward functional goals.  Patient is appropriate to continue with skilled physical therapy intervention, as indicated by initial plan of care.   Meeting expectations    Goal Status:  Ongoing  Pt. will be independent with home exercise program in : Progressing toward  Patient will : Progressing toward (less shaking when drinking)  other weeks: 8 weeks  Pt will: increase R and L laterotrusion TMJ AROM to assist with normal chewing motion in 8 weeks.-progress toward goal    Plan / Patient Education:     Continue with initial plan of care.  Progress with home program as tolerated.   US was beneficial. Will continue US.  Progress with Phase 2 jaw stabilization if able and monitor for \"z\" movements with jaw opening.    Subjective:     Pain Ratin  Less clicking in TMJ and no soreness post exercise.  Feeling looser.  Response to PT/benefits:good with jaw feeling more relaxed, able to open jaw better when singing/yawning and chewing with greater comfort and less head shaking with drinking from a glass.    Continued difficulties but improved from initial assessment:Drink from glass   Chew/yawn  Dentist appts  singing       Objective:     Palpation:  Tender bilat TMJ and masseter but less, Jaw openinng 40 mm noting less \"z\" movement with " "controlled opening.  Tight right>left suboccipital.    Today's Exercises:  OPT EXERCISES 5/16/2017   Comment #2    Exercise #3 TMJ rotation with tongue on roof of month then removing tongue and continue to open, monitoring left TMJ and using right hand to assist opening   Comment #3    Exercise #4    Comment #4    Exercise #5    Comment #5 SO release with tennis balls, verbal instruction-H   Exercise #6 Row for core and scapular strength with purple band. 5x5\"-H       Treatment Today    TREATMENT MINUTES COMMENTS   Evaluation     Self-care/ Home management     Manual therapy 5 Supine SO relesase   Neuromuscular Re-education     Therapeutic Activity     Therapeutic Exercises 10 See flow sheet   Gait training     Modality__US bilat TMJ and masseter at SL________________ 4 min each=8 min=2 set up each 3 MHZ, 20%, 0.5 w/c2 with small head              Total 27    Blank areas are intentional and mean the treatment did not include these items.       Preethi MORGAN Felicity  5/16/2017        [facility] Discharge Summary  Patient Name: Myrna Brothers  Date: 7/14/2017  Referral Diagnosis: Temporomandibular dysfunction syndrome  Referring provider: Beatriz Hays MD  Visit Diagnosis:   1. TMJ (temporomandibular joint disorder)     2. Poor posture         Goals:  Pt. will be independent with home exercise program in : Progressing toward  Patient will : Progressing toward (less shaking when drinking)  other weeks: 8 weeks  Pt will: increase R and L laterotrusion TMJ AROM to assist with normal chewing motion in 8 weeks.-progress toward goal    Patient was seen for 3 visits from 4/18/2017 to 5/16/2017 with 3 missed appointments.  The patient was making progress toward goals and has demonstrated understanding of and independence in the home program for self-care, and progression to next steps.  She will initiate contact if questions or concerns arise.  Patient received a home program scapular TMJ strengthening, " cervical stretching  The patient discontinued therapy, did not return.    Therapy will be discontinued at this time.  The patient will need a new referral to resume.    Thank you for your referral.  Preethi Blevins  7/14/2017  5:06 PM

## 2021-06-10 NOTE — PROGRESS NOTES
SUBJECTIVE   Myrna Brothers is a 31 y.o. old female with a past medical history of depression/anxiety and overweight who presented to clinic today for further evaluation of neck/shoulder pain. 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 Patient denies hitting her head, fall, loss of consciousness, headache or vomiting. She was able to go to work but pain came on while she was at work. Pain located in the lower neck and shoulder region and upper back. ROM is ok but hurts when she turns to the left. Denies headache or dizziness or visual or hearing concern. She denies numbness/tingling or weakness or loss of sensation in her upper extremities. She denies previous neck or shoulder injury. Took some motrin for pain.     Review of Systems:  A 12 point comprehensive review of systems was negative except as noted in HPI.    Medical History  Active Ambulatory (Non-Hospital) Problems    Diagnosis     Restless sleeper     Temporomandibular dysfunction syndrome     Overweight (BMI 25.0-29.9)     Encounter for surveillance of contraceptive pills     Other depression     RAD (reactive airway disease)     Anxiety     Irregular Length Of Menstrual Periods     Breast Fibrocystic Disease     Past Medical History:   Diagnosis Date     Fibrocystic breast        Surgical History  She  has a past surgical history that includes Penelope tooth extraction.    Social History  Reviewed, and she  reports that she has never smoked. She does not have any smokeless tobacco history on file. She reports that she drinks alcohol. She reports that she does not use illicit drugs.    Family History  Reviewed, and family history includes Breast cancer (age of onset: 51) in her mother; Cancer in her maternal grandfather and paternal grandfather; Diabetes in her father; Heart disease (age of onset: 61) in her  father. There is no history of Colon cancer.    Medications  Reviewed and reconciled.      Allergies  Allergies   Allergen Reactions     Lidocaine      Lidocaine Hcl          OBJECTIVE  Physical Exam:  Vital signs: /64 (Patient Site: Right Arm, Patient Position: Sitting, Cuff Size: Adult Regular)  Pulse 70  Temp 99  F (37.2  C) (Oral)   Resp 16  Wt 186 lb (84.4 kg)  BMI 30.48 kg/m2  Weight: 186 lb (84.4 kg)    General appearance: pleasant, appears stated age, cooperative and in no distress  Eyes: EOMs intact, PERRL, conjunctivae normal.   ENT: moist oral mucosa, posterior oropharynx normal  Lymph: no cervical/supraclavicular adenopathy  Respiratory: clear to auscultation bilaterally, good air movement throughout, no wheezing or crackles, speaking full sentences without difficulty  Cardiovascular: regular rate and rhythm, no murmur appreciated, no leg edema  Musculoskeletal: warm and well perfused, strong and symmetric dorsalis pedal pulses, strength 5/5 and equal bilaterally  Neck: Cervical spine non-tender to palpation. Full ROM with pain with turning head to left. Negative Spurlings.  Bilateral Shoulder: skin without erythema, swelling or ecchymosis. No atrophy noted. Non-tender to palpation over the SC joint, clavicle, AC joint, or bicipital groove. Full ROM. Rotator cuff strength testing 5/5 bilaterally (abduction, external and internal rotation). Negative Neer's. No winging of scapula or abnormal scapular movements noted.  CMS intact. Trapezius muscles seemed tight and mildly tender to palpation  Skin: no rashes  Neuro: alert oriented x 3, grossly normal otherwise, reflexes intact throughout.     ASSESSMENT/ PLAN    1. Neck and shoulder pain  31 y.o. old female with a past medical history of depression/anxiety and overweight who presented to clinic today for further evaluation of neck/shoulder pain that came on after she was involved in an elevator accident, with the elevator slamming to a stop.  Patient did not experience fall, head injury or any other red flag concerning symptoms. She appears well on exam. MSK and neurologic exam completely except mild tenderness to palpation of the trap muscles and also pain with turning head to the left but full ROM of neck nonetheless. Most likely muscular pain in nature. Reassured patient. I advised patient to go with conservative therapy - ice/heating, tylenol/advil for pain as needed,and massage. Patient verbalized understanding. rtc if not improved in 1-2 weeks.    Katalina Johnson MD

## 2021-06-11 NOTE — PROGRESS NOTES
ASSESSMENT/ PLAN    1. Migraines  X 2 weeks, not getting better, improves with Excedrin, associated with nausea and dizziness. No neurological deficit today. Similar in severity and characteristic of previous migraine episodes. Family h/o significant for migraines. Unlikely intracranial pathology/infection leading to her symptoms. Will treat symptomatically with naprosyn x 2 weeks and zofran as needed for nausea. Will check pregnancy test to ensure patient is not pregnant prior to taking naprosyn. Will also give her sumatriptan for future migraine headaches. RTC if not improving or migraines occur more frequently or worsening in her symptoms.   - naproxen (NAPROSYN) 500 MG tablet; Take 1 tablet (500 mg total) by mouth 2 (two) times a day with meals for 14 days.  Dispense: 28 tablet; Refill: 0  - ondansetron (ZOFRAN, AS HYDROCHLORIDE,) 4 MG tablet; Take 1 tablet (4 mg total) by mouth every 8 (eight) hours as needed for nausea.  Dispense: 10 tablet; Refill: 0  - Pregnancy, Urine  - SUMAtriptan (IMITREX) 50 MG tablet; Take 1 tablet (50 mg total) by mouth once as needed for migraine.  Dispense: 18 tablet; Refill: 0      2. RAD (reactive airway disease), mild persistent, uncomplicated  Feels that her asthma isn't well controlled, using rescue inhaler 3-4 times/week and report 3-4 days of symptoms a week. No night time symptom. Spirometry with bronchodilator performed in clinic today and I reviewed result - no sign of airway obstruction, no improvement after administration of bronchodilator. I doubt it's a good test. I reviewed previous PFT performed about 4 years ago through [facility] and it was also normal. Will send for another PFT since spirometry today was not a good test. In the meantime, will escalate her asthma administration with addition of Flovent low dose. Will follow up with patient once PFT is completed. Patient agreed with plan. Patient did not appear to be in asthma exacerbation today.    - fluticasone (FLOVENT HFA) 110 mcg/actuation inhaler; Inhale 1 puff 2 (two) times a day.  Dispense: 1 Inhaler; Refill: 2  - Spirometry with bronchodilator  - albuterol nebulizer solution 3 mL (PROVENTIL); Take 3 mL by nebulization once.  - PFT Complete; Future  - PFT Bronchial Challenge with Methacholine; Future      Katalina Johnson MD        SUBJECTIVE   Myrna Brothers is a 31 y.o. old female with a past medical history of TMJ, anxiety, reactive airway disease who presented to clinic today for further evaluation of migraine headaches, nausea, dizziness. Her symptoms have been ongoing for about 2 weeks, similar in severity and characteristics to previous migraine episodes. She has been going to physical therapy for TMJ disorder which is improving. Migraines have been occurring every day. Denies vomiting. Decreased appetite because of headache. Fatigued every day. Dizziness is described as lightheaded, aggravated by changing position. Has has migraines before, once every couple months but not this frequent. Pain located behind both eyes, and temporal regions. Denies photosensitivity or phono-sensitivity. Pain is rated 7-8/10. Doesn't wake her up at night. Able to sleep through the night so far. Waking up with nausea in the morning. Has become sensitive to smell recently. Has tried Excedrin migraine and aromatherapy which help a little bit but migraines do not go away. Denies facial droop, slurry speech, loss of sensation anywhere. Denies TBI. Migraines run in her family. Denies increased stressors. Her mood has been very good. 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 Denies fever/chills.     Also reports her asthma seems to have gotten worse and would like to discuss this. She has symptoms every day now including chest tightness and cough at least 3-4 days/week and using 3-4 times a week of the rescue inhaler. Some mild seasonal allergies and she's  "been taking OTC allergic medication. Last PFT was 3-4 years ago and normal per my chart review.       Review of Systems:  A 12 point comprehensive review of systems was negative except as noted in HPI.    Medical History  Active Ambulatory (Non-Hospital) Problems    Diagnosis     Restless sleeper     Temporomandibular dysfunction syndrome     Overweight (BMI 25.0-29.9)     Encounter for surveillance of contraceptive pills     Other depression     RAD (reactive airway disease)     Anxiety     Irregular Length Of Menstrual Periods     Breast Fibrocystic Disease     Past Medical History:   Diagnosis Date     Fibrocystic breast        Surgical History  She  has a past surgical history that includes [place] tooth extraction.    Social History  Reviewed, and she  reports that she has never smoked. She does not have any smokeless tobacco history on file. She reports that she drinks alcohol. She reports that she does not use illicit drugs.    Family History  Reviewed, and family history includes Breast cancer (age of onset: 51) in her mother; Cancer in her maternal grandfather and paternal grandfather; Diabetes in her father; Heart disease (age of onset: 61) in her father. There is no history of Colon cancer.    Medications  Reviewed and reconciled    Allergies  Allergies   Allergen Reactions     Lidocaine      Lidocaine Hcl          OBJECTIVE  Physical Exam:  Vital signs: /62 (Patient Site: Right Arm, Patient Position: Sitting, Cuff Size: Adult Large)  Temp 98.7  F (37.1  C) (Oral)   Resp 16  Ht 5' 5.5\" (1.664 m)  Wt 187 lb (84.8 kg)  LMP 05/23/2017 (Approximate)  Breastfeeding? No  BMI 30.65 kg/m2  Weight: 187 lb (84.8 kg)    General appearance: pleasant, appears stated age, cooperative and in no distress  Eyes: EOMs intact, PERRL, conjunctivae normal.   ENT: moist oral mucosa, posterior oropharynx normal, TMs normal.   Lymph: no cervical/supraclavicular adenopathy  Respiratory: clear to auscultation " bilaterally, good air movement throughout, no wheezing or crackles, speaking full sentences without difficulty  Cardiovascular: regular rate and rhythm, no murmur appreciated, no leg edema  Abdomen: active bowel sounds, soft, non-tender, non-distended  Musculoskeletal: warm and well perfused, strong and symmetric dorsalis pedal pulses, strength 5/5 and equal bilaterally  Skin: no rashes  Neuro: alert oriented x 3, grossly normal otherwise  Psych: normal affect, appropriate conversation

## 2021-06-13 NOTE — PROGRESS NOTES
Chief Complaint   Patient presents with     Sinus Problem     10 days, dizzy, stuffy feeling, sore throat, fever, head ache ear ache         HPI:   Myrna Brothers is a 31 y.o. female has had lots of head congestion for the last 10 days. Temp to 100.  No sweats.  Cough --nonproductive.  No shortness of breath .  No chest pain.  Sore thorat worsening the last few days. myalgias  Ibuprofen.  Using claritin--uses daily for the year.    ROS:  Constitutional: as per HPi  Eyes: negative   ENT: as per HPI  Respiratory: as per HPI   CV: negative   GI: as per HPi  : negative   SKIN: negative   MS: negative   NEURO: as per HPI     Medications:  Current Outpatient Prescriptions on File Prior to Visit   Medication Sig Dispense Refill     albuterol (PROAIR HFA) 90 mcg/actuation inhaler Inhale 1-2 puffs as needed. Every 4-6 hours. 1 each 6     escitalopram oxalate (LEXAPRO) 20 MG tablet TAKE 1 TABLET(20 MG) BY MOUTH DAILY 90 tablet 0     fluticasone (FLOVENT HFA) 110 mcg/actuation inhaler Inhale 1 puff 2 (two) times a day. 1 Inhaler 2     propranolol (INDERAL) 10 MG tablet TAKE 1 TO 2 TABLETS BY MOUTH TWICE DAILY 30 MINUTES TO 1 HOUR BEFORE SPEAKING OR PERFORMANCE 180 tablet 0     ondansetron (ZOFRAN, AS HYDROCHLORIDE,) 4 MG tablet Take 1 tablet (4 mg total) by mouth every 8 (eight) hours as needed for nausea. 10 tablet 0     SUMAtriptan (IMITREX) 50 MG tablet Take 1 tablet (50 mg total) by mouth once as needed for migraine. 18 tablet 0     Current Facility-Administered Medications on File Prior to Visit   Medication Dose Route Frequency Provider Last Rate Last Dose     albuterol nebulizer solution 2.5 mg (PROVENTIL)  2.5 mg Nebulization Once Kendra Davidson MD             Social History:  Social History   Substance Use Topics     Smoking status: Never Smoker     Smokeless tobacco: Never Used     Alcohol use 0.0 - 0.6 oz/week     0 - 1 Shots of liquor per week      Comment: rare         Physical Exam:   Vitals:     "10/13/17 1251   BP: 124/70   Pulse: 64   Resp: 16   Temp: 98.9  F (37.2  C)   TempSrc: Oral   Weight: 191 lb (86.6 kg)   Height: 5' 5.5\" (1.664 m)       GENERAL:   Alert. Oriented.  EYES: Clear  HENT:  Ears: R TM pearly gray. Normal landmarks. L TM pearly gray.  Normal landmarks  Nose: Clear.  Sinuses: tender bilateral maxillary sinuses.  Oropharynx:  No erythema. No exudate.  NECK: Supple. No adenopathy.  LUNGS: Clear to ascultation.  No crackles.  No wheezing  HEART: RRR  SKIN:  No rash.         Assessment/Plan:    1. Acute sinusitis  amoxicillin-clavulanate (AUGMENTIN) 500-125 mg per tablet   2. Allergic rhinitis  fluticasone (FLONASE) 50 mcg/actuation nasal spray      Antibiotic as directed.  Warm moist compresses to face.  Tylenol or ibuprofen as needed for pain or fever.  Sudafed as needed for congestion.  Afrin nasal spray as needed for congestion, no longer than 3 days.  Saline nasal spray.  Delsym as needed for cough.  Mucinex as needed to thin secretions.  F/U if worsening or not improving.     Given flonase nasal spray to try for allergies      The following portions of the patient's history were reviewed and updated as appropriate: allergies, current medications, past family history, past medical history, past social history, past surgical history and problem list.    Agata Hwang MD      10/13/2017        "

## 2021-07-23 ENCOUNTER — RECORDS - HEALTHEAST (OUTPATIENT)
Dept: LAB | Facility: CLINIC | Age: 36
End: 2021-07-23

## 2021-07-23 DIAGNOSIS — N64.4 MASTODYNIA: ICD-10-CM

## 2021-10-10 ENCOUNTER — HEALTH MAINTENANCE LETTER (OUTPATIENT)
Age: 36
End: 2021-10-10

## 2022-01-29 ENCOUNTER — HEALTH MAINTENANCE LETTER (OUTPATIENT)
Age: 37
End: 2022-01-29

## 2022-04-18 NOTE — TELEPHONE ENCOUNTER
Pharmacy notified.     Gisel Oconnor RN   .[facility]    
Please call pharmacy as should be routed to be refilled by her psychiatrist   
sertraline (ZOLOFT) 100 MG tablet      Last Written Prescription Date:  unknown  Last Fill Quantity: unknown,   # refills: 0  Last Office Visit: 11/15/18  Future Office visit:    Next 5 appointments (look out 90 days)    Jan 03, 2019  8:30 AM CST  [facility] [address] [place] [id number]  [phone]   Jan 11, 2019 11:30 AM CST  [facility] [address] [place] [id number]  [phone]           Routing refill request to provider for review/approval because:  Medication is reported/historical        Staff has received multiple requests for this medication. Please advise    DAHLIA Ruffin      
Quality 110: Preventive Care And Screening: Influenza Immunization: Influenza Immunization Administered during Influenza season
Detail Level: Detailed

## 2022-09-17 ENCOUNTER — HEALTH MAINTENANCE LETTER (OUTPATIENT)
Age: 37
End: 2022-09-17

## 2022-11-18 NOTE — PROGRESS NOTES
"[facility]  Integrated Behavioral Health  November 21, 2018    Behavioral Health Clinician Progress Note    Patient Name: Myrna Brothers           Service Type:  Individual      Service Location:   Face to Face in Clinic     Session Start Time: 9: 0 am  Session End Time:       Session Length: 38 - 52      Attendees: Patient    Visit Activities (Refresh list every visit): [facility] [place] Only    Diagnostic Assessment Date: 4/12/18  Treatment Plan Review Date: 11/8/18  See Flowsheets for today's PHQ-9 and OREN-7 results  Previous PHQ-9:   PHQ-9 SCORE 2/28/2018 4/16/2018 7/13/2018   Total Score 5 11 1     Previous OREN-7:   OREN-7 SCORE 2/28/2018 4/16/2018 7/13/2018   Total Score 5 18 1       LUKASZ LEVEL:  No flowsheet data found.    DATA  Extended Session (60+ minutes): No  Interactive Complexity: No  Crisis: No  [facility] Patient: No    Treatment Objective(s) Addressed in This Session:  Target Behavior(s): disease management/lifestyle changes , mental health management    Depressed Mood: Decrease frequency and intensity of feeling down, depressed, hopeless  Identify negative self-talk and behaviors: challenge core beliefs, myths, and actions  Anxiety: will experience a reduction in anxiety, will develop more effective coping skills to manage anxiety symptoms, will develop healthy cognitive patterns and beliefs and will increase ability to function adaptively    Current Stressors / Issues:  Patient reported significant improvement in mental health symptoms since previous visit. She stated that she has been \"very happy\" due to positive interactions with her son and feeling more confident in her abilities.  Patient stated that she also spoke with her mother about her symptoms, and had a positive interaction. She stated that it normalized her feelings, helped her to feel less alone, and her mother was able to remind her that she is a good mother.  Patient reported that she has been more mindful of her " [address] 46  Progress Note - [address] 1963, 62 y o  female MRN: [id number]  Unit/Bed#: E4 -01 Encounter: [id number]  Primary Care Provider: ALEX Carr   Date and time admitted to hospital: 10/21/2022  7:58 PM    * Stroke [facility])  Assessment & Plan  62year old female is currently admitted due to a moderate to large volume left MCA territory stroke  · Repeat CT head showed an evolving acute infarct in right parietal-temporal-occipital lobes with questionable petechial cortical hemorrhage in right parietal lobe  Discussed with Dr Zakiya Pickens  Recommended that we continue anticoagulation for now due to the clinical insignificance of the size and the risk of taking her off anticoagulation far outweighs the benefits off of it  PAD (peripheral artery disease) ([facility])  Assessment & Plan  Uncontrolled diabetes, hypertension, hyperlipidemia  Lost to follow-up and poorly compliant to diabetic regimen  · S/p abdominal aortogram,  LLE SFA shockwave angioplasty/FATOU 10/31/22   · S/p R femoral endarterectomy 11/7  · S/p aspiration thrombectomy in right common iliac artery and left SFA  · Heparin drip transitioned to Argatroban in the setting of thrombocytopenia and concern for HIT  · Continue statin / Plavix  · PICC line requested  · Per cardiology may proceed with surgical intervention  Awaiting final schedule from vascular surgery  Tentative plan Monday versus Tuesday  UTI (urinary tract infection)  Assessment & Plan  UA positive, but likely contaminant rather than an actual urinary tract infection  · Appreciate ID recommendations  Monitor off antibiotics  Jacinto catheter replaced 11/16/2022    Thrombocytopenia [facility])  Assessment & Plan  Possibly due to HIT   Resolved  · Awaiting heparin antibody by serotonin release  · Heparin induced antibody elevated    Recent Labs     11/16/22  0904 11/17/22  0504 11/18/22  0410    382 384         Diabetic ulcer of heel "interactions with her son, recognizes when he responds, and is able to focus on the positive interactions even when a negative interaction occurs.  Patient shared that the daily self reflection on what has gone well has been helpful.  She noted decrease in anxiety when her son cries, because she is now looking forward to demonstrating to herself that she can help him through his cries. Patient continued to discuss strategies to help her to establish realistic goals for her and her son each day, and she shared that she is less overwhelmed by all that she needs to accomplish in the day. [facility] reviewed strategies to help patient to continue with these new beliefs about herself in moments of heightened emotional intensity. Patient denied SI. She stated that she has not had SI since her ED visit. She expressed goal of reacting and responding to suicidal thoughts in the future (to reduce them) instead of ruminating and feeling \"comfort\" in them.    Progress on Treatment Objective(s) / Homework:  Satisfactory progress - ACTION (Actively working towards change); Intervened by reinforcing change plan / affirming steps taken    Motivational Interviewing    MI Intervention: Expressed Empathy/Understanding, Supported Autonomy, Collaboration, Evocation, Permission to raise concern or advise, Open-ended questions, Change talk (evoked) and Reframe     Change Talk Expressed by the Patient: Ability to change Reasons to change Need to change    Provider Response to Change Talk: E - Evoked more info from patient about behavior change, A - Affirmed patient's thoughts, decisions, or attempts at behavior change, R - Reflected patient's change talk and S - Summarized patient's change talk statements    Also provided psychoeducation about behavioral health condition, symptoms, and treatment options    Care Plan review completed: Yes    Medication Review:  No changes to current psychiatric medication(s)    Medication " associated with diabetes mellitus due to underlying condition [facility])  Assessment & Plan  Vascular surgery planning bilateral BKA  Awaiting definite intervention date     Acute on chronic anemia  Assessment & Plan  S/p 2U pRBC  Once intervention date has been set, will likely have to transfuse her to 8 0 per my discussion with vascular surgery today  Recent Labs     11/17/22  0504 11/18/22  0410   HGB 7 5* 7 1*         Type 2 diabetes mellitus with hyperglycemia, with long-term current use of insulin [facility])  Assessment & Plan  Lab Results   Component Value Date    HGBA1C 9 8 (H) 10/25/2022     Recent Labs     11/17/22  1546 11/17/22  2057 11/18/22  0744 11/18/22  1103   POCGLU 187* 190* 129 145*     · Inpatient regimen: Sliding scale, Glargine 15U HS    Benign essential hypertension  Assessment & Plan  Above goal  · Continue amlodipine, Cardura    Depression, recurrent ([facility] [address] )  Assessment & Plan  Evaluated by Psychiatry on this admission  · Continue Zoloft 150 mg daily / Quetiapine 25mg po Qhs    Acute respiratory failure with hypoxia ([facility] [address] )  Assessment & Plan  Echo on 11/07 shows 05% LVEF, no diastolic dysfunction  Postprocedural hypoxia  · Currently on 2L NC      VTE Pharmacologic Prophylaxis:   Pharmacologic: Argatroban  Mechanical VTE Prophylaxis in Place: Yes    Discussions with Specialists or Other Care Team Provider: nursing, picc team, ID    Education and Discussions with Family / Patient: patient, Junious Grief    Time Spent for Care: 30 minutes  More than 50% of total time spent on counseling and coordination of care as described above  Current Length of Stay: 27 day(s)    Current Patient Status: Inpatient   Certification Statement: The patient will continue to require additional inpatient hospital stay due to vascular intervention, picc line placement    Discharge Plan: next weej    Code Status: Level 1 - Full Code      Subjective:   Patient seen and examined at bedside  Complaining about her chronic leg pain  Compliance:  Yes    Changes in Health Issues:   None reported    Chemical Use Review:   Substance Use: Chemical use reviewed, no active concerns identified      Tobacco Use: No current tobacco use.      Assessment: Current Emotional / Mental Status (status of significant symptoms):  Risk status (Self / Other harm or suicidal ideation)  Patient has had a history of suicidal ideation: in correlation with stress, sent text on 4/13 about wanting to die, but no active SI. Per patient and , patient has had history of passive SI for more than 10 years and denies a history of suicide attempts, self-injurious behavior, homicidal ideation, homicidal behavior and and other safety concerns  Patient denies current fears or concerns for personal safety.  Patient denies current or recent suicidal ideation or behaviors.  Patient denies current or recent homicidal ideation or behaviors.  Patient denies current or recent self injurious behavior or ideation.  Patient denies other safety concerns.  A safety and risk management plan has not been developed at this time, however patient was encouraged to call [place] [phone] should there be a change in any of these risk factors.    Appearance:   Appropriate   Eye Contact:   Good   Psychomotor Behavior: Normal   Attitude:   Cooperative   Orientation:   All  Speech   Rate / Production: Normal    Volume:  Normal   Mood:    Normal  Affect:    Appropriate   Thought Content:  Clear   Thought Form:  Coherent  Logical   Insight:    Good     Diagnoses:  No diagnosis found.    Collateral Reports Completed:  Communicated with: PCP    Plan: (Homework, other):  Patient was given information about behavioral services and encouraged to schedule a follow up appointment with the clinic [facility] in 1 week.  She was also given Cognitive Behavioral Therapy skills to practice when experiencing anxiety and depression.  Patient also given resources for mother and baby support groups, postpartum depression  No new issues overnight  Objective:     Vitals:   Temp (24hrs), Av 7 °F (37 1 °C), Min:97 8 °F (36 6 °C), Max:99 6 °F (37 6 °C)    Temp:  [97 8 °F (36 6 °C)-99 6 °F (37 6 °C)] 97 8 °F (36 6 °C)  HR:  [85-97] 86  Resp:  [20-22] 22  BP: (114-155)/(58-79) 144/70  SpO2:  [90 %-93 %] 90 %  Body mass index is 41 06 kg/m²  Input and Output Summary (last 24 hours): Intake/Output Summary (Last 24 hours) at 2022 1128  Last data filed at 2022 0640  Gross per 24 hour   Intake --   Output 1275 ml   Net -1275 ml       Physical Exam:     Physical Exam  Vitals reviewed  Constitutional:       General: She is not in acute distress  HENT:      Head: Normocephalic  Nose: Nose normal       Mouth/Throat:      Mouth: Mucous membranes are moist    Eyes:      General: No scleral icterus  Cardiovascular:      Rate and Rhythm: Normal rate  Pulmonary:      Effort: Pulmonary effort is normal  No respiratory distress  Abdominal:      General: There is no distension  Palpations: Abdomen is soft  Tenderness: There is no abdominal tenderness  Musculoskeletal:      Comments: Bilateral lower extremity ecchymosis / wounds   Skin:     General: Skin is warm  Neurological:      Mental Status: She is alert  Mental status is at baseline     Psychiatric:         Mood and Affect: Mood normal          Behavior: Behavior normal        Additional Data:     Labs:    Results from last 7 days   Lab Units 22  0410   WBC Thousand/uL 20 85*   HEMOGLOBIN g/dL 7 1*   HEMATOCRIT % 23 1*   PLATELETS Thousands/uL 384   NEUTROS PCT % 81*   LYMPHS PCT % 7*   MONOS PCT % 8   EOS PCT % 2     Results from last 7 days   Lab Units 22  0410 22  0619 22  0148   SODIUM mmol/L 139   < > 140   POTASSIUM mmol/L 3 4*   < > 3 1*   CHLORIDE mmol/L 103   < > 102   CO2 mmol/L 31   < > 31   BUN mg/dL 10   < > 16   CREATININE mg/dL 1 10   < > 1 08   ANION GAP mmol/L 5   < > 7   CALCIUM mg/dL 7 8*   < > 7 8* "\"warm line\". CD Recommendations: No indications of CD issues.  Stephanie Calabrese, [facility], [facility]      ______________________________________________________________________    Integrated Primary Care Behavioral Health Treatment Plan    Patient's Name: Myrna Brothers  YOB: 1985    Date: 4/23/18    DSM-V Diagnoses: 296.32 (F33.1) Major Depressive Disorder, Recurrent Episode, Moderate _ or 300.02 (F41.1) Generalized Anxiety Disorder  Psychosocial / Contextual Factors: recently postpartum  WHODAS: TBD    Referral / Collaboration:  Patient has appointment with psychiary 11/16/18.    Anticipated number of session or this episode of care: 6-8      MeasurableTreatment Goal(s) related to diagnosis / functional impairment(s)  Goal 1: Patient will reduce feelings of depression as evidenced by decreased score on PHQ-9.    I will know I've met my goal when I feel less down and emotional.      Objective #A (Patient Action)    Patient will Decrease frequency and intensity of feeling down, depressed, hopeless.  Status: New - Date: 4/23/18     Intervention(s)  [facility] will assign homework to assist with behavioral activation/activity scheduling.    Objective #B  Patient will Identify negative self-talk and behaviors: challenge core beliefs, myths, and actions.  Status: New - Date: 4/23/18     Intervention(s)  [facility] will teach about automatic negative thoughts, irrational core beliefs, and cognitive restructuring techniques.        Goal 2: Patient will reduce feelings of anxiety as evidenced by decreased score on GAD7.    I will know I've met my goal when I feel less anxious and worried.      Objective #A (Patient Action)    Status: New - Date: 4/23/18     Patient will identify three distraction and diversion activities and use those activities to decrease level of anxiety  .    Intervention(s)  [facility] will assign homework to practice distress tolerance and mindfulness techniques between session.    Objective #B  Patient will use " ALBUMIN g/dL  --   --  1 5*   TOTAL BILIRUBIN mg/dL  --   --  0 67   ALK PHOS U/L  --   --  232*   ALT U/L  --   --  53   AST U/L  --   --  114*   GLUCOSE RANDOM mg/dL 142*   < > 85    < > = values in this interval not displayed  Results from last 7 days   Lab Units 11/11/22  1918   INR  3 70*     Results from last 7 days   Lab Units 11/18/22  1103 11/18/22  0744 11/17/22 2057 11/17/22  1546 11/17/22  1118 11/17/22  0752 11/16/22 2055 11/16/22  1600 11/16/22  1115 11/16/22  0725 11/15/22  2117 11/15/22  1607   POC GLUCOSE mg/dl 145* 129 190* 187* 159* 135 148* 153* 154* 143* 179* 134         Results from last 7 days   Lab Units 11/15/22  0559 11/14/22  0619 11/13/22  0148   LACTIC ACID mmol/L  --   --  1 0   PROCALCITONIN ng/ml 0 93* 1 00* 0 48*           * I Have Reviewed All Lab Data Listed Above  * Additional Pertinent Lab Tests Reviewed: [address] Admission Reviewed      Lines:  Invasive Devices     Peripheral Intravenous Line  Duration           Peripheral IV 11/15/22 Left Antecubital 2 days    Peripheral IV 11/15/22 Right Antecubital 2 days          Drain  Duration           Urethral Catheter Non-latex 18 Fr  1 day               Imaging:    Imaging Reports Reviewed Today Include: No new imaging    Recent Cultures (last 7 days):     Results from last 7 days   Lab Units 11/13/22  0109 11/12/22  1625   BLOOD CULTURE  No Growth After 4 Days    Staphylococcus coagulase negative*  --    GRAM STAIN RESULT  Gram positive cocci in clusters*  --    URINE CULTURE   --  >100,000 cfu/ml Enterobacter cloacae complex*  70,000-79,000 cfu/ml Kluyveromyces marxianus*       Last 24 Hours Medication List:   Current Facility-Administered Medications   Medication Dose Route Frequency Provider Last Rate   • acetaminophen  650 mg Oral Q6H PRN Frank Tovar MD     • ALPRAZolam  0 25 mg Oral Daily PRN Anahi Kendrick MD     • amLODIPine  10 mg Oral Daily Frank Tovar MD     • argatroban  0 1-3 mcg/kg/min Intravenous Titrated Link Ren MD 0 1 mcg/kg/min (11/17/22 9580)   • atorvastatin  40 mg Oral Daily With Sho Garrett MD     • [facility] Hold] chlorhexidine  15 mL Swish & Spit Once Genoveva Ibanez DO     • clopidogrel  75 mg Oral Daily Mariana Gerard MD     • doxazosin  2 mg Oral Daily Mariana Gerard MD     • gabapentin  200 mg Oral BID Mariana Gerard MD     • heparin 2000 units and papaverine 60 mg in isolyte equivalent 500 mL irrigation bag   Irrigation Once Anastacia Baker MD     • insulin glargine  15 Units Subcutaneous HS Mariana Gerard MD     • insulin lispro  1-6 Units Subcutaneous TID AC Mariana Gerard MD     • naloxone  0 04 mg Intravenous Q1MIN PRN Mariana Gerard MD     • nystatin   Topical BID Mariana Gerard MD     • ondansetron  4 mg Intravenous Q6H PRN Mariana Gerard MD     • oxyCODONE  5 mg Oral Q4H PRN Link Ren MD     • pantoprazole  40 mg Oral Early Morning Mariana Gerard MD     • polyethylene glycol  17 g Oral Daily PRN Mariana Gerard MD     • QUEtiapine  25 mg Oral HS Mariana Gerard MD     • senna  1 tablet Oral HS PRN ALEX Rossi     • sertraline  150 mg Oral Daily Mariana Gerard MD          Today, Patient Was Seen By: Ivana Clement MD    ** Please Note: Dictation voice to text software may have been used in the creation of this document   ** cognitive strategies identified in therapy to challenge anxious thoughts.    Status: New - Date: 4/23/18     Intervention(s)  [facility] will teach CBT techniques related to cognitive restructuring.    Objective #C  Patient will use relaxation strategies 1-2 times per day to reduce the physical symptoms of anxiety.  Status: New - Date: 4/23/18     Intervention(s)  [facility] will role-play relaxation techniques.    Patient has reviewed and agreed to the above plan.      Stephanie Calabrese, KVNG  April 23, 2018

## 2023-05-06 ENCOUNTER — HEALTH MAINTENANCE LETTER (OUTPATIENT)
Age: 38
End: 2023-05-06

## 2024-02-25 ENCOUNTER — HEALTH MAINTENANCE LETTER (OUTPATIENT)
Age: 39
End: 2024-02-25

## 2024-07-26 ASSESSMENT — ANXIETY QUESTIONNAIRES
8. IF YOU CHECKED OFF ANY PROBLEMS, HOW DIFFICULT HAVE THESE MADE IT FOR YOU TO DO YOUR WORK, TAKE CARE OF THINGS AT HOME, OR GET ALONG WITH OTHER PEOPLE?: SOMEWHAT DIFFICULT
2. NOT BEING ABLE TO STOP OR CONTROL WORRYING: SEVERAL DAYS
5. BEING SO RESTLESS THAT IT IS HARD TO SIT STILL: SEVERAL DAYS
3. WORRYING TOO MUCH ABOUT DIFFERENT THINGS: SEVERAL DAYS
6. BECOMING EASILY ANNOYED OR IRRITABLE: SEVERAL DAYS
GAD7 TOTAL SCORE: 7
IF YOU CHECKED OFF ANY PROBLEMS ON THIS QUESTIONNAIRE, HOW DIFFICULT HAVE THESE PROBLEMS MADE IT FOR YOU TO DO YOUR WORK, TAKE CARE OF THINGS AT HOME, OR GET ALONG WITH OTHER PEOPLE: SOMEWHAT DIFFICULT
1. FEELING NERVOUS, ANXIOUS, OR ON EDGE: SEVERAL DAYS
4. TROUBLE RELAXING: SEVERAL DAYS
7. FEELING AFRAID AS IF SOMETHING AWFUL MIGHT HAPPEN: SEVERAL DAYS
7. FEELING AFRAID AS IF SOMETHING AWFUL MIGHT HAPPEN: SEVERAL DAYS
GAD7 TOTAL SCORE: 7

## 2024-08-01 ASSESSMENT — PATIENT HEALTH QUESTIONNAIRE - PHQ9
SUM OF ALL RESPONSES TO PHQ QUESTIONS 1-9: 9
10. IF YOU CHECKED OFF ANY PROBLEMS, HOW DIFFICULT HAVE THESE PROBLEMS MADE IT FOR YOU TO DO YOUR WORK, TAKE CARE OF THINGS AT HOME, OR GET ALONG WITH OTHER PEOPLE: SOMEWHAT DIFFICULT
SUM OF ALL RESPONSES TO PHQ QUESTIONS 1-9: 9
10. IF YOU CHECKED OFF ANY PROBLEMS, HOW DIFFICULT HAVE THESE PROBLEMS MADE IT FOR YOU TO DO YOUR WORK, TAKE CARE OF THINGS AT HOME, OR GET ALONG WITH OTHER PEOPLE: SOMEWHAT DIFFICULT

## 2024-08-02 ENCOUNTER — VIRTUAL VISIT (OUTPATIENT)
Dept: FAMILY MEDICINE | Facility: CLINIC | Age: 39
End: 2024-08-02
Payer: COMMERCIAL

## 2024-08-02 ENCOUNTER — MYC MEDICAL ADVICE (OUTPATIENT)
Dept: FAMILY MEDICINE | Facility: CLINIC | Age: 39
End: 2024-08-02

## 2024-08-02 ENCOUNTER — VIRTUAL VISIT (OUTPATIENT)
Dept: BEHAVIORAL HEALTH | Facility: CLINIC | Age: 39
End: 2024-08-02
Payer: COMMERCIAL

## 2024-08-02 DIAGNOSIS — F41.1 GENERALIZED ANXIETY DISORDER: Primary | ICD-10-CM

## 2024-08-02 DIAGNOSIS — F33.0 MILD EPISODE OF RECURRENT MAJOR DEPRESSIVE DISORDER (H): ICD-10-CM

## 2024-08-02 DIAGNOSIS — F33.1 MODERATE EPISODE OF RECURRENT MAJOR DEPRESSIVE DISORDER (H): ICD-10-CM

## 2024-08-02 DIAGNOSIS — F41.9 ANXIETY: Primary | ICD-10-CM

## 2024-08-02 DIAGNOSIS — F41.9 ANXIETY: ICD-10-CM

## 2024-08-02 PROCEDURE — 99203 OFFICE O/P NEW LOW 30 MIN: CPT | Mod: 95 | Performed by: PHYSICIAN ASSISTANT

## 2024-08-02 PROCEDURE — 90791 PSYCH DIAGNOSTIC EVALUATION: CPT | Mod: 95

## 2024-08-02 RX ORDER — VENLAFAXINE HYDROCHLORIDE 75 MG/1
75 CAPSULE, EXTENDED RELEASE ORAL DAILY
COMMUNITY
Start: 2023-08-01 | End: 2024-08-02

## 2024-08-02 RX ORDER — VENLAFAXINE HYDROCHLORIDE 150 MG/1
150 CAPSULE, EXTENDED RELEASE ORAL DAILY
Qty: 90 CAPSULE | Refills: 3 | Status: SHIPPED | OUTPATIENT
Start: 2024-08-02 | End: 2024-08-02

## 2024-08-02 RX ORDER — VENLAFAXINE HYDROCHLORIDE 75 MG/1
75 CAPSULE, EXTENDED RELEASE ORAL DAILY
Qty: 90 CAPSULE | Refills: 3 | Status: SHIPPED | OUTPATIENT
Start: 2024-08-02

## 2024-08-02 RX ORDER — VENLAFAXINE HYDROCHLORIDE 150 MG/1
150 CAPSULE, EXTENDED RELEASE ORAL DAILY
COMMUNITY
Start: 2024-03-18 | End: 2024-08-02

## 2024-08-02 RX ORDER — ROSUVASTATIN CALCIUM 10 MG/1
10 TABLET, COATED ORAL DAILY
COMMUNITY
Start: 2024-02-23 | End: 2024-09-12

## 2024-08-02 RX ORDER — ARIPIPRAZOLE 5 MG/1
5 TABLET ORAL DAILY
Qty: 90 TABLET | Refills: 3 | Status: SHIPPED | OUTPATIENT
Start: 2024-08-02 | End: 2024-09-12

## 2024-08-02 RX ORDER — BUPROPION HYDROCHLORIDE 300 MG/1
300 TABLET ORAL EVERY MORNING
Qty: 90 TABLET | Refills: 3 | Status: SHIPPED | OUTPATIENT
Start: 2024-08-02

## 2024-08-02 RX ORDER — NORETHINDRONE ACETATE AND ETHINYL ESTRADIOL, ETHINYL ESTRADIOL AND FERROUS FUMARATE 1MG-10(24)
1 KIT ORAL DAILY
COMMUNITY
Start: 2024-02-23 | End: 2024-09-12

## 2024-08-02 RX ORDER — ARIPIPRAZOLE 5 MG/1
5 TABLET ORAL DAILY
COMMUNITY
Start: 2024-03-18 | End: 2024-08-02

## 2024-08-02 RX ORDER — BUPROPION HYDROCHLORIDE 300 MG/1
300 TABLET ORAL EVERY MORNING
COMMUNITY
Start: 2023-08-01 | End: 2024-08-02

## 2024-08-02 ASSESSMENT — COLUMBIA-SUICIDE SEVERITY RATING SCALE - C-SSRS
REASONS FOR IDEATION PAST MONTH: COMPLETELY TO END OR STOP THE PAIN (YOU COULDN'T GO ON LIVING WITH THE PAIN OR HOW YOU WERE FEELING)
ATTEMPT LIFETIME: NO
1. IN THE PAST MONTH, HAVE YOU WISHED YOU WERE DEAD OR WISHED YOU COULD GO TO SLEEP AND NOT WAKE UP?: YES
1. HAVE YOU WISHED YOU WERE DEAD OR WISHED YOU COULD GO TO SLEEP AND NOT WAKE UP?: YES
REASONS FOR IDEATION LIFETIME: COMPLETELY TO END OR STOP THE PAIN (YOU COULDN'T GO ON LIVING WITH THE PAIN OR HOW YOU WERE FEELING)
2. HAVE YOU ACTUALLY HAD ANY THOUGHTS OF KILLING YOURSELF?: NO

## 2024-08-02 NOTE — PROGRESS NOTES
"[facility]    PATIENT'S NAME: Myrna Brtohers  PREFERRED NAME: Lucrecia  PRONOUNS: she/her  MRN: [id number]  : 1985  ADDRESS: [address] [place] [id number]  [facility]  [id number]  DATE OF SERVICE: 24  START TIME: 1:30pm  END TIME: 2:30pm  PREFERRED PHONE: [phone]  May we leave a program related message: Yes  EMERGENCY CONTACT: was not obtained.  SERVICE MODALITY:  Video Visit:      Provider verified identity through the following two step process.  Patient provided:  Patient  and Patient address    Telemedicine Visit: The patient's condition can be safely assessed and treated via synchronous audio and visual telemedicine encounter.      Reason for Telemedicine Visit: Patient has requested telehealth visit    Originating Site (Patient Location): Patient's home    Distant Site (Provider Location): Provider Remote Setting- Home Office    Consent:  The patient/guardian has verbally consented to: the potential risks and benefits of telemedicine (video visit) versus in person care; bill my insurance or make self-payment for services provided; and responsibility for payment of non-covered services.     Patient would like the video invitation sent by:  My Chart    Mode of Communication:  Video Conference via Amwell    Distant Location (Provider):  Off-site    As the provider I attest to compliance with applicable laws and regulations related to telemedicine.    UNIVERSAL ADULT Mental Health DIAGNOSTIC ASSESSMENT    Identifying Information:  Patient is a 38 year old,  individual.  Patient was referred for an assessment by self.  Patient attended the session alone.    Chief Complaint:   The reason for seeking services at this time is: \"Anxiety and depression\".  Patient reports that she moved to [place] from [place] in April after her  was laid off.  Patient is currently living with her in laws while her  looks for work.  Patient has a job as an " " and enjoys her work.  Patient endorses some of the following symptoms as being especially prevalent since this transition: Lack of motivation / energy, waking up in the middle of the night several times, sadness / irritability, racing thoughts and overthinking.  The problem(s) began 06/18/03.    Patient has attempted to resolve these concerns in the past through therapy and psychiatry .  \"From [age] I was in therapy.  I went back to therapy and went on medication sometime in my 30s.  I had post partum depression and anxiety after the birth of my son.\"    Social/Family History:  Patient reported they grew up in [place], [place].  They were raised by biological parents.  Parents were always together.  Patient reported that their childhood was \"really nice for the most part.  I always felt like my parents paid more attention to my brother and cared about him more.  They were good parents for the most part, I just always feel like second option.  I never felt like they were proud of me or that they liked me very much.\"  Patient described their current relationships with family of origin as \"great with my brother.  I get along with my parents way better than I did as a kid, but sometimes I feel like they still don't see me as an adult.\"      The patient describes their cultural background as .  Cultural influences and impact on patient's life structure, values, norms, and healthcare: \"Orthodox roxy is important, music is important - I am in a choir and it brings me purpose.\"  Contextual influences on patient's health include:  hx of therapy, moved to [place] from [place] in April after her  was laid off, currently living with in-laws, 5 year old son is starting , son has some cognitive and speech delays, family of origin dynamics, relational dynamics.  These factors will be addressed in the Preliminary Treatment plan. Patient identified their preferred language to be " English. Patient reported they does not need the assistance of an  or other support involved in therapy.     Patient reported had no significant delays in developmental tasks.   Patient's highest education level was college graduate.  Patient identified the following learning problems: none reported.  Modifications will not be used to assist communication in therapy.  Patient reports they are  able to understand written materials.    Patient reported the following relationship history.  Patient's current relationship status is  for 12 years in August.   Patient identified their sexual orientation as heterosexual.  Patient reported having 1 child(romain) age 3. Patient identified friends; therapist; spouse as part of their support system.  Patient identified the quality of these relationships as stable and meaningful.      Patient's current living/housing situation involves transitional housing, living with her in-laws until her  finds a job.  The immediate members of family and household include Willie Brothers, 40,Spouse and they report that housing is stable.    Patient is currently employed fulltime.  Patient reports their finances are obtained through employment. Patient does identify finances as a current stressor.      Patient reported that they have not been involved with the legal system. Patient does not report being under probation/ parole/ jurisdiction.     Patient's Strengths and Limitations:  Patient identified the following strengths or resources that will help them succeed in treatment: commitment to health and well being, roxy / spirituality, insight, intelligence, positive work environment, motivation, and work ethic. Things that may interfere with the patient's success in treatment include: none identified.     Assessments:  The following assessments were completed by patient for this visit:  PHQ9:       12/13/2018    10:39 AM 12/20/2018    11:14 AM 1/11/2019     2:28 PM  1/18/2019    12:46 PM 7/29/2019     4:19 PM 8/2/2019     3:11 PM 8/1/2024     8:43 AM   PHQ-9 SCORE   PHQ-9 Total Score MyChart       9 (Mild depression)   PHQ-9 Total Score 9 16 15 9 10 5 9    9     GAD7:       12/13/2018    10:39 AM 12/20/2018    11:14 AM 1/11/2019     2:28 PM 1/18/2019    12:46 PM 7/29/2019     4:19 PM 8/2/2019     3:11 PM 7/26/2024     3:57 PM   OREN-7 SCORE   Total Score       7 (mild anxiety)   Total Score 7 14 10 8 10 4 7     CAGE-AID:       8/1/2024     8:52 AM   CAGE-AID Total Score   Total Score 0   Total Score MyChart 0 (A total score of 2 or greater is considered clinically significant)     PROMIS 10-Global Health (only subscores and total score):       8/1/2024     8:52 AM   PROMIS-10 Scores Only   Global Mental Health Score 10   Global Physical Health Score 15   PROMIS TOTAL - SUBSCORES 25     [place] Suicide Severity Rating Scale (Lifetime/Recent)      5/15/2020    12:17 PM 8/2/2024     2:55 PM   [place] Suicide Severity Rating (Lifetime/Recent)   Q1 Wish to be Dead (Lifetime) Yes    Comments onset over the past 10 years    Q2 Non-Specific Active Suicidal Thoughts (Lifetime) Yes    Non-Specific Active Suicidal Thought Description (Lifetime) more than 2 years ago    Most Severe Ideation Rating (Lifetime) 4    Frequency (Lifetime) 3    Duration (Lifetime) 2    Controllability (Lifetime) 3    Protective Factors  (Lifetime) 1    Reasons for Ideation (Lifetime) 5    RETIRED: 1. Wish to be Dead (Recent) No    RETIRED: 2. Non-Specific Active Suicidal Thoughts (Recent) No    3. Active Suicidal Ideation with any Methods (Not Plan) Without Intent to Act (Lifetime) Yes    RETIRE: Active Suicidal Ideation with any Methods (Not Plan) Description (Lifetime) considered swerving into traffic while driving    RETIRED: 3. Active Suicidal Ideation with any Methods (Not Plan) Without Intent to Act (Recent) No    RETIRE: 4. Active Suicidal Ideation with Some Intent to Act, Without Specific Plan  "(Lifetime) No    4. Active Suicidal Ideation with Some Intent to Act, Without Specific Plan (Recent) No    RETIRE: 5. Active Suicidal Ideation with Specific Plan and Intent (Lifetime) No    RETIRED: 5. Active Suicidal Ideation with Specific Plan and Intent (Recent) No    Most Severe Ideation Rating (Past Month) NA    Frequency (Past Month) NA    Duration (Past Month) NA    Controllability (Past Month) NA    Protective Factors (Past Month) NA    Reasons for Ideation (Past Month) NA    Actual Attempt (Lifetime) No    Actual Attempt (Past 3 Months) No    Has subject engaged in non-suicidal self-injurious behavior? (Lifetime) No    Has subject engaged in non-suicidal self-injurious behavior? (Past 3 Months) No    Interrupted Attempts (Lifetime) No    Interrupted Attempts (Past 3 Months) No    Aborted or Self-Interrupted Attempt (Lifetime) No    Aborted or Self-Interrupted Attempt (Past 3 Months) No    Preparatory Acts or Behavior (Lifetime) No    Preparatory Acts or Behavior (Past 3 Months) No    Most Recent Attempt Actual Lethality Code NA    Most Lethal Attempt Actual Lethality Code NA    Initial/First Attempt Actual Lethality Code NA    Q1 Wish to be Dead (Lifetime)  Y   1. Wish to be Dead (Past 1 Month)  Y   Wish to be Dead Description (Past 1 Month)  Patient reports thoughts of \"it would be better if I wasn't here.\"  Patient reports that she has no methods, plan, or intent.  Patient contracted for safety.   Q2 Non-Specific Active Suicidal Thoughts (Lifetime)  N   Most Severe Ideation Rating (Lifetime)  3   Most Severe Ideation Rating (Past 1 Month)  1   Frequency (Lifetime)  3   Frequency (Past 1 Month)  2   Duration (Lifetime)  2   Duration (Past 1 Month)  1   Controllability (Lifetime)  1   Controllability (Past 1 Month)  1   Deterrents (Lifetime)  1   Deterrents (Past 1 Month)  1   Reasons for Ideation (Lifetime)  5   Reasons for Ideation (Past 1 Month)  5   Actual Attempt (Lifetime)  N   Has subject engaged in " non-suicidal self-injurious behavior? (Lifetime)  N   Calculated C-SSRS Risk Score (Lifetime/Recent)  Low Risk       Personal and Family Medical History:  Patient does report a family history of mental health concerns.  Patient reports family history includes Alzheimer Disease in her maternal grandmother; Anxiety Disorder in her maternal grandmother; Asthma in her maternal grandfather; Breast Cancer (age of onset: 51.00) in her mother; Cancer in her maternal grandfather and paternal grandfather; Diabetes in her father; Heart Disease (age of onset: 61.00) in her father; Hypertension in her paternal grandmother; Leukemia in her paternal grandfather; Migraines in her brother and mother; Other - See Comments in her mother; Prostate Cancer in her maternal grandfather; Stillborn in her mother..     Patient does report Mental Health Diagnosis and/or Treatment.  Patient reported the following previous diagnoses which include(s): an anxiety disorder; depression .  Patient reported symptoms began age 17.  Patient has received mental health services in the past:  therapy; psychiatry.  Psychiatric Hospitalizations: none.  Patient denies a history of civil commitment.      Currently, patient is not receiving other mental health services.      Patient has had a physical exam to rule out medical causes for current symptoms.  Date of last physical exam was within the past year. Client was encouraged to follow up with PCP if symptoms were to develop. The patient does not have a Primary Care Provider and was encouraged to establish care with a PCP. - has an appointment scheduled for September. Patient reports the following medical concerns: Type 2 diabetes and high cholesterol.  Patient denies any issues with pain..   There are not significant appetite / nutritional concerns / weight changes.   Patient does not report a history of head injury / trauma / cognitive impairment.      Patient reports current meds as:   Current Outpatient  Medications   Medication Sig Dispense Refill    ARIPiprazole (ABILIFY) 5 MG tablet Take 1 tablet (5 mg) by mouth daily 90 tablet 3    buPROPion (WELLBUTRIN XL) 300 MG 24 hr tablet Take 1 tablet (300 mg) by mouth every morning 90 tablet 3    venlafaxine (EFFEXOR XR) 150 MG 24 hr capsule Take 1 capsule (150 mg) by mouth daily Along with 75 mg 90 capsule 3    venlafaxine (EFFEXOR XR) 75 MG 24 hr capsule Take 1 capsule (75 mg) by mouth daily Along with 150 mg 90 capsule 3    LO LOESTRIN FE 1 MG-10 MCG / 10 MCG TABS Take 1 tablet by mouth daily      metFORMIN (GLUCOPHAGE) 500 MG tablet Take 500 mg by mouth 2 times daily (with meals)      rosuvastatin (CRESTOR) 10 MG tablet Take 10 mg by mouth daily       No current facility-administered medications for this visit.     Medication Adherence:  Patient reports taking.  taking prescribed medications as prescribed.    Patient Allergies:    Allergies   Allergen Reactions    Xylocaine [Lidocaine] Anaphylaxis    Epidural Tray      Pt states with epidural, she and the baby both had decreased heart rates    Novocain [Procaine]        Medical History:    Past Medical History:   Diagnosis Date    Diabetes (H)     gestational    Generalized anxiety disorder 1/4/2018    Hyperlipidemia     Major depressive disorder with single episode, in full remission (H24) 1/4/2018    Sleep apnea     Uncomplicated asthma          Current Mental Status Exam:   Appearance:  Appropriate    Eye Contact:  Good   Psychomotor:  Normal       Gait / station:  NA  Attitude / Demeanor: Friendly Pleasant  Speech      Rate / Production: Normal/ Responsive      Volume:  Normal  volume      Language:  intact  Mood:   Anxious   Affect:   Appropriate    Thought Content: Clear   Thought Process: Coherent  Goal Directed       Associations: No loosening of associations  Insight:   Good   Judgment:  Intact   Orientation:  All  Attention/concentration: Good    Substance Use:   Patient did not report a family history of  "substance use concerns; see medical history section for details.  Patient has not received chemical dependency treatment in the past.  Patient has not ever been to detox.      Patient is not currently receiving any chemical dependency treatment. Patient reported the following problems as a result of their substance use:   none .    Patient denies using alcohol.  Patient denies using tobacco.  Patient reports using cannabis.  Patient reports using cannabis 2-3 times per week - patient will have 2 gummies at night.  Patient denies using caffeine.  Patient reports using/abusing the following substance(s). Patient reported no other substance use.     Substance Use: No symptoms    Based on the negative CAGE score and clinical interview there  are not indications of drug or alcohol abuse.    Significant Losses / Trauma / Abuse / Neglect Issues:   Patient did not serve in the .  There are indications or report of significant loss, trauma, abuse or neglect issues related to: \"when I was 6, my mom had a stillborn.\"   Concerns for possible neglect are not present.     Safety Assessment:   Patient denies current homicidal ideation and behaviors.  Patient denies current self-injurious ideation and behaviors.    Patient denied risk behaviors associated with substance use.   Patient denies any high risk behaviors associated with mental health symptoms.  Patient reports the following current concerns for their personal safety: None.  Patient reports there are not firearms in the house.         History of Safety Concerns:  Patient denied a history of homicidal ideation.     Patient denied a history of personal safety concerns.    Patient denied a history of assaultive behaviors.    Patient denied a history of sexual assault behaviors.     Patient denied a history of risk behaviors associated with substance use.  Patient denies any history of high risk behaviors associated with mental health symptoms.  Patient reports the " following protective factors: forward or future oriented thinking; safe and stable environment; purpose    Risk Plan:  See Recommendations for Safety and Risk Management Plan    Review of Symptoms per patient report:   Depression: Change in sleep, Lack of interest, Excessive or inappropriate guilt, Change in energy level, Psychomotor slowing or agitation, Feelings of hopelessness, Feelings of helplessness, Low self-worth, Ruminations, Irritability, and Feeling sad, down, or depressed  Bre:  No Symptoms  Psychosis: No Symptoms  Anxiety: Excessive worry, Nervousness, Physical complaints, such as headaches, stomachaches, muscle tension, Sleep disturbance, Psychomotor agitation, Ruminations, and Irritability  Panic:  No symptoms  Post Traumatic Stress Disorder:  No Symptoms   Eating Disorder: No Symptoms  ADD / ADHD:  No symptoms  Conduct Disorder: No symptoms  Autism Spectrum Disorder: No symptoms  Obsessive Compulsive Disorder: No Symptoms    Patient reports the following compulsive behaviors and treatment history:  none .      Diagnostic Criteria:   Generalized Anxiety Disorder  A. Excessive anxiety and worry about a number of events or activities (such as work or school performance).   B. The person finds it difficult to control the worry.  C. Select 3 or more symptoms (required for diagnosis). Only one item is required in children.   - Restlessness or feeling keyed up or on edge.    - Being easily fatigued.    - Irritability.    - Muscle tension.    - Sleep disturbance (difficulty falling or staying asleep, or restless unsatisfying sleep).   D. The focus of the anxiety and worry is not confined to features of an Axis I disorder.  E. The anxiety, worry, or physical symptoms cause clinically significant distress or impairment in social, occupational, or other important areas of functioning.   F. The disturbance is not due to the direct physiological effects of a substance (e.g., a drug of abuse, a medication) or a  general medical condition (e.g., hyperthyroidism) and does not occur exclusively during a Mood Disorder, a Psychotic Disorder, or a Pervasive Developmental Disorder. Major Depressive Disorder  CRITERIA (A-C) REPRESENT A MAJOR DEPRESSIVE EPISODE - SELECT THESE CRITERIA  A) Recurrent episode(s) - symptoms have been present during the same 2-week period and represent a change from previous functioning 5 or more symptoms (required for diagnosis)   - Depressed mood. Note: In children and adolescents, can be irritable mood.     - Diminished interest or pleasure in all, or almost all, activities.    - Decreased sleep.    - Psychomotor activity agitation.    - Fatigue or loss of energy.    - Feelings of worthlessness or inappropriate and excessive guilt.   B) The symptoms cause clinically significant distress or impairment in social, occupational, or other important areas of functioning  C) The episode is not attributable to the physiological effects of a substance or to another medical condition  D) The occurence of major depressive episode is not better explained by other thought / psychotic disorders  E) There has never been a manic episode or hypomanic episode     Functional Status:  Patient reports the following functional impairments:  relationship(s) and self-care.     Nonprogrammatic care:  Patient is requesting basic services to address current mental health concerns.    Clinical Summary:  1. Psychosocial, Cultural and Contextual Factors: hx of therapy, moved to [place] from [place] in April after her  was laid off, currently living with in-laws, 5 year old son is starting , son has some cognitive and speech delays, family of origin dynamics, relational dynamics  2. Principal DSM5 Diagnoses  (Sustained by DSM5 Criteria Listed Above):   296.31 (F33.0) Major Depressive Disorder, Recurrent Episode, Mild _ and With anxious distress  300.02 (F41.1) Generalized Anxiety Disorder.  3. Other Diagnoses that is  relevant to services:   NA  4. Provisional Diagnosis:  NA  5. Prognosis: Expect Improvement.  6. Likely consequences of symptoms if not treated: increase anxious / depressive symptoms; decreased functional capacity.  7. Client strengths include:  caring, creative, educated, empathetic, employed, goal-focused, good listener, has a previous history of therapy, insightful, intelligent, motivated, open to learning, open to suggestions / feedback, and responsible parent .     Recommendations:     1. Plan for Safety and Risk Management:   Safety and Risk: A safety and risk management plan has been developed including: Patient consented to co-developed safety plan on 8/2/2024.  Safety and risk management plan was reviewed.   Patient agreed to use safety plan should any safety concerns arise.  A copy was made available to the patient..          Report to child / adult protection services was NA.     2. Patient's identified  no cultural influences to be incorporated into her care at this time .     3. Initial Treatment will focus on:    Depressed Mood - increasing engagement, motivation, behavioral activation; improving mood  Anxiety - cognitive distortions, ruminating thoughts, core beliefs .     4. Resources/Service Plan:    services are not indicated.   Modifications to assist communication are not indicated.   Additional disability accommodations are not indicated.      5. Collaboration:   Collaboration / coordination of treatment will be initiated with the following  support professionals:  none .      6.  Referrals:   The following referral(s) will be initiated:  none .       A Release of Information has been obtained for the following:  none .    7. KAYE:    KAYE:  Discussed the general effects of drugs and alcohol on health and well-being.     8. Records:   These were not available for review at time of assessment.   Information in this assessment was obtained from the medical record and  provided by patient  "who is a good historian.    Patient will have open access to their mental health medical record.    9.   Interactive Complexity: No    10. Safety Plan:     [facility] Safety Plan      Creation Date: 8/2/24 Last Update Date: 8/2/24      Step 1: Warning signs:    Warning Signs    In moments when I am feeling really stressed, especially with parenting    Negative thoughts: \"I'm not a good mom, I'm not good at anything.\"    When I am hearing criticism from others, especially from my .      Step 2: Internal coping strategies - Things I can do to take my mind off my problems without contacting another person:    Strategies    Do yoga    Listen to music that is uplifting for me    Mindfulness / visualization practice    Journaling      Step 3: People and social settings that provide distraction:    Name Contact Information    Glenis in cell phone    Misti in cell phone       Places    go for a walk      Step 4: People whom I can ask for help during a crisis:    Name Contact Information    Glenis in cell phone    Misti in cell phone      Step 5: Professionals or agencies I can contact during a crisis:    Clinician/Agency Name Phone Emergency Contact    [facility]        Suicide Prevention Lifeline Phone: Call or Text 000  Crisis Text Line: Text HOME to 120794     Step 6: Making the environment safer (plan for lethal means safety):   Did not identify any lethal methods     Optional: What is most important to me and worth living for?:   My son, my , and my friends.     [facility] Safety Plan. Preethi Haley and Livan Diaz. Used with permission of the authors.           Provider Name/ Credentials:  [facility]   August 2, 2024       "

## 2024-08-02 NOTE — PROGRESS NOTES
Lucrecia is a 38 year old who is being evaluated via a billable video visit.    How would you like to obtain your AVS? MyChart  If the video visit is dropped, the invitation should be resent by: Text to cell phone: [phone]  Will anyone else be joining your video visit? No      Assessment & Plan     Anxiety  Moderate episode of recurrent major depressive disorder (H)  - venlafaxine (EFFEXOR XR) 75 MG 24 hr capsule  Dispense: 90 capsule; Refill: 3  - venlafaxine (EFFEXOR XR) 150 MG 24 hr capsule  Dispense: 90 capsule; Refill: 3  - buPROPion (WELLBUTRIN XL) 300 MG 24 hr tablet  Dispense: 90 tablet; Refill: 3  - ARIPiprazole (ABILIFY) 5 MG tablet  Dispense: 90 tablet; Refill: 3  Chronic issues, ongoing for years but per patient stable on current medications.  No current SI/HI.  Refills given for 1 year. Discussed since she is stable, no need at this time to see psychiatry unless things change.  Follow up if symptoms worsen or do not improve        Depression Screening Follow Up        8/1/2024     8:43 AM   PHQ   PHQ-9 Total Score 9    9   Q9: Thoughts of better off dead/self-harm past 2 weeks Several days   F/U: Thoughts of suicide or self-harm Yes   F/U: Self harm-plan No   F/U: Self-harm action No   F/U: Safety concerns No         8/1/2024     8:43 AM   Last PHQ-9   1.  Little interest or pleasure in doing things 1   2.  Feeling down, depressed, or hopeless 2   3.  Trouble falling or staying asleep, or sleeping too much 1   4.  Feeling tired or having little energy 2   5.  Poor appetite or overeating 0   6.  Feeling bad about yourself 1   7.  Trouble concentrating 0   8.  Moving slowly or restless 1   Q9: Thoughts of better off dead/self-harm past 2 weeks 1   PHQ-9 Total Score 9    9   In the past two weeks have you had thoughts of suicide or self harm? Yes   Do you have concerns about your personal safety or the safety of others? No   In the past 2 weeks have you thought about a plan or had intention to harm  yourself? No   In the past 2 weeks have you acted on these thoughts in any way? No       Follow Up Actions Taken  Crisis resource information provided in the After Visit Summary  Patient to follow up with PCP.  Clinic staff to schedule appointment if able.    Discussed the following ways the patient can remain in a safe environment:  be around others    Risks, benefits and alternatives were discussed with patient. Agreeable to the plan of care.      Maura Singer is a 38 year old, presenting for the following health issues:  Referral (Referral for mental health.  Moving back to [place] and needs to establish care.  [facility] is booking about 3 mo out, will you refill until then? )      8/2/2024    10:08 AM   Additional Questions   Roomed by KRISTIN Bronson CMA([facility])     Via the Health Maintenance questionnaire, the patient has reported the following services have been completed -Mammogram: [facility] 2023-04-18-Cervical Cancer Screening: [facility] 2023-04-02, this information has been sent to the abstraction team.  History of Present Illness       Mental Health Follow-up:  Patient presents to follow-up on Depression & Anxiety.Patient's depression since last visit has been:  Medium  The patient is having other symptoms associated with depression.  Patient's anxiety since last visit has been:  Medium  The patient is having other symptoms associated with anxiety.  Any significant life events: job concerns, financial concerns and housing concerns  Patient is feeling anxious or having panic attacks.  Patient has no concerns about alcohol or drug use.    She eats 2-3 servings of fruits and vegetables daily.She consumes 1 sweetened beverage(s) daily.She exercises with enough effort to increase her heart rate 9 or less minutes per day.  She exercises with enough effort to increase her heart rate 3 or less days per week. She is missing 1 dose(s) of medications per week.  She is not taking prescribed medications regularly due to  remembering to take.       Patient is here today for mental health  Has history of anxiety and depression since she was 17  Doing well on current combination of meds  No SI/HI  Has been followed by psychiatry previously and wondering if needs to see psychiatry  No hospitalizations for mental health  No concerns today but did run out of medication and needs refills        Review of Systems  Constitutional, HEENT, cardiovascular, pulmonary, gi and gu systems are negative, except as otherwise noted.      Objective           Vitals:  No vitals were obtained today due to virtual visit.    Physical Exam   GENERAL: alert and no distress  EYES: Eyes grossly normal to inspection.  No discharge or erythema, or obvious scleral/conjunctival abnormalities.  RESP: No audible wheeze, cough, or visible cyanosis.    SKIN: Visible skin clear. No significant rash, abnormal pigmentation or lesions.  NEURO: Cranial nerves grossly intact.  Mentation and speech appropriate for age.  PSYCH: Appropriate affect, tone, and pace of words        Video-Visit Details    Type of service:  Video Visit   Originating Location (pt. Location): Home    Distant Location (provider location):  On-site  Platform used for Video Visit: Ghassan  Signed Electronically by: Tatyana Thomas PA-C

## 2024-08-05 RX ORDER — VENLAFAXINE HYDROCHLORIDE 150 MG/1
150 CAPSULE, EXTENDED RELEASE ORAL DAILY
Qty: 90 CAPSULE | Refills: 3 | Status: SHIPPED | OUTPATIENT
Start: 2024-08-05

## 2024-08-26 NOTE — PROGRESS NOTES
[facility]                                     Progress Note    Patient Name: Myrna Brothers  Date: 2024         Service Type: Individual      Session Start Time: 3:05pm  Session End Time: 3:30pm     Session Length: 25 min    Session #: 1    Attendees: Client attended alone    Service Modality:  Video Visit:      Provider verified identity through the following two step process.  Patient provided:  Patient  and Patient address    Telemedicine Visit: The patient's condition can be safely assessed and treated via synchronous audio and visual telemedicine encounter.      Reason for Telemedicine Visit: Patient has requested telehealth visit    Originating Site (Patient Location): Patient's place of employment    Distant Site (Provider Location): Provider Remote Setting- Home Office    Consent:  The patient/guardian has verbally consented to: the potential risks and benefits of telemedicine (video visit) versus in person care; bill my insurance or make self-payment for services provided; and responsibility for payment of non-covered services.     Patient would like the video invitation sent by:  My Chart    Mode of Communication:  Video Conference via Amwell    Distant Location (Provider):  Off-site    As the provider I attest to compliance with applicable laws and regulations related to telemedicine.    DATA  Interactive Complexity: No  Crisis: No        Progress Since Last Session (Related to Symptoms / Goals / Homework):   Symptoms: No change (depression / anxiety)    Homework: Achieved / completed to satisfaction      Episode of Care Goals: No improvement - CONTEMPLATION (Considering change and yet undecided); Intervened by assessing the negative and positive thinking (ambivalence) about behavior change     Current / Ongoing Stressors and Concerns:   Today, session was spent drafting the treatment plan.  See detailed plan below.  Patient and therapist also discussed learning styles,  attendance policy, and expectations for how sessions will be structured moving forward.  Therapist assessed for risk and safety - no concerns at this time.  In the coming week, patient will engage in self-care.  Patient will bring 1-3 things to discuss during next session.         Treatment Objective(s) Addressed in This Session:   Discussed in session       Intervention:   Solution Focused: treatment planning    Assessments completed prior to visit:  The following assessments were completed by patient for this visit:  PHQ9:       12/20/2018    11:14 AM 1/11/2019     2:28 PM 1/18/2019    12:46 PM 7/29/2019     4:19 PM 8/2/2019     3:11 PM 8/1/2024     8:43 AM 8/27/2024     8:45 AM   PHQ-9 SCORE   PHQ-9 Total Score MyChart      9 (Mild depression) 5 (Mild depression)   PHQ-9 Total Score 16 15 9 10 5 9    9 5     GAD7:       12/13/2018    10:39 AM 12/20/2018    11:14 AM 1/11/2019     2:28 PM 1/18/2019    12:46 PM 7/29/2019     4:19 PM 8/2/2019     3:11 PM 7/26/2024     3:57 PM   OREN-7 SCORE   Total Score       7 (mild anxiety)   Total Score 7 14 10 8 10 4 7     PROMIS 10-Global Health (only subscores and total score):       8/1/2024     8:52 AM 8/22/2024     9:25 AM   PROMIS-10 Scores Only   Global Mental Health Score 10 12   Global Physical Health Score 15 13   PROMIS TOTAL - SUBSCORES 25 25     [place] Suicide Severity Rating Scale (Short Version)      7/15/2019    11:46 AM 7/24/2019     6:12 AM   [place] Suicide Severity Rating (Short Version)   Over the past 2 weeks have you felt down, depressed, or hopeless? no no   Over the past 2 weeks have you had thoughts of killing yourself? no no   Have you ever attempted to kill yourself? no no         ASSESSMENT: Current Emotional / Mental Status (status of significant symptoms):   Risk status (Self / Other harm or suicidal ideation)   Patient denies current fears or concerns for personal safety.   Patient denies current or recent suicidal ideation or  behaviors.   Patient denies current or recent homicidal ideation or behaviors.   Patient denies current or recent self injurious behavior or ideation.   Patient denies other safety concerns.   Patient reports there has been no change in risk factors since their last session.     Patient reports there has been no change in protective factors since their last session.     A safety and risk management plan has been developed including: Patient consented to co-developed safety plan on 8/2/2024.  Safety and risk management plan was reviewed.   Patient agreed to use safety plan should any safety concerns arise.  A copy was made available to the patient.     Appearance:   Appropriate    Eye Contact:   Good    Psychomotor Behavior: Normal    Attitude:   Cooperative    Orientation:   All   Speech    Rate / Production: Normal     Volume:  Normal    Mood:    Normal   Affect:    Appropriate    Thought Content:  Clear    Thought Form:  Coherent  Logical    Insight:    Good      Medication Review:   No changes to current psychiatric medication(s)     Medication Compliance:   Yes     Changes in Health Issues:   None reported     Chemical Use Review:   Substance Use: Chemical use reviewed, no active concerns identified      Tobacco Use: No current tobacco use.      Diagnosis:  1. Generalized anxiety disorder    2. Mild episode of recurrent major depressive disorder (H24)        Collateral Reports Completed:   Not Applicable    PLAN: (Patient Tasks / Therapist Tasks / Other)   In the coming week, patient will engage in self-care.    Patient will bring 1-3 things to discuss during next session.          CHRISTINE Yoo, LICSW    8/27/2024    ______________________________________________________________________    Individual Treatment Plan    Patient's Name: Myrna Brothers  YOB: 1985    Date of Creation: 8/27/2024  Date Treatment Plan Last Reviewed/Revised: 8/27/2024    DSM5 Diagnoses: 296.31 (F33.0) Major  Depressive Disorder, Recurrent Episode, Mild _ and With anxious distress or 300.02 (F41.1) Generalized Anxiety Disorder  Psychosocial / Contextual Factors: hx of therapy, moved to [place] from [place] in April after her  was laid off, currently living with in-laws, 5 year old son is starting , son has some cognitive and speech delays, family of origin dynamics, relational dynamics   PROMIS (reviewed every 90 days):       8/1/2024     8:52 AM 8/22/2024     9:25 AM   PROMIS-10 Scores Only   Global Mental Health Score 10 12   Global Physical Health Score 15 13   PROMIS TOTAL - SUBSCORES 25 25       Referral / Collaboration:  Referral to another professional/service is not indicated at this time..    Anticipated number of session for this episode of care: 9-12 sessions  Anticipation frequency of session: Every other week  Anticipated Duration of each session: 38-52 minutes  Treatment plan will be reviewed in 90 days or when goals have been changed.       MeasurableTreatment Goal(s) related to diagnosis / functional impairment(s)  Goal 1: Patient will decrease symptoms of depression as evidenced by decreased PHQ-9 score.    I will know I've met my goal when I feel confident as a mom and have more energy.    Objective #A (Patient Action)    Patient will Increase interest, engagement, and pleasure in doing things  Feel less tired and more energy during the day   Identify negative self-talk and behaviors: challenge core beliefs, myths, and actions  Decrease thoughts that you'd be better off dead or of suicide / self-harm  Manage safety and utilize safety plan .  Status: New - Date: 8/27/2024      Intervention(s)  Therapist will teach strategies to reduce / manage depressive symptoms.  Therapist will teach and model positive self talk behaviors and strategies.  Therapist will check in with patient regarding safety / SI.       Goal 2: Patient will decrease symptoms of anxiety as evidenced by decreased OREN-7 score.     I will know I've met my goal when I don't worry so much and when I am more assertive.      Objective #A (Patient Action)    Patient will use at least 2 coping skills for anxiety management in the next 2 weeks.   Learn strategies to manage / reduce ruminating thoughts.  Learn and utilize assertive communication skills.     Status: New - Date: 8/27/2024      Intervention(s)  Therapist will teach coping skills for management of anxious symptoms.  Therapist will teach assertive communication and interpersonal effectiveness skills.  Therapist will teach cognitive strategies to help with worrying thoughts.      Patient has reviewed and agreed to the above plan.      CHRISTINE Yoo, Northern Light Acadia HospitalSW  August 27, 2024

## 2024-08-27 ENCOUNTER — VIRTUAL VISIT (OUTPATIENT)
Dept: BEHAVIORAL HEALTH | Facility: CLINIC | Age: 39
End: 2024-08-27
Payer: COMMERCIAL

## 2024-08-27 DIAGNOSIS — F33.0 MILD EPISODE OF RECURRENT MAJOR DEPRESSIVE DISORDER (H): ICD-10-CM

## 2024-08-27 DIAGNOSIS — F41.1 GENERALIZED ANXIETY DISORDER: Primary | ICD-10-CM

## 2024-08-27 PROCEDURE — 90832 PSYTX W PT 30 MINUTES: CPT | Mod: 95

## 2024-08-27 ASSESSMENT — ASTHMA QUESTIONNAIRES
QUESTION_1 LAST FOUR WEEKS HOW MUCH OF THE TIME DID YOUR ASTHMA KEEP YOU FROM GETTING AS MUCH DONE AT WORK, SCHOOL OR AT HOME: NONE OF THE TIME
QUESTION_3 LAST FOUR WEEKS HOW OFTEN DID YOUR ASTHMA SYMPTOMS (WHEEZING, COUGHING, SHORTNESS OF BREATH, CHEST TIGHTNESS OR PAIN) WAKE YOU UP AT NIGHT OR EARLIER THAN USUAL IN THE MORNING: NOT AT ALL
ACT_TOTALSCORE: 25
ACT_TOTALSCORE: 25
QUESTION_4 LAST FOUR WEEKS HOW OFTEN HAVE YOU USED YOUR RESCUE INHALER OR NEBULIZER MEDICATION (SUCH AS ALBUTEROL): NOT AT ALL
QUESTION_2 LAST FOUR WEEKS HOW OFTEN HAVE YOU HAD SHORTNESS OF BREATH: NOT AT ALL
QUESTION_5 LAST FOUR WEEKS HOW WOULD YOU RATE YOUR ASTHMA CONTROL: COMPLETELY CONTROLLED

## 2024-08-27 ASSESSMENT — PATIENT HEALTH QUESTIONNAIRE - PHQ9
10. IF YOU CHECKED OFF ANY PROBLEMS, HOW DIFFICULT HAVE THESE PROBLEMS MADE IT FOR YOU TO DO YOUR WORK, TAKE CARE OF THINGS AT HOME, OR GET ALONG WITH OTHER PEOPLE: SOMEWHAT DIFFICULT
SUM OF ALL RESPONSES TO PHQ QUESTIONS 1-9: 5
SUM OF ALL RESPONSES TO PHQ QUESTIONS 1-9: 5

## 2024-08-28 ENCOUNTER — OFFICE VISIT (OUTPATIENT)
Dept: FAMILY MEDICINE | Facility: CLINIC | Age: 39
End: 2024-08-28
Payer: COMMERCIAL

## 2024-08-28 VITALS
BODY MASS INDEX: 32.86 KG/M2 | DIASTOLIC BLOOD PRESSURE: 66 MMHG | TEMPERATURE: 98.5 F | HEIGHT: 65 IN | OXYGEN SATURATION: 95 % | WEIGHT: 197.2 LBS | HEART RATE: 85 BPM | SYSTOLIC BLOOD PRESSURE: 120 MMHG | RESPIRATION RATE: 21 BRPM

## 2024-08-28 DIAGNOSIS — M79.601 PAIN AND NUMBNESS OF RIGHT UPPER EXTREMITY: Primary | ICD-10-CM

## 2024-08-28 DIAGNOSIS — M54.12 CERVICAL RADICULOPATHY: ICD-10-CM

## 2024-08-28 DIAGNOSIS — R20.0 PAIN AND NUMBNESS OF RIGHT UPPER EXTREMITY: Primary | ICD-10-CM

## 2024-08-28 DIAGNOSIS — E11.65 TYPE 2 DIABETES MELLITUS WITH HYPERGLYCEMIA, WITHOUT LONG-TERM CURRENT USE OF INSULIN (H): ICD-10-CM

## 2024-08-28 PROBLEM — G89.18 POST-OPERATIVE PAIN: Status: RESOLVED | Noted: 2019-07-24 | Resolved: 2024-08-28

## 2024-08-28 PROBLEM — K85.90 POST-ERCP ACUTE PANCREATITIS: Status: RESOLVED | Noted: 2019-07-24 | Resolved: 2024-08-28

## 2024-08-28 PROBLEM — K91.89 POST-ERCP ACUTE PANCREATITIS: Status: RESOLVED | Noted: 2019-07-24 | Resolved: 2024-08-28

## 2024-08-28 PROBLEM — O24.414 INSULIN CONTROLLED GESTATIONAL DIABETES MELLITUS (GDM) IN THIRD TRIMESTER: Status: RESOLVED | Noted: 2018-08-27 | Resolved: 2024-08-28

## 2024-08-28 LAB
ALBUMIN SERPL BCG-MCNC: 4.4 G/DL (ref 3.5–5.2)
ALP SERPL-CCNC: 116 U/L (ref 40–150)
ALT SERPL W P-5'-P-CCNC: 47 U/L (ref 0–50)
ANION GAP SERPL CALCULATED.3IONS-SCNC: 11 MMOL/L (ref 7–15)
AST SERPL W P-5'-P-CCNC: 38 U/L (ref 0–45)
BILIRUB SERPL-MCNC: 0.4 MG/DL
BUN SERPL-MCNC: 12.1 MG/DL (ref 6–20)
CALCIUM SERPL-MCNC: 9.6 MG/DL (ref 8.8–10.4)
CHLORIDE SERPL-SCNC: 101 MMOL/L (ref 98–107)
CREAT SERPL-MCNC: 0.87 MG/DL (ref 0.51–0.95)
EGFRCR SERPLBLD CKD-EPI 2021: 86 ML/MIN/1.73M2
GLUCOSE SERPL-MCNC: 148 MG/DL (ref 70–99)
HBA1C MFR BLD: 7 % (ref 0–5.6)
HCO3 SERPL-SCNC: 26 MMOL/L (ref 22–29)
POTASSIUM SERPL-SCNC: 4.6 MMOL/L (ref 3.4–5.3)
PROT SERPL-MCNC: 7.7 G/DL (ref 6.4–8.3)
SODIUM SERPL-SCNC: 138 MMOL/L (ref 135–145)
TSH SERPL DL<=0.005 MIU/L-ACNC: 1.31 UIU/ML (ref 0.3–4.2)
VIT B12 SERPL-MCNC: 378 PG/ML (ref 232–1245)

## 2024-08-28 PROCEDURE — 82607 VITAMIN B-12: CPT | Performed by: NURSE PRACTITIONER

## 2024-08-28 PROCEDURE — 83036 HEMOGLOBIN GLYCOSYLATED A1C: CPT | Performed by: NURSE PRACTITIONER

## 2024-08-28 PROCEDURE — 99214 OFFICE O/P EST MOD 30 MIN: CPT | Performed by: NURSE PRACTITIONER

## 2024-08-28 PROCEDURE — G2211 COMPLEX E/M VISIT ADD ON: HCPCS | Performed by: NURSE PRACTITIONER

## 2024-08-28 PROCEDURE — 80053 COMPREHEN METABOLIC PANEL: CPT | Performed by: NURSE PRACTITIONER

## 2024-08-28 PROCEDURE — 84443 ASSAY THYROID STIM HORMONE: CPT | Performed by: NURSE PRACTITIONER

## 2024-08-28 PROCEDURE — 36415 COLL VENOUS BLD VENIPUNCTURE: CPT | Performed by: NURSE PRACTITIONER

## 2024-08-28 RX ORDER — ALBUTEROL SULFATE 90 UG/1
2 AEROSOL, METERED RESPIRATORY (INHALATION) EVERY 6 HOURS PRN
COMMUNITY
Start: 2024-08-28 | End: 2024-09-12

## 2024-08-28 RX ORDER — PREDNISONE 20 MG/1
40 TABLET ORAL DAILY
Qty: 10 TABLET | Refills: 0 | Status: SHIPPED | OUTPATIENT
Start: 2024-08-28 | End: 2024-09-02

## 2024-08-28 ASSESSMENT — PAIN SCALES - GENERAL: PAINLEVEL: EXTREME PAIN (8)

## 2024-08-28 NOTE — PROGRESS NOTES
"  Assessment & Plan     Pain and numbness of right upper extremity  Possible cervical radiculopathy.  Recommend starting to do mobility and strengthening exercises of neck and shoulders.  Consider formal Physical Therapy if not improving.  - Vitamin B12; Future  Discussed with patient the indication and use of medication(s), risks/benefits, and potential adverse side effects.  Patient/guardian verbalized understanding and agreement with the plan.   - predniSONE (DELTASONE) 20 MG tablet; Take 2 tablets (40 mg) by mouth daily for 5 days.  - Vitamin B12    Type 2 diabetes mellitus with hyperglycemia, without long-term current use of insulin (H)  Uncontrolled    - Hemoglobin A1c  - TSH with free T4 reflex  - Comprehensive metabolic panel (BMP + Alb, Alk Phos, ALT, AST, Total. Bili, TP)  - Start metFORMIN (GLUCOPHAGE) 500 MG tablet; Take 1 tablet (500 mg) by mouth daily (with breakfast).  Follow-up in 3 months for recheck        BMI  Estimated body mass index is 32.82 kg/m  as calculated from the following:    Height as of this encounter: 1.651 m (5' 5\").    Weight as of this encounter: 89.4 kg (197 lb 3.2 oz).             Subjective   Lucrecia is a 38 year old, presenting for the following health issues:  Shoulder Pain (Right side, times months /On and off - hard to lie on side . )      8/28/2024     7:47 AM   Additional Questions   Roomed by Donna LINO     Shoulder Pain    History of Present Illness       Reason for visit:  Arm and ahoulder numbness and pain  Symptom onset:  1-2 weeks ago  Symptoms include:  Arm and shoulder numbness and pain  Symptom intensity:  Moderate  Symptom progression:  Worsening  Had these symptoms before:  Yes  Has tried/received treatment for these symptoms:  No  What makes it worse:  Laying on that side of my body  What makes it better:  Tylenol, sometimes She is missing 1 dose(s) of medications per week.  She is not taking prescribed medications regularly due to remembering to take.     First " "visit for symptoms    Additional provider notes:    Off and on for months about 4 months pain and numbness into right arm.  But now has become more persistent.  Numbness and pain in neck and down into right shoulder and arm.  Not hard to  things.  Worse with arm overhead and when laying on her side.  No swelling.  No h/o injuries.  Associated symptom- Tingling.    She has type 2 diabetes since last year.  Ran out of Metformin so stopped.    Off birth control pill because she ran out.    The patient does not smoke    No hormonal contraceptive risk factors:  Thrombophlebitis or thromboembolic disorder (DVT, PE, stroke); cerebrovascular or coronary artery disease; valvular heart disease with thrombogenic complications; ischemic heart disease; hypertension; migraine headaches; breast cancer or any other estrogen-dependent cancer; undiagnosed genital bleeding; acute or chronic liver disease; liver cancer; symptomatic gallbladder disease; vascular disease or diabetes of longer than 20 years duration; diabetes with complications of nephropathy/retinopathy/neuropathy; malabsorptive bariatric procedures; or lupus.          Objective    /66 (BP Location: Right leg, Patient Position: Sitting, Cuff Size: Adult Large)   Pulse 85   Temp 98.5  F (36.9  C) (Oral)   Resp 21   Ht 1.651 m (5' 5\")   Wt 89.4 kg (197 lb 3.2 oz)   LMP 08/07/2024 (Approximate)   SpO2 95%   BMI 32.82 kg/m    Body mass index is 32.82 kg/m .  Physical Exam   GENERAL: healthy, alert, no distress, and obese  MS: RUE exam shows normal strength and muscle mass and ROM of all joints is normal.  No tenderness to right elbow or right wrist.  There is tenderness to right shoulder lateral and superior.  Neck active ROM intact and without tenderness.  NEURO: Normal strength and tone, mentation intact and speech normal  PSYCH: mentation appears normal, affect normal/bright    Results for orders placed or performed in visit on 08/28/24   Hemoglobin A1c  "    Status: Abnormal   Result Value Ref Range    Hemoglobin A1C 7.0 (H) 0.0 - 5.6 %   TSH with free T4 reflex     Status: Normal   Result Value Ref Range    TSH 1.31 0.30 - 4.20 uIU/mL   Vitamin B12     Status: Normal   Result Value Ref Range    Vitamin B12 378 232 - 1,245 pg/mL   Comprehensive metabolic panel (BMP + Alb, Alk Phos, ALT, AST, Total. Bili, TP)     Status: Abnormal   Result Value Ref Range    Sodium 138 135 - 145 mmol/L    Potassium 4.6 3.4 - 5.3 mmol/L    Carbon Dioxide (CO2) 26 22 - 29 mmol/L    Anion Gap 11 7 - 15 mmol/L    Urea Nitrogen 12.1 6.0 - 20.0 mg/dL    Creatinine 0.87 0.51 - 0.95 mg/dL    GFR Estimate 86 >60 mL/min/1.73m2    Calcium 9.6 8.8 - 10.4 mg/dL    Chloride 101 98 - 107 mmol/L    Glucose 148 (H) 70 - 99 mg/dL    Alkaline Phosphatase 116 40 - 150 U/L    AST 38 0 - 45 U/L    ALT 47 0 - 50 U/L    Protein Total 7.7 6.4 - 8.3 g/dL    Albumin 4.4 3.5 - 5.2 g/dL    Bilirubin Total 0.4 <=1.2 mg/dL           Signed Electronically by: Jovana Ratliff NP

## 2024-09-01 PROBLEM — E11.65 TYPE 2 DIABETES MELLITUS WITH HYPERGLYCEMIA, WITHOUT LONG-TERM CURRENT USE OF INSULIN (H): Status: ACTIVE | Noted: 2024-09-01

## 2024-09-09 NOTE — PROGRESS NOTES
[facility]                                     Progress Note    Patient Name: Myrna Brothers  Date: 2024         Service Type: Individual      Session Start Time: 1:03pm  Session End Time: 1:57pm     Session Length: 54 min    Session #: 2    Attendees: Client attended alone    Service Modality:  Video Visit:      Provider verified identity through the following two step process.  Patient provided:  Patient  and Patient address    Telemedicine Visit: The patient's condition can be safely assessed and treated via synchronous audio and visual telemedicine encounter.      Reason for Telemedicine Visit: Patient has requested telehealth visit    Originating Site (Patient Location): Patient's place of employment    Distant Site (Provider Location): Provider Remote Setting- Home Office    Consent:  The patient/guardian has verbally consented to: the potential risks and benefits of telemedicine (video visit) versus in person care; bill my insurance or make self-payment for services provided; and responsibility for payment of non-covered services.     Patient would like the video invitation sent by:  My Chart    Mode of Communication:  Video Conference via [employer]    Distant Location (Provider):  Off-site    As the provider I attest to compliance with applicable laws and regulations related to telemedicine.    DATA  Extended Session (53+ minutes): PROLONGED SERVICE IN THE OUTPATIENT SETTING REQUIRING DIRECT (FACE-TO-FACE) PATIENT CONTACT BEYOND THE USUAL SERVICE:    - Longer session due to limited access to mental health appointments and necessity to address patient's distress / complexity  Interactive Complexity: No  Crisis: No         Progress Since Last Session (Related to Symptoms / Goals / Homework):   Symptoms: No change (depression / anxiety)    Homework: Achieved / completed to satisfaction      Episode of Care Goals: No improvement - CONTEMPLATION (Considering change and yet  undecided); Intervened by assessing the negative and positive thinking (ambivalence) about behavior change     Current / Ongoing Stressors and Concerns:   Today, session was spent processing through anxiety tied to the patient's son starting .  Also processed through patient's experience of mom guilt.  Explored patient's concerns by drawing on interventions from ACT, DBT, and CBT.  In the coming month, patient will practice drawing awareness to the three states of mind.  Patient will utilize CBT skills discussed in therapy to manage anxious thoughts.  Patient will practice radical acceptance.  Next appointment 10/9.      Therapist assessed for risk and safety - patient reports passive SI without plan, intent or methods.  Patient contracted for safety and will continue to utilize safety plan as needed.  Should there be an increase in frequency or intensity of symptoms related to SI/SIB, patient will call / text [phone], call 911, or go to local ED for evaluation.         Treatment Objective(s) Addressed in This Session:   use at least 2 coping skills for anxiety management in the next 2 weeks  Decrease frequency and intensity of feeling down, depressed, hopeless  Identify negative self-talk and behaviors: challenge core beliefs, myths, and actions  Decrease thoughts that you'd be better off dead or of suicide / self-harm       Intervention:   CBT: thought-behavior-feeling connection, cognitive distortions, checking for the evidence  DBT: 3 states of mind  Emotion Focused Therapy: emotional processing  ACT: radical acceptance, values based living    Assessments completed prior to visit:  The following assessments were completed by patient for this visit:  PHQ9:       1/11/2019     2:28 PM 1/18/2019    12:46 PM 7/29/2019     4:19 PM 8/2/2019     3:11 PM 8/1/2024     8:43 AM 8/27/2024     8:45 AM 9/10/2024     1:45 PM   PHQ-9 SCORE   PHQ-9 Total Score MyChart     9 (Mild depression) 5 (Mild depression) 7 (Mild  "depression)   PHQ-9 Total Score 15 9 10 5 9    9 5 7     GAD7:       12/13/2018    10:39 AM 12/20/2018    11:14 AM 1/11/2019     2:28 PM 1/18/2019    12:46 PM 7/29/2019     4:19 PM 8/2/2019     3:11 PM 7/26/2024     3:57 PM   OREN-7 SCORE   Total Score       7 (mild anxiety)   Total Score 7 14 10 8 10 4 7     PROMIS 10-Global Health (only subscores and total score):       8/1/2024     8:52 AM 8/22/2024     9:25 AM   PROMIS-10 Scores Only   Global Mental Health Score 10 12   Global Physical Health Score 15 13   PROMIS TOTAL - SUBSCORES 25 25     Dixon Suicide Severity Rating Scale (Short Version)      7/15/2019    11:46 AM 7/24/2019     6:12 AM 9/11/2024     1:05 PM   Dixon Suicide Severity Rating (Short Version)   Over the past 2 weeks have you felt down, depressed, or hopeless? no no    Over the past 2 weeks have you had thoughts of killing yourself? no no    Have you ever attempted to kill yourself? no no    1. Wish to be Dead (Since Last Contact)   Y   Wish to be Dead Description (Since Last Contact)   Thoughts of \"Iit would better if I wasn't here\" without plan, intent or methods.  Patient contracted for safety.   2. Non-Specific Active Suicidal Thoughts (Since Last Contact)   N   Actual Attempt (Since Last Contact)   N   Has subject engaged in non-suicidal self-injurious behavior? (Since Last Contact)   N   Calculated C-SSRS Risk Score (Since Last Contact)   Low Risk         ASSESSMENT: Current Emotional / Mental Status (status of significant symptoms):   Risk status (Self / Other harm or suicidal ideation)   Patient denies current fears or concerns for personal safety.   Patient reports the following current or recent suicidal ideation or behaviors: passive SI without plan, intent, or methods.   Patient denies current or recent homicidal ideation or behaviors.   Patient denies current or recent self injurious behavior or ideation.   Patient denies other safety concerns.   Patient reports there has been no " change in risk factors since their last session.     Patient reports there has been no change in protective factors since their last session.     A safety and risk management plan has been developed including: Patient consented to co-developed safety plan on 8/2/2024.  Safety and risk management plan was reviewed.   Patient agreed to use safety plan should any safety concerns arise.  A copy was made available to the patient.     Appearance:   Appropriate    Eye Contact:   Good    Psychomotor Behavior: Normal    Attitude:   Cooperative    Orientation:   All   Speech    Rate / Production: Normal     Volume:  Normal    Mood:    Anxious    Affect:    Appropriate    Thought Content:  Clear    Thought Form:  Coherent  Logical    Insight:    Good      Medication Review:   No changes to current psychiatric medication(s)     Medication Compliance:   Yes     Changes in Health Issues:   None reported     Chemical Use Review:   Substance Use: Chemical use reviewed, no active concerns identified      Tobacco Use: No current tobacco use.      Diagnosis:  1. Generalized anxiety disorder    2. Mild episode of recurrent major depressive disorder (H24)          Collateral Reports Completed:   Not Applicable    PLAN: (Patient Tasks / Therapist Tasks / Other)  Utilize safety plan as needed.  Should there be an increase in frequency or intensity of symptoms related to SI/SIB, patient will call / text [phone], call 911, or go to local ED for evaluation.    In the coming month, patient will practice drawing awareness to the three states of mind.    Patient will utilize CBT skills discussed in therapy to manage anxious thoughts.    Patient will practice radical acceptance.    Next appointment 10/9.          CHRISTINE Yoo, [facility]    9/11/2024    ______________________________________________________________________    Individual Treatment Plan    Patient's Name: Myrna Brothers  YOB: 1985    Date of Creation:  8/27/2024  Date Treatment Plan Last Reviewed/Revised: 8/27/2024    DSM5 Diagnoses: 296.31 (F33.0) Major Depressive Disorder, Recurrent Episode, Mild _ and With anxious distress or 300.02 (F41.1) Generalized Anxiety Disorder  Psychosocial / Contextual Factors: hx of therapy, moved to [place] from [place] in April after her  was laid off, currently living with in-laws, 5 year old son is starting , son has some cognitive and speech delays, family of origin dynamics, relational dynamics   PROMIS (reviewed every 90 days):       8/1/2024     8:52 AM 8/22/2024     9:25 AM   PROMIS-10 Scores Only   Global Mental Health Score 10 12   Global Physical Health Score 15 13   PROMIS TOTAL - SUBSCORES 25 25       Referral / Collaboration:  Referral to another professional/service is not indicated at this time..    Anticipated number of session for this episode of care: 9-12 sessions  Anticipation frequency of session: Every other week  Anticipated Duration of each session: 38-52 minutes  Treatment plan will be reviewed in 90 days or when goals have been changed.       MeasurableTreatment Goal(s) related to diagnosis / functional impairment(s)  Goal 1: Patient will decrease symptoms of depression as evidenced by decreased PHQ-9 score.    I will know I've met my goal when I feel confident as a mom and have more energy.    Objective #A (Patient Action)    Patient will Increase interest, engagement, and pleasure in doing things  Feel less tired and more energy during the day   Identify negative self-talk and behaviors: challenge core beliefs, myths, and actions  Decrease thoughts that you'd be better off dead or of suicide / self-harm  Manage safety and utilize safety plan .  Status: New - Date: 8/27/2024      Intervention(s)  Therapist will teach strategies to reduce / manage depressive symptoms.  Therapist will teach and model positive self talk behaviors and strategies.  Therapist will check in with patient regarding safety  / SI.       Goal 2: Patient will decrease symptoms of anxiety as evidenced by decreased OREN-7 score.    I will know I've met my goal when I don't worry so much and when I am more assertive.      Objective #A (Patient Action)    Patient will use at least 2 coping skills for anxiety management in the next 2 weeks.   Learn strategies to manage / reduce ruminating thoughts.  Learn and utilize assertive communication skills.     Status: New - Date: 8/27/2024      Intervention(s)  Therapist will teach coping skills for management of anxious symptoms.  Therapist will teach assertive communication and interpersonal effectiveness skills.  Therapist will teach cognitive strategies to help with worrying thoughts.      Patient has reviewed and agreed to the above plan.      CHRISTINE Yoo, LICSW  August 27, 2024

## 2024-09-10 ASSESSMENT — PATIENT HEALTH QUESTIONNAIRE - PHQ9
SUM OF ALL RESPONSES TO PHQ QUESTIONS 1-9: 7
10. IF YOU CHECKED OFF ANY PROBLEMS, HOW DIFFICULT HAVE THESE PROBLEMS MADE IT FOR YOU TO DO YOUR WORK, TAKE CARE OF THINGS AT HOME, OR GET ALONG WITH OTHER PEOPLE: SOMEWHAT DIFFICULT
SUM OF ALL RESPONSES TO PHQ QUESTIONS 1-9: 7

## 2024-09-10 ASSESSMENT — SOCIAL DETERMINANTS OF HEALTH (SDOH): HOW OFTEN DO YOU GET TOGETHER WITH FRIENDS OR RELATIVES?: ONCE A WEEK

## 2024-09-11 ENCOUNTER — VIRTUAL VISIT (OUTPATIENT)
Dept: BEHAVIORAL HEALTH | Facility: CLINIC | Age: 39
End: 2024-09-11
Payer: COMMERCIAL

## 2024-09-11 DIAGNOSIS — F41.1 GENERALIZED ANXIETY DISORDER: Primary | ICD-10-CM

## 2024-09-11 DIAGNOSIS — F33.0 MILD EPISODE OF RECURRENT MAJOR DEPRESSIVE DISORDER (H): ICD-10-CM

## 2024-09-11 PROCEDURE — 90837 PSYTX W PT 60 MINUTES: CPT | Mod: 95

## 2024-09-11 ASSESSMENT — COLUMBIA-SUICIDE SEVERITY RATING SCALE - C-SSRS
1. SINCE LAST CONTACT, HAVE YOU WISHED YOU WERE DEAD OR WISHED YOU COULD GO TO SLEEP AND NOT WAKE UP?: YES
ATTEMPT SINCE LAST CONTACT: NO
2. HAVE YOU ACTUALLY HAD ANY THOUGHTS OF KILLING YOURSELF?: NO

## 2024-09-12 ENCOUNTER — OFFICE VISIT (OUTPATIENT)
Dept: FAMILY MEDICINE | Facility: CLINIC | Age: 39
End: 2024-09-12
Payer: COMMERCIAL

## 2024-09-12 VITALS
HEART RATE: 90 BPM | HEIGHT: 65 IN | OXYGEN SATURATION: 94 % | DIASTOLIC BLOOD PRESSURE: 65 MMHG | RESPIRATION RATE: 16 BRPM | TEMPERATURE: 99.3 F | WEIGHT: 198.6 LBS | SYSTOLIC BLOOD PRESSURE: 105 MMHG | BODY MASS INDEX: 33.09 KG/M2

## 2024-09-12 DIAGNOSIS — J45.20 MILD INTERMITTENT REACTIVE AIRWAY DISEASE WITHOUT COMPLICATION: ICD-10-CM

## 2024-09-12 DIAGNOSIS — Z30.41 ENCOUNTER FOR SURVEILLANCE OF CONTRACEPTIVE PILLS: ICD-10-CM

## 2024-09-12 DIAGNOSIS — E11.65 TYPE 2 DIABETES MELLITUS WITH HYPERGLYCEMIA, WITHOUT LONG-TERM CURRENT USE OF INSULIN (H): Primary | ICD-10-CM

## 2024-09-12 DIAGNOSIS — F33.1 MODERATE EPISODE OF RECURRENT MAJOR DEPRESSIVE DISORDER (H): ICD-10-CM

## 2024-09-12 DIAGNOSIS — F41.9 ANXIETY: ICD-10-CM

## 2024-09-12 DIAGNOSIS — F41.1 GENERALIZED ANXIETY DISORDER: ICD-10-CM

## 2024-09-12 LAB
CREAT UR-MCNC: 189 MG/DL
MICROALBUMIN UR-MCNC: <12 MG/L
MICROALBUMIN/CREAT UR: NORMAL MG/G{CREAT}

## 2024-09-12 PROCEDURE — 82570 ASSAY OF URINE CREATININE: CPT | Performed by: FAMILY MEDICINE

## 2024-09-12 PROCEDURE — G2211 COMPLEX E/M VISIT ADD ON: HCPCS | Performed by: FAMILY MEDICINE

## 2024-09-12 PROCEDURE — 99214 OFFICE O/P EST MOD 30 MIN: CPT | Performed by: FAMILY MEDICINE

## 2024-09-12 PROCEDURE — 96127 BRIEF EMOTIONAL/BEHAV ASSMT: CPT | Performed by: FAMILY MEDICINE

## 2024-09-12 PROCEDURE — 82043 UR ALBUMIN QUANTITATIVE: CPT | Performed by: FAMILY MEDICINE

## 2024-09-12 RX ORDER — ALBUTEROL SULFATE 90 UG/1
2 AEROSOL, METERED RESPIRATORY (INHALATION) EVERY 6 HOURS PRN
Qty: 18 G | Refills: 3 | Status: SHIPPED | OUTPATIENT
Start: 2024-09-12

## 2024-09-12 RX ORDER — ARIPIPRAZOLE 5 MG/1
7.5 TABLET ORAL DAILY
Qty: 135 TABLET | Refills: 3 | Status: SHIPPED | OUTPATIENT
Start: 2024-09-12

## 2024-09-12 RX ORDER — NORETHINDRONE ACETATE AND ETHINYL ESTRADIOL, ETHINYL ESTRADIOL AND FERROUS FUMARATE 1MG-10(24)
1 KIT ORAL DAILY
Qty: 90 TABLET | Refills: 3 | Status: SHIPPED | OUTPATIENT
Start: 2024-09-12

## 2024-09-12 ASSESSMENT — ANXIETY QUESTIONNAIRES
8. IF YOU CHECKED OFF ANY PROBLEMS, HOW DIFFICULT HAVE THESE MADE IT FOR YOU TO DO YOUR WORK, TAKE CARE OF THINGS AT HOME, OR GET ALONG WITH OTHER PEOPLE?: SOMEWHAT DIFFICULT
GAD7 TOTAL SCORE: 7
7. FEELING AFRAID AS IF SOMETHING AWFUL MIGHT HAPPEN: SEVERAL DAYS
GAD7 TOTAL SCORE: 7

## 2024-09-12 ASSESSMENT — PATIENT HEALTH QUESTIONNAIRE - PHQ9
10. IF YOU CHECKED OFF ANY PROBLEMS, HOW DIFFICULT HAVE THESE PROBLEMS MADE IT FOR YOU TO DO YOUR WORK, TAKE CARE OF THINGS AT HOME, OR GET ALONG WITH OTHER PEOPLE: SOMEWHAT DIFFICULT
SUM OF ALL RESPONSES TO PHQ QUESTIONS 1-9: 7

## 2024-09-12 NOTE — PROGRESS NOTES
Assessment/ Plan     1. Type 2 diabetes mellitus with hyperglycemia, without long-term current use of insulin (H)  Lucrecia is here to establish care and get med refills.  Her diabetes is under good control with an A1c of 7.0 with just 1 tablet of metformin.  She has not had a urine albumin so we will get that today.  She is otherwise up-to-date on her blood work.  She is currently not taking a statin as she is planning on pregnancy in the next 6 months and they told her to stop it.  She is overdue for an eye exam as she moved back here from [place] so she will set that up and make sure they send us records.  Will follow-up in 6 months for complete physical exam and recheck of her diabetes.  - Albumin Random Urine Quantitative with Creat Ratio; Future  - metFORMIN (GLUCOPHAGE) 500 MG tablet; Take 1 tablet (500 mg) by mouth daily (with breakfast).  Dispense: 90 tablet; Refill: 3  - Albumin Random Urine Quantitative with Creat Ratio    2. Encounter for surveillance of contraceptive pills  She would like refills of birth control pills.  They are thinking about possibly starting to get pregnant again in about 6 months.  - LO LOESTRIN FE 1 MG-10 MCG / 10 MCG TABS; Take 1 tablet by mouth daily.  Dispense: 90 tablet; Refill: 3    3. Generalized anxiety disorder  She has pretty severe anxiety and depression and takes Abilify, Wellbutrin, and venlafaxine.  She was seeing a psychiatrist in [place] but has not established here.  She does have a therapist.  We reviewed her medications and the Abilify and venlafaxine are not preferred in pregnancy.  I suggest that she reestablish with a psychiatrist so that they can help change these over or manage her during her pregnancy.  She will check her network.    4. Moderate episode of recurrent major depressive disorder (H)  Her PHQ-9 is 7, OREN-7 is 7.  She did answer positive to suicidal thoughts.  She states that she has more passive thoughts and not active.  She does not feel  like she would act on them and feels like she has a good support system.  Again, recommend establishing with a psychiatrist.  - ARIPiprazole (ABILIFY) 5 MG tablet; Take 1.5 tablets (7.5 mg) by mouth daily.  Dispense: 135 tablet; Refill: 3    5. Mild intermittent reactive airway disease without complication  He has more reactive airway disease with weather change.  She very rarely uses her inhaler.  - albuterol (PROAIR HFA/PROVENTIL HFA/VENTOLIN HFA) 108 (90 Base) MCG/ACT inhaler; Inhale 2 puffs into the lungs every 6 hours as needed for shortness of breath, wheezing or cough.  Dispense: 18 g; Refill: 3      Subjective:      Myrna Brothers is a 38 year old female who presents for establish care.  She originally was from [place] and then had been living in [place] for few years and is now back in [place].  I am able to see her records through [facility] which is helpful.  She actually had a complete physical exam in February of this year.  She had a Pap smear which showed ASCUS with negative HPV.  She is up-to-date on her mammogram.  She does have multiple medical problems.    Type 2 diabetes.  Her most recent A1c is 7.0.  She just taking 1 tablet of metformin.  She is overdue for eye exam.  She has no signs or symptoms of peripheral neuropathy.  She had been taking Crestor but they told her to stop it as she is planning a pregnancy in the next 6 months.  Her LDL was 200.  Would recommend restarting this after pregnancy.  Follow-up in 6 months.    Anxiety and depression.  She currently takes Abilify, Wellbutrin, and Effexor.  She is seeing a therapist.  She was seeing a psychiatrist in [place] and needs to reestablish here.  With her pregnancy, they likely will need to make some changes of her medications but we have about 6 months for her to get established and get this figured out.    The longitudinal plan of care for the diagnosis(es)/condition(s) as documented were addressed during this visit. Due to the  "added complexity in care, I will continue to support Lucrecia in the subsequent management and with ongoing continuity of care.   Relevant past medical, family, surgical, and social history reviewed with patient, unless noted in HPI, not pertinent for this visit.  Medications were discussed and reconciled.   Review of Systems   A 12 point comprehensive review of systems was negative except as noted.      Current Outpatient Medications   Medication Sig Dispense Refill    albuterol (PROAIR HFA/PROVENTIL HFA/VENTOLIN HFA) 108 (90 Base) MCG/ACT inhaler Inhale 2 puffs into the lungs every 6 hours as needed for shortness of breath, wheezing or cough. 18 g 3    ARIPiprazole (ABILIFY) 5 MG tablet Take 1.5 tablets (7.5 mg) by mouth daily. 135 tablet 3    buPROPion (WELLBUTRIN XL) 300 MG 24 hr tablet Take 1 tablet (300 mg) by mouth every morning 90 tablet 3    LO LOESTRIN FE 1 MG-10 MCG / 10 MCG TABS Take 1 tablet by mouth daily. 90 tablet 3    metFORMIN (GLUCOPHAGE) 500 MG tablet Take 1 tablet (500 mg) by mouth daily (with breakfast). 90 tablet 3    venlafaxine (EFFEXOR XR) 150 MG 24 hr capsule Take 1 capsule (150 mg) by mouth daily Along with 75 mg 90 capsule 3    venlafaxine (EFFEXOR XR) 75 MG 24 hr capsule Take 1 capsule (75 mg) by mouth daily Along with 150 mg 90 capsule 3         Objective:     /65   Pulse 90   Temp 99.3  F (37.4  C)   Resp 16   Ht 1.651 m (5' 5\")   Wt 90.1 kg (198 lb 9.6 oz)   LMP 09/06/2024 (Approximate)   SpO2 94%   BMI 33.05 kg/m      Body mass index is 33.05 kg/m .       General appearance: alert, appears stated age and cooperative         No results found for this or any previous visit (from the past 168 hour(s)).       This note has been dictated using voice recognition software. Any grammatical or context distortions are unintentional and inherent to the software  "

## 2024-10-07 NOTE — PROGRESS NOTES
[facility]                                     Progress Note    Patient Name: Myrna Brothers  Date: 10/9/2024         Service Type: Individual      Session Start Time: 1:05pm  Session End Time: 1:55pm     Session Length: 50 min    Session #: 3    Attendees: Client attended alone    Service Modality:  Video Visit:      Provider verified identity through the following two step process.  Patient provided:  Patient  and Patient address    Telemedicine Visit: The patient's condition can be safely assessed and treated via synchronous audio and visual telemedicine encounter.      Reason for Telemedicine Visit: Patient has requested telehealth visit    Originating Site (Patient Location): Patient's place of employment    Distant Site (Provider Location): Provider Remote Setting- Home Office    Consent:  The patient/guardian has verbally consented to: the potential risks and benefits of telemedicine (video visit) versus in person care; bill my insurance or make self-payment for services provided; and responsibility for payment of non-covered services.     Patient would like the video invitation sent by:  My Chart    Mode of Communication:  Video Conference via Amwell    Distant Location (Provider):  Off-site    As the provider I attest to compliance with applicable laws and regulations related to telemedicine.    DATA  Extended Session (53+ minutes): No  Interactive Complexity: No  Crisis: No         Progress Since Last Session (Related to Symptoms / Goals / Homework):   Symptoms: No change (depression / anxiety)    Homework: Achieved / completed to satisfaction      Episode of Care Goals: Minimal progress - PREPARATION (Decided to change - considering how); Intervened by negotiating a change plan and determining options / strategies for behavior change, identifying triggers, exploring social supports, and working towards setting a date to begin behavior change     Current / Ongoing Stressors  and Concerns:   Today, session was spent processing through anxiety tied to the patient's 's upcoming bariatric surgery.  Patient expressed fears around her  leaving her after he loses weight.  Patient acknowledged this thought as an irrational thought, but reports that emotional she has been struggling with her own body image.  Patient and therapist processed through negative cognitions tied to body image.  Explored early programming / shaming experiences and reframed thoughts through reparenting.  Also discussed mindful / intuitive eating principles to improve relationship with food.  In the coming weeks, patient will utilize reparenting to challenge shaming thoughts.  Patient will practice body neutrality.  Patient will look into resources for intuitive eating.  Patient will return in two weeks for follow up.      Therapist assessed for risk and safety - patient denied SI/SIB. Patient contracted for safety and will continue to utilize safety plan as needed.  Should there be an increase in frequency or intensity of symptoms related to SI/SIB, patient will call / text [phone], call 911, or go to local ED for evaluation.         Treatment Objective(s) Addressed in This Session:   use at least 2 coping skills for anxiety management in the next 2 weeks  Decrease frequency and intensity of feeling down, depressed, hopeless  Identify negative self-talk and behaviors: challenge core beliefs, myths, and actions  Decrease thoughts that you'd be better off dead or of suicide / self-harm       Intervention:   CBT: cognitive restructuring  DBT: validation  Emotion Focused Therapy: emotional processing  Psychodynamic: reparenting, deconstructing early attachments / schemas  Intuitive Eating     Assessments completed prior to visit:  The following assessments were completed by patient for this visit:  PHQ9:       8/2/2019     3:11 PM 8/1/2024     8:43 AM 8/27/2024     8:45 AM 9/10/2024     1:45 PM 9/12/2024     6:59  "AM 9/12/2024     7:04 AM 10/9/2024    12:46 PM   PHQ-9 SCORE   PHQ-9 Total Score MyChart  9 (Mild depression) 5 (Mild depression) 7 (Mild depression)  7 (Mild depression) 6 (Mild depression)   PHQ-9 Total Score 5 9    9 5 7 7 7 6     GAD7:       1/11/2019     2:28 PM 1/18/2019    12:46 PM 7/29/2019     4:19 PM 8/2/2019     3:11 PM 7/26/2024     3:57 PM 9/12/2024     7:01 AM 9/12/2024     7:04 AM   OREN-7 SCORE   Total Score     7 (mild anxiety)  7 (mild anxiety)   Total Score 10 8 10 4 7 7 7     PROMIS 10-Global Health (only subscores and total score):       8/1/2024     8:52 AM 8/22/2024     9:25 AM   PROMIS-10 Scores Only   Global Mental Health Score 10 12   Global Physical Health Score 15 13   PROMIS TOTAL - SUBSCORES 25 25     [place] Suicide Severity Rating Scale (Short Version)      7/15/2019    11:46 AM 7/24/2019     6:12 AM 9/11/2024     1:05 PM 10/9/2024     2:11 PM   [place] Suicide Severity Rating (Short Version)   Over the past 2 weeks have you felt down, depressed, or hopeless? no no     Over the past 2 weeks have you had thoughts of killing yourself? no no     Have you ever attempted to kill yourself? no no     1. Wish to be Dead (Since Last Contact)   Y N   Wish to be Dead Description (Since Last Contact)   Thoughts of \"Iit would better if I wasn't here\" without plan, intent or methods.  Patient contracted for safety.    2. Non-Specific Active Suicidal Thoughts (Since Last Contact)   N N   Actual Attempt (Since Last Contact)   N N   Has subject engaged in non-suicidal self-injurious behavior? (Since Last Contact)   N N   Calculated C-SSRS Risk Score (Since Last Contact)   Low Risk No Risk Indicated         ASSESSMENT: Current Emotional / Mental Status (status of significant symptoms):   Risk status (Self / Other harm or suicidal ideation)   Patient denies current fears or concerns for personal safety.   Patient denies current or recent suicidal ideation or behaviors.   Patient denies current or " recent homicidal ideation or behaviors.   Patient denies current or recent self injurious behavior or ideation.   Patient denies other safety concerns.   Patient reports there has been no change in risk factors since their last session.     Patient reports there has been no change in protective factors since their last session.     A safety and risk management plan has been developed including: Patient consented to co-developed safety plan on 8/2/2024.  Safety and risk management plan was reviewed.   Patient agreed to use safety plan should any safety concerns arise.  A copy was made available to the patient.     Appearance:   Appropriate    Eye Contact:   Good    Psychomotor Behavior: Normal    Attitude:   Cooperative    Orientation:   All   Speech    Rate / Production: Normal     Volume:  Normal    Mood:    Anxious    Affect:    Appropriate    Thought Content:  Clear    Thought Form:  Coherent  Logical    Insight:    Good      Medication Review:   No changes to current psychiatric medication(s)     Medication Compliance:   Yes     Changes in Health Issues:   None reported     Chemical Use Review:   Substance Use: Chemical use reviewed, no active concerns identified      Tobacco Use: No current tobacco use.      Diagnosis:  1. Generalized anxiety disorder    2. Mild episode of recurrent major depressive disorder (H)            Collateral Reports Completed:   Not Applicable    PLAN: (Patient Tasks / Therapist Tasks / Other)  Utilize safety plan as needed.  Should there be an increase in frequency or intensity of symptoms related to SI/SIB, patient will call / text [phone], call 911, or go to local ED for evaluation.    In the coming weeks, patient will utilize reparenting to challenge shaming thoughts.    Patient will practice body neutrality.    Patient will look into resources for intuitive eating.    Patient will return in two weeks for follow up.          CHRISTINE Yoo,  LICSW    10/9/2024    ______________________________________________________________________    Individual Treatment Plan    Patient's Name: Myrna Brothers  YOB: 1985    Date of Creation: 8/27/2024  Date Treatment Plan Last Reviewed/Revised: 8/27/2024    DSM5 Diagnoses: 296.31 (F33.0) Major Depressive Disorder, Recurrent Episode, Mild _ and With anxious distress or 300.02 (F41.1) Generalized Anxiety Disorder  Psychosocial / Contextual Factors: hx of therapy, moved to [place] from [place] in April after her  was laid off, currently living with in-laws, 5 year old son is starting , son has some cognitive and speech delays, family of origin dynamics, relational dynamics   PROMIS (reviewed every 90 days):       8/1/2024     8:52 AM 8/22/2024     9:25 AM   PROMIS-10 Scores Only   Global Mental Health Score 10 12   Global Physical Health Score 15 13   PROMIS TOTAL - SUBSCORES 25 25       Referral / Collaboration:  Referral to another professional/service is not indicated at this time..    Anticipated number of session for this episode of care: 9-12 sessions  Anticipation frequency of session: Every other week  Anticipated Duration of each session: 38-52 minutes  Treatment plan will be reviewed in 90 days or when goals have been changed.       MeasurableTreatment Goal(s) related to diagnosis / functional impairment(s)  Goal 1: Patient will decrease symptoms of depression as evidenced by decreased PHQ-9 score.    I will know I've met my goal when I feel confident as a mom and have more energy.    Objective #A (Patient Action)    Patient will Increase interest, engagement, and pleasure in doing things  Feel less tired and more energy during the day   Identify negative self-talk and behaviors: challenge core beliefs, myths, and actions  Decrease thoughts that you'd be better off dead or of suicide / self-harm  Manage safety and utilize safety plan .  Status: New - Date: 8/27/2024       Intervention(s)  Therapist will teach strategies to reduce / manage depressive symptoms.  Therapist will teach and model positive self talk behaviors and strategies.  Therapist will check in with patient regarding safety / SI.       Goal 2: Patient will decrease symptoms of anxiety as evidenced by decreased OREN-7 score.    I will know I've met my goal when I don't worry so much and when I am more assertive.      Objective #A (Patient Action)    Patient will use at least 2 coping skills for anxiety management in the next 2 weeks.   Learn strategies to manage / reduce ruminating thoughts.  Learn and utilize assertive communication skills.     Status: New - Date: 8/27/2024      Intervention(s)  Therapist will teach coping skills for management of anxious symptoms.  Therapist will teach assertive communication and interpersonal effectiveness skills.  Therapist will teach cognitive strategies to help with worrying thoughts.      Patient has reviewed and agreed to the above plan.      CHRISTINE Yoo, [facility]  August 27, 2024

## 2024-10-09 ENCOUNTER — VIRTUAL VISIT (OUTPATIENT)
Dept: BEHAVIORAL HEALTH | Facility: CLINIC | Age: 39
End: 2024-10-09
Payer: COMMERCIAL

## 2024-10-09 DIAGNOSIS — F41.1 GENERALIZED ANXIETY DISORDER: Primary | ICD-10-CM

## 2024-10-09 DIAGNOSIS — F33.0 MILD EPISODE OF RECURRENT MAJOR DEPRESSIVE DISORDER (H): ICD-10-CM

## 2024-10-09 PROCEDURE — 90834 PSYTX W PT 45 MINUTES: CPT | Mod: 95

## 2024-10-09 ASSESSMENT — COLUMBIA-SUICIDE SEVERITY RATING SCALE - C-SSRS
ATTEMPT SINCE LAST CONTACT: NO
2. HAVE YOU ACTUALLY HAD ANY THOUGHTS OF KILLING YOURSELF?: NO
1. SINCE LAST CONTACT, HAVE YOU WISHED YOU WERE DEAD OR WISHED YOU COULD GO TO SLEEP AND NOT WAKE UP?: NO

## 2024-10-09 ASSESSMENT — PATIENT HEALTH QUESTIONNAIRE - PHQ9
SUM OF ALL RESPONSES TO PHQ QUESTIONS 1-9: 6
10. IF YOU CHECKED OFF ANY PROBLEMS, HOW DIFFICULT HAVE THESE PROBLEMS MADE IT FOR YOU TO DO YOUR WORK, TAKE CARE OF THINGS AT HOME, OR GET ALONG WITH OTHER PEOPLE: SOMEWHAT DIFFICULT
SUM OF ALL RESPONSES TO PHQ QUESTIONS 1-9: 6

## 2024-10-15 NOTE — TELEPHONE ENCOUNTER
Patient to department for first time yearly Reclast infusion. Tolerated infusion well. Observed additional 30 minutes post infusion, no signs/symptoms of reaction. Given avs with information about possible side effects. Encouraged to drink extra water today. Patient to return in one year for next reclast, will need new order and labs at that time, patient aware.   Script tubed to pharmacy onsite. Patient informed that script was sent to our pharmacy onsite since this medication might be a compound medication.    DAHLIA Ruffin

## 2024-11-04 NOTE — PROGRESS NOTES
[facility]                                     Progress Note    Patient Name: Myrna Brothers  Date: 2024         Service Type: Individual      Session Start Time: 1:08pm  Session End Time: 2:03pm     Session Length: 55 min    Session #: 4    Attendees: Client attended alone    Service Modality:  Video Visit:      Provider verified identity through the following two step process.  Patient provided:  Patient  and Patient address    Telemedicine Visit: The patient's condition can be safely assessed and treated via synchronous audio and visual telemedicine encounter.      Reason for Telemedicine Visit: Patient has requested telehealth visit    Originating Site (Patient Location): Patient's place of employment    Distant Site (Provider Location): Provider Remote Setting- Home Office    Consent:  The patient/guardian has verbally consented to: the potential risks and benefits of telemedicine (video visit) versus in person care; bill my insurance or make self-payment for services provided; and responsibility for payment of non-covered services.     Patient would like the video invitation sent by:  My Chart    Mode of Communication:  Video Conference via Amwell    Distant Location (Provider):  Off-site    As the provider I attest to compliance with applicable laws and regulations related to telemedicine.    DATA  Extended Session (53+ minutes): PROLONGED SERVICE IN THE OUTPATIENT SETTING REQUIRING DIRECT (FACE-TO-FACE) PATIENT CONTACT BEYOND THE USUAL SERVICE:    - Longer session due to limited access to mental health appointments and necessity to address patient's distress / complexity  Interactive Complexity: No  Crisis: No         Progress Since Last Session (Related to Symptoms / Goals / Homework):   Symptoms: Worsening (anxiety)    Homework: Achieved / completed to satisfaction      Episode of Care Goals: Minimal progress - PREPARATION (Decided to change - considering how); Intervened  "by negotiating a change plan and determining options / strategies for behavior change, identifying triggers, exploring social supports, and working towards setting a date to begin behavior change     Current / Ongoing Stressors and Concerns:   Today, patient and therapist processed through the following stressors: 1) anxiety tied to election results / catastrophic thinking, 2) anxiety tied to work related stress, and 3) body image concerns.  In the coming weeks, patient will challenge catastrophic thought processes by engaging in action steps within her locus of control.  Patient will utilize distress tolerance and mindfulness skills.  Patient will check for the evidence and challenge negative thoughts about body image.  Next appt: 1/8.    Therapist assessed for risk and safety - patient reports passive SI of \"it would be easier if I wasn't here\" without plan, intent, or methods. Patient contracted for safety and will continue to utilize safety plan as needed.  Should there be an increase in frequency or intensity of symptoms related to SI/SIB, patient will call / text [phone], call 911, or go to local ED for evaluation.         Treatment Objective(s) Addressed in This Session:   use at least 2 coping skills for anxiety management in the next 2 weeks  Decrease frequency and intensity of feeling down, depressed, hopeless  Identify negative self-talk and behaviors: challenge core beliefs, myths, and actions  Decrease thoughts that you'd be better off dead or of suicide / self-harm       Intervention:   CBT: cognitive restructuring, reality checking  DBT: validation, mindfulness, distress tolerance   Emotion Focused Therapy: emotional processing  Psychodynamic: reparenting, deconstructing early attachments / schemas      Assessments completed prior to visit:  The following assessments were completed by patient for this visit:  PHQ9:       8/1/2024     8:43 AM 8/27/2024     8:45 AM 9/10/2024     1:45 PM 9/12/2024     6:59 " "AM 9/12/2024     7:04 AM 10/9/2024    12:46 PM 11/5/2024     1:22 PM   PHQ-9 SCORE   PHQ-9 Total Score MyChart 9 (Mild depression) 5 (Mild depression) 7 (Mild depression)  7 (Mild depression) 6 (Mild depression) 6 (Mild depression)   PHQ-9 Total Score 9    9 5 7 7 7 6 6        Patient-reported    Multiple values from one day are sorted in reverse-chronological order     GAD7:       1/11/2019     2:28 PM 1/18/2019    12:46 PM 7/29/2019     4:19 PM 8/2/2019     3:11 PM 7/26/2024     3:57 PM 9/12/2024     7:01 AM 9/12/2024     7:04 AM   OREN-7 SCORE   Total Score     7 (mild anxiety)  7 (mild anxiety)   Total Score 10 8 10 4 7 7 7     PROMIS 10-Global Health (only subscores and total score):       8/1/2024     8:52 AM 8/22/2024     9:25 AM   PROMIS-10 Scores Only   Global Mental Health Score 10 12   Global Physical Health Score 15 13   PROMIS TOTAL - SUBSCORES 25 25     [place] Suicide Severity Rating Scale (Short Version)      7/15/2019    11:46 AM 7/24/2019     6:12 AM 9/11/2024     1:05 PM 10/9/2024     2:11 PM 11/6/2024     1:41 PM   [place] Suicide Severity Rating (Short Version)   Over the past 2 weeks have you felt down, depressed, or hopeless? no no      Over the past 2 weeks have you had thoughts of killing yourself? no no      Have you ever attempted to kill yourself? no no      1. Wish to be Dead (Since Last Contact)   Y N Y   Wish to be Dead Description (Since Last Contact)   Thoughts of \"Iit would better if I wasn't here\" without plan, intent or methods.  Patient contracted for safety.  thoughts of \"wishing I wasn't here\" without plan, intent, or methods.   2. Non-Specific Active Suicidal Thoughts (Since Last Contact)   N N N   Most Severe Ideation Rating (Since Last Contact)     1   Frequency (Since Last Contact)     3   Duration (Since Last Contact)     1   Deterrents (Since Last Contact)     1   Reasons for Ideation (Since Last Contact)     5   Actual Attempt (Since Last Contact)   N N N   Has subject " "engaged in non-suicidal self-injurious behavior? (Since Last Contact)   N N N   Calculated C-SSRS Risk Score (Since Last Contact)   Low Risk No Risk Indicated Low Risk         ASSESSMENT: Current Emotional / Mental Status (status of significant symptoms):   Risk status (Self / Other harm or suicidal ideation)   Patient denies current fears or concerns for personal safety.   Patient reports the following current or recent suicidal ideation or behaviors: passive SI of \"it would be easier if I wasn't here\" without plan, intent, or methods.  Patient contracted for safety.   Patient denies current or recent homicidal ideation or behaviors.   Patient denies current or recent self injurious behavior or ideation.   Patient denies other safety concerns.   Patient reports there has been no change in risk factors since their last session.     Patient reports there has been no change in protective factors since their last session.     A safety and risk management plan has been developed including: Patient consented to co-developed safety plan on 8/2/2024.  Safety and risk management plan was reviewed.   Patient agreed to use safety plan should any safety concerns arise.  A copy was made available to the patient.     Appearance:   Appropriate    Eye Contact:   Good    Psychomotor Behavior: Normal    Attitude:   Cooperative    Orientation:   All   Speech    Rate / Production: Normal     Volume:  Normal    Mood:    Anxious  Sad    Affect:    Tearful Worrisome    Thought Content:  Clear    Thought Form:  Coherent  Logical    Insight:    Good      Medication Review:   No changes to current psychiatric medication(s)     Medication Compliance:   Yes     Changes in Health Issues:   None reported     Chemical Use Review:   Substance Use: Chemical use reviewed, no active concerns identified      Tobacco Use: No current tobacco use.      Diagnosis:  1. Generalized anxiety disorder    2. Mild episode of recurrent major depressive disorder " (H)              Collateral Reports Completed:   Not Applicable    PLAN: (Patient Tasks / Therapist Tasks / Other)  Utilize safety plan as needed.  Should there be an increase in frequency or intensity of symptoms related to SI/SIB, patient will call / text [phone], call 911, or go to local ED for evaluation.    In the coming weeks, patient will challenge catastrophic thought processes by engaging in action steps within her locus of control.    Patient will utilize distress tolerance and mindfulness skills.    Patient will check for the evidence and challenge negative thoughts about body image.    Next appt: 1/8.        CHRISTINE Yoo, [facility] Center[facility]    11/6/2024    ______________________________________________________________________    Individual Treatment Plan    Patient's Name: Myrna Brothers  YOB: 1985    Date of Creation: 8/27/2024  Date Treatment Plan Last Reviewed/Revised: 8/27/2024    DSM5 Diagnoses: 296.31 (F33.0) Major Depressive Disorder, Recurrent Episode, Mild _ and With anxious distress or 300.02 (F41.1) Generalized Anxiety Disorder  Psychosocial / Contextual Factors: hx of therapy, moved to [place] from [place] in April after her  was laid off, currently living with in-laws, 5 year old son is starting , son has some cognitive and speech delays, family of origin dynamics, relational dynamics   PROMIS (reviewed every 90 days):       8/1/2024     8:52 AM 8/22/2024     9:25 AM   PROMIS-10 Scores Only   Global Mental Health Score 10 12   Global Physical Health Score 15 13   PROMIS TOTAL - SUBSCORES 25 25       Referral / Collaboration:  Referral to another professional/service is not indicated at this time..    Anticipated number of session for this episode of care: 9-12 sessions  Anticipation frequency of session: Every other week  Anticipated Duration of each session: 38-52 minutes  Treatment plan will be reviewed in 90 days or when goals have been changed.        MeasurableTreatment Goal(s) related to diagnosis / functional impairment(s)  Goal 1: Patient will decrease symptoms of depression as evidenced by decreased PHQ-9 score.    I will know I've met my goal when I feel confident as a mom and have more energy.    Objective #A (Patient Action)    Patient will Increase interest, engagement, and pleasure in doing things  Feel less tired and more energy during the day   Identify negative self-talk and behaviors: challenge core beliefs, myths, and actions  Decrease thoughts that you'd be better off dead or of suicide / self-harm  Manage safety and utilize safety plan .  Status: New - Date: 8/27/2024      Intervention(s)  Therapist will teach strategies to reduce / manage depressive symptoms.  Therapist will teach and model positive self talk behaviors and strategies.  Therapist will check in with patient regarding safety / SI.       Goal 2: Patient will decrease symptoms of anxiety as evidenced by decreased OREN-7 score.    I will know I've met my goal when I don't worry so much and when I am more assertive.      Objective #A (Patient Action)    Patient will use at least 2 coping skills for anxiety management in the next 2 weeks.   Learn strategies to manage / reduce ruminating thoughts.  Learn and utilize assertive communication skills.     Status: New - Date: 8/27/2024      Intervention(s)  Therapist will teach coping skills for management of anxious symptoms.  Therapist will teach assertive communication and interpersonal effectiveness skills.  Therapist will teach cognitive strategies to help with worrying thoughts.      Patient has reviewed and agreed to the above plan.      CHRISTINE Yoo, [facility]  August 27, 2024

## 2024-11-06 ENCOUNTER — VIRTUAL VISIT (OUTPATIENT)
Dept: BEHAVIORAL HEALTH | Facility: CLINIC | Age: 39
End: 2024-11-06
Payer: COMMERCIAL

## 2024-11-06 DIAGNOSIS — F33.0 MILD EPISODE OF RECURRENT MAJOR DEPRESSIVE DISORDER (H): ICD-10-CM

## 2024-11-06 DIAGNOSIS — F41.1 GENERALIZED ANXIETY DISORDER: Primary | ICD-10-CM

## 2024-11-06 PROCEDURE — 90837 PSYTX W PT 60 MINUTES: CPT | Mod: 95

## 2024-11-06 ASSESSMENT — COLUMBIA-SUICIDE SEVERITY RATING SCALE - C-SSRS
ATTEMPT SINCE LAST CONTACT: NO
1. SINCE LAST CONTACT, HAVE YOU WISHED YOU WERE DEAD OR WISHED YOU COULD GO TO SLEEP AND NOT WAKE UP?: YES
2. HAVE YOU ACTUALLY HAD ANY THOUGHTS OF KILLING YOURSELF?: NO
REASONS FOR IDEATION SINCE LAST CONTACT: COMPLETELY TO END OR STOP THE PAIN (YOU COULDN'T GO ON LIVING WITH THE PAIN OR HOW YOU WERE FEELING)

## 2024-11-11 ENCOUNTER — OFFICE VISIT (OUTPATIENT)
Dept: FAMILY MEDICINE | Facility: CLINIC | Age: 39
End: 2024-11-11
Payer: COMMERCIAL

## 2024-11-11 VITALS
WEIGHT: 196.8 LBS | BODY MASS INDEX: 31.63 KG/M2 | DIASTOLIC BLOOD PRESSURE: 64 MMHG | OXYGEN SATURATION: 97 % | RESPIRATION RATE: 18 BRPM | HEIGHT: 66 IN | SYSTOLIC BLOOD PRESSURE: 110 MMHG | HEART RATE: 91 BPM | TEMPERATURE: 97.2 F

## 2024-11-11 DIAGNOSIS — J01.10 ACUTE NON-RECURRENT FRONTAL SINUSITIS: Primary | ICD-10-CM

## 2024-11-11 DIAGNOSIS — E11.65 TYPE 2 DIABETES MELLITUS WITH HYPERGLYCEMIA, WITHOUT LONG-TERM CURRENT USE OF INSULIN (H): ICD-10-CM

## 2024-11-11 PROCEDURE — G2211 COMPLEX E/M VISIT ADD ON: HCPCS | Performed by: PHYSICIAN ASSISTANT

## 2024-11-11 PROCEDURE — 99213 OFFICE O/P EST LOW 20 MIN: CPT | Performed by: PHYSICIAN ASSISTANT

## 2024-11-11 ASSESSMENT — PAIN SCALES - GENERAL: PAINLEVEL_OUTOF10: SEVERE PAIN (7)

## 2024-11-11 NOTE — PATIENT INSTRUCTIONS
Patsy Singer,    Thank you for allowing [facility] to manage your care.    This is likely viral sinusitis, but you can start the Augmentin in 3 days if you are not improving.    If you develop worsening/changing symptoms at any time, please be seen in clinic/urgent care.    I sent your prescriptions to your pharmacy. Take a probiotic pill, eat live culture yogurt (Greek yogurt or Activia), drink kefir daily for one week if you have to take the antibiotics to encourage growth of good bacteria in your system.    Drink 8-10 glasses of fluid daily to stay well-hydrated. Use Flonase and a neti pot over the counter.    If you have any questions or concerns, please feel free to call us at [phone]    Sincerely,    Kleber Minor PA-C    Did you know?      You can schedule a video visit for follow-up appointments as well as future appointments for certain conditions.  Please see the below link.     [email or web address]    If you have not already done so,  I encourage you to sign up for [facility] ([email or web address]).  This will allow you to review your results, securely communicate with a provider, and schedule virtual visits as well.

## 2024-11-11 NOTE — PROGRESS NOTES
"  Assessment & Plan   Problem List Items Addressed This Visit          Endocrine    Type 2 diabetes mellitus with hyperglycemia, without long-term current use of insulin (H)     Other Visit Diagnoses       Acute non-recurrent frontal sinusitis    -  Primary    Relevant Medications    amoxicillin-clavulanate (AUGMENTIN) 875-125 MG tablet           Assessment & Plan  Sinusitis  - Likely viral sinusitis.   - Consider over-the-counter COVID test. If symptoms persist or worsen by Thursday, start antibiotics. Use Flonase and saline rinses. Monitor symptoms and report if there is no improvement or if there is intolerance to the medication.    Diabetes  - The most recent Hgb A1C was 7 mmol/L  - Monitor blood sugar levels.    Prescription  - Augmentin for sinus infection, to be started if symptoms persist or worsen by Thursday.  - Recommendation of probiotics like Greek yogurt, Activia if antibiotics are started.    Complete history and physical exam as below. Afebrile with normal vital signs.    DDx and Dx discussed with and explained to the pt to their satisfaction.  All questions were answered at this time. Pt expressed understanding of and agreement with this dx, tx, and plan. No further workup warranted and standard medication warnings given. I have given the patient a list of pertinent indications for re-evaluation. Will go to the Emergency Department if symptoms worsen or new concerning symptoms arise. Patient left in no apparent distress.        BMI  Estimated body mass index is 31.44 kg/m  as calculated from the following:    Height as of this encounter: 1.685 m (5' 6.34\").    Weight as of this encounter: 89.3 kg (196 lb 12.8 oz).     See Patient Instructions      Maura Singer is a 39 year old, presenting for the following health issues:  Sick        11/11/2024     1:52 PM   Additional Questions   Roomed by Erum Blevins CMA   Accompanied by N/A         11/11/2024     1:52 PM   Patient Reported Additional " "Medications   Patient reports taking the following new medications No new medications     History of Present Illness       Reason for visit:  Possible ear and sinus infection  Symptom onset:  3-7 days ago  Symptoms include:  Fluid in ear, stuffy nose and sinuses, headaches, pain in ear and behind eyes  Symptom intensity:  Moderate  Symptom progression:  Worsening  Had these symptoms before:  Yes  Has tried/received treatment for these symptoms:  Yes  Previous treatment was successful:  Yes  Prior treatment description:  Antibiotics  What makes it worse:  Looking at screens, laying down  What makes it better:  Not looking at screens and standing up She is missing 1 dose(s) of medications per week.  She is not taking prescribed medications regularly due to remembering to take.     Patient is coming in today with symptoms of stuffiness, jaw pain, fluid in the ear (started in left now both), ear pain, coughing, headaches.  Starting one week ago.  Her boss was sick, but it is unknown with what.  - Lucrecia reports having sinus infections regularly  - Current episode has been ongoing for about a week.  - Initially started on the left side and then progressed to both ears.  - Reports tenderness in the sinus area.  - No discharge, just nasal stuffiness.  - Had fever and chills during the first half of the week.  - Minor cough and sensation of things dripping down the throat and chest congestion.  - No recent COVID testing done.    Past medical history  Diabetes    Allergies  Allergic to numbing medications.    Current medications  DayQuil, NyQuil    Review of Systems  Constitutional, HEENT, cardiovascular, pulmonary, gi and gu systems are negative, except as otherwise noted.      Objective    /64   Pulse 91   Temp 97.2  F (36.2  C) (Temporal)   Resp 18   Ht 1.685 m (5' 6.34\")   Wt 89.3 kg (196 lb 12.8 oz)   LMP 10/21/2024 (Within Days)   SpO2 97%   Breastfeeding No   BMI 31.44 kg/m    Body mass index is 31.44 " kg/m .  Physical Exam  Vitals and nursing note reviewed.   Constitutional:       General: She is not in acute distress.     Appearance: She is not ill-appearing or diaphoretic.   HENT:      Head: Normocephalic and atraumatic.      Right Ear: Tympanic membrane, ear canal and external ear normal.      Left Ear: Tympanic membrane, ear canal and external ear normal.      Nose: Nose normal.      Mouth/Throat:      Mouth: Mucous membranes are moist.   Eyes:      Conjunctiva/sclera: Conjunctivae normal.   Cardiovascular:      Rate and Rhythm: Normal rate and regular rhythm.      Heart sounds: Normal heart sounds. No murmur heard.     No friction rub. No gallop.   Pulmonary:      Effort: Pulmonary effort is normal. No respiratory distress.      Breath sounds: Normal breath sounds. No stridor. No wheezing, rhonchi or rales.   Musculoskeletal:      Cervical back: Normal range of motion and neck supple. No rigidity.   Lymphadenopathy:      Cervical: No cervical adenopathy.   Skin:     General: Skin is warm and dry.   Neurological:      General: No focal deficit present.      Mental Status: She is alert. Mental status is at baseline.   Psychiatric:         Mood and Affect: Mood normal.         Behavior: Behavior normal.              Signed Electronically by: FLAKITA Garcia

## 2024-11-30 ENCOUNTER — HEALTH MAINTENANCE LETTER (OUTPATIENT)
Age: 39
End: 2024-11-30

## 2024-12-19 ENCOUNTER — OFFICE VISIT (OUTPATIENT)
Dept: FAMILY MEDICINE | Facility: CLINIC | Age: 39
End: 2024-12-19
Payer: COMMERCIAL

## 2024-12-19 VITALS
RESPIRATION RATE: 16 BRPM | BODY MASS INDEX: 31.12 KG/M2 | TEMPERATURE: 98.4 F | WEIGHT: 193.6 LBS | HEART RATE: 96 BPM | OXYGEN SATURATION: 95 % | SYSTOLIC BLOOD PRESSURE: 118 MMHG | DIASTOLIC BLOOD PRESSURE: 76 MMHG | HEIGHT: 66 IN

## 2024-12-19 DIAGNOSIS — H69.92 EUSTACHIAN TUBE DYSFUNCTION, LEFT: Primary | ICD-10-CM

## 2024-12-19 ASSESSMENT — PATIENT HEALTH QUESTIONNAIRE - PHQ9
SUM OF ALL RESPONSES TO PHQ QUESTIONS 1-9: 6
SUM OF ALL RESPONSES TO PHQ QUESTIONS 1-9: 6
10. IF YOU CHECKED OFF ANY PROBLEMS, HOW DIFFICULT HAVE THESE PROBLEMS MADE IT FOR YOU TO DO YOUR WORK, TAKE CARE OF THINGS AT HOME, OR GET ALONG WITH OTHER PEOPLE: SOMEWHAT DIFFICULT

## 2024-12-19 NOTE — PROGRESS NOTES
"  Assessment & Plan     Eustachian tube dysfunction, left  2 weeks of left ear discomfort, noted to have clear TMs bilaterally, though posterior fluid behind TM on left likely contributing to her symptoms.  Recommend intranasal fluticasone, reviewed proper administration technique.  If symptoms persist or worsen, consider course of antibiotics for secondary bacterial infection, though currently no signs of otitis media or sinusitis.    Subjective   Lucrecia is a 39 year old, presenting for the following health issues:  Ear Problem (Left ear is painful and plugged.  Started about a month ago.   She states the right ear was like this also, but that is better.  She did have a cold recently.  )        12/19/2024     1:14 PM   Additional Questions   Roomed by KRISTIN Bronson CMA([facility])     History of Present Illness       Reason for visit:  Possible ear infection    She eats 0-1 servings of fruits and vegetables daily.She consumes 1 sweetened beverage(s) daily.She exercises with enough effort to increase her heart rate 9 or less minutes per day.  She exercises with enough effort to increase her heart rate 3 or less days per week. She is missing 1 dose(s) of medications per week.  She is not taking prescribed medications regularly due to remembering to take.     Seen 11/11/2024 for similar ear/sinus symptoms.  Ear exam normal at that time.  She was prescribed Augmentin but did not pick this up because symptoms naturally improved.  The past 2 weeks feels as though symptoms have returned.  Aching in the left ear/left mandible.  Some nasal congestion but no significant sinus tenderness or headaches.  Afebrile.  Son with recent URI, and feels she may be developing his symptoms.  Your symptoms exacerbated when lying on left side.  No ear drainage.      Objective    /76   Pulse 96   Temp 98.4  F (36.9  C) (Oral)   Resp 16   Ht 1.67 m (5' 5.75\")   Wt 87.8 kg (193 lb 9.6 oz)   LMP 10/14/2024 (Within Days)   SpO2 95%   BMI " 31.49 kg/m    Body mass index is 31.49 kg/m .  Physical Exam   GENERAL: alert and no distress  EYES: Eyes grossly normal to inspection  HENT: ear canals and TM's normal, posterior fluid behind the left TM, nose and mouth without ulcers or lesions  NECK: no adenopathy, no asymmetry, masses, or scars  RESP: lungs clear to auscultation - no rales, rhonchi or wheezes  CV: regular rate and rhythm, normal S1 S2, no S3 or S4, no murmur  PSYCH: mentation appears normal, affect normal/bright            Signed Electronically by: Keysha Jorge DO

## 2025-01-06 NOTE — PROGRESS NOTES
[facility]                                     Progress Note    Patient Name: Myrna Brothers  Date: 2025         Service Type: Individual      Session Start Time: 12:05pm  Session End Time: 1pm     Session Length: 55 min    Session #: 5    Attendees: Client attended alone    Service Modality:  Video Visit:      Provider verified identity through the following two step process.  Patient provided:  Patient  and Patient address    Telemedicine Visit: The patient's condition can be safely assessed and treated via synchronous audio and visual telemedicine encounter.      Reason for Telemedicine Visit: Patient has requested telehealth visit    Originating Site (Patient Location): Patient's place of employment    Distant Site (Provider Location): Provider Remote Setting- Home Office    Consent:  The patient/guardian has verbally consented to: the potential risks and benefits of telemedicine (video visit) versus in person care; bill my insurance or make self-payment for services provided; and responsibility for payment of non-covered services.     Patient would like the video invitation sent by:  My Chart    Mode of Communication:  Video Conference via [employer]    Distant Location (Provider):  Off-site    As the provider I attest to compliance with applicable laws and regulations related to telemedicine.    DATA  Extended Session (53+ minutes): PROLONGED SERVICE IN THE OUTPATIENT SETTING REQUIRING DIRECT (FACE-TO-FACE) PATIENT CONTACT BEYOND THE USUAL SERVICE:    - Longer session due to limited access to mental health appointments and necessity to address patient's distress / complexity  Interactive Complexity: No  Crisis: No         Progress Since Last Session (Related to Symptoms / Goals / Homework):   Symptoms: Worsening (anxiety)    Homework: Achieved / completed to satisfaction      Episode of Care Goals: Satisfactory progress - ACTION (Actively working towards change); Intervened by  "reinforcing change plan / affirming steps taken     Current / Ongoing Stressors and Concerns:   Today, patient reports that it's been a difficult month.  Patient reports that she got laid off at work before the holidays.  Patient processed through feeling blind sided by this.  Processed through ways in which she is processing through her emotions and reframing unhelpful thought processes.  Patient reports that her  recently went through bariatric surgery and had some complications but is doing better now.  Processed through concerns with her own anxiety tied to body image.  Processed through emotions related to her son being assessed for ADHD and autism.  Processed through difficulties in knowing how to manage various behaviors with her son (\"how do I discipline / enforce boundaries in the context of his specific needs?\"). Therapist recommended family therapy to help patient in getting support for strategies - referral placed.  Patient processed through anxiety / rumination tied to her son's difficulties with making friends.  In the coming weeks, patient will schedule for couples therapy.  Patient will practice radical acceptance over the things she can't control.  Patient will reframe anxious thoughts.  Next appt: 1/22.    Therapist assessed for risk and safety - patient denied SI/SIB. Patient contracted for safety and will continue to utilize safety plan as needed.  Should there be an increase in frequency or intensity of symptoms related to SI/SIB, patient will call / text [phone], call 911, or go to local ED for evaluation.         Treatment Objective(s) Addressed in This Session:   use at least 2 coping skills for anxiety management in the next 2 weeks  Decrease frequency and intensity of feeling down, depressed, hopeless  Identify negative self-talk and behaviors: challenge core beliefs, myths, and actions  Decrease thoughts that you'd be better off dead or of suicide / self-harm       Intervention:   CBT: " "cognitive restructuring, reality checking  DBT: validation, mindfulness, distress tolerance   Emotion Focused Therapy: emotional processing  Psychodynamic: reparenting, deconstructing early attachments / schemas  Radical acceptance / locus of control    Assessments completed prior to visit:  The following assessments were completed by patient for this visit:  PHQ9:       9/10/2024     1:45 PM 9/12/2024     6:59 AM 9/12/2024     7:04 AM 10/9/2024    12:46 PM 11/5/2024     1:22 PM 12/19/2024    11:18 AM 1/8/2025    11:08 AM   PHQ-9 SCORE   PHQ-9 Total Score MyChart 7 (Mild depression)  7 (Mild depression) 6 (Mild depression) 6 (Mild depression) 6 (Mild depression) 7 (Mild depression)   PHQ-9 Total Score 7 7 7 6 6  6  7        Patient-reported     GAD7:       1/18/2019    12:46 PM 7/29/2019     4:19 PM 8/2/2019     3:11 PM 7/26/2024     3:57 PM 9/12/2024     7:01 AM 9/12/2024     7:04 AM 1/8/2025    11:08 AM   OREN-7 SCORE   Total Score    7 (mild anxiety)  7 (mild anxiety) 6 (mild anxiety)   Total Score 8 10 4 7 7 7 6        Patient-reported     PROMIS 10-Global Health (only subscores and total score):       8/1/2024     8:52 AM 8/22/2024     9:25 AM 1/8/2025    11:09 AM   PROMIS-10 Scores Only   Global Mental Health Score 10 12 10    Global Physical Health Score 15 13 14    PROMIS TOTAL - SUBSCORES 25 25 24        Patient-reported     Bradley Suicide Severity Rating Scale (Short Version)      7/15/2019    11:46 AM 7/24/2019     6:12 AM 9/11/2024     1:05 PM 10/9/2024     2:11 PM 11/6/2024     1:41 PM 1/8/2025     1:07 PM   Bradley Suicide Severity Rating (Short Version)   Over the past 2 weeks have you felt down, depressed, or hopeless? no no       Over the past 2 weeks have you had thoughts of killing yourself? no no       Have you ever attempted to kill yourself? no no       1. Wish to be Dead (Since Last Contact)   Y N Y N   Wish to be Dead Description (Since Last Contact)   Thoughts of \"Iit would better if I " "wasn't here\" without plan, intent or methods.  Patient contracted for safety.  thoughts of \"wishing I wasn't here\" without plan, intent, or methods.    2. Non-Specific Active Suicidal Thoughts (Since Last Contact)   N N N N   Most Severe Ideation Rating (Since Last Contact)     1    Frequency (Since Last Contact)     3    Duration (Since Last Contact)     1    Deterrents (Since Last Contact)     1    Reasons for Ideation (Since Last Contact)     5    Actual Attempt (Since Last Contact)   N N N N   Has subject engaged in non-suicidal self-injurious behavior? (Since Last Contact)   N N N N   Calculated C-SSRS Risk Score (Since Last Contact)   Low Risk No Risk Indicated Low Risk No Risk Indicated         ASSESSMENT: Current Emotional / Mental Status (status of significant symptoms):   Risk status (Self / Other harm or suicidal ideation)   Patient denies current fears or concerns for personal safety.   Patient denies current or recent suicidal ideation or behaviors.   Patient denies current or recent homicidal ideation or behaviors.   Patient denies current or recent self injurious behavior or ideation.   Patient denies other safety concerns.   Patient reports there has been no change in risk factors since their last session.     Patient reports there has been no change in protective factors since their last session.     A safety and risk management plan has been developed including: Patient consented to co-developed safety plan on 8/2/2024.  Safety and risk management plan was reviewed.   Patient agreed to use safety plan should any safety concerns arise.  A copy was made available to the patient.     Appearance:   Appropriate    Eye Contact:   Good    Psychomotor Behavior: Normal    Attitude:   Cooperative    Orientation:   All   Speech    Rate / Production: Normal     Volume:  Normal    Mood:    Anxious  Sad    Affect:    Tearful Worrisome    Thought Content:  Clear    Thought Form:  Coherent  Logical "    Insight:    Good      Medication Review:   No changes to current psychiatric medication(s)     Medication Compliance:   Yes     Changes in Health Issues:   None reported     Chemical Use Review:   Substance Use: Chemical use reviewed, no active concerns identified      Tobacco Use: No current tobacco use.      Diagnosis:  1. Generalized anxiety disorder    2. Mild episode of recurrent major depressive disorder                Collateral Reports Completed:   Not Applicable    PLAN: (Patient Tasks / Therapist Tasks / Other)  Utilize safety plan as needed.  Should there be an increase in frequency or intensity of symptoms related to SI/SIB, patient will call / text [phone], call 911, or go to local ED for evaluation.    In the coming weeks, patient will schedule for couples therapy.    Patient will practice radical acceptance over the things she can't control.    Patient will reframe anxious thoughts.    Next appt: 1/22.        CHRISTINE Yoo, LICSW    1/8/2025    ______________________________________________________________________    Individual Treatment Plan    Patient's Name: Myrna Brothers  YOB: 1985    Date of Creation: 8/27/2024  Date Treatment Plan Last Reviewed/Revised: 1/8/2025    DSM5 Diagnoses: 296.31 (F33.0) Major Depressive Disorder, Recurrent Episode, Mild _ and With anxious distress or 300.02 (F41.1) Generalized Anxiety Disorder  Psychosocial / Contextual Factors: hx of therapy, moved to [place] from [place] in April after her  was laid off, currently living with in-laws, 5 year old son is starting , son has some cognitive and speech delays, family of origin dynamics, relational dynamics   PROMIS (reviewed every 90 days):       8/1/2024     8:52 AM 8/22/2024     9:25 AM 1/8/2025    11:09 AM   PROMIS-10 Scores Only   Global Mental Health Score 10 12 10    Global Physical Health Score 15 13 14    PROMIS TOTAL - SUBSCORES 25 25 24        Patient-reported       Referral /  Collaboration:  The following referral(s) will be initiated: family therapy .    Anticipated number of session for this episode of care: 9-12 sessions  Anticipation frequency of session: Every other week  Anticipated Duration of each session: 38-52 minutes  Treatment plan will be reviewed in 90 days or when goals have been changed.       MeasurableTreatment Goal(s) related to diagnosis / functional impairment(s)  Goal 1: Patient will decrease symptoms of depression as evidenced by decreased PHQ-9 score.    I will know I've met my goal when I feel confident as a mom and have more energy.    Objective #A (Patient Action)    Patient will Increase interest, engagement, and pleasure in doing things  Feel less tired and more energy during the day   Identify negative self-talk and behaviors: challenge core beliefs, myths, and actions  Decrease thoughts that you'd be better off dead or of suicide / self-harm  Manage safety and utilize safety plan .  Status: continued: 1/8/2025    Intervention(s)  Therapist will teach strategies to reduce / manage depressive symptoms.  Therapist will teach and model positive self talk behaviors and strategies.  Therapist will check in with patient regarding safety / SI.       Goal 2: Patient will decrease symptoms of anxiety as evidenced by decreased OREN-7 score.    I will know I've met my goal when I don't worry so much and when I am more assertive.      Objective #A (Patient Action)    Patient will use at least 2 coping skills for anxiety management in the next 2 weeks.   Learn strategies to manage / reduce ruminating thoughts.  Learn and utilize assertive communication skills.     Status: continued: 1/8/2025    Intervention(s)  Therapist will teach coping skills for management of anxious symptoms.  Therapist will teach assertive communication and interpersonal effectiveness skills.  Therapist will teach cognitive strategies to help with worrying thoughts.      Patient has reviewed and  agreed to the above plan.      CHRISTINE Yoo, LICSW  1/8/2025

## 2025-01-07 ENCOUNTER — PATIENT OUTREACH (OUTPATIENT)
Dept: CARE COORDINATION | Facility: CLINIC | Age: 40
End: 2025-01-07
Payer: COMMERCIAL

## 2025-01-08 ENCOUNTER — VIRTUAL VISIT (OUTPATIENT)
Dept: BEHAVIORAL HEALTH | Facility: CLINIC | Age: 40
End: 2025-01-08
Payer: COMMERCIAL

## 2025-01-08 DIAGNOSIS — F41.1 GENERALIZED ANXIETY DISORDER: Primary | ICD-10-CM

## 2025-01-08 DIAGNOSIS — F33.0 MILD EPISODE OF RECURRENT MAJOR DEPRESSIVE DISORDER: ICD-10-CM

## 2025-01-08 PROCEDURE — 90837 PSYTX W PT 60 MINUTES: CPT | Mod: 95

## 2025-01-08 ASSESSMENT — ANXIETY QUESTIONNAIRES
GAD7 TOTAL SCORE: 6
GAD7 TOTAL SCORE: 6
1. FEELING NERVOUS, ANXIOUS, OR ON EDGE: SEVERAL DAYS
7. FEELING AFRAID AS IF SOMETHING AWFUL MIGHT HAPPEN: SEVERAL DAYS
4. TROUBLE RELAXING: SEVERAL DAYS
IF YOU CHECKED OFF ANY PROBLEMS ON THIS QUESTIONNAIRE, HOW DIFFICULT HAVE THESE PROBLEMS MADE IT FOR YOU TO DO YOUR WORK, TAKE CARE OF THINGS AT HOME, OR GET ALONG WITH OTHER PEOPLE: SOMEWHAT DIFFICULT
6. BECOMING EASILY ANNOYED OR IRRITABLE: NOT AT ALL
7. FEELING AFRAID AS IF SOMETHING AWFUL MIGHT HAPPEN: SEVERAL DAYS
2. NOT BEING ABLE TO STOP OR CONTROL WORRYING: SEVERAL DAYS
8. IF YOU CHECKED OFF ANY PROBLEMS, HOW DIFFICULT HAVE THESE MADE IT FOR YOU TO DO YOUR WORK, TAKE CARE OF THINGS AT HOME, OR GET ALONG WITH OTHER PEOPLE?: SOMEWHAT DIFFICULT
5. BEING SO RESTLESS THAT IT IS HARD TO SIT STILL: SEVERAL DAYS
GAD7 TOTAL SCORE: 6
3. WORRYING TOO MUCH ABOUT DIFFERENT THINGS: SEVERAL DAYS

## 2025-01-08 ASSESSMENT — PATIENT HEALTH QUESTIONNAIRE - PHQ9
10. IF YOU CHECKED OFF ANY PROBLEMS, HOW DIFFICULT HAVE THESE PROBLEMS MADE IT FOR YOU TO DO YOUR WORK, TAKE CARE OF THINGS AT HOME, OR GET ALONG WITH OTHER PEOPLE: SOMEWHAT DIFFICULT
SUM OF ALL RESPONSES TO PHQ QUESTIONS 1-9: 7
SUM OF ALL RESPONSES TO PHQ QUESTIONS 1-9: 7

## 2025-01-08 ASSESSMENT — COLUMBIA-SUICIDE SEVERITY RATING SCALE - C-SSRS
1. SINCE LAST CONTACT, HAVE YOU WISHED YOU WERE DEAD OR WISHED YOU COULD GO TO SLEEP AND NOT WAKE UP?: NO
ATTEMPT SINCE LAST CONTACT: NO
2. HAVE YOU ACTUALLY HAD ANY THOUGHTS OF KILLING YOURSELF?: NO

## 2025-01-20 NOTE — PROGRESS NOTES
[facility]                                     Progress Note    Patient Name: Myrna Brothers  Date: 2025         Service Type: Individual      Session Start Time: 12pm  Session End Time: 12:50pm     Session Length: 50 min    Session #: 6    Attendees: Client attended alone    Service Modality:  Video Visit:      Provider verified identity through the following two step process.  Patient provided:  Patient  and Patient address    Telemedicine Visit: The patient's condition can be safely assessed and treated via synchronous audio and visual telemedicine encounter.      Reason for Telemedicine Visit: Patient has requested telehealth visit    Originating Site (Patient Location): Patient's place of employment    Distant Site (Provider Location): Provider Remote Setting- Home Office    Consent:  The patient/guardian has verbally consented to: the potential risks and benefits of telemedicine (video visit) versus in person care; bill my insurance or make self-payment for services provided; and responsibility for payment of non-covered services.     Patient would like the video invitation sent by:  My Chart    Mode of Communication:  Video Conference via [employer]    Distant Location (Provider):  Off-site    As the provider I attest to compliance with applicable laws and regulations related to telemedicine.    DATA  Extended Session (53+ minutes): No  Interactive Complexity: No  Crisis: No         Progress Since Last Session (Related to Symptoms / Goals / Homework):   Symptoms: Worsening (anxiety)    Homework: Achieved / completed to satisfaction      Episode of Care Goals: Satisfactory progress - ACTION (Actively working towards change); Intervened by reinforcing change plan / affirming steps taken     Current / Ongoing Stressors and Concerns:   Today, patient reports that various stressors have led to increased anxious / depressive symptoms.  Processed through a parenting stressor and  identified emotions.  Identified and challenged cognitive distortions by checking for the evidence.  Patient processed through stressors related to the political climate.  Processed through stressors tied to interviewing for new jobs.  Explored patient's experience of social anxiety.  Reviewed concepts of radical acceptance and locus of control.  In the coming weeks, patient will utilize CBT skills.  Patient will focus on controllable action steps.  Patient will identify initial triggers for rumination.  Next appt: 2/5.    Therapist assessed for risk and safety - patient denied SI/SIB. Patient contracted for safety and will continue to utilize safety plan as needed.  Should there be an increase in frequency or intensity of symptoms related to SI/SIB, patient will call / text [phone], call 911, or go to local ED for evaluation.         Treatment Objective(s) Addressed in This Session:   use at least 2 coping skills for anxiety management in the next 2 weeks  Decrease frequency and intensity of feeling down, depressed, hopeless  Identify negative self-talk and behaviors: challenge core beliefs, myths, and actions  Decrease thoughts that you'd be better off dead or of suicide / self-harm       Intervention:   CBT: cognitive restructuring, reality checking, cognitive distortions  DBT: validation, mindfulness, distress tolerance   Emotion Focused Therapy: emotional processing  Solution Focused: strengths based      Assessments completed prior to visit:  The following assessments were completed by patient for this visit:  PHQ9:       9/10/2024     1:45 PM 9/12/2024     6:59 AM 9/12/2024     7:04 AM 10/9/2024    12:46 PM 11/5/2024     1:22 PM 12/19/2024    11:18 AM 1/8/2025    11:08 AM   PHQ-9 SCORE   PHQ-9 Total Score MyChart 7 (Mild depression)  7 (Mild depression) 6 (Mild depression) 6 (Mild depression) 6 (Mild depression) 7 (Mild depression)   PHQ-9 Total Score 7 7 7 6 6  6  7        Patient-reported     GAD7:        "7/29/2019     4:19 PM 8/2/2019     3:11 PM 7/26/2024     3:57 PM 9/12/2024     7:01 AM 9/12/2024     7:04 AM 1/8/2025    11:08 AM 1/22/2025    12:02 PM   OREN-7 SCORE   Total Score   7 (mild anxiety)  7 (mild anxiety) 6 (mild anxiety) 13 (moderate anxiety)   Total Score 10 4 7 7 7 6  13        Patient-reported     PROMIS 10-Global Health (only subscores and total score):       8/1/2024     8:52 AM 8/22/2024     9:25 AM 1/8/2025    11:09 AM   PROMIS-10 Scores Only   Global Mental Health Score 10 12 10    Global Physical Health Score 15 13 14    PROMIS TOTAL - SUBSCORES 25 25 24        Patient-reported     [place] Suicide Severity Rating Scale (Short Version)      7/15/2019    11:46 AM 7/24/2019     6:12 AM 9/11/2024     1:05 PM 10/9/2024     2:11 PM 11/6/2024     1:41 PM 1/8/2025     1:07 PM   [place] Suicide Severity Rating (Short Version)   Over the past 2 weeks have you felt down, depressed, or hopeless? no no       Over the past 2 weeks have you had thoughts of killing yourself? no no       Have you ever attempted to kill yourself? no no       1. Wish to be Dead (Since Last Contact)   Y N Y N   Wish to be Dead Description (Since Last Contact)   Thoughts of \"Iit would better if I wasn't here\" without plan, intent or methods.  Patient contracted for safety.  thoughts of \"wishing I wasn't here\" without plan, intent, or methods.    2. Non-Specific Active Suicidal Thoughts (Since Last Contact)   N N N N   Most Severe Ideation Rating (Since Last Contact)     1    Frequency (Since Last Contact)     3    Duration (Since Last Contact)     1    Deterrents (Since Last Contact)     1    Reasons for Ideation (Since Last Contact)     5    Actual Attempt (Since Last Contact)   N N N N   Has subject engaged in non-suicidal self-injurious behavior? (Since Last Contact)   N N N N   Calculated C-SSRS Risk Score (Since Last Contact)   Low Risk No Risk Indicated Low Risk No Risk Indicated         ASSESSMENT: Current Emotional / " Mental Status (status of significant symptoms):   Risk status (Self / Other harm or suicidal ideation)   Patient denies current fears or concerns for personal safety.   Patient denies current or recent suicidal ideation or behaviors.   Patient denies current or recent homicidal ideation or behaviors.   Patient denies current or recent self injurious behavior or ideation.   Patient denies other safety concerns.   Patient reports there has been no change in risk factors since their last session.     Patient reports there has been no change in protective factors since their last session.     A safety and risk management plan has been developed including: Patient consented to co-developed safety plan on 8/2/2024.  Safety and risk management plan was reviewed.   Patient agreed to use safety plan should any safety concerns arise.  A copy was made available to the patient.     Appearance:   Appropriate    Eye Contact:   Good    Psychomotor Behavior: Normal    Attitude:   Cooperative    Orientation:   All   Speech    Rate / Production: Normal     Volume:  Normal    Mood:    Anxious  Sad    Affect:    Tearful Worrisome    Thought Content:  Clear    Thought Form:  Coherent  Logical    Insight:    Good      Medication Review:   No changes to current psychiatric medication(s)     Medication Compliance:   Yes     Changes in Health Issues:   None reported     Chemical Use Review:   Substance Use: Chemical use reviewed, no active concerns identified      Tobacco Use: No current tobacco use.      Diagnosis:  1. Generalized anxiety disorder    2. Mild episode of recurrent major depressive disorder                  Collateral Reports Completed:   Not Applicable    PLAN: (Patient Tasks / Therapist Tasks / Other)  Utilize safety plan as needed.  Should there be an increase in frequency or intensity of symptoms related to SI/SIB, patient will call / text [phone], call 911, or go to local ED for evaluation.    In the coming weeks, patient  will utilize CBT skills.    Patient will focus on controllable action steps.    Patient will identify initial triggers for rumination.    Next appt: 2/5.        CHRISTINE Yoo, LICSW    1/22/2025    ______________________________________________________________________    Individual Treatment Plan    Patient's Name: Myrna Brothers  YOB: 1985    Date of Creation: 8/27/2024  Date Treatment Plan Last Reviewed/Revised: 1/8/2025    DSM5 Diagnoses: 296.31 (F33.0) Major Depressive Disorder, Recurrent Episode, Mild _ and With anxious distress or 300.02 (F41.1) Generalized Anxiety Disorder  Psychosocial / Contextual Factors: hx of therapy, moved to [place] from [place] in April after her  was laid off, currently living with in-laws, 5 year old son is starting , son has some cognitive and speech delays, family of origin dynamics, relational dynamics   PROMIS (reviewed every 90 days):       8/1/2024     8:52 AM 8/22/2024     9:25 AM 1/8/2025    11:09 AM   PROMIS-10 Scores Only   Global Mental Health Score 10 12 10    Global Physical Health Score 15 13 14    PROMIS TOTAL - SUBSCORES 25 25 24        Patient-reported       Referral / Collaboration:  Referral to another professional/service is not indicated at this time..    Anticipated number of session for this episode of care: 9-12 sessions  Anticipation frequency of session: Every other week  Anticipated Duration of each session: 38-52 minutes  Treatment plan will be reviewed in 90 days or when goals have been changed.       MeasurableTreatment Goal(s) related to diagnosis / functional impairment(s)  Goal 1: Patient will decrease symptoms of depression as evidenced by decreased PHQ-9 score.    I will know I've met my goal when I feel confident as a mom and have more energy.    Objective #A (Patient Action)    Patient will Increase interest, engagement, and pleasure in doing things  Feel less tired and more energy during the day   Identify  negative self-talk and behaviors: challenge core beliefs, myths, and actions  Decrease thoughts that you'd be better off dead or of suicide / self-harm  Manage safety and utilize safety plan .  Status: continued: 1/8/2025    Intervention(s)  Therapist will teach strategies to reduce / manage depressive symptoms.  Therapist will teach and model positive self talk behaviors and strategies.  Therapist will check in with patient regarding safety / SI.       Goal 2: Patient will decrease symptoms of anxiety as evidenced by decreased OREN-7 score.    I will know I've met my goal when I don't worry so much and when I am more assertive.      Objective #A (Patient Action)    Patient will use at least 2 coping skills for anxiety management in the next 2 weeks.   Learn strategies to manage / reduce ruminating thoughts.  Learn and utilize assertive communication skills.     Status: continued: 1/8/2025    Intervention(s)  Therapist will teach coping skills for management of anxious symptoms.  Therapist will teach assertive communication and interpersonal effectiveness skills.  Therapist will teach cognitive strategies to help with worrying thoughts.      Patient has reviewed and agreed to the above plan.      CHRISTINE Yoo, LICSW  1/8/2025

## 2025-01-21 ENCOUNTER — PATIENT OUTREACH (OUTPATIENT)
Dept: CARE COORDINATION | Facility: CLINIC | Age: 40
End: 2025-01-21

## 2025-01-22 ENCOUNTER — VIRTUAL VISIT (OUTPATIENT)
Dept: BEHAVIORAL HEALTH | Facility: CLINIC | Age: 40
End: 2025-01-22
Payer: COMMERCIAL

## 2025-01-22 DIAGNOSIS — F41.1 GENERALIZED ANXIETY DISORDER: Primary | ICD-10-CM

## 2025-01-22 DIAGNOSIS — F33.0 MILD EPISODE OF RECURRENT MAJOR DEPRESSIVE DISORDER: ICD-10-CM

## 2025-01-22 PROCEDURE — 90834 PSYTX W PT 45 MINUTES: CPT | Mod: 95

## 2025-01-22 ASSESSMENT — ANXIETY QUESTIONNAIRES
GAD7 TOTAL SCORE: 13
2. NOT BEING ABLE TO STOP OR CONTROL WORRYING: MORE THAN HALF THE DAYS
8. IF YOU CHECKED OFF ANY PROBLEMS, HOW DIFFICULT HAVE THESE MADE IT FOR YOU TO DO YOUR WORK, TAKE CARE OF THINGS AT HOME, OR GET ALONG WITH OTHER PEOPLE?: SOMEWHAT DIFFICULT
1. FEELING NERVOUS, ANXIOUS, OR ON EDGE: MORE THAN HALF THE DAYS
7. FEELING AFRAID AS IF SOMETHING AWFUL MIGHT HAPPEN: MORE THAN HALF THE DAYS
4. TROUBLE RELAXING: MORE THAN HALF THE DAYS
5. BEING SO RESTLESS THAT IT IS HARD TO SIT STILL: MORE THAN HALF THE DAYS
GAD7 TOTAL SCORE: 13
IF YOU CHECKED OFF ANY PROBLEMS ON THIS QUESTIONNAIRE, HOW DIFFICULT HAVE THESE PROBLEMS MADE IT FOR YOU TO DO YOUR WORK, TAKE CARE OF THINGS AT HOME, OR GET ALONG WITH OTHER PEOPLE: SOMEWHAT DIFFICULT
3. WORRYING TOO MUCH ABOUT DIFFERENT THINGS: MORE THAN HALF THE DAYS
6. BECOMING EASILY ANNOYED OR IRRITABLE: SEVERAL DAYS

## 2025-02-03 NOTE — PROGRESS NOTES
[facility]                                     Progress Note    Patient Name: Myrna Brothers  Date: 2025         Service Type: Individual      Session Start Time: 12:05pm  Session End Time: 12:45pm    Session Length: 40 min    Session #: 7    Attendees: Client attended alone    Service Modality:  Video Visit:      Provider verified identity through the following two step process.  Patient provided:  Patient  and Patient address    Telemedicine Visit: The patient's condition can be safely assessed and treated via synchronous audio and visual telemedicine encounter.      Reason for Telemedicine Visit: Patient has requested telehealth visit    Originating Site (Patient Location): Patient's home    Distant Site (Provider Location): Provider Remote Setting- Home Office    Consent:  The patient/guardian has verbally consented to: the potential risks and benefits of telemedicine (video visit) versus in person care; bill my insurance or make self-payment for services provided; and responsibility for payment of non-covered services.     Patient would like the video invitation sent by:  My Chart    Mode of Communication:  Video Conference via Amwell    Distant Location (Provider):  Off-site    As the provider I attest to compliance with applicable laws and regulations related to telemedicine.    DATA  Extended Session (53+ minutes): No  Interactive Complexity: No  Crisis: No         Progress Since Last Session (Related to Symptoms / Goals / Homework):   Symptoms: Worsening (anxiety)    Homework: Achieved / completed to satisfaction      Episode of Care Goals: Satisfactory progress - ACTION (Actively working towards change); Intervened by reinforcing change plan / affirming steps taken     Current / Ongoing Stressors and Concerns:   Today, patient reports that things have been busy the past few weeks.  Patient reports that she has been utilizing a tool on [employer] to prepare for interview  "questions and is reading books.  Therapist validated patient's growth in regards to taking action steps within her locus of control.  Remainder of session was spent processing through stress and anxiety tied to the political climate.  Patient processed through fears connected to the impact of certain policies on her son's education.  Identified strategies for management of anxious / ruminating thought processes.  In the coming weeks, patient will start a worry journal.  Patient will ask herself \"what's the next best thing that I can do?\"  Patient will utilize mindfulness skills.  Next appt: 2/17.    Therapist assessed for risk and safety - patient denied SI/SIB. Patient contracted for safety and will continue to utilize safety plan as needed.  Should there be an increase in frequency or intensity of symptoms related to SI/SIB, patient will call / text [phone], call 911, or go to local ED for evaluation.         Treatment Objective(s) Addressed in This Session:   use at least 2 coping skills for anxiety management in the next 2 weeks  Decrease frequency and intensity of feeling down, depressed, hopeless  Identify negative self-talk and behaviors: challenge core beliefs, myths, and actions  Decrease thoughts that you'd be better off dead or of suicide / self-harm       Intervention:   CBT: cognitive restructuring, reality checking, cognitive distortions  DBT: validation, mindfulness, distress tolerance   Emotion Focused Therapy: emotional processing  Solution Focused: strengths based  Mindfulness strategies      Assessments completed prior to visit:  The following assessments were completed by patient for this visit:  PHQ9:       9/10/2024     1:45 PM 9/12/2024     6:59 AM 9/12/2024     7:04 AM 10/9/2024    12:46 PM 11/5/2024     1:22 PM 12/19/2024    11:18 AM 1/8/2025    11:08 AM   PHQ-9 SCORE   PHQ-9 Total Score Luis Alfredo 7 (Mild depression)  7 (Mild depression) 6 (Mild depression) 6 (Mild depression) 6 (Mild depression) " "7 (Mild depression)   PHQ-9 Total Score 7 7 7 6 6  6  7        Patient-reported     GAD7:       7/29/2019     4:19 PM 8/2/2019     3:11 PM 7/26/2024     3:57 PM 9/12/2024     7:01 AM 9/12/2024     7:04 AM 1/8/2025    11:08 AM 1/22/2025    12:02 PM   OREN-7 SCORE   Total Score   7 (mild anxiety)  7 (mild anxiety) 6 (mild anxiety) 13 (moderate anxiety)   Total Score 10 4 7 7 7 6  13        Patient-reported     PROMIS 10-Global Health (only subscores and total score):       8/1/2024     8:52 AM 8/22/2024     9:25 AM 1/8/2025    11:09 AM   PROMIS-10 Scores Only   Global Mental Health Score 10 12 10    Global Physical Health Score 15 13 14    PROMIS TOTAL - SUBSCORES 25 25 24        Patient-reported     [place] Suicide Severity Rating Scale (Short Version)      7/15/2019    11:46 AM 7/24/2019     6:12 AM 9/11/2024     1:05 PM 10/9/2024     2:11 PM 11/6/2024     1:41 PM 1/8/2025     1:07 PM   [place] Suicide Severity Rating (Short Version)   Over the past 2 weeks have you felt down, depressed, or hopeless? no no       Over the past 2 weeks have you had thoughts of killing yourself? no no       Have you ever attempted to kill yourself? no no       1. Wish to be Dead (Since Last Contact)   Y N Y N   Wish to be Dead Description (Since Last Contact)   Thoughts of \"Iit would better if I wasn't here\" without plan, intent or methods.  Patient contracted for safety.  thoughts of \"wishing I wasn't here\" without plan, intent, or methods.    2. Non-Specific Active Suicidal Thoughts (Since Last Contact)   N N N N   Most Severe Ideation Rating (Since Last Contact)     1    Frequency (Since Last Contact)     3    Duration (Since Last Contact)     1    Deterrents (Since Last Contact)     1    Reasons for Ideation (Since Last Contact)     5    Actual Attempt (Since Last Contact)   N N N N   Has subject engaged in non-suicidal self-injurious behavior? (Since Last Contact)   N N N N   Calculated C-SSRS Risk Score (Since Last Contact)   " Low Risk No Risk Indicated Low Risk No Risk Indicated         ASSESSMENT: Current Emotional / Mental Status (status of significant symptoms):   Risk status (Self / Other harm or suicidal ideation)   Patient denies current fears or concerns for personal safety.   Patient denies current or recent suicidal ideation or behaviors.   Patient denies current or recent homicidal ideation or behaviors.   Patient denies current or recent self injurious behavior or ideation.   Patient denies other safety concerns.   Patient reports there has been no change in risk factors since their last session.     Patient reports there has been no change in protective factors since their last session.     A safety and risk management plan has been developed including: Patient consented to co-developed safety plan on 8/2/2024.  Safety and risk management plan was reviewed.   Patient agreed to use safety plan should any safety concerns arise.  A copy was made available to the patient.     Appearance:   Appropriate    Eye Contact:   Good    Psychomotor Behavior: Normal    Attitude:   Cooperative    Orientation:   All   Speech    Rate / Production: Normal     Volume:  Normal    Mood:    Anxious    Affect:    Worrisome    Thought Content:  Clear    Thought Form:  Coherent  Logical    Insight:    Good      Medication Review:   No changes to current psychiatric medication(s)     Medication Compliance:   Yes     Changes in Health Issues:   None reported     Chemical Use Review:   Substance Use: Chemical use reviewed, no active concerns identified      Tobacco Use: No current tobacco use.      Diagnosis:  1. Generalized anxiety disorder    2. Mild episode of recurrent major depressive disorder                    Collateral Reports Completed:   Not Applicable    PLAN: (Patient Tasks / Therapist Tasks / Other)  Utilize safety plan as needed.  Should there be an increase in frequency or intensity of symptoms related to SI/SIB, patient will call / text  "[phone], call 911, or go to local ED for evaluation.    In the coming weeks, patient will start a worry journal.    Patient will ask herself \"what's the next best thing that I can do?\"    Patient will utilize mindfulness skills.    Next appt: 2/17.        CHRISTINE Yoo, LICSW    2/5/2025    ______________________________________________________________________    Individual Treatment Plan    Patient's Name: Myrna Brothers  YOB: 1985    Date of Creation: 8/27/2024  Date Treatment Plan Last Reviewed/Revised: 1/8/2025    DSM5 Diagnoses: 296.31 (F33.0) Major Depressive Disorder, Recurrent Episode, Mild _ and With anxious distress or 300.02 (F41.1) Generalized Anxiety Disorder  Psychosocial / Contextual Factors: hx of therapy, moved to [place] from [place] in April after her  was laid off, currently living with in-laws, 5 year old son is starting , son has some cognitive and speech delays, family of origin dynamics, relational dynamics   PROMIS (reviewed every 90 days):       8/1/2024     8:52 AM 8/22/2024     9:25 AM 1/8/2025    11:09 AM   PROMIS-10 Scores Only   Global Mental Health Score 10 12 10    Global Physical Health Score 15 13 14    PROMIS TOTAL - SUBSCORES 25 25 24        Patient-reported       Referral / Collaboration:  Referral to another professional/service is not indicated at this time..    Anticipated number of session for this episode of care: 9-12 sessions  Anticipation frequency of session: Every other week  Anticipated Duration of each session: 38-52 minutes  Treatment plan will be reviewed in 90 days or when goals have been changed.       MeasurableTreatment Goal(s) related to diagnosis / functional impairment(s)  Goal 1: Patient will decrease symptoms of depression as evidenced by decreased PHQ-9 score.    I will know I've met my goal when I feel confident as a mom and have more energy.    Objective #A (Patient Action)    Patient will Increase interest, engagement, " and pleasure in doing things  Feel less tired and more energy during the day   Identify negative self-talk and behaviors: challenge core beliefs, myths, and actions  Decrease thoughts that you'd be better off dead or of suicide / self-harm  Manage safety and utilize safety plan .  Status: continued: 1/8/2025    Intervention(s)  Therapist will teach strategies to reduce / manage depressive symptoms.  Therapist will teach and model positive self talk behaviors and strategies.  Therapist will check in with patient regarding safety / SI.       Goal 2: Patient will decrease symptoms of anxiety as evidenced by decreased OREN-7 score.    I will know I've met my goal when I don't worry so much and when I am more assertive.      Objective #A (Patient Action)    Patient will use at least 2 coping skills for anxiety management in the next 2 weeks.   Learn strategies to manage / reduce ruminating thoughts.  Learn and utilize assertive communication skills.     Status: continued: 1/8/2025    Intervention(s)  Therapist will teach coping skills for management of anxious symptoms.  Therapist will teach assertive communication and interpersonal effectiveness skills.  Therapist will teach cognitive strategies to help with worrying thoughts.      Patient has reviewed and agreed to the above plan.      CHRISTINE Yoo, LICSW  1/8/2025

## 2025-02-05 ENCOUNTER — VIRTUAL VISIT (OUTPATIENT)
Dept: BEHAVIORAL HEALTH | Facility: CLINIC | Age: 40
End: 2025-02-05
Payer: COMMERCIAL

## 2025-02-05 DIAGNOSIS — F33.0 MILD EPISODE OF RECURRENT MAJOR DEPRESSIVE DISORDER: ICD-10-CM

## 2025-02-05 DIAGNOSIS — F41.1 GENERALIZED ANXIETY DISORDER: Primary | ICD-10-CM

## 2025-02-05 PROCEDURE — 90834 PSYTX W PT 45 MINUTES: CPT | Mod: 95

## 2025-02-15 ASSESSMENT — SLEEP AND FATIGUE QUESTIONNAIRES
HOW LIKELY ARE YOU TO NOD OFF OR FALL ASLEEP WHILE WATCHING TV: WOULD NEVER DOZE
HOW LIKELY ARE YOU TO NOD OFF OR FALL ASLEEP WHILE SITTING AND TALKING TO SOMEONE: WOULD NEVER DOZE
HOW LIKELY ARE YOU TO NOD OFF OR FALL ASLEEP WHEN YOU ARE A PASSENGER IN A CAR FOR AN HOUR WITHOUT A BREAK: WOULD NEVER DOZE
HOW LIKELY ARE YOU TO NOD OFF OR FALL ASLEEP WHILE SITTING QUIETLY AFTER LUNCH WITHOUT ALCOHOL: SLIGHT CHANCE OF DOZING
HOW LIKELY ARE YOU TO NOD OFF OR FALL ASLEEP WHILE SITTING INACTIVE IN A PUBLIC PLACE: WOULD NEVER DOZE
HOW LIKELY ARE YOU TO NOD OFF OR FALL ASLEEP WHILE LYING DOWN TO REST IN THE AFTERNOON WHEN CIRCUMSTANCES PERMIT: MODERATE CHANCE OF DOZING
HOW LIKELY ARE YOU TO NOD OFF OR FALL ASLEEP WHILE SITTING AND READING: SLIGHT CHANCE OF DOZING
HOW LIKELY ARE YOU TO NOD OFF OR FALL ASLEEP IN A CAR, WHILE STOPPED FOR A FEW MINUTES IN TRAFFIC: WOULD NEVER DOZE

## 2025-02-17 ENCOUNTER — VIRTUAL VISIT (OUTPATIENT)
Dept: BEHAVIORAL HEALTH | Facility: CLINIC | Age: 40
End: 2025-02-17
Payer: COMMERCIAL

## 2025-02-17 DIAGNOSIS — F33.0 MILD EPISODE OF RECURRENT MAJOR DEPRESSIVE DISORDER: ICD-10-CM

## 2025-02-17 DIAGNOSIS — F41.1 GENERALIZED ANXIETY DISORDER: Primary | ICD-10-CM

## 2025-02-17 PROCEDURE — 90832 PSYTX W PT 30 MINUTES: CPT | Mod: 95

## 2025-02-17 NOTE — PROGRESS NOTES
[facility]                                     Progress Note    Patient Name: Myrna Brothers  Date: 2025         Service Type: Individual      Session Start Time: 3:05pm  Session End Time: 3:35pm    Session Length: 30 min    Session #: 8    Attendees: Client attended alone    Service Modality:  Video Visit:      Provider verified identity through the following two step process.  Patient provided:  Patient  and Patient address    Telemedicine Visit: The patient's condition can be safely assessed and treated via synchronous audio and visual telemedicine encounter.      Reason for Telemedicine Visit: Patient has requested telehealth visit    Originating Site (Patient Location): Patient's home    Distant Site (Provider Location): [facility]    Consent:  The patient/guardian has verbally consented to: the potential risks and benefits of telemedicine (video visit) versus in person care; bill my insurance or make self-payment for services provided; and responsibility for payment of non-covered services.     Patient would like the video invitation sent by:  My Chart    Mode of Communication:  Video Conference via [employer]    Distant Location (Provider):  On-site    As the provider I attest to compliance with applicable laws and regulations related to telemedicine.    DATA  Extended Session (53+ minutes): No  Interactive Complexity: No  Crisis: No         Progress Since Last Session (Related to Symptoms / Goals / Homework):   Symptoms: No change (depression / anxiety)    Homework: Achieved / completed to satisfaction      Episode of Care Goals: Satisfactory progress - ACTION (Actively working towards change); Intervened by reinforcing change plan / affirming steps taken     Current / Ongoing Stressors and Concerns:   Today, patient reports that things have been going fairly well.  Processed through ongoing stressors tied to searching for new  jobs.  Processed through social anxiety tied to navigating interviews.  Patient reports worsening depressive symptoms in the past week.  Patient reports that she has been taking action steps within her locus of control.  Patient shared that she attended a protest earlier today and that this helped her to feel less alone and more hopeful.  Remainder of session was spent processing through an interpersonal stressor. In the coming weeks, patient will engage in values based living.  Patient will engage in self leadership (parts work).  Next appt: 3/5.    Therapist assessed for risk and safety - patient denied SI/SIB. Patient contracted for safety and will continue to utilize safety plan as needed.  Should there be an increase in frequency or intensity of symptoms related to SI/SIB, patient will call / text [phone], call 911, or go to local ED for evaluation.         Treatment Objective(s) Addressed in This Session:   use at least 2 coping skills for anxiety management in the next 2 weeks  Decrease frequency and intensity of feeling down, depressed, hopeless  Identify negative self-talk and behaviors: challenge core beliefs, myths, and actions  Decrease thoughts that you'd be better off dead or of suicide / self-harm       Intervention:   CBT: cognitive restructuring, reality checking, cognitive distortions  DBT: validation, mindfulness, distress tolerance   Emotion Focused Therapy: emotional processing  Solution Focused: strengths based  ACT: values based living  IFS: self leadership      Assessments completed prior to visit:  The following assessments were completed by patient for this visit:  PHQ9:       9/12/2024     6:59 AM 9/12/2024     7:04 AM 10/9/2024    12:46 PM 11/5/2024     1:22 PM 12/19/2024    11:18 AM 1/8/2025    11:08 AM 2/16/2025     3:19 PM   PHQ-9 SCORE   [facility]  7 (Mild depression) 6 (Mild depression) 6 (Mild depression) 6 (Mild depression) 7 (Mild depression) 6 (Mild depression)  "  PHQ-9 Total Score 7 7 6 6  6  7  6        Patient-reported     GAD7:       7/29/2019     4:19 PM 8/2/2019     3:11 PM 7/26/2024     3:57 PM 9/12/2024     7:01 AM 9/12/2024     7:04 AM 1/8/2025    11:08 AM 1/22/2025    12:02 PM   OREN-7 SCORE   Total Score   7 (mild anxiety)  7 (mild anxiety) 6 (mild anxiety) 13 (moderate anxiety)   Total Score 10 4 7 7 7 6  13        Patient-reported     PROMIS 10-Global Health (only subscores and total score):       8/1/2024     8:52 AM 8/22/2024     9:25 AM 1/8/2025    11:09 AM 2/15/2025     9:56 AM   PROMIS-10 Scores Only   Global Mental Health Score 10 12 10  13    Global Physical Health Score 15 13 14  14    PROMIS TOTAL - SUBSCORES 25 25 24  27        Patient-reported     [place] Suicide Severity Rating Scale (Short Version)      7/15/2019    11:46 AM 7/24/2019     6:12 AM 9/11/2024     1:05 PM 10/9/2024     2:11 PM 11/6/2024     1:41 PM 1/8/2025     1:07 PM   [place] Suicide Severity Rating (Short Version)   Over the past 2 weeks have you felt down, depressed, or hopeless? no no       Over the past 2 weeks have you had thoughts of killing yourself? no no       Have you ever attempted to kill yourself? no no       1. Wish to be Dead (Since Last Contact)   Y N Y N   Wish to be Dead Description (Since Last Contact)   Thoughts of \"Iit would better if I wasn't here\" without plan, intent or methods.  Patient contracted for safety.  thoughts of \"wishing I wasn't here\" without plan, intent, or methods.    2. Non-Specific Active Suicidal Thoughts (Since Last Contact)   N N N N   Most Severe Ideation Rating (Since Last Contact)     1    Frequency (Since Last Contact)     3    Duration (Since Last Contact)     1    Deterrents (Since Last Contact)     1    Reasons for Ideation (Since Last Contact)     5    Actual Attempt (Since Last Contact)   N N N N   Has subject engaged in non-suicidal self-injurious behavior? (Since Last Contact)   N N N N   Calculated C-SSRS Risk Score (Since " Last Contact)   Low Risk No Risk Indicated Low Risk No Risk Indicated         ASSESSMENT: Current Emotional / Mental Status (status of significant symptoms):   Risk status (Self / Other harm or suicidal ideation)   Patient denies current fears or concerns for personal safety.   Patient denies current or recent suicidal ideation or behaviors.   Patient denies current or recent homicidal ideation or behaviors.   Patient denies current or recent self injurious behavior or ideation.   Patient denies other safety concerns.   Patient reports there has been no change in risk factors since their last session.     Patient reports there has been no change in protective factors since their last session.     A safety and risk management plan has been developed including: Patient consented to co-developed safety plan on 8/2/2024.  Safety and risk management plan was reviewed.   Patient agreed to use safety plan should any safety concerns arise.  A copy was made available to the patient.     Appearance:   Appropriate    Eye Contact:   Good    Psychomotor Behavior: Normal    Attitude:   Cooperative    Orientation:   All   Speech    Rate / Production: Normal     Volume:  Normal    Mood:    Anxious  Sad    Affect:    Tearful Worrisome    Thought Content:  Clear    Thought Form:  Coherent  Logical    Insight:    Good      Medication Review:   No changes to current psychiatric medication(s)     Medication Compliance:   Yes     Changes in Health Issues:   None reported     Chemical Use Review:   Substance Use: Chemical use reviewed, no active concerns identified      Tobacco Use: No current tobacco use.      Diagnosis:  1. Generalized anxiety disorder    2. Mild episode of recurrent major depressive disorder                      Collateral Reports Completed:   Not Applicable    PLAN: (Patient Tasks / Therapist Tasks / Other)  Utilize safety plan as needed.  Should there be an increase in frequency or intensity of symptoms related to  SI/SIB, patient will call / text [phone], call 911, or go to local ED for evaluation.    In the coming weeks, patient will engage in values based living.    Patient will engage in self leadership (parts work).    Next appt: 3/5.        CHRISTINE Yoo, LICSW    2/17/2025    ______________________________________________________________________    Individual Treatment Plan    Patient's Name: Myrna Brothers  YOB: 1985    Date of Creation: 8/27/2024  Date Treatment Plan Last Reviewed/Revised: 1/8/2025    DSM5 Diagnoses: 296.31 (F33.0) Major Depressive Disorder, Recurrent Episode, Mild _ and With anxious distress or 300.02 (F41.1) Generalized Anxiety Disorder  Psychosocial / Contextual Factors: hx of therapy, moved to [place] from [place] in April after her  was laid off, currently living with in-laws, 5 year old son is starting , son has some cognitive and speech delays, family of origin dynamics, relational dynamics   PROMIS (reviewed every 90 days):       8/1/2024     8:52 AM 8/22/2024     9:25 AM 1/8/2025    11:09 AM 2/15/2025     9:56 AM   PROMIS-10 Scores Only   Global Mental Health Score 10 12 10  13    Global Physical Health Score 15 13 14  14    PROMIS TOTAL - SUBSCORES 25 25 24  27        Patient-reported       Referral / Collaboration:  Referral to another professional/service is not indicated at this time..    Anticipated number of session for this episode of care: 9-12 sessions  Anticipation frequency of session: Every other week  Anticipated Duration of each session: 38-52 minutes  Treatment plan will be reviewed in 90 days or when goals have been changed.       MeasurableTreatment Goal(s) related to diagnosis / functional impairment(s)  Goal 1: Patient will decrease symptoms of depression as evidenced by decreased PHQ-9 score.    I will know I've met my goal when I feel confident as a mom and have more energy.    Objective #A (Patient Action)    Patient will Increase interest,  engagement, and pleasure in doing things  Feel less tired and more energy during the day   Identify negative self-talk and behaviors: challenge core beliefs, myths, and actions  Decrease thoughts that you'd be better off dead or of suicide / self-harm  Manage safety and utilize safety plan .  Status: continued: 1/8/2025    Intervention(s)  Therapist will teach strategies to reduce / manage depressive symptoms.  Therapist will teach and model positive self talk behaviors and strategies.  Therapist will check in with patient regarding safety / SI.       Goal 2: Patient will decrease symptoms of anxiety as evidenced by decreased OREN-7 score.    I will know I've met my goal when I don't worry so much and when I am more assertive.      Objective #A (Patient Action)    Patient will use at least 2 coping skills for anxiety management in the next 2 weeks.   Learn strategies to manage / reduce ruminating thoughts.  Learn and utilize assertive communication skills.     Status: continued: 1/8/2025    Intervention(s)  Therapist will teach coping skills for management of anxious symptoms.  Therapist will teach assertive communication and interpersonal effectiveness skills.  Therapist will teach cognitive strategies to help with worrying thoughts.      Patient has reviewed and agreed to the above plan.      CHRISTINE Yoo, LICSW  1/8/2025

## 2025-02-19 NOTE — PROGRESS NOTES
"Virtual Visit Details    Type of service:  Video Visit     Originating Location (pt. Location): Home    Distant Location (provider location):  On-site  Platform used for Video Visit: [facility] Weight Management Consult    PATIENT:  Myrna Brothers  MRN:         [id number]  :         1985  ASHLEY:         2025        I had the pleasure of seeing your patient, Myrna Brothers. Full intake/assessment was done to determine barriers to weight loss success and develop a treatment plan. Myrna Brothers is a 39 year old female interested in treatment of medical problems associated with excess weight. She has a height of 5' 5\", a weight of 200 lbs 0 oz, and the calculated Body mass index is 33.28 kg/m .    Myrna is a patient with mature onset class I obesity with significant element of familial/genetic influence and with current health consequences. She does need aggressive weight loss plan due to the aggressive metabolic disease she's developed at relatively modest obesity levels (type II diabetes with dyslipidemia).  Myrna Brothers eats a high carb diet, has perception of intense hunger, and has a disorganized meal pattern.      Her problem is complicated by a hunger disorder, mental health/psychopharmacological barriers, gender and short stature, impaired metabolic perception, and relatively sedentary lifestyle.      Review of the patient's history and habits today suggest that weight gain has been longstanding problem:    Previous Interventions found to be helpful in the past for weight loss include none. No previous medical weight management. Diagnosis of type II diabetes last year, on metformin but A1c still 7% on last check. We'll see how levels look with intake labs this next week or two and ramp up Mounjaro therapy for multiple benefits on her diabetes, metabolic syndrome and obesity related risks. Plan to ramp to 10mg/week if tolerated and hold with more aggressive " "weight management goal in light of her advanced metabolic disease at mild obesity and we'll shoot for BMI under 27.5 and ideally under 25 to minimize the risks of MACEs in the decade ahead.    We discussed a toolbox approach to weight management today and she is open to combining mindful, reduced calorie dietary therapy with increased mindfulness techniques, activity/exercise improvements to optimize their current and future health.  Medication assistance for appetite control was discussed today and on review of the risks/benefits for this patients health history, we've decided to start with therapy geared at her type II diabetes with Mounjaro.    ASSESSMENT AND PLAN  Problem List Items Addressed This Visit    None           60 minutes spent by me on the date of the encounter doing chart review, history and exam, documentation and further activities per the note        She has the following co-morbidities:        2/20/2025     1:03 PM   --   I have the following health issues associated with obesity Type II Diabetes    High Cholesterol   I have the following symptoms associated with obesity Depression   A1c last year c/w her type II diabetes:  Lab Results   Component Value Date    A1C 7.0 08/28/2024   Lipids c/w her metabolic syndrome:  Recent Labs   Lab Test 08/03/19  0741   CHOL 263*   HDL 46*   *   TRIG 165*     Given the relatively modest excess weight/Class I obesity but with profound metabolic disease present, aggressive weight management is indicated. If not responding well to non surgical weight loss, am exception for normal bariatric qualifications may be reasonable in light of her high burden of disease.          No data to display                    2/20/2025     1:03 PM   Referring Provider   Please name the provider who referred you to Medical Weight Management  If you do not know, please answer \"I Don't Know\" I don t know     Feels she'd like to \"get control\" of her weight related health in her " 40s. Energy is low and not feeling confident in her body.     Currently not much of an exerciser. Will do yoga and walking regularly.     TDEE of 2000-2100kcal/day calculated today.       2/20/2025     1:03 PM   Weight History   How concerned are you about your weight? Very Concerned   I became overweight As an Adult   The following factors have contributed to my weight gain Eating Wrong Types of Food    Eating Too Much    Lack of Exercise    Stress   I have tried the following methods to lose weight Watching Portions or Calories    Exercise   My lowest weight since age 16 was 120   My highest weight since age 16 was 207   The most weight I have ever lost was (lbs) 50   I have the following family history of obesity/being overweight My father is overweight   How has your weight changed over the last year? Gained   How many pounds? 15           2/20/2025     1:03 PM   Diet Recall Review with Patient   If you do eat lunch, what types of food do you typically eat? Leftover dinners   If you do eat supper, what types of food do you typically eat? Varies   How many glasses of juice do you drink in a typical day? 0   How many of glasses of milk do you drink in a typical day? 0   How many cans/bottles of sugar pop/soda/tea/sports drinks do you drink in a day? 0   How many cans/bottles of diet pop/soda/tea or sports drink do you drink in a day? 2   How often do you have a drink of alcohol? Never   Misses breakfast regularly due to habit.         2/20/2025     1:03 PM   Eating Habits   Generally, my meals include foods like these bread, pasta, rice, potatoes, corn, crackers, sweet dessert, pop, or juice Almost Everyday   Generally, my meals include foods like these fried meats, brats, burgers, french fries, pizza, cheese, chips, or ice cream A Few Times a Week   Eat fast food (like McDonalds, [employer], Taco Bell) Less Than Weekly   Eat at a buffet or sit-down restaurant Less Than Weekly   Eat most of my meals in front of  the TV or computer Almost Everyday   Often skip meals, eat at random times, have no regular eating times A Few Times a Week   Rarely sit down for a meal but snack or graze throughout Never   Eat extra snacks between meals Never   Eat most of my food at the end of the day A Few Times a Week   Eat in the middle of the night or wake up at night to eat Never   Eat extra snacks to prevent or correct low blood sugar Never   Eat to prevent acid reflux or stomach pain Never   Worry about not having enough food to eat Never   I eat when I am depressed Once a Week   I eat when I am stressed Once a Week   I eat when I am bored Never   I eat when I am anxious Never   I eat when I am happy or as a reward Once a Week   I feel hungry all the time even if I just have eaten Never   Feeling full is important to me Almost Everyday   I finish all the food on my plate even if I am already full Never   I can't resist eating delicious food or walk past the good food/smell Never   I eat/snack without noticing that I am eating Never   I eat when I am preparing the meal Never   I eat more than usual when I see others eating Never   I have trouble not eating sweets, ice cream, cookies, or chips if they are around the house Almost Everyday   I think about food all day Almost Everyday   What foods, if any, do you crave? Sweets/Candy/Chocolate   Please list any other foods you crave? [employer] frappuccinos and ice cream   Liquid calories contribute greatly to excess weight in the form of sweet coffees and ice creams.         2/20/2025     1:03 PM   Amount of Food   I feel out of control when eating Weekly   I eat a large amount of food, like a loaf of bread, a box of cookies, a pint/quart of ice cream, all at once Weekly   I eat a large amount of food even when I am not hungry Never   I eat rapidly Never   I eat alone because I feel embarrassed and do not want others to see how much I have eaten Never   I eat until I am uncomfortably full Never    I feel bad, disgusted, or guilty after I overeat Never           2/20/2025     1:03 PM   Activity/Exercise History   How much of a typical 12 hour day do you spend sitting? Most of the Day   How much of a typical 12 hour day do you spend lying down? Less Than Half the Day   How much of a typical day do you spend walking/standing? Less Than Half the Day   How many hours (not including work) do you spend on the TV/Video Games/Computer/Tablet/Phone? 4-5 Hours   How many times a week are you active for the purpose of exercise? Never   What keeps you from being more active? Too tired    Other   How many total minutes do you spend doing some activity for the purpose of exercising when you exercise? Less Than 15 Minutes       PAST MEDICAL HISTORY:  Past Medical History:   Diagnosis Date    Diabetes (H)     gestational    Generalized anxiety disorder 01/04/2018    Hyperlipidemia     Insulin controlled gestational diabetes mellitus (GDM) in third trimester 08/27/2018    20 units of Humulin at HS as of 9/20/18      Major depressive disorder with single episode, in full remission 01/04/2018    Post-ERCP acute pancreatitis 07/24/2019    Sleep apnea     Type 2 diabetes mellitus with hyperglycemia, without long-term current use of insulin (H) 09/01/2024    Uncomplicated asthma    No sleep study available to review in her chart or in care everywhere. She thinks the diagnosis was an error and I've removed it from her list. Weight loss should improve any upper airway restriction or mild ADRIENNE.  Mild snoring.   Pancreatitis was due to choledocholithiasis in 2019.         2/20/2025     1:03 PM   Work/Social History Reviewed With Patient   My employment status is Unemployed   What is your marital status? /In a Relationship   If in a relationship, is your significant other overweight? Yes   If you have children, are they overweight? No   Who do you live with? , son, mother and father-in-law   Who does the food shopping?  Myself and father-in-law           2/20/2025     1:03 PM   Mental Health History Reviewed With Patient   Have you ever been physically or sexually abused? No   How often in the past 2 weeks have you felt little interest or pleasure in doing things? For Several Days   Over the past 2 weeks how often have you felt down, depressed, or hopeless? For Several Days           2/20/2025     1:03 PM   Sleep History Reviewed With Patient   How many hours do you sleep at night? 8       Past Surgical History:   Procedure Laterality Date    ENDOSCOPIC RETROGRADE CHOLANGIOPANCREATOGRAM N/A 07/24/2019    Procedure: Endoscopic Retrograde Cholangiopancreatography with Bile Duct Sphincterotomy and Pancreatic Duct Stent Placement;  Surgeon: Doyle Camarena MD;  Location: [facility] OR    HEAD & NECK SURGERY      wisdom teeth    LAPAROSCOPIC CHOLECYSTECTOMY  08/07/2019    WISDOM TOOTH EXTRACTION         Social History     Socioeconomic History    Marital status:      Spouse name: Not on file    Number of children: Not on file    Years of education: Not on file    Highest education level: Not on file   Occupational History    Not on file   Tobacco Use    Smoking status: Never     Passive exposure: Never    Smokeless tobacco: Never   Vaping Use    Vaping status: Never Used   Substance and Sexual Activity    Alcohol use: Yes     Comment: denies recent use    Drug use: No    Sexual activity: Yes     Partners: Male   Other Topics Concern    Parent/sibling w/ CABG, MI or angioplasty before 65F 55M? Not Asked   Social History Narrative    Not on file     Social Drivers of Health     Financial Resource Strain: Low Risk  (9/10/2024)    Financial Resource Strain     Within the past 12 months, have you or your family members you live with been unable to get utilities (heat, electricity) when it was really needed?: No   Food Insecurity: Low Risk  (9/10/2024)    Food Insecurity     Within the past 12 months, did you worry that your food would  run out before you got money to buy more?: No     Within the past 12 months, did the food you bought just not last and you didn t have money to get more?: No   Transportation Needs: Low Risk  (9/10/2024)    Transportation Needs     Within the past 12 months, has lack of transportation kept you from medical appointments, getting your medicines, non-medical meetings or appointments, work, or from getting things that you need?: No   Physical Activity: Inactive (9/10/2024)    Exercise Vital Sign     Days of Exercise per Week: 0 days     Minutes of Exercise per Session: 0 min   Stress: Stress Concern Present (9/10/2024)    [employer] - Occupational Stress Questionnaire     Feeling of Stress : To some extent   Social Connections: Unknown (9/10/2024)    Social Connection and Isolation Panel [NHANES]     Frequency of Communication with Friends and Family: Not on file     Frequency of Social Gatherings with Friends and Family: Once a week     Attends Restorationism Services: Not on file     Active Member of Clubs or Organizations: Not on file     Attends Club or Organization Meetings: Not on file     Marital Status: Not on file   Interpersonal Safety: Low Risk  (8/28/2024)    Interpersonal Safety     Do you feel physically and emotionally safe where you currently live?: Yes     Within the past 12 months, have you been hit, slapped, kicked or otherwise physically hurt by someone?: No     Within the past 12 months, have you been humiliated or emotionally abused in other ways by your partner or ex-partner?: No   Housing Stability: Low Risk  (9/10/2024)    Housing Stability     Do you have housing? : Yes     Are you worried about losing your housing?: No       MEDICATIONS:   Current Outpatient Medications   Medication Sig Dispense Refill    albuterol (PROAIR HFA/PROVENTIL HFA/VENTOLIN HFA) 108 (90 Base) MCG/ACT inhaler Inhale 2 puffs into the lungs every 6 hours as needed for shortness of breath,  wheezing or cough. 18 g 3    ARIPiprazole (ABILIFY) 5 MG tablet Take 1.5 tablets (7.5 mg) by mouth daily. 135 tablet 3    buPROPion (WELLBUTRIN XL) 300 MG 24 hr tablet TAKE 1 TABLET BY MOUTH EVERY DAY IN THE MORNING 90 tablet 1    LO LOESTRIN FE 1 MG-10 MCG / 10 MCG TABS Take 1 tablet by mouth daily. 90 tablet 3    metFORMIN (GLUCOPHAGE) 500 MG tablet Take 1 tablet (500 mg) by mouth daily (with breakfast). 90 tablet 1    venlafaxine (EFFEXOR XR) 150 MG 24 hr capsule Take 1 capsule (150 mg) by mouth daily Along with 75 mg 90 capsule 3    venlafaxine (EFFEXOR XR) 75 MG 24 hr capsule Take 1 capsule (75 mg) by mouth daily Along with 150 mg 90 capsule 3     Bupropion has minor appetite suppressant effects on its own but less so than when mixed with naltrexone.   ALLERGIES:   Allergies   Allergen Reactions    Xylocaine [Lidocaine] Anaphylaxis    Epidural Tray      Pt states with epidural, she and the baby both had decreased heart rates    Novocain [Procaine]      TSH   Date Value Ref Range Status   08/28/2024 1.31 0.30 - 4.20 uIU/mL Final   08/03/2019 0.96 0.40 - 4.00 mU/L Final     Lab Results   Component Value Date    A1C 7.0 08/28/2024     Last Comprehensive Metabolic Panel:  Lab Results   Component Value Date     08/28/2024    POTASSIUM 4.6 08/28/2024    CHLORIDE 101 08/28/2024    CO2 26 08/28/2024    ANIONGAP 11 08/28/2024     (H) 08/28/2024    BUN 12.1 08/28/2024    CR 0.87 08/28/2024    GFRESTIMATED 86 08/28/2024    MAGGIE 9.6 08/28/2024       Liver Function Studies -   Recent Labs   Lab Test 08/28/24  0844   PROTTOTAL 7.7   ALBUMIN 4.4   BILITOTAL 0.4   ALKPHOS 116   AST 38   ALT 47     Recent Labs   Lab Test 08/03/19  0741   CHOL 263*   HDL 46*   *   TRIG 165*     TSH   Date Value Ref Range Status   08/28/2024 1.31 0.30 - 4.20 uIU/mL Final   08/03/2019 0.96 0.40 - 4.00 mU/L Final       Will repeat labs and get new fasting lipids. No need to repeat TSH given proximal normal results.      PHYSICAL  "EXAM:  Objective    Ht 1.651 m (5' 5\")   Wt 90.7 kg (200 lb)   BMI 33.28 kg/m    Vitals - Patient Reported  Pain Score: No Pain (0)      Vitals:  No vitals were obtained today due to virtual visit.    Physical Exam   GENERAL: alert and no distress  EYES: Eyes grossly normal to inspection.  No discharge or erythema, or obvious scleral/conjunctival abnormalities.  RESP: No audible wheeze, cough, or visible cyanosis.    SKIN: Visible skin clear. No significant rash, abnormal pigmentation or lesions.  NEURO: Cranial nerves grossly intact.  Mentation and speech appropriate for age.  PSYCH: Appropriate affect, tone, and pace of words        Sincerely,    Christiano Ivan MD          "

## 2025-02-20 ENCOUNTER — VIRTUAL VISIT (OUTPATIENT)
Dept: SURGERY | Facility: CLINIC | Age: 40
End: 2025-02-20
Payer: COMMERCIAL

## 2025-02-20 VITALS — WEIGHT: 200 LBS | BODY MASS INDEX: 33.32 KG/M2 | HEIGHT: 65 IN

## 2025-02-20 DIAGNOSIS — E88.810 METABOLIC SYNDROME X: ICD-10-CM

## 2025-02-20 DIAGNOSIS — E11.65 TYPE 2 DIABETES MELLITUS WITH HYPERGLYCEMIA, WITHOUT LONG-TERM CURRENT USE OF INSULIN (H): ICD-10-CM

## 2025-02-20 DIAGNOSIS — E66.09 CLASS 1 OBESITY DUE TO EXCESS CALORIES WITH SERIOUS COMORBIDITY AND BODY MASS INDEX (BMI) OF 33.0 TO 33.9 IN ADULT: Primary | ICD-10-CM

## 2025-02-20 DIAGNOSIS — E66.811 CLASS 1 OBESITY DUE TO EXCESS CALORIES WITH SERIOUS COMORBIDITY AND BODY MASS INDEX (BMI) OF 33.0 TO 33.9 IN ADULT: Primary | ICD-10-CM

## 2025-02-20 ASSESSMENT — SLEEP AND FATIGUE QUESTIONNAIRES
HOW LIKELY ARE YOU TO NOD OFF OR FALL ASLEEP WHILE SITTING AND TALKING TO SOMEONE: WOULD NEVER DOZE
HOW LIKELY ARE YOU TO NOD OFF OR FALL ASLEEP WHILE SITTING QUIETLY AFTER LUNCH WITHOUT ALCOHOL: SLIGHT CHANCE OF DOZING
HOW LIKELY ARE YOU TO NOD OFF OR FALL ASLEEP WHEN YOU ARE A PASSENGER IN A CAR FOR AN HOUR WITHOUT A BREAK: WOULD NEVER DOZE
HOW LIKELY ARE YOU TO NOD OFF OR FALL ASLEEP WHILE SITTING AND READING: SLIGHT CHANCE OF DOZING
HOW LIKELY ARE YOU TO NOD OFF OR FALL ASLEEP WHILE LYING DOWN TO REST IN THE AFTERNOON WHEN CIRCUMSTANCES PERMIT: MODERATE CHANCE OF DOZING
HOW LIKELY ARE YOU TO NOD OFF OR FALL ASLEEP WHILE WATCHING TV: WOULD NEVER DOZE
HOW LIKELY ARE YOU TO NOD OFF OR FALL ASLEEP WHILE SITTING INACTIVE IN A PUBLIC PLACE: WOULD NEVER DOZE
HOW LIKELY ARE YOU TO NOD OFF OR FALL ASLEEP IN A CAR, WHILE STOPPED FOR A FEW MINUTES IN TRAFFIC: WOULD NEVER DOZE

## 2025-02-20 ASSESSMENT — PAIN SCALES - GENERAL: PAINLEVEL_OUTOF10: NO PAIN (0)

## 2025-02-20 NOTE — LETTER
"2025      Myrna Brothers  [address]  [facility] [address]      Dear Colleague,    Thank you for referring your patient, Myrna Brothers, to the [facility] [place]. Please see a copy of my visit note below.    Virtual Visit Details    Type of service:  Video Visit     Originating Location (pt. Location): Home    Distant Location (provider location):  On-site  Platform used for Video Visit: [facility] Weight Management Consult    PATIENT:  Myrna Brothers  MRN:         [id number]  :         1985  ASHLEY:         2025        I had the pleasure of seeing your patient, Myrna Brothers. Full intake/assessment was done to determine barriers to weight loss success and develop a treatment plan. Myrna Brothers is a 39 year old female interested in treatment of medical problems associated with excess weight. She has a height of 5' 5\", a weight of 200 lbs 0 oz, and the calculated Body mass index is 33.28 kg/m .    Myrna is a patient with mature onset class I obesity with significant element of familial/genetic influence and with current health consequences. She does need aggressive weight loss plan due to the aggressive metabolic disease she's developed at relatively modest obesity levels (type II diabetes with dyslipidemia).  Myrna Brothers eats a high carb diet, has perception of intense hunger, and has a disorganized meal pattern.      Her problem is complicated by a hunger disorder, mental health/psychopharmacological barriers, gender and short stature, impaired metabolic perception, and relatively sedentary lifestyle.      Review of the patient's history and habits today suggest that weight gain has been longstanding problem:    Previous Interventions found to be helpful in the past for weight loss include none. No previous medical weight management. Diagnosis of type II diabetes last year, on metformin but " A1c still 7% on last check. We'll see how levels look with intake labs this next week or two and ramp up Mounjaro therapy for multiple benefits on her diabetes, metabolic syndrome and obesity related risks. Plan to ramp to 10mg/week if tolerated and hold with more aggressive weight management goal in light of her advanced metabolic disease at mild obesity and we'll shoot for BMI under 27.5 and ideally under 25 to minimize the risks of MACEs in the decade ahead.    We discussed a toolbox approach to weight management today and she is open to combining mindful, reduced calorie dietary therapy with increased mindfulness techniques, activity/exercise improvements to optimize their current and future health.  Medication assistance for appetite control was discussed today and on review of the risks/benefits for this patients health history, we've decided to start with therapy geared at her type II diabetes with Mounjaro.    ASSESSMENT AND PLAN  Problem List Items Addressed This Visit    None           60 minutes spent by me on the date of the encounter doing chart review, history and exam, documentation and further activities per the note        She has the following co-morbidities:        2/20/2025     1:03 PM   --   I have the following health issues associated with obesity Type II Diabetes    High Cholesterol   I have the following symptoms associated with obesity Depression   A1c last year c/w her type II diabetes:  Lab Results   Component Value Date    A1C 7.0 08/28/2024   Lipids c/w her metabolic syndrome:  Recent Labs   Lab Test 08/03/19  0741   CHOL 263*   HDL 46*   *   TRIG 165*     Given the relatively modest excess weight/Class I obesity but with profound metabolic disease present, aggressive weight management is indicated. If not responding well to non surgical weight loss, am exception for normal bariatric qualifications may be reasonable in light of her high burden of disease.          No data to  "display                    2/20/2025     1:03 PM   Referring Provider   Please name the provider who referred you to Medical Weight Management  If you do not know, please answer \"I Don't Know\" I don t know     Feels she'd like to \"get control\" of her weight related health in her 40s. Energy is low and not feeling confident in her body.     Currently not much of an exerciser. Will do yoga and walking regularly.     TDEE of 2000-2100kcal/day calculated today.       2/20/2025     1:03 PM   Weight History   How concerned are you about your weight? Very Concerned   I became overweight As an Adult   The following factors have contributed to my weight gain Eating Wrong Types of Food    Eating Too Much    Lack of Exercise    Stress   I have tried the following methods to lose weight Watching Portions or Calories    Exercise   My lowest weight since age 16 was 120   My highest weight since age 16 was 207   The most weight I have ever lost was (lbs) 50   I have the following family history of obesity/being overweight My father is overweight   How has your weight changed over the last year? Gained   How many pounds? 15           2/20/2025     1:03 PM   Diet Recall Review with Patient   If you do eat lunch, what types of food do you typically eat? Leftover dinners   If you do eat supper, what types of food do you typically eat? Varies   How many glasses of juice do you drink in a typical day? 0   How many of glasses of milk do you drink in a typical day? 0   How many cans/bottles of sugar pop/soda/tea/sports drinks do you drink in a day? 0   How many cans/bottles of diet pop/soda/tea or sports drink do you drink in a day? 2   How often do you have a drink of alcohol? Never   Misses breakfast regularly due to habit.         2/20/2025     1:03 PM   Eating Habits   Generally, my meals include foods like these bread, pasta, rice, potatoes, corn, crackers, sweet dessert, pop, or juice Almost Everyday   Generally, my meals include " foods like these fried meats, brats, burgers, french fries, pizza, cheese, chips, or ice cream A Few Times a Week   Eat fast food (like McDonalds, [employer], Taco Bell) Less Than Weekly   Eat at a buffet or sit-down restaurant Less Than Weekly   Eat most of my meals in front of the TV or computer Almost Everyday   Often skip meals, eat at random times, have no regular eating times A Few Times a Week   Rarely sit down for a meal but snack or graze throughout Never   Eat extra snacks between meals Never   Eat most of my food at the end of the day A Few Times a Week   Eat in the middle of the night or wake up at night to eat Never   Eat extra snacks to prevent or correct low blood sugar Never   Eat to prevent acid reflux or stomach pain Never   Worry about not having enough food to eat Never   I eat when I am depressed Once a Week   I eat when I am stressed Once a Week   I eat when I am bored Never   I eat when I am anxious Never   I eat when I am happy or as a reward Once a Week   I feel hungry all the time even if I just have eaten Never   Feeling full is important to me Almost Everyday   I finish all the food on my plate even if I am already full Never   I can't resist eating delicious food or walk past the good food/smell Never   I eat/snack without noticing that I am eating Never   I eat when I am preparing the meal Never   I eat more than usual when I see others eating Never   I have trouble not eating sweets, ice cream, cookies, or chips if they are around the house Almost Everyday   I think about food all day Almost Everyday   What foods, if any, do you crave? Sweets/Candy/Chocolate   Please list any other foods you crave? [employer] frappuccinos and ice cream   Liquid calories contribute greatly to excess weight in the form of sweet coffees and ice creams.         2/20/2025     1:03 PM   Amount of Food   I feel out of control when eating Weekly   I eat a large amount of food, like a loaf of bread, a box of  cookies, a pint/quart of ice cream, all at once Weekly   I eat a large amount of food even when I am not hungry Never   I eat rapidly Never   I eat alone because I feel embarrassed and do not want others to see how much I have eaten Never   I eat until I am uncomfortably full Never   I feel bad, disgusted, or guilty after I overeat Never           2/20/2025     1:03 PM   Activity/Exercise History   How much of a typical 12 hour day do you spend sitting? Most of the Day   How much of a typical 12 hour day do you spend lying down? Less Than Half the Day   How much of a typical day do you spend walking/standing? Less Than Half the Day   How many hours (not including work) do you spend on the TV/Video Games/Computer/Tablet/Phone? 4-5 Hours   How many times a week are you active for the purpose of exercise? Never   What keeps you from being more active? Too tired    Other   How many total minutes do you spend doing some activity for the purpose of exercising when you exercise? Less Than 15 Minutes       PAST MEDICAL HISTORY:  Past Medical History:   Diagnosis Date     Diabetes (H)     gestational     Generalized anxiety disorder 01/04/2018     Hyperlipidemia      Insulin controlled gestational diabetes mellitus (GDM) in third trimester 08/27/2018    20 units of Humulin at HS as of 9/20/18       Major depressive disorder with single episode, in full remission 01/04/2018     Post-ERCP acute pancreatitis 07/24/2019     Sleep apnea      Type 2 diabetes mellitus with hyperglycemia, without long-term current use of insulin (H) 09/01/2024     Uncomplicated asthma    No sleep study available to review in her chart or in care everywhere. She thinks the diagnosis was an error and I've removed it from her list. Weight loss should improve any upper airway restriction or mild ADRIENNE.  Mild snoring.   Pancreatitis was due to choledocholithiasis in 2019.         2/20/2025     1:03 PM   Work/Social History Reviewed With Patient   My  employment status is Unemployed   What is your marital status? /In a Relationship   If in a relationship, is your significant other overweight? Yes   If you have children, are they overweight? No   Who do you live with? , son, mother and father-in-law   Who does the food shopping? Myself and father-in-law           2/20/2025     1:03 PM   Mental Health History Reviewed With Patient   Have you ever been physically or sexually abused? No   How often in the past 2 weeks have you felt little interest or pleasure in doing things? For Several Days   Over the past 2 weeks how often have you felt down, depressed, or hopeless? For Several Days           2/20/2025     1:03 PM   Sleep History Reviewed With Patient   How many hours do you sleep at night? 8       Past Surgical History:   Procedure Laterality Date     ENDOSCOPIC RETROGRADE CHOLANGIOPANCREATOGRAM N/A 07/24/2019    Procedure: Endoscopic Retrograde Cholangiopancreatography with Bile Duct Sphincterotomy and Pancreatic Duct Stent Placement;  Surgeon: Doyle Camarena MD;  Location: [facility]     HEAD & NECK SURGERY      wisdom teeth     LAPAROSCOPIC CHOLECYSTECTOMY  08/07/2019     WISDOM TOOTH EXTRACTION         Social History     Socioeconomic History     Marital status:      Spouse name: Not on file     Number of children: Not on file     Years of education: Not on file     Highest education level: Not on file   Occupational History     Not on file   Tobacco Use     Smoking status: Never     Passive exposure: Never     Smokeless tobacco: Never   Vaping Use     Vaping status: Never Used   Substance and Sexual Activity     Alcohol use: Yes     Comment: denies recent use     Drug use: No     Sexual activity: Yes     Partners: Male   Other Topics Concern     Parent/sibling w/ CABG, MI or angioplasty before 65F 55M? Not Asked   Social History Narrative     Not on file     Social Drivers of Health     Financial Resource Strain: Low Risk  (9/10/2024)     Financial Resource Strain      Within the past 12 months, have you or your family members you live with been unable to get utilities (heat, electricity) when it was really needed?: No   Food Insecurity: Low Risk  (9/10/2024)    Food Insecurity      Within the past 12 months, did you worry that your food would run out before you got money to buy more?: No      Within the past 12 months, did the food you bought just not last and you didn t have money to get more?: No   Transportation Needs: Low Risk  (9/10/2024)    Transportation Needs      Within the past 12 months, has lack of transportation kept you from medical appointments, getting your medicines, non-medical meetings or appointments, work, or from getting things that you need?: No   Physical Activity: Inactive (9/10/2024)    Exercise Vital Sign      Days of Exercise per Week: 0 days      Minutes of Exercise per Session: 0 min   Stress: Stress Concern Present (9/10/2024)    [employer] - Occupational Stress Questionnaire      Feeling of Stress : To some extent   Social Connections: Unknown (9/10/2024)    Social Connection and Isolation Panel [NHANES]      Frequency of Communication with Friends and Family: Not on file      Frequency of Social Gatherings with Friends and Family: Once a week      Attends Taoism Services: Not on file      Active Member of Clubs or Organizations: Not on file      Attends Club or Organization Meetings: Not on file      Marital Status: Not on file   Interpersonal Safety: Low Risk  (8/28/2024)    Interpersonal Safety      Do you feel physically and emotionally safe where you currently live?: Yes      Within the past 12 months, have you been hit, slapped, kicked or otherwise physically hurt by someone?: No      Within the past 12 months, have you been humiliated or emotionally abused in other ways by your partner or ex-partner?: No   Housing Stability: Low Risk  (9/10/2024)    Housing Stability      Do  you have housing? : Yes      Are you worried about losing your housing?: No       MEDICATIONS:   Current Outpatient Medications   Medication Sig Dispense Refill     albuterol (PROAIR HFA/PROVENTIL HFA/VENTOLIN HFA) 108 (90 Base) MCG/ACT inhaler Inhale 2 puffs into the lungs every 6 hours as needed for shortness of breath, wheezing or cough. 18 g 3     ARIPiprazole (ABILIFY) 5 MG tablet Take 1.5 tablets (7.5 mg) by mouth daily. 135 tablet 3     buPROPion (WELLBUTRIN XL) 300 MG 24 hr tablet TAKE 1 TABLET BY MOUTH EVERY DAY IN THE MORNING 90 tablet 1     LO LOESTRIN FE 1 MG-10 MCG / 10 MCG TABS Take 1 tablet by mouth daily. 90 tablet 3     metFORMIN (GLUCOPHAGE) 500 MG tablet Take 1 tablet (500 mg) by mouth daily (with breakfast). 90 tablet 1     venlafaxine (EFFEXOR XR) 150 MG 24 hr capsule Take 1 capsule (150 mg) by mouth daily Along with 75 mg 90 capsule 3     venlafaxine (EFFEXOR XR) 75 MG 24 hr capsule Take 1 capsule (75 mg) by mouth daily Along with 150 mg 90 capsule 3     Bupropion has minor appetite suppressant effects on its own but less so than when mixed with naltrexone.   ALLERGIES:   Allergies   Allergen Reactions     Xylocaine [Lidocaine] Anaphylaxis     Epidural Tray      Pt states with epidural, she and the baby both had decreased heart rates     Novocain [Procaine]      TSH   Date Value Ref Range Status   08/28/2024 1.31 0.30 - 4.20 uIU/mL Final   08/03/2019 0.96 0.40 - 4.00 mU/L Final     Lab Results   Component Value Date    A1C 7.0 08/28/2024     Last Comprehensive Metabolic Panel:  Lab Results   Component Value Date     08/28/2024    POTASSIUM 4.6 08/28/2024    CHLORIDE 101 08/28/2024    CO2 26 08/28/2024    ANIONGAP 11 08/28/2024     (H) 08/28/2024    BUN 12.1 08/28/2024    CR 0.87 08/28/2024    GFRESTIMATED 86 08/28/2024    MAGGIE 9.6 08/28/2024       Liver Function Studies -   Recent Labs   Lab Test 08/28/24  0844   PROTTOTAL 7.7   ALBUMIN 4.4   BILITOTAL 0.4   ALKPHOS 116   AST 38  "  ALT 47     Recent Labs   Lab Test 08/03/19  0741   CHOL 263*   HDL 46*   *   TRIG 165*     TSH   Date Value Ref Range Status   08/28/2024 1.31 0.30 - 4.20 uIU/mL Final   08/03/2019 0.96 0.40 - 4.00 mU/L Final       Will repeat labs and get new fasting lipids. No need to repeat TSH given proximal normal results.      PHYSICAL EXAM:  Objective   Ht 1.651 m (5' 5\")   Wt 90.7 kg (200 lb)   BMI 33.28 kg/m    Vitals - Patient Reported  Pain Score: No Pain (0)      Vitals:  No vitals were obtained today due to virtual visit.    Physical Exam   GENERAL: alert and no distress  EYES: Eyes grossly normal to inspection.  No discharge or erythema, or obvious scleral/conjunctival abnormalities.  RESP: No audible wheeze, cough, or visible cyanosis.    SKIN: Visible skin clear. No significant rash, abnormal pigmentation or lesions.  NEURO: Cranial nerves grossly intact.  Mentation and speech appropriate for age.  PSYCH: Appropriate affect, tone, and pace of words        Sincerely,    Christiano Ivan MD              Again, thank you for allowing me to participate in the care of your patient.        Sincerely,        Christiano Ivan MD    Electronically signed"

## 2025-02-20 NOTE — PATIENT INSTRUCTIONS
"Plan:  Welcome to weight loss season. To start, we'll aim for mindful protein rich meals through the day every 5-6.5 hours to stay just ahead of your appetite and nourish your body with a goal intake of 1375-1450kcal/day with 75-85 grams of lean protein daily and 80 oz of water daily. Avoid all liquid calories and stick with water (ok to add lemon/lime if needed), unsweetened tea/black coffee. Avoid alcohol.  Try to stay on track 19/20 days to see the best results.     2. Given diabetes, we'll ramp up Mounjaro to complement your metformin:  start 2.5mg/week of Mounjaro for 4 weeks then increase to 5mg/week for 4 weeks then 7.5mg/week for 4 weeks and then we'll stay on 10mg/week if tolerated well and helpful. Anticipate longer term use but we may reduce dose in the future at lower weights depending on response.    Stop Mounjaro if severe abdominal pain/vomiting/rash/throat swelling or severe constipation side effects that aren't remedied by the suggestions below (see handout).     3. Track intake with an alvarado like My Net Diary or LoseIt.     4. Follow up with dietician.    5. Check labs this week or next. Fasting recommended as it's been about 5 years since your last lipid check.     6. Aim for 10-20 minutes of walking each day to de-stress and tap into the benefits of exercise on your metabolic syndrome. Ideally 2 days weekly (or more) of some strength work would be very helpful as well. Swimming/cycling, flow or sculpt type yoga or pilates are all great option in addition to body weight or circuit training/free weights depending on your interests.       7. Goal BMI under 27.5 this year ahead.       What makes a person succeed with dramatic and sustained weight loss?    It's being at the right point in your life where you feel the need to lose the weight, not because anyone told you so but because of a voice inside of you that says, \"I am ready for this\".  You're now at a point where you may be feeling anxious, " "irritable and when you look in the mirror you do not recognize the person looking back.  Your healthy self is buried somewhere in that reflexion and you want to free it again.  This is the sort of motivation that leads to success, and it comes from you.    Because the only person that can lose that extra weight is you, I like my patients to focus on the mindset of being in Weight Loss Season.  This gives you permission to make the changes necessary to be consistent with the diet/activity and behavior changes that lead to successful, healthy weight loss.  Nearly any diet plan can work for weight loss, but keeping it healthy and nutrient based to prevent deficiencies/hair loss/fatigue or irritability is vital.  If you have a plan you want to try, we'll work with you to make sure no adjustments are needed to keep you healthy through your weight loss season and working with our Bariatric Dieticians you'll be given expert guidance to customize your diet plan to suit your particular needs. If you don't have your own ideas in mind, we are always happy to suggest well researched and validated plans that provide enough food to prevent hunger but still tap into your excess fat reserves and lose weight in a sustainable fashion.  There is great evidence that lean protein/healthy fat intake with good fiber intake while minimizing simple starches/carbs produces reliable/sustainable weight loss in most people. But some feel more connected to an intermittent fasting/fast mimicking or ketogenic diet.  These protocols can be hard for many to stick with and that's why we prefer the protein/healthy fat focused diets but if these alternative strategies appeal to you, we can work with you to optimize your knowledge and results with these tools.    Losing weight is a temporary commitment, but you need to be \"All In\" to have a good weight loss season.  To avoid frustration, you have to be willing to be on track 19 out of 20 days or even " "better than that. But, weight loss season is generally only 4-8 months in length. After that length of time, it can be hard to maintain a negative calorie balance and our brain, motivations and metabolism will usually bring you to a plateau that cannot be broken in this modern world where other commitments start to take priority. That's when we look to stabilize the weight loss you've achieved.  If you've reached your goal by that time, fantastic, and job well done.  If there is more to go, then after a few months of stabilization, we can usually attack that previous plateau and break into new territory.    Because of this time limitation, we want to really get to work right away and get into a sustainable routine ASAP.  One of the best predictors of how much weight you're going to lose throughout the season is how much you lose in the first 6 weeks, so prepare well and jump in with both feet.      Occasionally, people may feel like they cannot commit fully to the changes necessary and may want to change one thing at a time and \"get used to\" the idea of losing weight.  That is OK because that is where they are in their life, and they cannot fully commit for any number of reasons.  It's part of that internal motivation and they just haven't reached PRIORTY NUMBER ONE status yet. It's possible that what they need is more time to reach that point and I am always willing to work with people that want to \"dabble\", but understand, the amount of success obtained with half measures, is much less than half results. Behavior Change cannot occur until we prepare our minds, bodies and environment for what is to come, action!    As you go through your plan, look for things to keep your motivation rolling.  The most successful people have a goal or target/reward that they are working towards.  Having a reward that celebrates your new fitness, mobility and energy is the best sort because it will encourage you to do well with the " "weight maintenance phase and long term lifestyle changes that promotes keeping the weight off for the long term.  Usually, \"getting healthier\" or improving blood tests or losing weight so your clothes fit better is not as internally motivating as having a tangible reward.  A good weight loss season reward is one that isn't food based, is affordable, but something special:  Something you won't be getting/doing unless you achieve your goal.   It s important to keep to the rule of success:  in order to get the reward, your goal MUST be achieved. Write this reward down, where you can look at it daily and keep it in the front of your mind as you go through your weight loss season and it will help keep you on track.    Tools that help change behavior are vital for success. The most studied and most supported tool for weight loss is nothing more than writing down your food and weight every day.  Every Day.  Accurately and completely.  When you commit to weight loss season, this information tells you whether you're getting ENOUGH food to fuel your weight loss properly as well as teaches you the interaction between different foods you eat and how your body responds with weight loss.  You'll see that sometimes after a heavy workout you don't see the scale move until 2-3 days later.  How saltier meals (chili for example) may make you retain water for 4-5 days before you see the weight come off, you'll get used to the mini-plateaus that develop after a good 3-8 lb drop in weight as well as how you break through if you keep working the diet as you should.    Weight loss is not a linear process, there are mini ups and downs.  Learning how your body loses weight and getting comfortable with that is very rewarding. The act of writing words on paper also solidifies your will power and commitment to the season of weight loss and that by itself changes your brain chemistry/appetite, motivation and prepares you for maximal success. "     Behavior change is all about getting into a new routine.  The old habits and routine have to change because without changing the circumstances of how you gained your weight, it's unlikely you'll enjoy satisfying results. If you have snacking habits, like every time you walk through the kitchen you grab a little something, well, that habit has to change and be replaced by a new habit.  It can be something as simple as keeping a doodle pad on the counter that you make a few scribbles and then walk through the kitchen having not opened the cupboard, or starting with a glass of water and leaving the kitchen without anything else, or checking your food journal to see how many calories you have left for the day.  Boredom is the enemy as are the old habits. Break new ground and try to push those old habits into a deep hole.  There are apps/counseling options available that can help with some of the day to day urges/behaviors if you're struggling. One commercial product that does a good job is Noom.  Unfortunately, there is a subscription fee.    Finally, exercise always helps.  While not mandatory to lose weight, every little bit helps and exercise has so many other benefits that to not work it into your plan is to miss out on all the mood, sleep, stress and general health benefits that come from making yourself a little short of breath and sweaty at least 3-4 days out of the week.  The metabolism and calorie burn benefits aside, almost every chronic ailment in medicine gets better with proper, aerobic exercise.  Allow yourself to start slow and let your body prepare itself to accept harder training 4-6 weeks down the road, but start now and commit to a plan.  Whether you have the means to hire a , join a gym or just walk out your front door or go down to your basement for a video workout, get into a exercise routine and  after 3 weeks of at least 3 times a week exercise you should be at a point where you can  "slowly start ramping up 10% each week to our goal of at least 150-300minutes weekly of aerobic exercise and at least twice weekly resistance training/strenghtening with weights/bands or body weight exercises.     I am a big fan of modifying the free training plan, \"Couch to 5k\", for almost all of my patients. Just type it into Google or look it up on your smart phone alvarado store.  To modify the plan,  you can use the training plan for whatever aerobic activity you do (bike/treadmill/elliptical/rower/pool/etc). During the \"jog\" intervals, you just move a little faster or harder or increase the tension or incline.  You use those little intervals to switch up the workout and recruit more muscles and pump the blood a little more and then recover again in the \"walk\" intervals by slowing down, decreasing the incline or turning down the tension.  3-4 days a week is not that much to ask and the benefits are enormous.  Start slow and develop the base from which you can then build on and reduce the risk of injury.  It's much more important 2-4 months from now to be enjoying your exercise then it is to over exert yourself at the start and hurt yourself.  Starting slowly allows your body to accept the training better down the road when the exercise becomes crucial for weight maintenance.  Without exercise down the road during your maintenance phase, all this hard work you are about to put in can be undone. It usually takes about 100-300 calories a day of exercise to maintain a weight loss and our focus during weight loss season is to generate the routine/activities and hobbies that make that enjoyable/sustainable.    Thanks for taking this first and most important step in your weight loss season.  Commit to it and we will cheerlead you all the way to success.  When things get tough or off track we'll offer guidance and analysis and when you reach your goal we'll celebrate your success.  In the end, it is all about your success, " your health and what you do with it.      Christiano Ivan MD  [facility]  [phone]        LEAN PROTEIN SOURCES  Getting 20-30 grams of protein, 3 meals daily, is appropriate for most people, some need more but more than about 40 grams per meal is not useful.  General rule is drinking one ounce of water per gram of protein eaten over the course of the day:  70 grams of protein each day, drink 70 oz of water.  Protein Source Portion Calories Grams of Protein                           Nonfat, plain Greek yogurt    (10 grams sugar or less) 3/4 cup (6 oz)  12-17   Light Yogurt (10 grams sugar or less) 3/4 cup (6 oz)  6-8   Protein Shake 1 shake 110-180 15-30   Skim/1% Milk or lactose-free milk 1 cup ( 8 oz)  8   Plain or light, flavored soymilk 1 cup  7-8   Plain or light, hemp milk 1 cup 110 6   Fat Free or 1% Cottage Cheese 1/2 cup 90 15   Part skim ricotta cheese 1/2 cup 100 14   Part skim or reduced fat cheese slices 1 ounce 65-80 8     Mozzarella String Cheese 1 80 8   Canned tuna, chicken, crab or salmon  (canned in water)  1/2 cup 100 15-20   White fish (broiled, grilled, baked) 3 ounces 100 21   Rocky/Tuna (broiled, grilled, baked) 3 ounces 150-180 21   Shrimp, Scallops, Lobster, Crab 3 ounces 100 21   Pork loin, Pork Tenderloin 3 ounces 150 21   Boneless, skinless chicken /turkey breast                          (broiled, grilled, baked) 3 ounces 120 21   [place], [place], [place], and Venison 3 ounces 120 21   Lean cuts of red meat and pork (sirloin,   round, tenderloin, flank, ground 93%-96%) 3 ounces 170 21   Lean or Extra Lean Ground Turkey 1/2 cup 150 20   90-95% Lean [place] Burger 1 alex 140-180 21   Low-fat casserole with lean meat 3/4 cup 200 17   Luncheon Meats                                                        (turkey, lean ham, roast beef, chicken) 3 ounces 100 21   Egg (boiled, poached, scrambled) 1 Egg 60 7   Egg Substitute 1/2 cup 70 10    Nuts (limit to 1 serving per day)  3 Tbsp. 150 7   Nut [place] (peanut, almond)  Limit to 1 serving or less daily 1 Tbsp. 90 4   Soy Burger (varies) 1  15   Garbanzo, Black, [place] Beans 1/2 cup 110 7   Refried Beans 1/2 cup 100 7   Kidney and [place]ima beans 1/2 cup 110 7   Tempeh 3 oz 175 18   Vegan crumbles 1/2 cup 100 14   Tofu 1/2 cup 110 14   Chili (beans and extra lean beef or turkey) 1 cup 200 23   Lentil Stew/Soup 1 cup 150 12   Black Bean Soup 1 cup 175 12           To help lose weight in a safe way and sustainable way, I'd like to start you on a 1300 calorie diet each day.  Understanding that every 3500 calorie deficit adds up to a pound of weight reduction, with the combination of light aerobic exercise, some modest weight training and diet, we should be able to lose around 1 to 2.5lbs weekly depending on your starting height and weight and exercise routine with this goal intake. Dropping abut 1% total body weight weekly is exceptional weight loss and very sustainable once in a good routine.    Hunger and fatigue are the enemies of weight loss and behavior change in general.  We become much more reactive in our eating when we're hungry and tired and decrease our levels of self control.  It's not a personal failing, it's just body chemistry.   We can combat this by trying to avoid being tired and hungry at the same time.  To help this,  try to front load your calories in the first 10 hours of you day so you get into the fatigued evening hours reasonably full and you can control impulses/mindless eating a lot better and avoid those bedtime snacks/evening treats that the tired brain craves.  If you can getting your exercise in the beginning of your day has also been shown to have superior results (but anytime is better than none).  We want to build up to 150 minutes or more as you progress through the first 2-3 months of weight loss season (300 minutes weekly is ideal for maintaining weight loss for most  after the end of weight loss season).     Weight loss goes through ups and downs and plateaus but if you stay on the program, you will enjoy success.   Commit to the process, try to be on track at least 19 days out of 20 and continue to think about why you're doing this and what you're working towards. If you haven't thought of your reward for hitting your weight loss goals, think about it now. Using these little victory bribes along the way helps a lot.    Finding a diet that is satisfying and repeatable-- day in and day out, improves success.  The following uses the concept of Daily Caloric Restriction, eating less every day.  An outline of how to break up the day's food is given below.  It is a starting point and example of what a 1300 calorie diet looks like.  You can modify the listed foods to suite your particular tastes, but pay attention to portions and protein content.   Do not skip breakfast on this plan as it will leave you hungry and lead to overeating at some point during the rest of the day.  If you can make supper the last food for the day more days of the week then not, it will help a lot.  That means that the intake during those first 10-12 hours of the day and hitting your protein/intake targets for all three meals is vital to your success and evening hunger control.     Start reading labels so you know that you're getting what you think you are and start measuring foods so you can eventually look at a portion and understand how it will provide the fuel you want.    Prepping raw veggies after you buy them (washing and putting into bags/tupperware for easy access), cooking several chicken breasts/proteins for the next 2-3 lunches and generally being able to grab and go what will keep you on target when time is short will greatly aid your success.  Prepare and plan ahead and success will follow. It really only requires a couple days weekly to optimize the access to the right food at the right time of  day.  Food delivery and stopping at fast food is a sign of reactive eating and usually will signal a stalling of your weight loss.    Read labels:  for protein portions/yogurt, protein bars etc looking for items with more than 10 grams of protein and less than 10 grams of sugar is very helpful.  Frozen meals should have at least 18 grams of protein, under 10 grams of sugar as well (typically around 300-380 calories).    Please note: if you've had previous bariatric surgery: wait 20-30 minutes before/after eating to drink your beverages to avoid early fullness/dumping syndrome/worsening malabsorption, early loss of fullness and hunger.  Start with eating your protein first, slow down your meals and chew thoroughly.          1300 calorie diet:  Think Big Breakfast, Medium Lunch, smaller dinner.  Note: it's OK to consolidate the calories/protein of the mid morning snack with breakfast and the midafternoon snack with lunch if time doesn't allow or if your don't wish to have those snacks. No snacks recommended after supper. If you're prone to late afternoon nibbling, that is a great time to get your fitness in so you can get to supper without the extra.    Breakfast goal of 300 calories-350 calories (egg, 1/3 to 1/2 cup cooked old fashioned oatmeal or steel cut oats and berries,3-4oz greek yogurt.)Glass of water and if you like coffee (black) or tea.        Mid morning Snack or part of lunch, about 2-2.5 hrs later: 100 calories (cottage cheese/string cheese/ fruit or banana) and a handful of cruchy/green veggies (cucumber/celery/green peppers/broccoli).  Small glass of water    Lunch:  300 calories (3.5-4 oz tuna with little mcdaniel (no bread), apple, salad with drizzle of olive oil/balsamic vinegar for example)  Water    Snack:  100 calories.  4-5 oz Greek Yogurt with at least 17 grams of protein per serving.    Or Cottage cheese (lower sodium version preferable). Get this in about 2 hrs before you plan to have dinner.  Protein drinks with at least 15-20 grams of protein and less than 6 grams of sugar could be used here to hit protein goals and decrease afternoon/evening hunger.  Glass of water    Dinner:  350 calories (4 oz meat or fish with cooked veggies or salad with minimal dressing, one piece of bread).  Glass of water or unsweetened tea with lemon  Dessert: 100 calories Medium Apple or Small handful of nuts, about 2/3 of an ounce (almonds/walnuts/cashews or pistachios are ideal).   Glass of water.    Try to avoid all soda and juice (low sodium V8 ok once a day).   You can do it! Embrace the healthier you and give up the inflammatory sugary treats that accelerate disease.    Choose an activity that is fun/interesting and available to you such as  Going for a swim for 15 minutes, walking 40 minutes, elliptical 20 minutes or cycling 20 minutes as many days a week as you can.  Having a walking or workout buddy can make it even more enjoyable and keep you on track the days your motivation/energy may be lower.  If those times are too long for your fitness level, start at 10 minutes of movement and each week try to increase by 2 minutes each week.  The first 70 minutes a week (10 minutes a day only) of exercise drops the mortality rate of a sedentary person 30%!!!!  Our goal is to work up to 150 minutes or more per week of moderate to vigorous exercise  to optimize metabolism and prevent weight regain during maintenance. If time is short and your fitness allows it, high intensity interval training can be a nice way to cut the workout time in half:  warm up 2-4 minutes then 3-6 intervals of increased intensity effort (70% of max heart rate) followed by an equal amount or more of recovery before repeating, then 1-3 minutes of cooldown.     10 minutes of weight lifting can be helpful as well or using some body weight exercises like wall squats, pushups (with assistance as needed or standing/wall pushups), seat presses, yoga moves. At  "least twice weekly helps maintain a good strength to weight ratio, more days is better.  [employer] is a great resource for free video demonstrations.    If you are into strenuous weight lifting or prolonged exercise, use an online calculator for how many calories you've burned and if your exercise is lasting over 60 minutes, replace 20-30% of those calories burned immediately after exercise .  For the more limited exercise (less than 500 calories burned), there is no need to add extra food unless you notice a lot of hunger on your food journal.  Usually  Even a  calorie load after longer workouts is more than adequate.  For longer efforts, hunger will increase if you don't refuel afterwards and can get meal plan off track due to hunger, so replenish immediately after the workout to keep on the diet plan and feeling good.    Exercise example:  If you burned 1000 calories during the exercise, immediately (within 30 minutes) have a snack/replacement beverage totaling 200-300 calories and ideally have a 3:1 ratio of carbohydrate grams to protein grams to keep muscles ready for exercise the next day.  Have your next, full meal within 2-3 hours of exercise.    Tips for success:  KEEP A FOOD JOURNAL and a log of daily weights.  Pencil and paper works fine for most. Otherwise, Cylex, fitDefine My Style, TakeCare, [employer], [employer] are all good tracker apps/programs or websites for food/fitness.  Remembering to ask yourself, \"How did my nourishment affect me today\" and comparing \"good days\" to \"hungry days\" to solidify what helps your body, in your life, feel it's best while losing weight.    1.  Prepare proteins ahead of time (broil chicken breasts in bulk so you can grab and go), steel cut oats can be stored in casserole dish/bowl in the fridge for quick scoop in the morning and rewarm in microwave, make use of crock pot recipes (watch salt content).    2.  Drink a 8-12 oz glass of water every 2-3 hours when awake.  We often " "mistake hunger for thirst, especially when losing weight.    3. Remember your Reward and Motivation when things get hard.    4.  Weigh yourself every morning and record, you'll stay on track better and learn how your body loses weight. Don't worry about 1 or 2 day patterns, but when on track you'll notice good trend downward of weight over 3-4 day segments.    5. Call or use [facility] messaging if problems/concerns .    6.  Find a handful of meals/foods that keep you on track and get into a boring routine that is sustainable for you.    7.  Take a complete multivitamin just to make sure all micronutrients are adequate during weight loss.    8. If losing hair/brittle nails it often means you are not taking enough protein.  Minimum goal is 60 grams daily of protein, most people with normal kidneys do well with upwards of 100-120 grams/day of protein. Consider taking Biotin as supplement or a \"Hair and Nail\" multivitamin.  If you are hypothyroid and losing hair, see you doctor for a check up of your levels if you haven't had one recently.    9.  Getting adequate sleep is very important for starting your day properly, when we are sleep deprived, our morning appetite is suppressed and without eating an adequate breakfast, we overeat later in the day when we're tired.  Our body heals in our sleep and our mental and immune health depends on this rest.  Aim for 7-8 hours of sleep nightly if possible.  If you sleep is disturbed, perform some introspection on stressors/depression/anxiety/PTSD events or possible sleep disorders and we can trouble shoot solutions/evaulations if issues persist.    Exercise during the day, meditative breathing before bed and after waking and removing the television from the bedroom are easy ways to improve quality of sleep.      10. Relaxation.  Controlled breathing exercises can lower stress levels in the brain.  One technique is 4, 7, 8 breathing:  Place the tip of your tongue behind your front " teeth, breath in through your mouth for 4 seconds, Hold it for 7 seconds and breath out slowly, making a leaky tire noise for 8 seconds.  Repeat 4 times.  Ideally do at the start and end of your day or if feeling stressed.  It works and it's why meditation/yoga/martial arts are often very breath based activities.  There are breathing techniques for alertness as well as relaxation out there and they can be quite helpful.      On-the-Go Breakfast Ideas  As of 2015, the latest research shows what a huge impact eating breakfast has on losing weight and feeling your best. People lose more weight when they make breakfast their biggest meal of the day compared to Dinner, but even if you cannot go to that degree, getting a breakfast that has at least 20 grams of protein and even a moderate amount of fat is ideal for maintaining good energy through the day and limits overeating in the evening hours.  The following are some quick and easy suggestions for at least getting something of substance into your body in the morning.  Enjoy!    Eating breakfast within 90 minutes of waking up is an important part of taking care of your body on a restricted calorie diet plan.  After sleeping for hours, your body is in need of fuel.  An ideal breakfast is a combination of protein, whole-grain carbohydrates, or fruit.  Here s why:    -Protein digests very slowly in the body, helping you feel more satisfied.  -Whole grains provide dietary fiber, which also digests slowly and helps keep your gut clean.  -Fruit is a great source of vitamins, minerals, and fiber.     Each one of these breakfast combinations has between 200-300 calories and 15-20 grams of protein.  Feel free to mix and match!    Bone Broth (chicken bone broth or beef bone broth) is a great way to boost protein content. 8oz of bone broth will typically have 9-12grams of protein for 40kcal of energy.    Protein: Choose  -1/2 cup low-fat cottage cheese  -2 hard boiled eggs , or  one cooked in olive oil (low/slow heat).  -1 low fat string cheese stick  -1 Tablespoon natural peanut butter  -FullStory vegetarian sausage alex (found in freezer section)  -1 slice lowfat cheese  -6 oz 2% or lowfat Greek yogurt, such as Fage or Oikos.    PLUS    Whole Grains:  Choose   -1 whole wheat English muffin  -1 whole wheat cosme, half  -1/2 Fiber One frozen muffin, thawed  -1/2 Fiber One toaster pastry  -1 whole wheat bagel thin  -1/2 cup Kashi cereal  -1 Kashi waffle (or other whole grain high-fiber waffle)  Aim for whole grain/sprouted breads with at least 3g of fiber/slice if having bread. [place] is one such brand.    OR    Fruit: Choose  -1/3 cup blueberries  -1/2 banana (or a plantain- similar to a banana, yet smaller)  -1/2 cup cantaloupe cubes  -1 small apple  -1 small orange  -1/2 cup strawberries  -handful raspberries/blackberries (each berry is about 1 calorie).    *Adapted from [employer], Fall 20    Ten Breakfasts Under 250 calories    Ideally, getting between 350-600 calories  (depending on starting height and weight)for breakfast is ideal for avoiding hunger later in the day, adjust/add to the following accordingly:    One- 250 calories, 8.5 g protein  1 slice whole-grain toast   1 Tbsp peanut butter    banana    Two- 250 calories, 8 g protein    cup nonfat/lowfat yogurt  1/3rd cup diced no-sugar peaches  1/3rd cup cereal (like Special K, Cheerios, or bran flakes)    Three- 250 calories, 25 g protein  1 egg scrambled with 1 oz skim milk    cup shredded cheddar    whole grain English muffin  1 oz Meeker adler  1 tsp margarine spread    Four- 225 calories, 25 g protein  1/2 cup Kashi Go-Lean cereal    cup skim milk mixed with 1 scoop Bariatric Advantage protein powder    cup no-sugar diced pears    Five- 250 calories, 20 g protein    cup oatmeal prepared with skim milk, 1 scoop protein powder, and sugar-free maple syrup    Six- 200 calories, 5 g protein  1 whole grain  waffle, toasted  1 tablespoon creamy peanut or almond butter    Seven-  250 calories, 19 g protein  Breakfast sandwich: 1 slice whole grain toast, cut in half.  Add 1 scrambled egg and one slice cheddar  cheese.    Eight-  250 calories, 15 g protein  2 eggs scrambled with 1/3 cup frozen spinach (heat before adding to eggs) and 2 tablespoons low fat cream cheese.    Nine-  150 calories, 15 g protein  2/3rd cup cottage cheese    cup cantaloupe    Ten- 200 calories, 20 g protein  Fruit smoothie made with 4 oz. nonfat Greek yogurt,   cup berries, 1 scoop protein powder, and 4 oz skim milk.    Ten Lunches Under 250 Calories    Aim for lunch to be around 300-400 calories a day when trying to lose weight and get that protein in!    One- 200 calories, 11 g protein  1/3 cup tuna salad made with light mcdaniel on 1 slice whole grain bread  1 small peeled apple    Two- 250 calories, 16 g protein  1/3 cup lowfat cottage cheese    cup cooked green beans    small fruit cocktail (in natural juice)    Three- 200 calories, 11 g protein    grilled cheese sandwich on whole grain bread with lowfat cheese  2/3rd cup of tomato soup    Four- 250 calories, 22 g protein  Deli wrap: 1 oz sliced turkey, 1 oz sliced ham, 1 oz sliced chicken rolled up with 1 slice low-fat cheese  1 small orange    Five- 250 calories, 28 g protein  2/3rd cup chili with 1 oz shredded cheese  4 saltine crackers    Six- 250 calories, 22 g protein  1 cup fresh spinach with 2 oz chicken, 1/3rd cup mandarin oranges, and 2 tablespoons sliced almonds with 1 tablespoon  vinaigrette dressing    Seven- 200 calories, 11 g protein  1 Tbsp sugar-free preserves and 1 Tbsp peanut butter on 1 slice whole grain toast    cup nonfat/lowfat Greek yogurt    Eight- 250 calories, 18 g protein  1 small soft-shell chicken taco with 1 oz shredded cheese, lettuce, tomato, salsa, and 1 Tbsp light sour cream    cup black beans    Nine- 225 calories, 13 g protein  2 ounces baked chicken  1/4 cup  mashed potatoes    cup green beans    Ten- 200 calories, 21 g protein  Deli cosme: 2 oz roast beef or other deli meat with 1 tsp Joshua mayonnaise and sliced tomato, onion, and lettuce  1/3rd cup cottage cheese      Ten Dinners Under 300 calories    If you're eating a large breakfast and medium lunch, keep dinner small.  300-400 calories is ideal for most people depending on their caloric needs.    One- 300 calories, 12 g protein  1-inch thick slice of turkey meatloaf    cup baked butternut squash    Two- 200 calories, 9 g protein  Bread-less BLT: 3 slices turkey adler, sliced tomato, wrapped in a large lettuce leaf    cup peeled fruit    Three- 275 calories, 36 g protein  3 oz roasted chicken    cup cooked broccoli    cup shredded cheddar cheese    cup unsweetened applesauce    Four- 200 calories, 25 g protein  3 oz baked tilapia  1/3rd cup cooked carrots    cup yogurt    Five- 250 calories, 20 g protein  Grilled ham  n  Swiss: spread 2 tsp ghee or butter on 1 slice of whole grain bread.  Cut bread in half, layer 2 oz deli ham with 1 piece of Swiss cheese and grill until cheese is melted.    cup cooked vegetables    Six- 250 calories, 18 g protein  Vegetarian cheeseburger: 1 [place]oca cheeseburger topped with lettuce, onion, tomato, and ketchup/mustard    cup sweet potato fries    Seven- 250 calories, 18 g protein  Pork pot roast: 2 oz roasted pork loin, 1/3rd cup roasted carrots,   medium potato, cooked with   cup gravy    Eight- 330 calories, 25 g protein  2 oz meatballs (about 2 small meatballs)    cup spaghetti sauce  1/2 piece toast topped with 1 tsp ghee or butterand topped with garlic powder, toasted in oven    Nine- 250 calories, 16 g protein  Mexican pizza: one 8  corn tortilla topped with 2 oz chicken,   cup salsa, 2 tablespoons black beans, 2 tablespoons shredded cheese.  Bake until cheese is melted.    Ten- 250 calories, 22 g protein  Shrimp stir-king: 3 oz cooked shrimp, 1/6th onion,   pepper,   cup chopped  carrots sautéed in 1 tablespoon olive oil, topped with 2 tablespoons stir king sauce and a pinch of sesame seeds        150 Calories or Less Snack Ideas   1 hardboiled egg with   cup berries  1 small apple with 1 hardboiled egg  10 almonds with   cup berries  2 clementines with 1 light string cheese  1 light string cheese with   sliced apple  1 light string cheese wrapped in 2 slices of turkey  4 100% whole wheat crackers (e.g. Triscuit) with 1 light string cheese    c. cottage cheese with   cup fruit and 1 Tbsp sunflower seeds     cup cottage cheese with   of an avocado     can tuna fish with 1 cup sliced cucumbers     cup roasted garbanzo beans with paprika and cayenne pepper    baked sweet potato with   cup chili beans or   cup cottage cheese  2 oz. nitrate free turkey slices with 1 cup carrots  1 container (6 oz) of low sugar (less than 10 grams of sugar) greek yogurt   3 Tablespoons of hummus with 1 cup sliced bell peppers   2 Tablespoons of hummus with 15 baby carrots  4 Tablespoons ranch dip made with plain Greek Yogurt and 3 mini cucumbers  1/4 cup nuts (any kind)  1 Tablespoon peanut butter with 1 stalk celery   1 dill pickle wrapped in 1-2 slices of deli ham with 1 tsp of mayonnaise/mustard.        Zepbound/Mounjaro (Tirzepatide) is a very effective satiety boosting appetite suppressant that elevates satiety hormones GLP1 and GIP. It needs to be ramped up slowly to be tolerated adequately.  It helps release insulin in response to food when blood sugar runs higher than normal and is very helpful for diabetics in managing blood sugar levels with low risks for any low blood sugars.  About 1/10 people will not tolerate this medication. Each month, you move up to a higher dose until eventually reaching the 10mg/week dose if tolerated with further ramping to follow if needed. If intolerant or severe side effects, a dose decrease would be wise, so keep me posted if not tolerated the ramping well. This may be a  longer term medication based on individual needs/physiology and appetite control.     Injections can be given after cleansing the skin with alcohol prep pad or swab (available OTC).     Stop Zepbound if severe abdominal pain/vomiting/rash/throat swelling or constant nausea that prevents adequate food/water intake. Stop 2-3 weeks prior to any planned general anesthesia surgeries to reduce risk for something called a post operative ileus.     Gallstones can occur in about 1% of patients on this medication so update me if increase right upper abdominal pain after eating.     Start meals with protein first, separate beverages from meals by 20 minutes and work hard in between meals to get your 64-75 oz of water daily to reduce risks for severe constipation. Consider a fiber supplement like powdered psyllium husk in 12 oz water each night, stool softerners as needed and Miralax or milk of Magnesia if more than 3 days have passed without a Bowel Movement. Some other options include:  For Prevention and Treatment of Constipation when on Semaglutide     From least aggressive to most aggressive:     Move: Wallking is essential - the more we move, the more our bowels move  Water: Drink water - 64oz or more a day  Go when you need to go. Don't wait. The longer you wait, the harder it gets.  Fiber: Fruit, raw veggies, nuts, whole grains, prune each night, flax/mariely seeds added into meals can all be helpful.   Stool Softeners: if constipation is mild and for maintenance  Gentle laxatives: Miralax, senokot, dulcolax , Smooth move tea as needed     More aggressive (and typically won't get to this point)  Milk of Magnesia  Mag Citrate (what you drink before a colonoscopy)  Suppositories  Enema      Check out [employer] for patient resources.  If you have weekends off, I recommend dosing Friday evenings.     Some people starve on this medication if not mindful about food intake. I recommend starting meals with the protein part of  your meal first, chew thoroughly and separate beverages from meals by about 20 minutes to make sure you get your nourishment in first. Include vegetables/complex carbohydrates and unsaturated fat as part of your balanced diet but group these at the end of the meal, after your protein is mostly gone. Satiety will kick in too early if drinking too much with meals and under-nourishment can result.     It's not a bad idea to take a complete multivitamin most days of the week if using this medication. Lower iron content tends to be less constipating.     Adequate hydration is essential for feeling your best, efficient fat burning, waste elimination and constipation prevention. For those without fluid restrictions due to other disease, the goal is at least one ounce of water per gram of protein consumed with a  minimum of 64oz/day goal.     Pancreatitis is a very rare but potentially serious side effect. Stop Zepbound if severe mid abdominal pain/burning in nature or if unable to eat/drink due to severe nausea/discomfort.   People with strong history of pancreatitis without clear cause should stay clear of this medication as should those planning to get pregnant, those with strong personal or family history for medullary thyroid cancer or Multiple Endocrine Neoplasia (rare).     Stop Zepbound at least 2 weeks prior to any planned surgery.  Stop until fully recovered if unexpected/emergent surgery is needed with anticipation that re-ramping will be needed if off longer than 14-16 days since last dose.    Kind Regards,  Christiano Ivan MD  [facility]      High Intensity Interval Training (HIIT):    To feel our best, our bodies need to move. Our mental health, sleep, memory, immune function, stress coping and metabolic health depend on exercise and its benefits for optimizing how our body works best. In the modern world, most do not get nearly enough exercise.  However, it's important  "to understand that ANYTHING is better than nothing and if you can get started with a routine for fitness, even a little bit has some benefit compared to none.  The more you do, the better (up to 20 hours weekly, beyond that the benefit tails off), but the NIH guidelines for all adults in [place] is to work up to 150-200 minutes of moderate aerobic activity each week to improve our health.  If you're a person that struggles with time pressure/lack of interest then those 2.5-3 hours weekly may be too much to ask.  So, we have to be very efficient in how we exercise to reap the benefits. It turns out that INTENSITY matters. When we use Vigorous rather than Moderate aerobic activity (Vigorous defined by a heart rate of 70-85% of calculated maximum heart rate; max heart rate equals 220 beats minus your age or the level of effort where you could talk 2-4 words before needing to take a breath), the amount of time required to reap the benefits of exercise is cut in half:  75-90 minutes/week.      A recent study showed that a 10 minute interval workout using a 3-4 minute warm up and then 20 second \"maximum effort\" followed by 1 minute, 40 seconds of recovery and then repeated two more times was as effective in improving fitness as a 30 minute brisk walk.  They utilized exercise bikes or stairs for the effort but you could adapt to whatever geography/gym/pool/bike/hill/staircase that you may have as long as you can safely do the effort without injury.   As your fitness improves, intervals of 30 seconds to 2 minutes of increased effort followed by 1-2 minutes of recovery are options as well. The fitter you become, the harder and more intervals you'll be able to do.  Start with what you CAN do, not what you WANT to do and that will allow your body to adapt/develop fitness down the road to reach your goals.  Give yourself permission to develop a base of fitness the first 3 weeks and then you should be able to ramp up from there " nicely and progressively each week.    Jumping right into a High Intensity Interval workout if you've not been having some level of exercise/fitness recently can increase your risk of injury.  To help develop some base fitness here are some options that would give you a 3-4 week base development, assuming you can walk or ride a stationary bike but haven't been doing much the last few months. 3-4 sessions each week of:      Week Warm up Interval: 3-6 repeats Cooldown   1 3-5 minutes easy walk/spin 20 seconds brisk but doable pace then recover with 90 seconds easy 2-3 minute easy walk/spin   2 3-5 minutes easy walk/spin 25 seconds brisk, 90 seconds recovery 2-3 minute easy walk/spin   3 3-5 minutes easy walk/spin 30 seconds brisk, 90 seconds recovery 2-3 minute easy walk/spin   4 3-5 minutes easy walk/spin 40 seconds brisk, 60 seconds recovery 2-3 minute easy walk/spin     *for base development, keep resistance steady and just  the speed. Once you've completed base phase, feel free to add a little extra resistance to the faster phase to increase vigorousness/heart rate.  During base phase you should be still able to talk comfortable/sing during faster pedaling/walking.     After completing the base phase, start ramping up workouts on the bike/walking. Have one easy pace workout but of longer duration (add 2-3 minutes each week to the time) each week.  One workout weekly should have an interval workout where you push your intensity working up to those 15-30 second maximum efforts and recovering fully and then repeating for at least 3-4 intervals.  Cool down/easy pedal at the end for 1-2 minutes.     Consider a spin class if you have the means/access and remember, it's OK to rest if needed. Make the workout yours and don't focus on other people's abilities, getting started will develop your own fitness every week.  Jogging plans such as the Couch to 10k or any aerobic training program make use of similar  "intervals and can help you reach a fitter and more capable body regardless of where/how you perform the intervals (walking/biking/swimming/elliptical/row machine/erg arm machine, peddler, raking, lawn mowing,etc).  Go for it.  How much activity does it take to burn off the occasional treat?  Occasional treats or indulgence doesn't have to set back your weight loss season goals. But if the occasional treat turns into the daily chocolate habit or the chips after dinner, or a can of soda, then your progress will slow dramatically unless your activity and exercise can compensate for those empty, low nutrition/high calorie foods.  Here's some estimations of walking off your snacks depending on general weights.  You can use \"calorie burn calculators\" if you search in Google, more accurate estimations should ask height/weight/gender and if you have a smart watch/heart rate monitor fitness watch, then your personal burn rate will be much more accurate then these general numbers. But these are in the ballpark.  http://www.Karma Snap.DataSift/ufgjfgzk-vvfxnd-cuifqtmbve/ is a nice reference for many different activities with just entering weight and time spent doing anactivity.    Most pre-packaged snacks/treats or 12 oz cans of soda come in around 150 kcal (small bag of chips (11-12 chips), 2 Tollhouse Cookies, 12 oz Coke, 5 oz of wine(125 kcal), 2 oz Vodka (130kcal),etc).  To burn off this snack/indulgence, walking at a \"walking the dog\" pace or for most people that aren't in training/fitness mode, is about 2.5 MPH. If you move slower than this, you'll burn less calories. If you move faster than this,you'll burn more.     Calories burned are for a 30 minute walk, at 2.5 MPH (traveling 1.25 miles in 30 minutes):    Body Weight Calories Burned   175 lbs 120 kcal   200 lbs 137 kcal   225 lbs 153 kcal   250 lbs 171 kcal   300 lbs 204 kcal       Speed, intensity, hills and activity type (walk/bike/swim/chores/yardwork/etc) " "will determine how much energy you burn per hour.  As you can see, for most people, a small snack of 150 kcal is a 30 minute commitment to moving at a modest pace.  A quick stroll. In comparison, most people moving at a \"missing the bus\" walk pace of about 4 mph (or the pace that a fit person may  walk in a marathon race if they can't run anymore).    30 minutes at 4 MPH speedy walk/slow jog on level ground burn rate (2 miles covered in 30 minutes):    Body Weight Calories Burned   175 lbs 198 kcal   200 lbs 227 kcal   225 lbs 255 kcal   250 lbs 283 kcal   300 lbs 340 kcal.     Finding loops in your neighborhood is easy to map out distances by using sites like, MapMyRun.com, MapMyRide.com, or Strava.com.    Get out there and earn it, burn it, or pay it forward.  Banking calories is always a good idea if you know an occasion is coming up where you plan to indulge. The goal for all adults around the work is at [id number] minutes weekly of moderate aerobic activity like those listed above (keeping heart rate at about 50-65% of your maximum heart rate calculation (roughly 220-Age in years (as long as not on heart slowing medicationslike Beta Blockers)).     Hopefully, this drives home the point that snacks need to be intentional during weight loss season as most of us struggle with finding 30-60minutes most days a week to exercise and it takes more than that in many cases to make up for the sweet/treats and indulgences that may have contributed to your weight gain over the years (roughly, a can of soda daily for a year will put a person at risk for a 10-15 lb weight gain each year).    Exercise Guidance    Nearly everything that bothers us gets better when the proper amount of exercise can be done regularly for what our body tolerates.  Getting to that level safely and without injury is the key.  When it comes to weight loss, exercise is especially important in maintaining the weight loss.  Unfortunately, one of the " harsh realities is that substantial weight loss slows our metabolism, often anywhere from 5-20%.  But as far as importance in weight reduction, our appetite can usually easily keep up with our exericse and mindful diet for how active we are is the main determining factor of weight reduction.    Our brain always remembers our heaviest weight and we can return to that if we're not mindful and moving regularly.  Our biology doesn't understand the concept of having too much energy, only not having enough.  As such, when we lose weight, it's thought that the brain interprets this as we're ill or in a famine and dials back our metabolism to limit further weight loss.  This is why exercise is so important in keeping the weight off and is the main reason people have some weight regain from their low weight point after weight loss.  We have to make up that 10-20% of calories not being burned.Since we can restrict our intake for only so long, exercise becomes very important in our long term healthy weigh maintenance to balance out the occasional indiscretion with our diet.    Generally, for every 5% body weight reduction in a weight loss season, a person needs to add  kilocalories of exercise in their daily routine to keep that weight off for the long term.  This is why it's vital to be starting your fitness regimen during weight loss season, so that routine is well established as you move into your maintenance period.    Additionally, all sorts of good enzymes and genes turn on with exercise and our stress, sleep, mood and bodies feel better when we can get to the point of making ourselves a little sweaty and short of breath 35-50 minutes most days of the week. But we have to start with what we can do first and give ourselves permission to work our way up to this goal.    Who isn't ready for exercise? Well, if you get severe dizziness/palpitations, chest pain or short of breath/faint with even minimal activity like  walking across a room or you're having to pause while going up a flight of stairs, then getting your heart and/or lungs fully evaluated prior to starting an exercise regimen is recommended. Everyone else can probably start a program, but everyone may start at a different point:  Some can set a 5-10 minute walking goal and others will be able to ride their bike for an hour.  Some only can do chair yoga and some decide to train for a triathlon. Starting with what your body can do is the important part and not asking to do more than you can comfortably do is the goal. Building up eventually to 150 minutes/week or more is the future goal- but anything over 10 minutes/day makes a difference in how our body works and feels. Our stress/sleep/heart/brain/organ function and risks for many types of cancer improve with exercise so having that tool to assist our health goals is vital.     Start with where you're at and look to add 5-10% more each week until you're at that 150 minutes or more a week goal (or 75 minutes/week or more of vigorous exercise). Moderate exercise can be estimated as the pace you can carry on a conversation and vigorous is the pace at which you can get 3-5 words out before having to take a breath.  If you're using heart rate monitoring, Moderate is about 55-60% of your maximum heart rate and vigorous about 75%. (Max heart rate estimated as 220 beats minus your age:  Example: 220-age of 42 =176 Beats per minute (BPM) maximum. 0.6X 176= 105 BPM (moderate), 132 BMP(vigorous)).    If you like to count steps, the 10,000 steps per day does correlate well with weight maintenance, but try to make at least 20-25% of those steps at a brisk pace (like you are about to miss your bus).      Let's move!  Christiano Ivan MD.

## 2025-02-20 NOTE — NURSING NOTE
Current patient location: Patient declined to provide     Is the patient currently in the state of [place]? YES    Visit mode: VIDEO    If the visit is dropped, the patient can be reconnected by:VIDEO VISIT: Text to cell phone:   Telephone Information:   Mobile [phone]       Will anyone else be joining the visit? NO  (If patient encounters technical issues they should call [phone]    Are changes needed to the allergy or medication list? No    Are refills needed on medications prescribed by this physician? NO    Rooming Documentation:  Questionnaire(s) completed    Reason for visit: Consult    Melia CARBALLO

## 2025-02-22 ENCOUNTER — LAB (OUTPATIENT)
Dept: LAB | Facility: CLINIC | Age: 40
End: 2025-02-22
Payer: COMMERCIAL

## 2025-02-22 DIAGNOSIS — E66.09 CLASS 1 OBESITY DUE TO EXCESS CALORIES WITH SERIOUS COMORBIDITY AND BODY MASS INDEX (BMI) OF 33.0 TO 33.9 IN ADULT: ICD-10-CM

## 2025-02-22 DIAGNOSIS — E66.811 CLASS 1 OBESITY DUE TO EXCESS CALORIES WITH SERIOUS COMORBIDITY AND BODY MASS INDEX (BMI) OF 33.0 TO 33.9 IN ADULT: ICD-10-CM

## 2025-02-22 DIAGNOSIS — E11.65 TYPE 2 DIABETES MELLITUS WITH HYPERGLYCEMIA, WITHOUT LONG-TERM CURRENT USE OF INSULIN (H): ICD-10-CM

## 2025-02-22 LAB
ALBUMIN SERPL BCG-MCNC: 4.5 G/DL (ref 3.5–5.2)
ALP SERPL-CCNC: 130 U/L (ref 40–150)
ALT SERPL W P-5'-P-CCNC: 41 U/L (ref 0–50)
ANION GAP SERPL CALCULATED.3IONS-SCNC: 11 MMOL/L (ref 7–15)
AST SERPL W P-5'-P-CCNC: 36 U/L (ref 0–45)
BILIRUB SERPL-MCNC: 1 MG/DL
BUN SERPL-MCNC: 11.5 MG/DL (ref 6–20)
CALCIUM SERPL-MCNC: 9.7 MG/DL (ref 8.8–10.4)
CHLORIDE SERPL-SCNC: 100 MMOL/L (ref 98–107)
CHOLEST SERPL-MCNC: 278 MG/DL
CREAT SERPL-MCNC: 0.91 MG/DL (ref 0.51–0.95)
EGFRCR SERPLBLD CKD-EPI 2021: 81 ML/MIN/1.73M2
EST. AVERAGE GLUCOSE BLD GHB EST-MCNC: 146 MG/DL
FASTING STATUS PATIENT QL REPORTED: ABNORMAL
FASTING STATUS PATIENT QL REPORTED: ABNORMAL
GLUCOSE SERPL-MCNC: 131 MG/DL (ref 70–99)
HBA1C MFR BLD: 6.7 % (ref 0–5.6)
HCO3 SERPL-SCNC: 25 MMOL/L (ref 22–29)
HDLC SERPL-MCNC: 40 MG/DL
LDLC SERPL CALC-MCNC: 206 MG/DL
NONHDLC SERPL-MCNC: 238 MG/DL
POTASSIUM SERPL-SCNC: 4.4 MMOL/L (ref 3.4–5.3)
PROT SERPL-MCNC: 8 G/DL (ref 6.4–8.3)
SODIUM SERPL-SCNC: 136 MMOL/L (ref 135–145)
TRIGL SERPL-MCNC: 161 MG/DL
VIT B12 SERPL-MCNC: 426 PG/ML (ref 232–1245)
VIT D+METAB SERPL-MCNC: 21 NG/ML (ref 20–50)

## 2025-02-22 PROCEDURE — 83036 HEMOGLOBIN GLYCOSYLATED A1C: CPT

## 2025-02-22 PROCEDURE — 82607 VITAMIN B-12: CPT

## 2025-02-22 PROCEDURE — 36415 COLL VENOUS BLD VENIPUNCTURE: CPT

## 2025-02-22 PROCEDURE — 80053 COMPREHEN METABOLIC PANEL: CPT

## 2025-02-22 PROCEDURE — 80061 LIPID PANEL: CPT

## 2025-02-22 PROCEDURE — 82306 VITAMIN D 25 HYDROXY: CPT

## 2025-03-03 NOTE — PROGRESS NOTES
[facility]                                     Progress Note    Patient Name: Myrna Brothers  Date: 3/5/2025         Service Type: Individual      Session Start Time: 12:05pm  Session End Time: 12:45pm    Session Length: 45 min    Session #: 9    Attendees: Client attended alone    Service Modality:  Video Visit:      Provider verified identity through the following two step process.  Patient provided:  Patient  and Patient address    Telemedicine Visit: The patient's condition can be safely assessed and treated via synchronous audio and visual telemedicine encounter.      Reason for Telemedicine Visit: Patient has requested telehealth visit    Originating Site (Patient Location): Patient's home    Distant Site (Provider Location): Provider Remote Setting- Home Office    Consent:  The patient/guardian has verbally consented to: the potential risks and benefits of telemedicine (video visit) versus in person care; bill my insurance or make self-payment for services provided; and responsibility for payment of non-covered services.     Patient would like the video invitation sent by:  My Chart    Mode of Communication:  Video Conference via Amwell    Distant Location (Provider):  Off-site    As the provider I attest to compliance with applicable laws and regulations related to telemedicine.    DATA  Extended Session (53+ minutes): No  Interactive Complexity: No  Crisis: No         Progress Since Last Session (Related to Symptoms / Goals / Homework):   Symptoms: No change (depression / anxiety)    Homework: Achieved / completed to satisfaction      Episode of Care Goals: Satisfactory progress - ACTION (Actively working towards change); Intervened by reinforcing change plan / affirming steps taken     Current / Ongoing Stressors and Concerns:   Today, patient reports that things have been going well.  Patient reports that she got a new job and will start in a few weeks.  Processed through  feelings of excitement and anxiety tied to this.  Processed through previous negative experiences with employers that are contributing to current anxious thoughts.  Patient reports that she has been setting better boundaries with media / news consumption.  Patient reports that her son's conferences went well and that this has helped with managing catastrophic thinking.  Remainder of session was spent processing through interpersonal conflict with her .  In the coming weeks, patient will continue to focus on action steps in her control.  Patient will practice assertive communication skills.  Next appt: 3/20.    Therapist assessed for risk and safety - patient denied SI/SIB. Patient contracted for safety and will continue to utilize safety plan as needed.  Should there be an increase in frequency or intensity of symptoms related to SI/SIB, patient will call / text [phone], call 911, or go to local ED for evaluation.         Treatment Objective(s) Addressed in This Session:   use at least 2 coping skills for anxiety management in the next 2 weeks  Decrease frequency and intensity of feeling down, depressed, hopeless  Identify negative self-talk and behaviors: challenge core beliefs, myths, and actions  Decrease thoughts that you'd be better off dead or of suicide / self-harm       Intervention:   CBT: cognitive restructuring, reality checking, cognitive distortions  DBT: validation, mindfulness, distress tolerance   Emotion Focused Therapy: emotional processing  Solution Focused: strengths based  Discussed assertiveness today in session.  Explored assertiveness training as involving the client learning the following:  Learning difference between passivity and assertiveness  Learning difference between aggression and assertiveness  Learning verbal and nonverbal assertiveness skills          Assessments completed prior to visit:  The following assessments were completed by patient for this visit:  PHQ9:        "9/12/2024     7:04 AM 10/9/2024    12:46 PM 11/5/2024     1:22 PM 12/19/2024    11:18 AM 1/8/2025    11:08 AM 2/16/2025     3:19 PM 3/4/2025     4:37 PM   PHQ-9 SCORE   PHQ-9 Total Score MyChart 7 (Mild depression) 6 (Mild depression) 6 (Mild depression) 6 (Mild depression) 7 (Mild depression) 6 (Mild depression) 4 (Minimal depression)   PHQ-9 Total Score 7 6 6  6  7  6  4        Patient-reported     GAD7:       7/29/2019     4:19 PM 8/2/2019     3:11 PM 7/26/2024     3:57 PM 9/12/2024     7:01 AM 9/12/2024     7:04 AM 1/8/2025    11:08 AM 1/22/2025    12:02 PM   OREN-7 SCORE   Total Score   7 (mild anxiety)  7 (mild anxiety) 6 (mild anxiety) 13 (moderate anxiety)   Total Score 10 4 7 7 7 6  13        Patient-reported     PROMIS 10-Global Health (only subscores and total score):       8/1/2024     8:52 AM 8/22/2024     9:25 AM 1/8/2025    11:09 AM 2/15/2025     9:56 AM 2/20/2025     1:01 PM   PROMIS-10 Scores Only   Global Mental Health Score 10 12 10  13  13    Global Physical Health Score 15 13 14  14  14    PROMIS TOTAL - SUBSCORES 25 25 24  27  27        Patient-reported     [place] Suicide Severity Rating Scale (Short Version)      7/15/2019    11:46 AM 7/24/2019     6:12 AM 9/11/2024     1:05 PM 10/9/2024     2:11 PM 11/6/2024     1:41 PM 1/8/2025     1:07 PM   [place] Suicide Severity Rating (Short Version)   Over the past 2 weeks have you felt down, depressed, or hopeless? no no       Over the past 2 weeks have you had thoughts of killing yourself? no no       Have you ever attempted to kill yourself? no no       1. Wish to be Dead (Since Last Contact)   Y N Y N   Wish to be Dead Description (Since Last Contact)   Thoughts of \"Iit would better if I wasn't here\" without plan, intent or methods.  Patient contracted for safety.  thoughts of \"wishing I wasn't here\" without plan, intent, or methods.    2. Non-Specific Active Suicidal Thoughts (Since Last Contact)   N N N N   Most Severe Ideation Rating (Since " Last Contact)     1    Frequency (Since Last Contact)     3    Duration (Since Last Contact)     1    Deterrents (Since Last Contact)     1    Reasons for Ideation (Since Last Contact)     5    Actual Attempt (Since Last Contact)   N N N N   Has subject engaged in non-suicidal self-injurious behavior? (Since Last Contact)   N N N N   Calculated C-SSRS Risk Score (Since Last Contact)   Low Risk No Risk Indicated Low Risk No Risk Indicated         ASSESSMENT: Current Emotional / Mental Status (status of significant symptoms):   Risk status (Self / Other harm or suicidal ideation)   Patient denies current fears or concerns for personal safety.   Patient denies current or recent suicidal ideation or behaviors.   Patient denies current or recent homicidal ideation or behaviors.   Patient denies current or recent self injurious behavior or ideation.   Patient denies other safety concerns.   Patient reports there has been no change in risk factors since their last session.     Patient reports there has been no change in protective factors since their last session.     A safety and risk management plan has been developed including: Patient consented to co-developed safety plan on 8/2/2024.  Safety and risk management plan was reviewed.   Patient agreed to use safety plan should any safety concerns arise.  A copy was made available to the patient.     Appearance:   Appropriate    Eye Contact:   Good    Psychomotor Behavior: Normal    Attitude:   Cooperative    Orientation:   All   Speech    Rate / Production: Normal     Volume:  Normal    Mood:    Anxious    Affect:    Worrisome    Thought Content:  Clear    Thought Form:  Coherent  Logical    Insight:    Good      Medication Review:   No changes to current psychiatric medication(s)     Medication Compliance:   Yes     Changes in Health Issues:   Yes: recently lost 20 lbs     Chemical Use Review:   Substance Use: Chemical use reviewed, no active concerns identified       Tobacco Use: No current tobacco use.      Diagnosis:  1. Generalized anxiety disorder    2. Mild episode of recurrent major depressive disorder                Collateral Reports Completed:   Not Applicable    PLAN: (Patient Tasks / Therapist Tasks / Other)  Utilize safety plan as needed.  Should there be an increase in frequency or intensity of symptoms related to SI/SIB, patient will call / text [phone], call 911, or go to local ED for evaluation.    In the coming weeks, patient will continue to focus on action steps in her control.    Patient will practice assertive communication skills.    Next appt: 3/20.        CHRISTINE Yoo, [facility]    3/5/2025    ______________________________________________________________________    Individual Treatment Plan    Patient's Name: Myrna Brothers  YOB: 1985    Date of Creation: 8/27/2024  Date Treatment Plan Last Reviewed/Revised: 1/8/2025    DSM5 Diagnoses: 296.31 (F33.0) Major Depressive Disorder, Recurrent Episode, Mild _ and With anxious distress or 300.02 (F41.1) Generalized Anxiety Disorder  Psychosocial / Contextual Factors: hx of therapy, moved to [place] from [place] in April after her  was laid off, currently living with in-laws, 5 year old son is starting , son has some cognitive and speech delays, family of origin dynamics, relational dynamics   PROMIS (reviewed every 90 days):       8/1/2024     8:52 AM 8/22/2024     9:25 AM 1/8/2025    11:09 AM 2/15/2025     9:56 AM   PROMIS-10 Scores Only   Global Mental Health Score 10 12 10  13    Global Physical Health Score 15 13 14  14    PROMIS TOTAL - SUBSCORES 25 25 24  27        Patient-reported       Referral / Collaboration:  Referral to another professional/service is not indicated at this time..    Anticipated number of session for this episode of care: 9-12 sessions  Anticipation frequency of session: Every other week  Anticipated Duration of each session: 38-52 minutes  Treatment  plan will be reviewed in 90 days or when goals have been changed.       MeasurableTreatment Goal(s) related to diagnosis / functional impairment(s)  Goal 1: Patient will decrease symptoms of depression as evidenced by decreased PHQ-9 score.    I will know I've met my goal when I feel confident as a mom and have more energy.    Objective #A (Patient Action)    Patient will Increase interest, engagement, and pleasure in doing things  Feel less tired and more energy during the day   Identify negative self-talk and behaviors: challenge core beliefs, myths, and actions  Decrease thoughts that you'd be better off dead or of suicide / self-harm  Manage safety and utilize safety plan .  Status: continued: 1/8/2025    Intervention(s)  Therapist will teach strategies to reduce / manage depressive symptoms.  Therapist will teach and model positive self talk behaviors and strategies.  Therapist will check in with patient regarding safety / SI.       Goal 2: Patient will decrease symptoms of anxiety as evidenced by decreased OREN-7 score.    I will know I've met my goal when I don't worry so much and when I am more assertive.      Objective #A (Patient Action)    Patient will use at least 2 coping skills for anxiety management in the next 2 weeks.   Learn strategies to manage / reduce ruminating thoughts.  Learn and utilize assertive communication skills.     Status: continued: 1/8/2025    Intervention(s)  Therapist will teach coping skills for management of anxious symptoms.  Therapist will teach assertive communication and interpersonal effectiveness skills.  Therapist will teach cognitive strategies to help with worrying thoughts.      Patient has reviewed and agreed to the above plan.      CHRISTINE Yoo, [facility] Center[facility]  1/8/2025

## 2025-03-04 ENCOUNTER — VIRTUAL VISIT (OUTPATIENT)
Dept: SURGERY | Facility: CLINIC | Age: 40
End: 2025-03-04
Payer: COMMERCIAL

## 2025-03-04 VITALS — BODY MASS INDEX: 30.66 KG/M2 | HEIGHT: 65 IN | WEIGHT: 184 LBS

## 2025-03-04 DIAGNOSIS — E66.811 OBESITY, CLASS I, BMI 30.0-34.9 (SEE ACTUAL BMI): ICD-10-CM

## 2025-03-04 DIAGNOSIS — E11.65 TYPE 2 DIABETES MELLITUS WITH HYPERGLYCEMIA, WITHOUT LONG-TERM CURRENT USE OF INSULIN (H): Primary | ICD-10-CM

## 2025-03-04 PROCEDURE — 98001 SYNCH AUDIO-VIDEO NEW LOW 30: CPT | Performed by: DIETITIAN, REGISTERED

## 2025-03-04 ASSESSMENT — PATIENT HEALTH QUESTIONNAIRE - PHQ9
10. IF YOU CHECKED OFF ANY PROBLEMS, HOW DIFFICULT HAVE THESE PROBLEMS MADE IT FOR YOU TO DO YOUR WORK, TAKE CARE OF THINGS AT HOME, OR GET ALONG WITH OTHER PEOPLE: SOMEWHAT DIFFICULT
SUM OF ALL RESPONSES TO PHQ QUESTIONS 1-9: 4
SUM OF ALL RESPONSES TO PHQ QUESTIONS 1-9: 4

## 2025-03-04 NOTE — LETTER
3/4/2025      Myrna Brothers  [address] [address]      Dear Colleague,    Thank you for referring your patient, Myrna Brothers, to the [facility] [place]. Please see a copy of my visit note below.    Myrna Brothers is a 39 year old who is being evaluated via a billable video visit.        How would you like to obtain your AVS? MyChart  If the video visit is dropped, the invitation should be resent by: Send to e-mail at: [email or web address]  Will anyone else be joining your video visit? No        Medical Weight Loss Initial Diet Evaluation  Assessment:  This patient was referred by Dr. Ivan for MNT as treatment for Obesity which is impacting her diabetes.     Myrna is presenting today for a new weight management nutrition consultation. Pt has had an initial appointment with Dr. Ivan.    Weight loss medication: Mounjaro, feeling good on this    Personal Goals: would like to get down to 120lb and help manage diabetes    Anthropometrics:    Pt's weight is 184lb  Initial weight: 200lb  Weight change: down 20lb  BMI: Body mass index is 30.62 kg/m .   Ideal body weight: 57 kg (125 lb 10.6 oz)  Adjusted ideal body weight: 70.5 kg (155 lb 6.4 oz)  Estimated RMR (Edmunds-St Jeor equation):  1503 kcals x 1.2 (sedentary) = 1803 kcals (for weight maintenance)    Recommended Protein Intake: 54-70 grams of protein/day (1-1.2g/kg adjusted body weight)    Medical History:  Patient Active Problem List   Diagnosis     Moderate episode of recurrent major depressive disorder (H)     Generalized anxiety disorder     Encounter for surveillance of contraceptive pills     Fibrocystic disease of breast     Irregular menstrual cycle     Class 1 obesity due to excess calories with serious comorbidity and body mass index (BMI) of 33.0 to 33.9 in adult     RAD (reactive airway disease)     Restless sleeper     Temporomandibular dysfunction syndrome      "Type 2 diabetes mellitus with hyperglycemia, without long-term current use of insulin (H)      Diabetes: dx a year and a half ago, not testing BG, taking metformin  Hx of insulin GDM  HbA1c:  No results found for: \"HGBA1C\"    Nutrition History:   Food allergies/intolerances/cultural or religous food customs: No   Weight loss history: has lost 20lb here and there  -struggles with sugary coffee drinks and other sweets craving  Vitamins/Mineral Supplementation: none    Dietary Recall:  Breakfast: frappucino- chocolate syrup, milk, coffee- order out  -lately has been having greek yogurt and water  Lunch: low fat cottage cheese and fruit  Dinner: ordered out- burger and sweet potato fries  -typically will have protein, veggies and starch- smaller helping of starch  Typical Snacks: none  Eating out: maybe once a week    Beverages: has been trying to drink more water- couple of glasses per day    Exercise: walking daily 20 min  -does yoga for stress management    Sleep: states she is sleeping more than usual  -never feels rested when she sleeps, states she is always sleepy, lower energy mid-afternoon    Stress: ebbs and flows- moved from [place] to [place] last year, job transitions for her and her   -tv and coloring for stress management    Nutrition Diagnosis (PES statement):     Obesity related to excessive energy intake as evidence by intake of high sugar and fat foods, lack of activity and BMI of 30.62       Nutrition Intervention  Food and/or Nutrient Delivery   Placed emphasis on importance of developing a healthy meal routine, aiming for 3 meals a day and no snacks.  Discussed using a protein supplement as a meal replacement-try in coffee for homemade frappucino  Nutrition Education   Discussed with patient how to build a meal: the importance of including a lean/low fat protein at each meal, include a source of vegetables at a minimum of lunch and dinner and limiting carbohydrate intake to higher fiber carbohydrate " sources  Educated on sources of lean protein, portion sizes, the amount of grams found in each source. Recommend patient to aim for 20-30g protein at each meal.  Nutrition Counseling   Encouraged importance of developing routine exercise for health benefits and weight loss.  Coordination of Nutrition Care   Reviewed diabetes, blood glucose management and discussed foods that impact blood glucose    Goals established by patient:   Bulk protein up a bit more at breakfast- 30g total  Continue with 20 min daily walks    Handouts provided:  Keys to success for weight management    Assessment/Plan:    Pt will follow up in 4 month(s) with bariatrician and 1 month(s) with dietitian.       Video-Visit Details    Type of service:  Video Visit    Video Start Time (time video started): 3:25p    Video End Time (time video stopped): 3:55p    Originating Location (pt. Location): Home      Distant Location (provider location):  Off-site    Mode of Communication:  Video Conference via Appetas    Physician has received verbal consent for a Video Visit from the patient? Yes      Aixa Casper RD        Again, thank you for allowing me to participate in the care of your patient.        Sincerely,        Aixa Casper RD    Electronically signed

## 2025-03-04 NOTE — PROGRESS NOTES
"Myrna Brothers is a 39 year old who is being evaluated via a billable video visit.        How would you like to obtain your AVS? MyChart  If the video visit is dropped, the invitation should be resent by: Send to e-mail at: [email or web address]  Will anyone else be joining your video visit? No        Medical Weight Loss Initial Diet Evaluation  Assessment:  This patient was referred by Dr. Ivan for MNT as treatment for Obesity which is impacting her diabetes.     Myrna is presenting today for a new weight management nutrition consultation. Pt has had an initial appointment with Dr. Ivan.    Weight loss medication: Mounjaro, feeling good on this    Personal Goals: would like to get down to 120lb and help manage diabetes    Anthropometrics:    Pt's weight is 184lb  Initial weight: 200lb  Weight change: down 20lb  BMI: Body mass index is 30.62 kg/m .   Ideal body weight: 57 kg (125 lb 10.6 oz)  Adjusted ideal body weight: 70.5 kg (155 lb 6.4 oz)  Estimated RMR ([place]-St Jeor equation):  1503 kcals x 1.2 (sedentary) = 1803 kcals (for weight maintenance)    Recommended Protein Intake: 54-70 grams of protein/day (1-1.2g/kg adjusted body weight)    Medical History:  Patient Active Problem List   Diagnosis    Moderate episode of recurrent major depressive disorder (H)    Generalized anxiety disorder    Encounter for surveillance of contraceptive pills    Fibrocystic disease of breast    Irregular menstrual cycle    Class 1 obesity due to excess calories with serious comorbidity and body mass index (BMI) of 33.0 to 33.9 in adult    RAD (reactive airway disease)    Restless sleeper    Temporomandibular dysfunction syndrome    Type 2 diabetes mellitus with hyperglycemia, without long-term current use of insulin (H)      Diabetes: dx a year and a half ago, not testing BG, taking metformin  Hx of insulin GDM  HbA1c:  No results found for: \"HGBA1C\"    Nutrition History:   Food allergies/intolerances/cultural or " religous food customs: No   Weight loss history: has lost 20lb here and there  -struggles with sugary coffee drinks and other sweets craving  Vitamins/Mineral Supplementation: none    Dietary Recall:  Breakfast: frappucino- chocolate syrup, milk, coffee- order out  -lately has been having greek yogurt and water  Lunch: low fat cottage cheese and fruit  Dinner: ordered out- burger and sweet potato fries  -typically will have protein, veggies and starch- smaller helping of starch  Typical Snacks: none  Eating out: maybe once a week    Beverages: has been trying to drink more water- couple of glasses per day    Exercise: walking daily 20 min  -does yoga for stress management    Sleep: states she is sleeping more than usual  -never feels rested when she sleeps, states she is always sleepy, lower energy mid-afternoon    Stress: ebbs and flows- moved from [place] to [place] last year, job transitions for her and her   -tv and coloring for stress management    Nutrition Diagnosis (PES statement):     Obesity related to excessive energy intake as evidence by intake of high sugar and fat foods, lack of activity and BMI of 30.62       Nutrition Intervention  Food and/or Nutrient Delivery   Placed emphasis on importance of developing a healthy meal routine, aiming for 3 meals a day and no snacks.  Discussed using a protein supplement as a meal replacement-try in coffee for homemade frappucino  Nutrition Education   Discussed with patient how to build a meal: the importance of including a lean/low fat protein at each meal, include a source of vegetables at a minimum of lunch and dinner and limiting carbohydrate intake to higher fiber carbohydrate sources  Educated on sources of lean protein, portion sizes, the amount of grams found in each source. Recommend patient to aim for 20-30g protein at each meal.  Nutrition Counseling   Encouraged importance of developing routine exercise for health benefits and weight loss.  Coordination  of Nutrition Care   Reviewed diabetes, blood glucose management and discussed foods that impact blood glucose    Goals established by patient:   Bulk protein up a bit more at breakfast- 30g total  Continue with 20 min daily walks    Handouts provided:  Keys to success for weight management    Assessment/Plan:    Pt will follow up in 4 month(s) with bariatrician and 1 month(s) with dietitian.       Video-Visit Details    Type of service:  Video Visit    Video Start Time (time video started): 3:25p    Video End Time (time video stopped): 3:55p    Originating Location (pt. Location): Home      Distant Location (provider location):  Off-site    Mode of Communication:  Video Conference via [facility]    Physician has received verbal consent for a Video Visit from the patient? Yes      Aixa Casper RD

## 2025-03-04 NOTE — PLAN OF CARE
VSS. Pain comfortably managed with 5-10mg PRN oxycodone. Denies nausea. +gas. Voiding spont, good amounts. Up ad bert. Pt resting comfortably. Continue POC.    No

## 2025-03-05 ENCOUNTER — VIRTUAL VISIT (OUTPATIENT)
Dept: BEHAVIORAL HEALTH | Facility: CLINIC | Age: 40
End: 2025-03-05
Payer: COMMERCIAL

## 2025-03-05 DIAGNOSIS — F33.0 MILD EPISODE OF RECURRENT MAJOR DEPRESSIVE DISORDER: ICD-10-CM

## 2025-03-05 DIAGNOSIS — F41.1 GENERALIZED ANXIETY DISORDER: Primary | ICD-10-CM

## 2025-03-05 PROCEDURE — 90834 PSYTX W PT 45 MINUTES: CPT | Mod: 95

## 2025-03-09 ENCOUNTER — HEALTH MAINTENANCE LETTER (OUTPATIENT)
Age: 40
End: 2025-03-09

## 2025-03-13 ENCOUNTER — MYC MEDICAL ADVICE (OUTPATIENT)
Dept: SURGERY | Facility: CLINIC | Age: 40
End: 2025-03-13
Payer: COMMERCIAL

## 2025-03-13 DIAGNOSIS — E11.65 TYPE 2 DIABETES MELLITUS WITH HYPERGLYCEMIA, WITHOUT LONG-TERM CURRENT USE OF INSULIN (H): ICD-10-CM

## 2025-03-19 NOTE — PROGRESS NOTES
[facility]                                     Progress Note    Patient Name: Myrna Brothers  Date: 3/20/2025         Service Type: Individual      Session Start Time: 11:05am  Session End Time: 11:40am    Session Length: 35 min    Session #: 10    Attendees: Client attended alone    Service Modality:  Video Visit:      Provider verified identity through the following two step process.  Patient provided:  Patient  and Patient address    Telemedicine Visit: The patient's condition can be safely assessed and treated via synchronous audio and visual telemedicine encounter.      Reason for Telemedicine Visit: Patient has requested telehealth visit    Originating Site (Patient Location): Patient's place of employment    Distant Site (Provider Location): Provider Remote Setting- Home Office    Consent:  The patient/guardian has verbally consented to: the potential risks and benefits of telemedicine (video visit) versus in person care; bill my insurance or make self-payment for services provided; and responsibility for payment of non-covered services.     Patient would like the video invitation sent by:  My Chart    Mode of Communication:  Video Conference via [facility]    Distant Location (Provider):  Off-site    As the provider I attest to compliance with applicable laws and regulations related to telemedicine.    DATA  Extended Session (53+ minutes): No  Interactive Complexity: No  Crisis: No         Progress Since Last Session (Related to Symptoms / Goals / Homework):   Symptoms: No change (depression / anxiety)    Homework: Achieved / completed to satisfaction      Episode of Care Goals: Satisfactory progress - ACTION (Actively working towards change); Intervened by reinforcing change plan / affirming steps taken     Current / Ongoing Stressors and Concerns:   Today, patient reports that things have been going well.  Patient reports that she started her new job and that this has been going  very well.  Processed through parenting stressors and experience of self blame / shame.  Patient and therapist reviewed CBT skills.  Also discussed connecting with her friend who has a son with ADHD for support.  In the coming month, patient will engage in self compassion.  Patient will challenge negative self talk.  Next appt: 4/21.    Therapist assessed for risk and safety - patient denied SI/SIB. Patient contracted for safety and will continue to utilize safety plan as needed.  Should there be an increase in frequency or intensity of symptoms related to SI/SIB, patient will call / text [phone], call [phone], or go to local ED for evaluation.         Treatment Objective(s) Addressed in This Session:   use at least 2 coping skills for anxiety management in the next 2 weeks  Decrease frequency and intensity of feeling down, depressed, hopeless  Identify negative self-talk and behaviors: challenge core beliefs, myths, and actions  Decrease thoughts that you'd be better off dead or of suicide / self-harm       Intervention:   CBT: cognitive restructuring, reality checking, cognitive distortions  DBT: validation, mindfulness, distress tolerance   Emotion Focused Therapy: emotional processing  Solution Focused: strengths based      Assessments completed prior to visit:  The following assessments were completed by patient for this visit:  PHQ9:       9/12/2024     7:04 AM 10/9/2024    12:46 PM 11/5/2024     1:22 PM 12/19/2024    11:18 AM 1/8/2025    11:08 AM 2/16/2025     3:19 PM 3/4/2025     4:37 PM   PHQ-9 SCORE   PHQ-9 Total Score [facility] 7 (Mild depression) 6 (Mild depression) 6 (Mild depression) 6 (Mild depression) 7 (Mild depression) 6 (Mild depression) 4 (Minimal depression)   PHQ-9 Total Score 7 6 6  6  7  6  4        Patient-reported     GAD7:       7/29/2019     4:19 PM 8/2/2019     3:11 PM 7/26/2024     3:57 PM 9/12/2024     7:01 AM 9/12/2024     7:04 AM 1/8/2025    11:08 AM 1/22/2025    12:02 PM   OREN-7 SCORE   Total  "Score   7 (mild anxiety)  7 (mild anxiety) 6 (mild anxiety) 13 (moderate anxiety)   Total Score 10 4 7 7 7 6  13        Patient-reported     PROMIS 10-Global Health (only subscores and total score):       8/1/2024     8:52 AM 8/22/2024     9:25 AM 1/8/2025    11:09 AM 2/15/2025     9:56 AM 2/20/2025     1:01 PM   PROMIS-10 Scores Only   Global Mental Health Score 10 12 10  13  13    Global Physical Health Score 15 13 14  14  14    PROMIS TOTAL - SUBSCORES 25 25 24  27  27        Patient-reported     [place] Suicide Severity Rating Scale (Short Version)      7/15/2019    11:46 AM 7/24/2019     6:12 AM 9/11/2024     1:05 PM 10/9/2024     2:11 PM 11/6/2024     1:41 PM 1/8/2025     1:07 PM   [place] Suicide Severity Rating (Short Version)   Over the past 2 weeks have you felt down, depressed, or hopeless? no no       Over the past 2 weeks have you had thoughts of killing yourself? no no       Have you ever attempted to kill yourself? no no       1. Wish to be Dead (Since Last Contact)   Y N Y N   Wish to be Dead Description (Since Last Contact)   Thoughts of \"Iit would better if I wasn't here\" without plan, intent or methods.  Patient contracted for safety.  thoughts of \"wishing I wasn't here\" without plan, intent, or methods.    2. Non-Specific Active Suicidal Thoughts (Since Last Contact)   N N N N   Most Severe Ideation Rating (Since Last Contact)     1    Frequency (Since Last Contact)     3    Duration (Since Last Contact)     1    Deterrents (Since Last Contact)     1    Reasons for Ideation (Since Last Contact)     5    Actual Attempt (Since Last Contact)   N N N N   Has subject engaged in non-suicidal self-injurious behavior? (Since Last Contact)   N N N N   Calculated C-SSRS Risk Score (Since Last Contact)   Low Risk No Risk Indicated Low Risk No Risk Indicated         ASSESSMENT: Current Emotional / Mental Status (status of significant symptoms):   Risk status (Self / Other harm or suicidal " ideation)   Patient denies current fears or concerns for personal safety.   Patient denies current or recent suicidal ideation or behaviors.   Patient denies current or recent homicidal ideation or behaviors.   Patient denies current or recent self injurious behavior or ideation.   Patient denies other safety concerns.   Patient reports there has been no change in risk factors since their last session.     Patient reports there has been no change in protective factors since their last session.     A safety and risk management plan has been developed including: Patient consented to co-developed safety plan on 8/2/2024.  Safety and risk management plan was reviewed.   Patient agreed to use safety plan should any safety concerns arise.  A copy was made available to the patient.     Appearance:   Appropriate    Eye Contact:   Good    Psychomotor Behavior: Normal    Attitude:   Cooperative    Orientation:   All   Speech    Rate / Production: Normal     Volume:  Normal    Mood:    Anxious    Affect:    Worrisome    Thought Content:  Clear    Thought Form:  Coherent  Logical    Insight:    Good      Medication Review:   No changes to current psychiatric medication(s)     Medication Compliance:   Yes     Changes in Health Issues:   Yes: recently lost 20 lbs     Chemical Use Review:   Substance Use: Chemical use reviewed, no active concerns identified      Tobacco Use: No current tobacco use.      Diagnosis:  1. Generalized anxiety disorder    2. Mild episode of recurrent major depressive disorder                  Collateral Reports Completed:   Not Applicable    PLAN: (Patient Tasks / Therapist Tasks / Other)  Utilize safety plan as needed.  Should there be an increase in frequency or intensity of symptoms related to SI/SIB, patient will call / text [phone], call [phone], or go to local ED for evaluation.     In the coming month, patient will engage in self compassion.    Patient will challenge negative self talk.  Next appt:  4/21.        CHRISTINE Yoo, LICSW    3/20/2025    ______________________________________________________________________    Individual Treatment Plan    Patient's Name: Myrna Brothers  YOB: 1985    Date of Creation: 8/27/2024  Date Treatment Plan Last Reviewed/Revised: 1/8/2025    DSM5 Diagnoses: 296.31 (F33.0) Major Depressive Disorder, Recurrent Episode, Mild _ and With anxious distress or 300.02 (F41.1) Generalized Anxiety Disorder  Psychosocial / Contextual Factors: hx of therapy, moved to [place] from [place] in April after her  was laid off, currently living with in-laws, 5 year old son is starting , son has some cognitive and speech delays, family of origin dynamics, relational dynamics   PROMIS (reviewed every 90 days):       8/1/2024     8:52 AM 8/22/2024     9:25 AM 1/8/2025    11:09 AM 2/15/2025     9:56 AM   PROMIS-10 Scores Only   Global Mental Health Score 10 12 10  13    Global Physical Health Score 15 13 14  14    PROMIS TOTAL - SUBSCORES 25 25 24  27        Patient-reported       Referral / Collaboration:  Referral to another professional/service is not indicated at this time..    Anticipated number of session for this episode of care: 9-12 sessions  Anticipation frequency of session: Every other week  Anticipated Duration of each session: 38-52 minutes  Treatment plan will be reviewed in 90 days or when goals have been changed.       MeasurableTreatment Goal(s) related to diagnosis / functional impairment(s)  Goal 1: Patient will decrease symptoms of depression as evidenced by decreased PHQ-9 score.    I will know I've met my goal when I feel confident as a mom and have more energy.    Objective #A (Patient Action)    Patient will Increase interest, engagement, and pleasure in doing things  Feel less tired and more energy during the day   Identify negative self-talk and behaviors: challenge core beliefs, myths, and actions  Decrease thoughts that you'd be better  off dead or of suicide / self-harm  Manage safety and utilize safety plan .  Status: continued: 1/8/2025    Intervention(s)  Therapist will teach strategies to reduce / manage depressive symptoms.  Therapist will teach and model positive self talk behaviors and strategies.  Therapist will check in with patient regarding safety / SI.       Goal 2: Patient will decrease symptoms of anxiety as evidenced by decreased OREN-7 score.    I will know I've met my goal when I don't worry so much and when I am more assertive.      Objective #A (Patient Action)    Patient will use at least 2 coping skills for anxiety management in the next 2 weeks.   Learn strategies to manage / reduce ruminating thoughts.  Learn and utilize assertive communication skills.     Status: continued: 1/8/2025    Intervention(s)  Therapist will teach coping skills for management of anxious symptoms.  Therapist will teach assertive communication and interpersonal effectiveness skills.  Therapist will teach cognitive strategies to help with worrying thoughts.      Patient has reviewed and agreed to the above plan.      CHRISTINE Yoo, LICSW  1/8/2025

## 2025-03-20 ENCOUNTER — VIRTUAL VISIT (OUTPATIENT)
Dept: BEHAVIORAL HEALTH | Facility: CLINIC | Age: 40
End: 2025-03-20
Payer: COMMERCIAL

## 2025-03-20 DIAGNOSIS — F41.1 GENERALIZED ANXIETY DISORDER: Primary | ICD-10-CM

## 2025-03-20 DIAGNOSIS — F33.0 MILD EPISODE OF RECURRENT MAJOR DEPRESSIVE DISORDER: ICD-10-CM

## 2025-03-20 ASSESSMENT — PATIENT HEALTH QUESTIONNAIRE - PHQ9
SUM OF ALL RESPONSES TO PHQ QUESTIONS 1-9: 5
SUM OF ALL RESPONSES TO PHQ QUESTIONS 1-9: 5
10. IF YOU CHECKED OFF ANY PROBLEMS, HOW DIFFICULT HAVE THESE PROBLEMS MADE IT FOR YOU TO DO YOUR WORK, TAKE CARE OF THINGS AT HOME, OR GET ALONG WITH OTHER PEOPLE: SOMEWHAT DIFFICULT

## 2025-03-24 ENCOUNTER — OFFICE VISIT (OUTPATIENT)
Dept: FAMILY MEDICINE | Facility: CLINIC | Age: 40
End: 2025-03-24
Payer: COMMERCIAL

## 2025-03-24 VITALS
RESPIRATION RATE: 16 BRPM | TEMPERATURE: 98.5 F | DIASTOLIC BLOOD PRESSURE: 71 MMHG | BODY MASS INDEX: 30.17 KG/M2 | HEIGHT: 65 IN | HEART RATE: 76 BPM | SYSTOLIC BLOOD PRESSURE: 108 MMHG | OXYGEN SATURATION: 94 % | WEIGHT: 181.1 LBS

## 2025-03-24 DIAGNOSIS — H69.92 DYSFUNCTION OF LEFT EUSTACHIAN TUBE: Primary | ICD-10-CM

## 2025-03-24 PROCEDURE — G2211 COMPLEX E/M VISIT ADD ON: HCPCS | Performed by: FAMILY MEDICINE

## 2025-03-24 PROCEDURE — 99213 OFFICE O/P EST LOW 20 MIN: CPT | Performed by: FAMILY MEDICINE

## 2025-03-24 PROCEDURE — 3078F DIAST BP <80 MM HG: CPT | Performed by: FAMILY MEDICINE

## 2025-03-24 PROCEDURE — 3074F SYST BP LT 130 MM HG: CPT | Performed by: FAMILY MEDICINE

## 2025-03-24 NOTE — ASSESSMENT & PLAN NOTE
Ongoing fullness of the left ear for the past several months starting with an upper respiratory infection back in December.  No improvement with 1 month of nasal administrate fluticasone.  She otherwise feels well.  Hearing is generally intact.  She can feel fluid move around.  No pulsatile tinnitus.  Exam is relatively unchanged from the right side to the left side.  Both sides are somewhat opaque implying some degree of fluid accumulation.  Given persistence, referral placed to ENT.  In the meantime she should continue fluticasone.  She can try Afrin for a few days to see if this helps relieve her symptoms.  Pseudoephedrine containing products also might have some impact.

## 2025-03-24 NOTE — PATIENT INSTRUCTIONS
- fluticasone nasal spray (Flonase): We will help expedite resolution of symptoms although will not improve symptoms acutely.   - afrin.  Do not use for more than three days   - pseudophedrine containing medications (the kind you have to get behind the counter at the pharmacy)

## 2025-03-24 NOTE — PROGRESS NOTES
"  Assessment & Plan   Problem List Items Addressed This Visit       Dysfunction of left eustachian tube - Primary     Ongoing fullness of the left ear for the past several months starting with an upper respiratory infection back in December.  No improvement with 1 month of nasal administrate fluticasone.  She otherwise feels well.  Hearing is generally intact.  She can feel fluid move around.  No pulsatile tinnitus.  Exam is relatively unchanged from the right side to the left side.  Both sides are somewhat opaque implying some degree of fluid accumulation.  Given persistence, referral placed to ENT.  In the meantime she should continue fluticasone.  She can try Afrin for a few days to see if this helps relieve her symptoms.  Pseudoephedrine containing products also might have some impact.         Relevant Orders    Adult ENT  Referral      The longitudinal plan of care for the diagnosis(es)/condition(s) as documented were addressed during this visit. Due to the added complexity in care, I will continue to support Lucrecia in the subsequent management and with ongoing continuity of care.    Subjective   Lucrecia is a 39 year old, presenting for the following health issues:  Ear Problem (Follow-up fluids in left ear about 3 months ago)        3/24/2025     3:41 PM   Additional Questions   Roomed by joy     History of Present Illness       Reason for visit:  Possible ear infection and fluid in ear    She eats 0-1 servings of fruits and vegetables daily.She consumes 0 sweetened beverage(s) daily.She exercises with enough effort to increase her heart rate 9 or less minutes per day.  She exercises with enough effort to increase her heart rate 3 or less days per week.   She is taking medications regularly.              Objective    /71 (BP Location: Left arm, Patient Position: Sitting, Cuff Size: Adult Large)   Pulse 76   Temp 98.5  F (36.9  C) (Oral)   Resp 16   Ht 1.651 m (5' 5\")   Wt 82.1 kg (181 lb 1.6 " oz)   LMP 03/22/2025   SpO2 94%   BMI 30.14 kg/m    Body mass index is 30.14 kg/m .  Physical Exam   General: No acute distress  ENT: Right tympanic membrane is without erythema.  It is not bulging.  Somewhat opaque.  External auditory canal without abnormalities.  Left TM is also somewhat opaque without obvious fluid line.  The ossicles are less prominent on the left side.  External auditory canal without abnormalities.  Temporal bone bilaterally nontender.  No anterior cervical lymphadenopathy.  Posterior oropharynx without erythema or cobblestoning change in texture.          Signed Electronically by: Saad Rios MD

## 2025-04-16 ASSESSMENT — PATIENT HEALTH QUESTIONNAIRE - PHQ9
SUM OF ALL RESPONSES TO PHQ QUESTIONS 1-9: 4
SUM OF ALL RESPONSES TO PHQ QUESTIONS 1-9: 4
10. IF YOU CHECKED OFF ANY PROBLEMS, HOW DIFFICULT HAVE THESE PROBLEMS MADE IT FOR YOU TO DO YOUR WORK, TAKE CARE OF THINGS AT HOME, OR GET ALONG WITH OTHER PEOPLE: SOMEWHAT DIFFICULT

## 2025-04-17 ENCOUNTER — VIRTUAL VISIT (OUTPATIENT)
Dept: PSYCHOLOGY | Facility: CLINIC | Age: 40
End: 2025-04-17
Payer: COMMERCIAL

## 2025-04-17 DIAGNOSIS — F41.1 GENERALIZED ANXIETY DISORDER: ICD-10-CM

## 2025-04-17 DIAGNOSIS — F33.0 MILD EPISODE OF RECURRENT MAJOR DEPRESSIVE DISORDER: ICD-10-CM

## 2025-04-17 PROCEDURE — 90847 FAMILY PSYTX W/PT 50 MIN: CPT | Mod: 95 | Performed by: MARRIAGE & FAMILY THERAPIST

## 2025-04-17 NOTE — PROGRESS NOTES
[facility]   Mental Health & Addiction Services     Progress Note - Initial Visit    Patient  Name:  Myrna Brothers Date: 4/17/25           Service Type: Family with client present     Visit Start Time: 1430  Visit End Time: 1524    Visit #:  1    Attendees: Client, Spouse / Significant Other, and son    Service Modality:  Video Visit:      Provider verified identity through the following two step process.  Patient provided:  Patient photo and Patient was verified at admission/transfer    Telemedicine Visit: The patient's condition can be safely assessed and treated via synchronous audio and visual telemedicine encounter.      Reason for Telemedicine Visit: Patient has requested telehealth visit and Services only offered telehealth    Originating Site (Patient Location): Patient's home    Distant Site (Provider Location): [facility]    Consent:  The patient/guardian has verbally consented to: the potential risks and benefits of telemedicine (video visit) versus in person care; bill my insurance or make self-payment for services provided; and responsibility for payment of non-covered services.     Patient would like the video invitation sent by:  My Chart    Mode of Communication:  Video Conference via [facility]    Distant Location (Provider):  On-site    As the provider I attest to compliance with applicable laws and regulations related to telemedicine.       DATA:   Interactive Complexity: No   Crisis: No     Presenting Concerns/  Current Stressors:  Client consulted about family system and ways to address her son's developmental behavioral problems and how to navigate her family through the process of outpatient family therapy.   Shared recommendations that they consult with a LMFT or Family Systems provider that sees young children (son is age 6) and can meet with the family in-person.     ASSESSMENT:  Mental Status Assessment:  Appearance:   Appropriate   Eye  Contact:   Good   Psychomotor Behavior: Normal   Attitude:   Cooperative   Orientation:   Person Place Time Situation  Speech   Rate / Production: Normal/ Responsive Talkative   Volume:  Normal   Mood:    Anxious  Depressed  Sad   Affect:    Labile  Worrisome   Thought Content:  Clear   Thought Form:  Blocking   Insight:    Intellectual Insight    Assessments completed prior to this visit:  The following assessments were completed by patient for this visit:  PHQ9:       11/5/2024     1:22 PM 12/19/2024    11:18 AM 1/8/2025    11:08 AM 2/16/2025     3:19 PM 3/4/2025     4:37 PM 3/20/2025    11:01 AM 4/16/2025     3:24 PM   PHQ-9 SCORE   PHQ-9 Total Score MyChart 6 (Mild depression) 6 (Mild depression) 7 (Mild depression) 6 (Mild depression) 4 (Minimal depression) 5 (Mild depression) 4 (Minimal depression)   PHQ-9 Total Score 6  6  7  6  4  5  4        Patient-reported     GAD7:       7/29/2019     4:19 PM 8/2/2019     3:11 PM 7/26/2024     3:57 PM 9/12/2024     7:01 AM 9/12/2024     7:04 AM 1/8/2025    11:08 AM 1/22/2025    12:02 PM   OREN-7 SCORE   Total Score   7 (mild anxiety)  7 (mild anxiety) 6 (mild anxiety) 13 (moderate anxiety)   Total Score 10 4 7 7 7 6  13        Patient-reported     CAGE-AID:       8/1/2024     8:52 AM   CAGE-AID Total Score   Total Score 0   Total Score MyChart 0 (A total score of 2 or greater is considered clinically significant)     PROMIS 10-Global Health (only subscores and total score):       8/1/2024     8:52 AM 8/22/2024     9:25 AM 1/8/2025    11:09 AM 2/15/2025     9:56 AM 2/20/2025     1:01 PM 4/16/2025     3:40 PM   PROMIS-10 Scores Only   Global Mental Health Score 10 12 10  13  13  11    Global Physical Health Score 15 13 14  14  14  16    PROMIS TOTAL - SUBSCORES 25 25 24  27  27  27        Patient-reported     Hand Suicide Severity Rating Scale (Lifetime/Recent)      5/15/2020    12:17 PM 8/2/2024     2:55 PM   Hand Suicide Severity Rating (Lifetime/Recent)   Q1 Wish  to be Dead (Lifetime) Yes    Comments onset over the past 10 years    Q2 Non-Specific Active Suicidal Thoughts (Lifetime) Yes    Non-Specific Active Suicidal Thought Description (Lifetime) more than 2 years ago    Most Severe Ideation Rating (Lifetime) 4    Frequency (Lifetime) 3    Duration (Lifetime) 2    Controllability (Lifetime) 3    Protective Factors  (Lifetime) 1    Reasons for Ideation (Lifetime) 5    RETIRED: 1. Wish to be Dead (Recent) No    RETIRED: 2. Non-Specific Active Suicidal Thoughts (Recent) No    3. Active Suicidal Ideation with any Methods (Not Plan) Without Intent to Act (Lifetime) Yes    RETIRE: Active Suicidal Ideation with any Methods (Not Plan) Description (Lifetime) considered swerving into traffic while driving    RETIRED: 3. Active Suicidal Ideation with any Methods (Not Plan) Without Intent to Act (Recent) No    RETIRE: 4. Active Suicidal Ideation with Some Intent to Act, Without Specific Plan (Lifetime) No    4. Active Suicidal Ideation with Some Intent to Act, Without Specific Plan (Recent) No    RETIRE: 5. Active Suicidal Ideation with Specific Plan and Intent (Lifetime) No    RETIRED: 5. Active Suicidal Ideation with Specific Plan and Intent (Recent) No    Most Severe Ideation Rating (Past Month) NA    Frequency (Past Month) NA    Duration (Past Month) NA    Controllability (Past Month) NA    Protective Factors (Past Month) NA    Reasons for Ideation (Past Month) NA    Actual Attempt (Lifetime) No    Actual Attempt (Past 3 Months) No    Has subject engaged in non-suicidal self-injurious behavior? (Lifetime) No    Has subject engaged in non-suicidal self-injurious behavior? (Past 3 Months) No    Interrupted Attempts (Lifetime) No    Interrupted Attempts (Past 3 Months) No    Aborted or Self-Interrupted Attempt (Lifetime) No    Aborted or Self-Interrupted Attempt (Past 3 Months) No    Preparatory Acts or Behavior (Lifetime) No    Preparatory Acts or Behavior (Past 3 Months) No    Most  "Recent Attempt Actual Lethality Code NA    Most Lethal Attempt Actual Lethality Code NA    Initial/First Attempt Actual Lethality Code NA    1. Wish to be Dead (Lifetime)  Y   1. Wish to be Dead (Past 1 Month)  Y   Wish to be Dead Description (Past 1 Month)  Patient reports thoughts of \"it would be better if I wasn't here.\"  Patient reports that she has no methods, plan, or intent.  Patient contracted for safety.   2. Non-Specific Active Suicidal Thoughts (Lifetime)  N   Most Severe Ideation Rating (Lifetime)  3   Most Severe Ideation Rating (Past 1 Month)  1   Frequency (Lifetime)  3   Frequency (Past 1 Month)  2   Duration (Lifetime)  2   Duration (Past 1 Month)  1   Controllability (Lifetime)  1   Controllability (Past 1 Month)  1   Deterrents (Lifetime)  1   Deterrents (Past 1 Month)  1   Reasons for Ideation (Lifetime)  5   Reasons for Ideation (Past 1 Month)  5   Actual Attempt (Lifetime)  N   Has subject engaged in non-suicidal self-injurious behavior? (Lifetime)  N   Calculated C-SSRS Risk Score (Lifetime/Recent)  Low Risk         Safety Issues and Plan for Safety and Risk Management:  Patient denies current fears or concerns for personal safety.  Patient denies current or recent suicidal ideation or behaviors.  Patient denies current or recent homicidal ideation or behaviors.  Patient denies current or recent self injurious behavior or ideation.  Patient denies other safety concerns.  Recommended that patient call 911 or go to the local ED should there be a change in any of these risk factors  Patient reports there are no firearms in the house.     Diagnostic Criteria:    Generalized Anxiety Disorder  A. Excessive anxiety and worry about a number of events or activities (such as work or school performance).   B. The person finds it difficult to control the worry.  C. Select 3 or more symptoms (required for diagnosis). Only one item is required in children.   - Restlessness or feeling keyed up or on edge.    " - Being easily fatigued.    - Difficulty concentrating or mind going blank.    - Irritability.    - Muscle tension.   D. The focus of the anxiety and worry is not confined to features of an Axis I disorder.  E. The anxiety, worry, or physical symptoms cause clinically significant distress or impairment in social, occupational, or other important areas of functioning.   F. The disturbance is not due to the direct physiological effects of a substance (e.g., a drug of abuse, a medication) or a general medical condition (e.g., hyperthyroidism) and does not occur exclusively during a Mood Disorder, a Psychotic Disorder, or a Pervasive Developmental Disorder.  Major Depressive Disorder  CRITERIA (A-C) REPRESENT A MAJOR DEPRESSIVE EPISODE - SELECT THESE CRITERIA  A) Recurrent episode(s) - symptoms have been present during the same 2-week period and represent a change from previous functioning 5 or more symptoms (required for diagnosis)   - Depressed mood. Note: In children and adolescents, can be irritable mood.     - Diminished interest or pleasure in all, or almost all, activities.    - Psychomotor activity agitation.    - Fatigue or loss of energy.    - Feelings of worthlessness or inappropriate and excessive guilt.    - Diminished ability to think or concentrate, or indecisiveness.   B) The symptoms cause clinically significant distress or impairment in social, occupational, or other important areas of functioning  C) The episode is not attributable to the physiological effects of a substance or to another medical condition  D) The occurence of major depressive episode is not better explained by other thought / psychotic disorders  E) There has never been a manic episode or hypomanic episode      DSM5 Diagnoses: (Sustained by DSM5 Criteria Listed Above)  Diagnoses:   Encounter Diagnoses   Name Primary?    Generalized anxiety disorder     Mild episode of recurrent major depressive disorder      Psychosocial & Contextual  Factors: Family stress,   Intervention:   Family Systems interventions to assist with unified front to address behavioral challenges and developmental needs of the clinet's child, while also addressing client's anxiety within attachment   Collateral Reports Completed:  Not Applicable        PLAN: (Homework, other):  1. Diagnostic Assessment will be referred out, as the family will be referred to a local family therapist in their area, to best meet the behavioral and developmental needs of the child. Patient was given the following to do until next session: NA  2. Provider recommended the following referrals:  Consult with insurance and pediatirician for prior apporval if needed.     3.  Suicide Risk and Safety Concerns were assessed for Myrna Brothers. No risk was identified by client.    Patient meets the following risk assessment and triage: Patient denied any current/recent history of suicidal ideation and/or behaviors.  No safety plan indicated at this time.       Lokesh Glaser, ROMARIO  April 19, 2025

## 2025-04-28 ENCOUNTER — VIRTUAL VISIT (OUTPATIENT)
Dept: SURGERY | Facility: CLINIC | Age: 40
End: 2025-04-28
Payer: COMMERCIAL

## 2025-04-28 VITALS — BODY MASS INDEX: 28.32 KG/M2 | HEIGHT: 65 IN | WEIGHT: 170 LBS

## 2025-04-28 DIAGNOSIS — Z71.3 NUTRITIONAL COUNSELING: ICD-10-CM

## 2025-04-28 DIAGNOSIS — E66.3 OVERWEIGHT: ICD-10-CM

## 2025-04-28 DIAGNOSIS — E11.65 TYPE 2 DIABETES MELLITUS WITH HYPERGLYCEMIA, WITHOUT LONG-TERM CURRENT USE OF INSULIN (H): Primary | ICD-10-CM

## 2025-04-28 PROCEDURE — 98005 SYNCH AUDIO-VIDEO EST LOW 20: CPT | Performed by: DIETITIAN, REGISTERED

## 2025-04-28 NOTE — LETTER
4/28/2025      Myrna Brothers  [address] [address]      Dear Colleague,    Thank you for referring your patient, Myrna Brothers, to the [facility] [place]. Please see a copy of my visit note below.    Myrna Brothers is a 39 year old who is being evaluated via a billable video visit.      How would you like to obtain your AVS? MyChart  If the video visit is dropped, the invitation should be resent by: Send to e-mail at: [email or web address]  Will anyone else be joining your video visit? No        Medical  Weight Loss Follow-Up Diet Evaluation  Assessment:  Mynra is presenting today for a follow up weight management nutrition consultation.  This patient has had an initial appointment and was referred by Dr. Ivan for MNT as treatment for Obesity which is impacting her diabetes.  Weight loss medication:  mounjaro , increased dose recently, 7.5mg- no side effects  Pt's weight is 170 lbs 0 oz  Initial weight: 200lb  Weight change: down 30lb         No data to display              BMI: Body mass index is 28.29 kg/m .  Ideal body weight: 57 kg (125 lb 10.6 oz)  Adjusted ideal body weight: 67.1 kg (147 lb 13.4 oz)    Estimated RMR ([place]-St Jeor equation):   1440 kcals x 1.2 (sedentary) = 1728 kcals (for weight maintenance)     Recommended Protein Intake: 54-70 grams of protein/day (1-1.2g/kg adjusted body weight)  Patient Active Problem List:  Patient Active Problem List   Diagnosis     Moderate episode of recurrent major depressive disorder (H)     Generalized anxiety disorder     Encounter for surveillance of contraceptive pills     Fibrocystic disease of breast     Irregular menstrual cycle     Class 1 obesity due to excess calories with serious comorbidity and body mass index (BMI) of 33.0 to 33.9 in adult     RAD (reactive airway disease)     Restless sleeper     Temporomandibular dysfunction syndrome     Type 2 diabetes mellitus  with hyperglycemia, without long-term current use of insulin (H)     Dysfunction of left eustachian tube     Diabetes: not testing blood glucose, DM2    Progress on goals from last visit: decreased frappucinos to 1-2 times per week- breakfast on Mondays  -feels like this is sustainable  Bulk protein up a bit more at breakfast- 30g total- goal met, has added more protein into diet  Continue with 20 min daily walks- this has been off and on, trying to get more into the habit with this    Dietary Recall:  Breakfast: greek yogurt, granola, water usually  Lunch:zero carb wrap with a few slices of turkey, hummus, bell peppers, etc.   Dinner: chicken casserole with fat free cream soup, chicken, noodles, peas, carrots- crustless pot pie  Typical snacks: not really snacking  Beverages: drinking a lot of water, frappucino on Monday mornings, crystal light in water  Exercise: needs to work on being more consistent     Nutrition Diagnosis:    Obesity related to excessive energy intake as evidence by intake of high sugar and fat foods, lack of activity and BMI of 28.29      Intervention:  Food and/or nutrient delivery: continue with current diet changes- focus on fiber and protein- working up to 60g protein per day and 25g fiber  Nutrition counseling: discussed considerations for exercise- encouraged strength training for weight loss maintenance, will plan to boost walks throughout the week    Monitoring/Evaluation:    Goals:  Walk 5/7 days of the week  Stay the course with nutrition    Patient to follow up in 3 month(s) with bariatrician and PRN with RD        Video-Visit Details    Type of service:  Video Visit    Video Start Time (time video started): 8:20a    Video End Time (time video stopped): 8:35a    Originating Location (pt. Location): Home      Distant Location (provider location):  Off-site    Mode of Communication:  Video Conference via [facility]    Physician has received verbal consent for a Video Visit from the  patient? Yes      Aixa Casper RD          Again, thank you for allowing me to participate in the care of your patient.        Sincerely,        Aixa Casper RD    Electronically signed

## 2025-04-28 NOTE — PROGRESS NOTES
Myrna Brothers is a 39 year old who is being evaluated via a billable video visit.      How would you like to obtain your AVS? MyChart  If the video visit is dropped, the invitation should be resent by: Send to e-mail at: [email or web address]  Will anyone else be joining your video visit? No        Medical  Weight Loss Follow-Up Diet Evaluation  Assessment:  Myrna is presenting today for a follow up weight management nutrition consultation.  This patient has had an initial appointment and was referred by Dr. Ivan for MNT as treatment for Obesity which is impacting her diabetes.  Weight loss medication:  mounjaro , increased dose recently, 7.5mg- no side effects  Pt's weight is 170 lbs 0 oz  Initial weight: 200lb  Weight change: down 30lb         No data to display              BMI: Body mass index is 28.29 kg/m .  Ideal body weight: 57 kg (125 lb 10.6 oz)  Adjusted ideal body weight: 67.1 kg (147 lb 13.4 oz)    Estimated RMR (Hart-St Jeor equation):   1440 kcals x 1.2 (sedentary) = 1728 kcals (for weight maintenance)     Recommended Protein Intake: 54-70 grams of protein/day (1-1.2g/kg adjusted body weight)  Patient Active Problem List:  Patient Active Problem List   Diagnosis    Moderate episode of recurrent major depressive disorder (H)    Generalized anxiety disorder    Encounter for surveillance of contraceptive pills    Fibrocystic disease of breast    Irregular menstrual cycle    Class 1 obesity due to excess calories with serious comorbidity and body mass index (BMI) of 33.0 to 33.9 in adult    RAD (reactive airway disease)    Restless sleeper    Temporomandibular dysfunction syndrome    Type 2 diabetes mellitus with hyperglycemia, without long-term current use of insulin (H)    Dysfunction of left eustachian tube     Diabetes: not testing blood glucose, DM2    Progress on goals from last visit: decreased frappucinos to 1-2 times per week- breakfast on Mondays  -feels like this is  sustainable  Bulk protein up a bit more at breakfast- 30g total- goal met, has added more protein into diet  Continue with 20 min daily walks- this has been off and on, trying to get more into the habit with this    Dietary Recall:  Breakfast: greek yogurt, granola, water usually  Lunch:zero carb wrap with a few slices of turkey, hummus, bell peppers, etc.   Dinner: chicken casserole with fat free cream soup, chicken, noodles, peas, carrots- crustless pot pie  Typical snacks: not really snacking  Beverages: drinking a lot of water, frappucino on Monday mornings, crystal light in water  Exercise: needs to work on being more consistent     Nutrition Diagnosis:    Obesity related to excessive energy intake as evidence by intake of high sugar and fat foods, lack of activity and BMI of 28.29      Intervention:  Food and/or nutrient delivery: continue with current diet changes- focus on fiber and protein- working up to 60g protein per day and 25g fiber  Nutrition counseling: discussed considerations for exercise- encouraged strength training for weight loss maintenance, will plan to boost walks throughout the week    Monitoring/Evaluation:    Goals:  Walk 5/7 days of the week  Stay the course with nutrition    Patient to follow up in 3 month(s) with bariatrician and PRN with RD        Video-Visit Details    Type of service:  Video Visit    Video Start Time (time video started): 8:20a    Video End Time (time video stopped): 8:35a    Originating Location (pt. Location): Home      Distant Location (provider location):  Off-site    Mode of Communication:  Video Conference via [facility]    Physician has received verbal consent for a Video Visit from the patient? Yes      Aixa Casper RD

## 2025-05-13 SDOH — HEALTH STABILITY: PHYSICAL HEALTH: ON AVERAGE, HOW MANY MINUTES DO YOU ENGAGE IN EXERCISE AT THIS LEVEL?: 20 MIN

## 2025-05-13 SDOH — HEALTH STABILITY: PHYSICAL HEALTH: ON AVERAGE, HOW MANY DAYS PER WEEK DO YOU ENGAGE IN MODERATE TO STRENUOUS EXERCISE (LIKE A BRISK WALK)?: 2 DAYS

## 2025-05-13 ASSESSMENT — ASTHMA QUESTIONNAIRES
QUESTION_4 LAST FOUR WEEKS HOW OFTEN HAVE YOU USED YOUR RESCUE INHALER OR NEBULIZER MEDICATION (SUCH AS ALBUTEROL): NOT AT ALL
QUESTION_3 LAST FOUR WEEKS HOW OFTEN DID YOUR ASTHMA SYMPTOMS (WHEEZING, COUGHING, SHORTNESS OF BREATH, CHEST TIGHTNESS OR PAIN) WAKE YOU UP AT NIGHT OR EARLIER THAN USUAL IN THE MORNING: NOT AT ALL
QUESTION_5 LAST FOUR WEEKS HOW WOULD YOU RATE YOUR ASTHMA CONTROL: COMPLETELY CONTROLLED
ACT_TOTALSCORE: 25
QUESTION_2 LAST FOUR WEEKS HOW OFTEN HAVE YOU HAD SHORTNESS OF BREATH: NOT AT ALL
QUESTION_1 LAST FOUR WEEKS HOW MUCH OF THE TIME DID YOUR ASTHMA KEEP YOU FROM GETTING AS MUCH DONE AT WORK, SCHOOL OR AT HOME: NONE OF THE TIME

## 2025-05-13 ASSESSMENT — SOCIAL DETERMINANTS OF HEALTH (SDOH): HOW OFTEN DO YOU GET TOGETHER WITH FRIENDS OR RELATIVES?: ONCE A WEEK

## 2025-05-14 ENCOUNTER — OFFICE VISIT (OUTPATIENT)
Dept: FAMILY MEDICINE | Facility: CLINIC | Age: 40
End: 2025-05-14
Payer: COMMERCIAL

## 2025-05-14 VITALS
RESPIRATION RATE: 16 BRPM | TEMPERATURE: 99.4 F | DIASTOLIC BLOOD PRESSURE: 60 MMHG | SYSTOLIC BLOOD PRESSURE: 94 MMHG | HEIGHT: 66 IN | BODY MASS INDEX: 27.19 KG/M2 | HEART RATE: 78 BPM | WEIGHT: 169.2 LBS | OXYGEN SATURATION: 94 %

## 2025-05-14 DIAGNOSIS — Z00.00 ROUTINE GENERAL MEDICAL EXAMINATION AT A HEALTH CARE FACILITY: Primary | ICD-10-CM

## 2025-05-14 DIAGNOSIS — J45.20 MILD INTERMITTENT REACTIVE AIRWAY DISEASE WITHOUT COMPLICATION: ICD-10-CM

## 2025-05-14 DIAGNOSIS — F33.1 MODERATE EPISODE OF RECURRENT MAJOR DEPRESSIVE DISORDER (H): ICD-10-CM

## 2025-05-14 DIAGNOSIS — E11.65 TYPE 2 DIABETES MELLITUS WITH HYPERGLYCEMIA, WITHOUT LONG-TERM CURRENT USE OF INSULIN (H): ICD-10-CM

## 2025-05-14 DIAGNOSIS — F41.9 ANXIETY: ICD-10-CM

## 2025-05-14 DIAGNOSIS — Z30.41 ENCOUNTER FOR SURVEILLANCE OF CONTRACEPTIVE PILLS: ICD-10-CM

## 2025-05-14 PROCEDURE — 3078F DIAST BP <80 MM HG: CPT | Performed by: FAMILY MEDICINE

## 2025-05-14 PROCEDURE — 99396 PREV VISIT EST AGE 40-64: CPT | Performed by: FAMILY MEDICINE

## 2025-05-14 PROCEDURE — 96127 BRIEF EMOTIONAL/BEHAV ASSMT: CPT | Performed by: FAMILY MEDICINE

## 2025-05-14 PROCEDURE — 99214 OFFICE O/P EST MOD 30 MIN: CPT | Mod: 25 | Performed by: FAMILY MEDICINE

## 2025-05-14 PROCEDURE — G2211 COMPLEX E/M VISIT ADD ON: HCPCS | Performed by: FAMILY MEDICINE

## 2025-05-14 PROCEDURE — 3074F SYST BP LT 130 MM HG: CPT | Performed by: FAMILY MEDICINE

## 2025-05-14 RX ORDER — NORETHINDRONE ACETATE AND ETHINYL ESTRADIOL, ETHINYL ESTRADIOL AND FERROUS FUMARATE 1MG-10(24)
1 KIT ORAL DAILY
Qty: 90 TABLET | Refills: 3 | Status: SHIPPED | OUTPATIENT
Start: 2025-05-14

## 2025-05-14 RX ORDER — BUPROPION HYDROCHLORIDE 300 MG/1
300 TABLET ORAL EVERY MORNING
Qty: 90 TABLET | Refills: 3 | Status: SHIPPED | OUTPATIENT
Start: 2025-05-14

## 2025-05-14 RX ORDER — ARIPIPRAZOLE 5 MG/1
7.5 TABLET ORAL DAILY
Qty: 135 TABLET | Refills: 3 | Status: SHIPPED | OUTPATIENT
Start: 2025-05-14

## 2025-05-14 NOTE — PATIENT INSTRUCTIONS
Patient Education   Preventive Care Advice   This is general advice given by our system to help you stay healthy. However, your care team may have specific advice just for you. Please talk to your care team about your preventive care needs.  Nutrition  Eat 5 or more servings of fruits and vegetables each day.  Try wheat bread, brown rice and whole grain pasta (instead of white bread, rice, and pasta).  Get enough calcium and vitamin D. Check the label on foods and aim for 100% of the RDA (recommended daily allowance).  Lifestyle  Exercise at least 150 minutes each week  (30 minutes a day, 5 days a week).  Do muscle strengthening activities 2 days a week. These help control your weight and prevent disease.  No smoking.  Wear sunscreen to prevent skin cancer.  Have a dental exam and cleaning every 6 months.  Yearly exams  See your health care team every year to talk about:  Any changes in your health.  Any medicines your care team has prescribed.  Preventive care, family planning, and ways to prevent chronic diseases.  Shots (vaccines)   HPV shots (up to age 26), if you've never had them before.  Hepatitis B shots (up to age 59), if you've never had them before.  COVID-19 shot: Get this shot when it's due.  Flu shot: Get a flu shot every year.  Tetanus shot: Get a tetanus shot every 10 years.  Pneumococcal, hepatitis A, and RSV shots: Ask your care team if you need these based on your risk.  Shingles shot (for age 50 and up)  General health tests  Diabetes screening:  Starting at age 35, Get screened for diabetes at least every 3 years.  If you are younger than age 35, ask your care team if you should be screened for diabetes.  Cholesterol test: At age 39, start having a cholesterol test every 5 years, or more often if advised.  Bone density scan (DEXA): At age 50, ask your care team if you should have this scan for osteoporosis (brittle bones).  Hepatitis C: Get tested at least once in your life.  STIs (sexually  transmitted infections)  Before age 24: Ask your care team if you should be screened for STIs.  After age 24: Get screened for STIs if you're at risk. You are at risk for STIs (including HIV) if:  You are sexually active with more than one person.  You don't use condoms every time.  You or a partner was diagnosed with a sexually transmitted infection.  If you are at risk for HIV, ask about PrEP medicine to prevent HIV.  Get tested for HIV at least once in your life, whether you are at risk for HIV or not.  Cancer screening tests  Cervical cancer screening: If you have a cervix, begin getting regular cervical cancer screening tests starting at age 21.  Breast cancer scan (mammogram): If you've ever had breasts, begin having regular mammograms starting at age 40. This is a scan to check for breast cancer.  Colon cancer screening: It is important to start screening for colon cancer at age 45.  Have a colonoscopy test every 10 years (or more often if you're at risk) Or, ask your provider about stool tests like a FIT test every year or Cologuard test every 3 years.  To learn more about your testing options, visit:   .  For help making a decision, visit:   https://bit.ly/ev11693.  Prostate cancer screening test: If you have a prostate, ask your care team if a prostate cancer screening test (PSA) at age 55 is right for you.  Lung cancer screening: If you are a current or former smoker ages 50 to 80, ask your care team if ongoing lung cancer screenings are right for you.  For informational purposes only. Not to replace the advice of your health care provider. Copyright   2023 [facility]. All rights reserved. Clinically reviewed by the [facility] Transitions Program. [employer] [id number] - REV 01/24.  Learning About Stress  What is stress?     Stress is your body's response to a hard situation. Your body can have a physical, emotional, or mental response. Stress is a fact of life for most people, and it  affects everyone differently. What causes stress for you may not be stressful for someone else.  A lot of things can cause stress. You may feel stress when you go on a job interview, take a test, or run a race. This kind of short-term stress is normal and even useful. It can help you if you need to work hard or react quickly. For example, stress can help you finish an important job on time.  Long-term stress is caused by ongoing stressful situations or events. Examples of long-term stress include long-term health problems, ongoing problems at work, or conflicts in your family. Long-term stress can harm your health.  How does stress affect your health?  When you are stressed, your body responds as though you are in danger. It makes hormones that speed up your heart, make you breathe faster, and give you a burst of energy. This is called the fight-or-flight stress response. If the stress is over quickly, your body goes back to normal and no harm is done.  But if stress happens too often or lasts too long, it can have bad effects. Long-term stress can make you more likely to get sick, and it can make symptoms of some diseases worse. If you tense up when you are stressed, you may develop neck, shoulder, or low back pain. Stress is linked to high blood pressure and heart disease.  Stress also harms your emotional health. It can make you jarrett, tense, or depressed. Your relationships may suffer, and you may not do well at work or school.  What can you do to manage stress?  You can try these things to help manage stress:   Do something active. Exercise or activity can help reduce stress. Walking is a great way to get started. Even everyday activities such as housecleaning or yard work can help.  Try yoga or sweta chi. These techniques combine exercise and meditation. You may need some training at first to learn them.  Do something you enjoy. For example, listen to music or go to a movie. Practice your hobby or do volunteer  "work.  Meditate. This can help you relax, because you are not worrying about what happened before or what may happen in the future.  Do guided imagery. Imagine yourself in any setting that helps you feel calm. You can use online videos, books, or a teacher to guide you.  Do breathing exercises. For example:  From a standing position, bend forward from the waist with your knees slightly bent. Let your arms dangle close to the floor.  Breathe in slowly and deeply as you return to a standing position. Roll up slowly and lift your head last.  Hold your breath for just a few seconds in the standing position.  Breathe out slowly and bend forward from the waist.  Let your feelings out. Talk, laugh, cry, and express anger when you need to. Talking with supportive friends or family, a counselor, or a roxy leader about your feelings is a healthy way to relieve stress. Avoid discussing your feelings with people who make you feel worse.  Write. It may help to write about things that are bothering you. This helps you find out how much stress you feel and what is causing it. When you know this, you can find better ways to cope.  What can you do to prevent stress?  You might try some of these things to help prevent stress:  Manage your time. This helps you find time to do the things you want and need to do.  Get enough sleep. Your body recovers from the stresses of the day while you are sleeping.  Get support. Your family, friends, and community can make a difference in how you experience stress.  Limit your news feed. Avoid or limit time on social media or news that may make you feel stressed.  Do something active. Exercise or activity can help reduce stress. Walking is a great way to get started.  Where can you learn more?  Go to https://www.Emotient.net/patiented  Enter N032 in the search box to learn more about \"Learning About Stress.\"  Current as of: October 24, 2024  Content Version: 14.4 2024-2025 [employer].   Care instructions adapted under license by your healthcare professional. If you have questions about a medical condition or this instruction, always ask your healthcare professional. [employer] disclaims any warranty or liability for your use of this information.    Learning About Depression Screening  What is depression screening?  Depression screening is a way to see if you have depression symptoms. It may be done by a doctor or counselor. It's often part of a routine checkup. That's because your mental health is just as important as your physical health.  Depression is a mental health condition that affects how you feel, think, and act. You may:  Have less energy.  Lose interest in your daily activities.  Feel sad and grouchy for a long time.  Depression is very common. It affects people of all ages.  Many things can lead to depression. Some people become depressed after they have a stroke or find out they have a major illness like cancer or heart disease. The death of a loved one or a breakup may lead to depression. It can run in families. Most experts believe that a combination of inherited genes and stressful life events can cause it.  What happens during screening?  You may be asked to fill out a form about your depression symptoms. You and the doctor will discuss your answers. The doctor may ask you more questions to learn more about how you think, act, and feel.  What happens after screening?  If you have symptoms of depression, your doctor will talk to you about your options.  Doctors usually treat depression with medicines or counseling. Often, combining the two works best. Many people don't get help because they think that they'll get over the depression on their own. But people with depression may not get better unless they get treatment.  The cause of depression is not well understood. There may be many factors involved. But if you have depression, it's not your fault.  A serious  "symptom of depression is thinking about death or suicide. If you or someone you care about talks about this or about feeling hopeless, get help right away.  It's important to know that depression can be treated. Medicine, counseling, and self-care may help.  Where can you learn more?  Go to https://www.ipvive.net/patiented  Enter T185 in the search box to learn more about \"Learning About Depression Screening.\"  Current as of: July 31, 2024  Content Version: 14.4    6058-5092 [employer].   Care instructions adapted under license by your healthcare professional. If you have questions about a medical condition or this instruction, always ask your healthcare professional. [employer] disclaims any warranty or liability for your use of this information.       "

## 2025-05-14 NOTE — PROGRESS NOTES
Answers submitted by the patient for this visit:  Patient Health Questionnaire (Submitted on 5/13/2025)  If you checked off any problems, how difficult have these problems made it for you to do your work, take care of things at home, or get along with other people?: Somewhat difficult  PHQ9 TOTAL SCORE: 5  Preventive Care Visit  [facility]  Jess Andujar MD, Family Medicine  May 14, 2025      1. Routine general medical examination at a health care facility (Primary)  Lucrecia comes in today for annual exam.  As far as healthcare maintenance, she is due for mammogram.  Her next Pap smear is due in 2027.  She is up-to-date on immunizations    2. Type 2 diabetes mellitus with hyperglycemia, without long-term current use of insulin (H)  Her diabetes is under excellent control with an A1c of 6.7.  She taking metformin but also started Mounjaro with the bariatric program.  She just had an A1c so no need to repeat today.  She did start a statin.  She is overdue for an eye exam and I reminded her to make that.  She will recheck her A1c at 6 months.  We also discussed if she is planning to get pregnant, she needs to stop the Mounjaro 3 months prior and also her statin and some of her other medications discussed below.  She has lost about 30 pounds with starting the Mounjaro.  - metFORMIN (GLUCOPHAGE) 500 MG tablet; Take 1 tablet (500 mg) by mouth daily (with breakfast).  Dispense: 90 tablet; Refill: 3    3. Moderate episode of recurrent major depressive disorder (H)  I did provide refills for her for her depression and anxiety.  PHQ-9 equals 5, OREN-7 equals 6.  When I saw her last year for establish care, she was thinking about her pregnancy and I recommended establishing with psychiatry because she would have to stop the Abilify and the venlafaxine.  She is still contemplating pregnancy but not in the near future.  If she decides she wants to pursue this, then would have her established with  psychiatry.  - ARIPiprazole (ABILIFY) 5 MG tablet; Take 1.5 tablets (7.5 mg) by mouth daily.  Dispense: 135 tablet; Refill: 3  - buPROPion (WELLBUTRIN XL) 300 MG 24 hr tablet; Take 1 tablet (300 mg) by mouth every morning.  Dispense: 90 tablet; Refill: 3    4. Anxiety  As above, OREN-7 equals 6.  She does see a therapist.  - ARIPiprazole (ABILIFY) 5 MG tablet; Take 1.5 tablets (7.5 mg) by mouth daily.  Dispense: 135 tablet; Refill: 3  - buPROPion (WELLBUTRIN XL) 300 MG 24 hr tablet; Take 1 tablet (300 mg) by mouth every morning.  Dispense: 90 tablet; Refill: 3    5. Encounter for surveillance of contraceptive pills  Currently on birth control.  - LO LOESTRIN FE 1 MG-10 MCG / 10 MCG TABS; Take 1 tablet by mouth daily.  Dispense: 90 tablet; Refill: 3    6. Mild intermittent reactive airway disease without complication  Very rarely uses her inhaler.  ACT equals 25.      Subjective   Lucrecia is a 39 year old, presenting for the following:  Physical (Refills. )        5/14/2025     3:36 PM   Additional Questions   Roomed by           HPI  She comes in today for annual exam.  Since I saw her last, she has been following with the bariatric clinic and started on Mounjaro.  She is lost about 30 pounds.  She feels like her mood is under fairly good control with her current medications.  She does see a therapist.  She did not yet establish with a psychiatrist as she felt like things were stable.  When I saw her last year, she was contemplating a pregnancy.  She states she is they are still thinking about it, but not in the immediate future.  I did discuss with her what medications need to be stopped ahead of time.    The longitudinal plan of care for the diagnosis(es)/condition(s) as documented were addressed during this visit. Due to the added complexity in care, I will continue to support Lucrecia in the subsequent management and with ongoing continuity of care.         Advance Care Planning    Discussed advance care planning  with patient; informed AVS has link to [facility].        5/13/2025   General Health   How would you rate your overall physical health? Good   Feel stress (tense, anxious, or unable to sleep) To some extent   (!) STRESS CONCERN      5/13/2025   Nutrition   Three or more servings of calcium each day? (!) NO   Diet: Low fat/cholesterol    Diabetic   How many servings of fruit and vegetables per day? (!) 0-1   How many sweetened beverages each day? 0-1       Multiple values from one day are sorted in reverse-chronological order         5/13/2025   Exercise   Days per week of moderate/strenous exercise 2 days   Average minutes spent exercising at this level 20 min   (!) EXERCISE CONCERN      5/13/2025   Social Factors   Frequency of gathering with friends or relatives Once a week   Worry food won't last until get money to buy more No   Food not last or not have enough money for food? No   Do you have housing? (Housing is defined as stable permanent housing and does not include staying outside in a car, in a tent, in an abandoned building, in an overnight shelter, or couch-surfing.) Yes   Are you worried about losing your housing? No   Lack of transportation? No   Unable to get utilities (heat,electricity)? No         5/13/2025   Dental   Dentist two times every year? Yes       Today's PHQ-9 Score:       5/13/2025     4:57 PM   PHQ-9 SCORE   PHQ-9 Total Score MyChart 5 (Mild depression)   PHQ-9 Total Score 5        Patient-reported         5/13/2025   Substance Use   Alcohol more than 3/day or more than 7/wk No   Do you use any other substances recreationally? No     Social History     Tobacco Use    Smoking status: Never     Passive exposure: Never    Smokeless tobacco: Never   Vaping Use    Vaping status: Never Used   Substance Use Topics    Alcohol use: Yes     Comment: denies recent use    Drug use: No          Mammogram Screening - Mammogram every 1-2 years updated in Health Maintenance based on mutual  decision making        5/13/2025   STI Screening   New sexual partner(s) since last STI/HIV test? No     History of abnormal Pap smear: No - age 30- 64 PAP with HPV every 5 years recommended        Latest Ref Rng & Units 3/30/2018     3:30 PM 3/30/2018     2:15 PM 11/2/2016     1:45 PM   PAP / HPV   PAP    Negative for squamous intraepithelial lesion or malignancy  Electronically signed by Gary Rodriguez CT (ASCP) on 11/8/2016 at 12:32 PM      PAP (Historical)  NIL      HPV 16 DNA NEG^Negative  Negative     HPV 18 DNA NEG^Negative  Negative     Other HR HPV NEG^Negative  Negative       ASCVD Risk   The 10-year ASCVD risk score (Alon ISSA, et al., 2019) is: 2.2%    Values used to calculate the score:      Age: 39 years      Sex: Female      Is Non- : No      Diabetic: Yes      Tobacco smoker: No      Systolic Blood Pressure: 94 mmHg      Is BP treated: No      HDL Cholesterol: 40 mg/dL      Total Cholesterol: 278 mg/dL        5/13/2025   Contraception/Family Planning   Questions about contraception or family planning (!) YES ocp        Reviewed and updated as needed this visit by Provider                    Lab work is in process  Current Outpatient Medications   Medication Sig Dispense Refill    albuterol (PROAIR HFA/PROVENTIL HFA/VENTOLIN HFA) 108 (90 Base) MCG/ACT inhaler Inhale 2 puffs into the lungs every 6 hours as needed for shortness of breath, wheezing or cough. 18 g 3    ARIPiprazole (ABILIFY) 5 MG tablet Take 1.5 tablets (7.5 mg) by mouth daily. 135 tablet 3    buPROPion (WELLBUTRIN XL) 300 MG 24 hr tablet Take 1 tablet (300 mg) by mouth every morning. 90 tablet 3    LO LOESTRIN FE 1 MG-10 MCG / 10 MCG TABS Take 1 tablet by mouth daily. 90 tablet 3    metFORMIN (GLUCOPHAGE) 500 MG tablet Take 1 tablet (500 mg) by mouth daily (with breakfast). 90 tablet 3    rosuvastatin (CRESTOR) 5 MG tablet Take 1 tablet (5 mg) by mouth daily. 90 tablet 1    [START ON 5/22/2025]  "Tirzepatide 10 MG/0.5ML SOAJ Inject 0.5 mLs (10 mg) subcutaneously every 7 days. 6 mL 2    Tirzepatide 7.5 MG/0.5ML SOAJ Inject 0.5 mLs (7.5 mg) subcutaneously once a week for 28 days. 2 mL 0    venlafaxine (EFFEXOR XR) 150 MG 24 hr capsule Take 1 capsule (150 mg) by mouth daily Along with 75 mg 90 capsule 3    venlafaxine (EFFEXOR XR) 75 MG 24 hr capsule Take 1 capsule (75 mg) by mouth daily Along with 150 mg 90 capsule 3         Review of Systems  Constitutional, HEENT, cardiovascular, pulmonary, gi and gu systems are negative, except as otherwise noted.     Objective    Exam  BP 94/60   Pulse 78   Temp 99.4  F (37.4  C)   Resp 16   Ht 1.664 m (5' 5.5\")   Wt 76.7 kg (169 lb 3.2 oz)   LMP 03/15/2025   SpO2 94%   BMI 27.73 kg/m     Estimated body mass index is 27.73 kg/m  as calculated from the following:    Height as of this encounter: 1.664 m (5' 5.5\").    Weight as of this encounter: 76.7 kg (169 lb 3.2 oz).    Physical Exam  GENERAL: alert and no distress  EYES: Eyes grossly normal to inspection, PERRL and conjunctivae and sclerae normal  HENT: ear canals and TM's normal, nose and mouth without ulcers or lesions  NECK: no adenopathy, no asymmetry, masses, or scars  RESP: lungs clear to auscultation - no rales, rhonchi or wheezes  BREAST: normal without masses, tenderness or nipple discharge and no palpable axillary masses or adenopathy  CV: regular rate and rhythm, normal S1 S2, no S3 or S4, no murmur, click or rub, no peripheral edema  ABDOMEN: soft, nontender, no hepatosplenomegaly, no masses and bowel sounds normal  MS: no gross musculoskeletal defects noted, no edema  SKIN: no suspicious lesions or rashes  NEURO: Normal strength and tone, mentation intact and speech normal  PSYCH: mentation appears normal, affect normal/bright  Diabetic foot exam: normal DP and PT pulses, no trophic changes or ulcerative lesions, and normal sensory exam        Signed Electronically by: Jess Andujar MD    "

## 2025-05-17 ENCOUNTER — MYC MEDICAL ADVICE (OUTPATIENT)
Dept: FAMILY MEDICINE | Facility: CLINIC | Age: 40
End: 2025-05-17
Payer: COMMERCIAL

## 2025-05-17 DIAGNOSIS — Z30.41 ENCOUNTER FOR SURVEILLANCE OF CONTRACEPTIVE PILLS: ICD-10-CM

## 2025-05-19 RX ORDER — NORETHINDRONE ACETATE AND ETHINYL ESTRADIOL, ETHINYL ESTRADIOL AND FERROUS FUMARATE 1MG-10(24)
1 KIT ORAL DAILY
Qty: 90 TABLET | Refills: 3 | Status: SHIPPED | OUTPATIENT
Start: 2025-05-19

## 2025-06-07 ENCOUNTER — HEALTH MAINTENANCE LETTER (OUTPATIENT)
Age: 40
End: 2025-06-07

## 2025-06-09 NOTE — PROGRESS NOTES
[facility]                                     Progress Note    Patient Name: Myrna Brothers  Date: 2025         Service Type: Individual      Session Start Time: 4:37pm  Session End Time: 5pm    Session Length: 23 min    Session #: 11    Attendees: Client attended alone    Service Modality:  Video Visit:      Provider verified identity through the following two step process.  Patient provided:  Patient  and Patient address    Telemedicine Visit: The patient's condition can be safely assessed and treated via synchronous audio and visual telemedicine encounter.      Reason for Telemedicine Visit: Patient has requested telehealth visit    Originating Site (Patient Location): Patient's place of employment    Distant Site (Provider Location): Provider Remote Setting- Home Office    Consent:  The patient/guardian has verbally consented to: the potential risks and benefits of telemedicine (video visit) versus in person care; bill my insurance or make self-payment for services provided; and responsibility for payment of non-covered services.     Patient would like the video invitation sent by:  My Chart    Mode of Communication:  Video Conference via Amwell    Distant Location (Provider):  Off-site    As the provider I attest to compliance with applicable laws and regulations related to telemedicine.    DATA  Extended Session (53+ minutes): No  Interactive Complexity: No  Crisis: No         Progress Since Last Session (Related to Symptoms / Goals / Homework):   Symptoms: No change (depression / anxiety)    Homework: Achieved / completed to satisfaction      Episode of Care Goals: Satisfactory progress - ACTION (Actively working towards change); Intervened by reinforcing change plan / affirming steps taken     Current / Ongoing Stressors and Concerns:  -Patient reports that things at work continue to go really well.  -Patient reports that finances are a current stressor.  -Processed  through stressors connected to her son getting tested for ASD & ADHD.  -Processed through experience of parenting guilt and reframed unhelpful thoughts by practicing self    Therapist assessed for risk and safety - patient denied SI/SIB. Patient contracted for safety and will continue to utilize safety plan as needed.  Should there be an increase in frequency or intensity of symptoms related to SI/SIB, patient will call / text [phone], call 911, or go to local ED for evaluation.         Treatment Objective(s) Addressed in This Session:   use at least 2 coping skills for anxiety management in the next 2 weeks, Decrease frequency and intensity of feeling down, depressed, hopeless  Identify negative self-talk and behaviors: challenge core beliefs, myths, and actions  Decrease thoughts that you'd be better off dead or of suicide / self-harm       Intervention:   CBT: cognitive restructuring, reality checking, cognitive distortions  DBT: validation, mindfulness, distress tolerance   Emotion Focused Therapy: emotional processing  Solution Focused: strengths based      Assessments completed prior to visit:  The following assessments were completed by patient for this visit:  PHQ9:       1/8/2025    11:08 AM 2/16/2025     3:19 PM 3/4/2025     4:37 PM 3/20/2025    11:01 AM 4/16/2025     3:24 PM 5/2/2025    10:11 AM 5/13/2025     4:57 PM   PHQ-9 SCORE   PHQ-9 Total Score MyChart 7 (Mild depression) 6 (Mild depression) 4 (Minimal depression) 5 (Mild depression) 4 (Minimal depression) 5 (Mild depression) 5 (Mild depression)   PHQ-9 Total Score 7  6  4  5  4  5  5        Patient-reported     GAD7:       8/2/2019     3:11 PM 7/26/2024     3:57 PM 9/12/2024     7:01 AM 9/12/2024     7:04 AM 1/8/2025    11:08 AM 1/22/2025    12:02 PM 5/2/2025    10:11 AM   OREN-7 SCORE   Total Score  7 (mild anxiety)  7 (mild anxiety) 6 (mild anxiety) 13 (moderate anxiety) 6 (mild anxiety)   Total Score 4 7 7 7 6  13  6        Patient-reported  "    PROMIS 10-Global Health (only subscores and total score):       8/1/2024     8:52 AM 8/22/2024     9:25 AM 1/8/2025    11:09 AM 2/15/2025     9:56 AM 2/20/2025     1:01 PM 4/16/2025     3:40 PM 5/2/2025    10:12 AM   PROMIS-10 Scores Only   Global Mental Health Score 10 12 10  13  13  11  13    Global Physical Health Score 15 13 14  14  14  16  14    PROMIS TOTAL - SUBSCORES 25 25 24  27  27  27  27        Patient-reported     [place] Suicide Severity Rating Scale (Short Version)      7/15/2019    11:46 AM 7/24/2019     6:12 AM 9/11/2024     1:05 PM 10/9/2024     2:11 PM 11/6/2024     1:41 PM 1/8/2025     1:07 PM   [place] Suicide Severity Rating (Short Version)   Over the past 2 weeks have you felt down, depressed, or hopeless? no no       Over the past 2 weeks have you had thoughts of killing yourself? no no       Have you ever attempted to kill yourself? no no       1. Wish to be Dead (Since Last Contact)   Y N Y N   Wish to be Dead Description (Since Last Contact)   Thoughts of \"Iit would better if I wasn't here\" without plan, intent or methods.  Patient contracted for safety.  thoughts of \"wishing I wasn't here\" without plan, intent, or methods.    2. Non-Specific Active Suicidal Thoughts (Since Last Contact)   N N N N   Most Severe Ideation Rating (Since Last Contact)     1    Frequency (Since Last Contact)     3    Duration (Since Last Contact)     1    Deterrents (Since Last Contact)     1    Reasons for Ideation (Since Last Contact)     5    Actual Attempt (Since Last Contact)   N N N N   Has subject engaged in non-suicidal self-injurious behavior? (Since Last Contact)   N N N N   Calculated C-SSRS Risk Score (Since Last Contact)   Low Risk No Risk Indicated Low Risk No Risk Indicated         ASSESSMENT: Current Emotional / Mental Status (status of significant symptoms):   Risk status (Self / Other harm or suicidal ideation)   Patient denies current fears or concerns for personal safety.   Patient " denies current or recent suicidal ideation or behaviors.   Patient denies current or recent homicidal ideation or behaviors.   Patient denies current or recent self injurious behavior or ideation.   Patient denies other safety concerns.   Patient reports there has been no change in risk factors since their last session.     Patient reports there has been no change in protective factors since their last session.     A safety and risk management plan has been developed including: Patient consented to co-developed safety plan on 8/2/2024.  Safety and risk management plan was reviewed.   Patient agreed to use safety plan should any safety concerns arise.  A copy was made available to the patient.     Appearance:   Appropriate    Eye Contact:   Good    Psychomotor Behavior: Normal    Attitude:   Cooperative    Orientation:   All   Speech    Rate / Production: Normal     Volume:  Normal    Mood:    Anxious    Affect:    Worrisome    Thought Content:  Clear    Thought Form:  Coherent  Logical    Insight:    Good      Medication Review:   No changes to current psychiatric medication(s)     Medication Compliance:   Yes     Changes in Health Issues:   None reported     Chemical Use Review:   Substance Use: Chemical use reviewed, no active concerns identified      Tobacco Use: No current tobacco use.      Diagnosis:  1. Generalized anxiety disorder    2. Mild episode of recurrent major depressive disorder              Collateral Reports Completed:   Not Applicable    PLAN: (Patient Tasks / Therapist Tasks / Other)  Utilize safety plan as needed.  Should there be an increase in frequency or intensity of symptoms related to SI/SIB, patient will call / text [phone], call 911, or go to local ED for evaluation.    In the coming weeks, patient will practice living in the gray.  Patient will utilize coping skills for anxiety management.  Patient will practice self compassion.  Next appt: 7/7.        CHRISTINE Yoo,  LICSW    6/11/2025    ______________________________________________________________________    Individual Treatment Plan    Patient's Name: Myrna Brothers  YOB: 1985    Date of Creation: 8/27/2024  Date Treatment Plan Last Reviewed/Revised: 5/2/2025    DSM5 Diagnoses: 296.31 (F33.0) Major Depressive Disorder, Recurrent Episode, Mild _ and With anxious distress or 300.02 (F41.1) Generalized Anxiety Disorder  Psychosocial / Contextual Factors: hx of therapy, moved to [place] from [place] in April after her  was laid off, currently living with in-laws, 5 year old son is starting , son has some cognitive and speech delays, family of origin dynamics, relational dynamics   PROMIS (reviewed every 90 days):       8/1/2024     8:52 AM 8/22/2024     9:25 AM 1/8/2025    11:09 AM 2/15/2025     9:56 AM 2/20/2025     1:01 PM 4/16/2025     3:40 PM   PROMIS-10 Scores Only   Global Mental Health Score 10 12 10  13  13  11    Global Physical Health Score 15 13 14  14  14  16    PROMIS TOTAL - SUBSCORES 25 25 24  27  27  27        Patient-reported       Referral / Collaboration:  Referral to another professional/service is not indicated at this time..    Anticipated number of session for this episode of care: 12+  Anticipation frequency of session: every 2-3 weeks  Anticipated Duration of each session: 38-52 minutes  Treatment plan will be reviewed in 90 days or when goals have been changed.       MeasurableTreatment Goal(s) related to diagnosis / functional impairment(s)  Goal 1: Patient will decrease symptoms of depression as evidenced by decreased PHQ-9 score.    I will know I've met my goal when I feel confident as a mom and have more energy.    Objective #A (Patient Action)    Patient will Increase interest, engagement, and pleasure in doing things  Feel less tired and more energy during the day   Identify negative self-talk and behaviors: challenge core beliefs, myths, and actions  Decrease thoughts  that you'd be better off dead or of suicide / self-harm  Manage safety and utilize safety plan.  Status: continued: 1/8/2025, 5/2/2025    Intervention(s)  Therapist will teach strategies to reduce / manage depressive symptoms.  Therapist will teach and model positive self talk behaviors and strategies.  Therapist will check in with patient regarding safety / SI.       Goal 2: Patient will decrease symptoms of anxiety as evidenced by decreased OREN-7 score.    I will know I've met my goal when I don't worry so much and when I am more assertive.      Objective #A (Patient Action)    Patient will use at least 2 coping skills for anxiety management in the next 2 weeks.   Learn strategies to manage / reduce ruminating thoughts.  Learn and utilize assertive communication skills.     Status: continued: 1/8/2025, 5/2/2025    Intervention(s)  Therapist will teach coping skills for management of anxious symptoms.  Therapist will teach assertive communication and interpersonal effectiveness skills.  Therapist will teach cognitive strategies to help with worrying thoughts.      Patient has reviewed and agreed to the above plan.      CHRISTINE Yoo, LICSW  5/2/2025

## 2025-06-11 ENCOUNTER — VIRTUAL VISIT (OUTPATIENT)
Dept: BEHAVIORAL HEALTH | Facility: CLINIC | Age: 40
End: 2025-06-11
Payer: COMMERCIAL

## 2025-06-11 DIAGNOSIS — F41.1 GENERALIZED ANXIETY DISORDER: Primary | ICD-10-CM

## 2025-06-11 DIAGNOSIS — F33.0 MILD EPISODE OF RECURRENT MAJOR DEPRESSIVE DISORDER: ICD-10-CM

## 2025-06-11 PROCEDURE — 90832 PSYTX W PT 30 MINUTES: CPT | Mod: 95

## 2025-06-18 ENCOUNTER — MYC MEDICAL ADVICE (OUTPATIENT)
Dept: FAMILY MEDICINE | Facility: CLINIC | Age: 40
End: 2025-06-18
Payer: COMMERCIAL

## 2025-06-18 DIAGNOSIS — Z30.41 ENCOUNTER FOR SURVEILLANCE OF CONTRACEPTIVE PILLS: Primary | ICD-10-CM

## 2025-06-22 RX ORDER — NORETHINDRONE ACETATE AND ETHINYL ESTRADIOL 1MG-20(21)
1 KIT ORAL DAILY
Qty: 84 TABLET | Refills: 3 | Status: SHIPPED | OUTPATIENT
Start: 2025-06-22

## 2025-07-07 ENCOUNTER — VIRTUAL VISIT (OUTPATIENT)
Dept: BEHAVIORAL HEALTH | Facility: CLINIC | Age: 40
End: 2025-07-07
Payer: COMMERCIAL

## 2025-07-07 DIAGNOSIS — F33.0 MILD EPISODE OF RECURRENT MAJOR DEPRESSIVE DISORDER: ICD-10-CM

## 2025-07-07 DIAGNOSIS — F41.1 GENERALIZED ANXIETY DISORDER: Primary | ICD-10-CM

## 2025-07-07 PROCEDURE — 90832 PSYTX W PT 30 MINUTES: CPT | Mod: 95

## 2025-07-07 ASSESSMENT — COLUMBIA-SUICIDE SEVERITY RATING SCALE - C-SSRS
REASONS FOR IDEATION SINCE LAST CONTACT: COMPLETELY TO END OR STOP THE PAIN (YOU COULDN'T GO ON LIVING WITH THE PAIN OR HOW YOU WERE FEELING)
2. HAVE YOU ACTUALLY HAD ANY THOUGHTS OF KILLING YOURSELF?: NO
ATTEMPT SINCE LAST CONTACT: NO
1. SINCE LAST CONTACT, HAVE YOU WISHED YOU WERE DEAD OR WISHED YOU COULD GO TO SLEEP AND NOT WAKE UP?: YES

## 2025-07-07 NOTE — PROGRESS NOTES
[facility]                                     Progress Note    Patient Name: Myrna Brothers  Date: 2025         Service Type: Individual      Session Start Time: 1pm  Session End Time: 1:30pm    Session Length: 30 min    Session #: 12    Attendees: Client attended alone    Service Modality:  Video Visit:      Provider verified identity through the following two step process.  Patient provided:  Patient  and Patient address    Telemedicine Visit: The patient's condition can be safely assessed and treated via synchronous audio and visual telemedicine encounter.      Reason for Telemedicine Visit: Patient has requested telehealth visit    Originating Site (Patient Location): Patient's place of employment    Distant Site (Provider Location): [facility]    Consent:  The patient/guardian has verbally consented to: the potential risks and benefits of telemedicine (video visit) versus in person care; bill my insurance or make self-payment for services provided; and responsibility for payment of non-covered services.     Patient would like the video invitation sent by:  My Chart    Mode of Communication:  Video Conference via Amwell    Distant Location (Provider):  On-site    As the provider I attest to compliance with applicable laws and regulations related to telemedicine.    DATA  Extended Session (53+ minutes): No  Interactive Complexity: No  Crisis: No         Progress Since Last Session (Related to Symptoms / Goals / Homework):   Symptoms: No change (depression / anxiety)    Homework: Achieved / completed to satisfaction      Episode of Care Goals: Satisfactory progress - ACTION (Actively working towards change); Intervened by reinforcing change plan / affirming steps taken     Current / Ongoing Stressors and Concerns:  -Patient processed through stressors tied to finances.  -Patient reports increased negative self talk / self  "criticism.  -Reviewed thought-feeling-behavior connection.  -Processed through parenting related stressors.  -Discussed strategies for cognitive diffusion and engaging with the thought-feeling-behavior connection.    Therapist assessed for risk and safety - patient reports passive SI (\"I wish I could escape\") without plan, intent, or methods. Patient contracted for safety and will continue to utilize safety plan as needed.  Should there be an increase in frequency or intensity of symptoms related to SI/SIB, patient will call / text [phone], call 911, or go to local ED for evaluation.         Treatment Objective(s) Addressed in This Session:   use at least 2 coping skills for anxiety management in the next 2 weeks, Decrease frequency and intensity of feeling down, depressed, hopeless  Identify negative self-talk and behaviors: challenge core beliefs, myths, and actions  Decrease thoughts that you'd be better off dead or of suicide / self-harm       Intervention:   CBT: cognitive restructuring, reality checking, cognitive distortions  DBT: validation, mindfulness, distress tolerance   Emotion Focused Therapy: emotional processing  Solution Focused: strengths based  Reviewed CBT's thought-feeling-action connection and again discussed the \"vicious cycle.\"      Assessments completed prior to visit:  The following assessments were completed by patient for this visit:  PHQ9:       2/16/2025     3:19 PM 3/4/2025     4:37 PM 3/20/2025    11:01 AM 4/16/2025     3:24 PM 5/2/2025    10:11 AM 5/13/2025     4:57 PM 7/7/2025    12:20 PM   PHQ-9 SCORE   PHQ-9 Total Score [facility] 6 (Mild depression) 4 (Minimal depression) 5 (Mild depression) 4 (Minimal depression) 5 (Mild depression) 5 (Mild depression) 5 (Mild depression)   PHQ-9 Total Score 6  4  5  4  5  5  5        Patient-reported     GAD7:       8/2/2019     3:11 PM 7/26/2024     3:57 PM 9/12/2024     7:01 AM 9/12/2024     7:04 AM 1/8/2025    11:08 AM 1/22/2025    12:02 PM " "5/2/2025    10:11 AM   OREN-7 SCORE   Total Score  7 (mild anxiety)  7 (mild anxiety) 6 (mild anxiety) 13 (moderate anxiety) 6 (mild anxiety)   Total Score 4 7 7 7 6  13  6        Patient-reported     PROMIS 10-Global Health (only subscores and total score):       8/1/2024     8:52 AM 8/22/2024     9:25 AM 1/8/2025    11:09 AM 2/15/2025     9:56 AM 2/20/2025     1:01 PM 4/16/2025     3:40 PM 5/2/2025    10:12 AM   PROMIS-10 Scores Only   Global Mental Health Score 10 12 10  13  13  11  13    Global Physical Health Score 15 13 14  14  14  16  14    PROMIS TOTAL - SUBSCORES 25 25 24  27  27  27  27        Patient-reported     [place] Suicide Severity Rating Scale (Short Version)      7/15/2019    11:46 AM 7/24/2019     6:12 AM 9/11/2024     1:05 PM 10/9/2024     2:11 PM 11/6/2024     1:41 PM 1/8/2025     1:07 PM 7/7/2025     1:04 PM   [place] Suicide Severity Rating (Short Version)   Over the past 2 weeks have you felt down, depressed, or hopeless? no no        Over the past 2 weeks have you had thoughts of killing yourself? no no        Have you ever attempted to kill yourself? no no        1. Wish to be Dead (Since Last Contact)   Y N Y N Y   Wish to be Dead Description (Since Last Contact)   Thoughts of \"Iit would better if I wasn't here\" without plan, intent or methods.  Patient contracted for safety.  thoughts of \"wishing I wasn't here\" without plan, intent, or methods.  Passive thoughts of \"I wish I could escape\" without plan, intent, or methods.   2. Non-Specific Active Suicidal Thoughts (Since Last Contact)   N N N N N   Most Severe Ideation Rating (Since Last Contact)     1  1   Frequency (Since Last Contact)     3  3   Duration (Since Last Contact)     1  1   Deterrents (Since Last Contact)     1  1   Reasons for Ideation (Since Last Contact)     5  5   Actual Attempt (Since Last Contact)   N N N N N   Has subject engaged in non-suicidal self-injurious behavior? (Since Last Contact)   N N N N N   Calculated " C-SSRS Risk Score (Since Last Contact)   Low Risk No Risk Indicated Low Risk No Risk Indicated Low Risk         ASSESSMENT: Current Emotional / Mental Status (status of significant symptoms):   Risk status (Self / Other harm or suicidal ideation)   Patient denies current fears or concerns for personal safety.   Patient reports the following current or recent suicidal ideation or behaviors: passive SI without plan, intent, or methods.   Patient denies current or recent homicidal ideation or behaviors.   Patient denies current or recent self injurious behavior or ideation.   Patient denies other safety concerns.   Patient reports there has been no change in risk factors since their last session.     Patient reports there has been no change in protective factors since their last session.     A safety and risk management plan has been developed including: Patient consented to co-developed safety plan on 8/2/2024.  Safety and risk management plan was reviewed.   Patient agreed to use safety plan should any safety concerns arise.  A copy was made available to the patient.     Appearance:   Appropriate    Eye Contact:   Good    Psychomotor Behavior: Normal    Attitude:   Cooperative    Orientation:   All   Speech    Rate / Production: Normal     Volume:  Normal    Mood:    Anxious    Affect:    Worrisome    Thought Content:  Clear    Thought Form:  Coherent  Logical    Insight:    Good      Medication Review:   No changes to current psychiatric medication(s)     Medication Compliance:   Yes     Changes in Health Issues:   None reported     Chemical Use Review:   Substance Use: Chemical use reviewed, no active concerns identified      Tobacco Use: No current tobacco use.      Diagnosis:  1. Generalized anxiety disorder    2. Mild episode of recurrent major depressive disorder                Collateral Reports Completed:   Not Applicable    PLAN: (Patient Tasks / Therapist Tasks / Other)  Utilize safety plan as  needed.  Should there be an increase in frequency or intensity of symptoms related to SI/SIB, patient will call / text [phone], call 911, or go to local ED for evaluation.    In the coming weeks, patient will challenge and reframe negative thought processes.  Patient will utilize cognitive diffusion and CBT skills.  Patient will prioritize daily walks.  Next appt: 8/14.        CHRISTINE Yoo, [facility]    7/7/2025    ______________________________________________________________________    Individual Treatment Plan    Patient's Name: Myrna Brothers  YOB: 1985    Date of Creation: 8/27/2024  Date Treatment Plan Last Reviewed/Revised: 5/2/2025    DSM5 Diagnoses: 296.31 (F33.0) Major Depressive Disorder, Recurrent Episode, Mild _ and With anxious distress or 300.02 (F41.1) Generalized Anxiety Disorder  Psychosocial / Contextual Factors: hx of therapy, moved to [place] from [place] in April after her  was laid off, currently living with in-laws, 5 year old son is starting , son has some cognitive and speech delays, family of origin dynamics, relational dynamics   PROMIS (reviewed every 90 days):       8/1/2024     8:52 AM 8/22/2024     9:25 AM 1/8/2025    11:09 AM 2/15/2025     9:56 AM 2/20/2025     1:01 PM 4/16/2025     3:40 PM 5/2/2025    10:12 AM   PROMIS-10 Scores Only   Global Mental Health Score 10 12 10  13  13  11  13    Global Physical Health Score 15 13 14  14  14  16  14    PROMIS TOTAL - SUBSCORES 25 25 24  27  27  27  27        Patient-reported       Referral / Collaboration:  Referral to another professional/service is not indicated at this time..    Anticipated number of session for this episode of care: 12+  Anticipation frequency of session: every 2-3 weeks  Anticipated Duration of each session: 38-52 minutes  Treatment plan will be reviewed in 90 days or when goals have been changed.       MeasurableTreatment Goal(s) related to diagnosis / functional impairment(s)  Goal 1:  Patient will decrease symptoms of depression as evidenced by decreased PHQ-9 score.    I will know I've met my goal when I feel confident as a mom and have more energy.    Objective #A (Patient Action)    Patient will Increase interest, engagement, and pleasure in doing things  Feel less tired and more energy during the day   Identify negative self-talk and behaviors: challenge core beliefs, myths, and actions  Decrease thoughts that you'd be better off dead or of suicide / self-harm  Manage safety and utilize safety plan.  Status: continued: 1/8/2025, 5/2/2025    Intervention(s)  Therapist will teach strategies to reduce / manage depressive symptoms.  Therapist will teach and model positive self talk behaviors and strategies.  Therapist will check in with patient regarding safety / SI.       Goal 2: Patient will decrease symptoms of anxiety as evidenced by decreased OREN-7 score.    I will know I've met my goal when I don't worry so much and when I am more assertive.      Objective #A (Patient Action)    Patient will use at least 2 coping skills for anxiety management in the next 2 weeks.   Learn strategies to manage / reduce ruminating thoughts.  Learn and utilize assertive communication skills.     Status: continued: 1/8/2025, 5/2/2025    Intervention(s)  Therapist will teach coping skills for management of anxious symptoms.  Therapist will teach assertive communication and interpersonal effectiveness skills.  Therapist will teach cognitive strategies to help with worrying thoughts.      Patient has reviewed and agreed to the above plan.      CHRISTINE Yoo, [facility]  5/2/2025

## 2025-07-28 ENCOUNTER — LAB (OUTPATIENT)
Dept: LAB | Facility: CLINIC | Age: 40
End: 2025-07-28
Payer: COMMERCIAL

## 2025-07-28 ENCOUNTER — OFFICE VISIT (OUTPATIENT)
Dept: SURGERY | Facility: CLINIC | Age: 40
End: 2025-07-28
Attending: EMERGENCY MEDICINE
Payer: COMMERCIAL

## 2025-07-28 VITALS
BODY MASS INDEX: 24.72 KG/M2 | HEIGHT: 66 IN | DIASTOLIC BLOOD PRESSURE: 64 MMHG | WEIGHT: 153.8 LBS | SYSTOLIC BLOOD PRESSURE: 100 MMHG

## 2025-07-28 DIAGNOSIS — E11.65 TYPE 2 DIABETES MELLITUS WITH HYPERGLYCEMIA, WITHOUT LONG-TERM CURRENT USE OF INSULIN (H): ICD-10-CM

## 2025-07-28 DIAGNOSIS — E66.09 CLASS 1 OBESITY DUE TO EXCESS CALORIES WITH SERIOUS COMORBIDITY AND BODY MASS INDEX (BMI) OF 33.0 TO 33.9 IN ADULT: ICD-10-CM

## 2025-07-28 DIAGNOSIS — E66.811 CLASS 1 OBESITY DUE TO EXCESS CALORIES WITH SERIOUS COMORBIDITY AND BODY MASS INDEX (BMI) OF 33.0 TO 33.9 IN ADULT: ICD-10-CM

## 2025-07-28 DIAGNOSIS — E88.810 METABOLIC SYNDROME X: ICD-10-CM

## 2025-07-28 LAB
EST. AVERAGE GLUCOSE BLD GHB EST-MCNC: 111 MG/DL
HBA1C MFR BLD: 5.5 % (ref 0–5.6)

## 2025-07-28 PROCEDURE — 99214 OFFICE O/P EST MOD 30 MIN: CPT | Performed by: EMERGENCY MEDICINE

## 2025-07-28 PROCEDURE — 3074F SYST BP LT 130 MM HG: CPT | Performed by: EMERGENCY MEDICINE

## 2025-07-28 PROCEDURE — 3078F DIAST BP <80 MM HG: CPT | Performed by: EMERGENCY MEDICINE

## 2025-07-28 PROCEDURE — 3044F HG A1C LEVEL LT 7.0%: CPT | Performed by: EMERGENCY MEDICINE

## 2025-07-28 PROCEDURE — 36415 COLL VENOUS BLD VENIPUNCTURE: CPT

## 2025-07-28 PROCEDURE — 83036 HEMOGLOBIN GLYCOSYLATED A1C: CPT

## 2025-07-28 NOTE — PATIENT INSTRUCTIONS
Plan:   1.  Diet: Great work with dietary changes and phasing out high calorie beverages. To continue this weight loss trajectory and get weight stabilized in the 130s, I'd recommend continuing to aim for 1250-1300kcal/day with 67-89 grams of lean protein based on your FFM of 105.6 lbs.  Continue good hydration and separate beverages from meals to make sure you adequately nourish yourself at meals. Protein first approach works well.    2. Exercise: continue good walks. I'd recommend some strength work this Fall 2-3x weekly.     3. Medication: continue Mounjaro 10mg/week    4. Recheck A1c.     5. Goals: 47 lbs down, a 23.5% total body weight reduction. Aim to get BMI in the 22-23 range given your propensity towards diabetes at very low excess weight.         Zepbound/Mounjaro (Tirzepatide) is a very effective satiety boosting appetite suppressant that elevates satiety hormones GLP1 and GIP. It needs to be ramped up slowly to be tolerated adequately.  It helps release insulin in response to food when blood sugar runs higher than normal and is very helpful for diabetics in managing blood sugar levels with low risks for any low blood sugars.  About 1/10 people will not tolerate this medication. Each month, you move up to a higher dose until eventually reaching the 10mg/week dose if tolerated with further ramping to follow if needed. If intolerant or severe side effects, a dose decrease would be wise, so keep me posted if not tolerated the ramping well. This may be a longer term medication based on individual needs/physiology and appetite control.     Injections can be given after cleansing the skin with alcohol prep pad or swab (available OTC).     Stop Zepbound if severe abdominal pain/vomiting/rash/throat swelling or constant nausea that prevents adequate food/water intake. Stop 2-3 weeks prior to any planned general anesthesia surgeries to reduce risk for something called a post operative ileus.     Gallstones can  occur in about 1% of patients on this medication so update me if increase right upper abdominal pain after eating.     Start meals with protein first, separate beverages from meals by 20 minutes and work hard in between meals to get your 64-75 oz of water daily to reduce risks for severe constipation. Consider a fiber supplement like powdered psyllium husk in 12 oz water each night, stool softerners as needed and Miralax or milk of Magnesia if more than 3 days have passed without a Bowel Movement. Some other options include:  For Prevention and Treatment of Constipation when on Semaglutide     From least aggressive to most aggressive:     Move: Wallking is essential - the more we move, the more our bowels move  Water: Drink water - 64oz or more a day  Go when you need to go. Don't wait. The longer you wait, the harder it gets.  Fiber: Fruit, raw veggies, nuts, whole grains, prune each night, flax/mariely seeds added into meals can all be helpful.   Stool Softeners: if constipation is mild and for maintenance  Gentle laxatives: Miralax, senokot, dulcolax , Smooth move tea as needed     More aggressive (and typically won't get to this point)  Milk of Magnesia  Mag Citrate (what you drink before a colonoscopy)  Suppositories  Enema      Check out [employer] for patient resources.  If you have weekends off, I recommend dosing Friday evenings.     Some people starve on this medication if not mindful about food intake. I recommend starting meals with the protein part of your meal first, chew thoroughly and separate beverages from meals by about 20 minutes to make sure you get your nourishment in first. Include vegetables/complex carbohydrates and unsaturated fat as part of your balanced diet but group these at the end of the meal, after your protein is mostly gone. Satiety will kick in too early if drinking too much with meals and under-nourishment can result.     It's not a bad idea to take a complete multivitamin most  days of the week if using this medication. Lower iron content tends to be less constipating.     Adequate hydration is essential for feeling your best, efficient fat burning, waste elimination and constipation prevention. For those without fluid restrictions due to other disease, the goal is at least one ounce of water per gram of protein consumed with a  minimum of 64oz/day goal.     Pancreatitis is a very rare but potentially serious side effect. Stop Zepbound if severe mid abdominal pain/burning in nature or if unable to eat/drink due to severe nausea/discomfort.   People with strong history of pancreatitis without clear cause should stay clear of this medication as should those planning to get pregnant, those with strong personal or family history for medullary thyroid cancer or Multiple Endocrine Neoplasia (rare).     Stop Zepbound at least 2 weeks prior to any planned surgery.  Stop until fully recovered if unexpected/emergent surgery is needed with anticipation that re-ramping will be needed if off longer than 14-16 days since last dose.    Kind Regards,  Christiano Ivan MD  [facility]        LEAN PROTEIN SOURCES  Getting 20-30 grams of protein, 3 meals daily, is appropriate for most people, some need more but more than about 40 grams per meal is not useful.  General rule is drinking one ounce of water per gram of protein eaten over the course of the day:  70 grams of protein each day, drink 70 oz of water.  Protein Source Portion Calories Grams of Protein                           Nonfat, plain Greek yogurt    (10 grams sugar or less) 3/4 cup (6 oz)  12-17   Light Yogurt (10 grams sugar or less) 3/4 cup (6 oz)  6-8   Protein Shake 1 shake 110-180 15-30   Skim/1% Milk or lactose-free milk 1 cup ( 8 oz)  8   Plain or light, flavored soymilk 1 cup  7-8   Plain or light, hemp milk 1 cup 110 6   Fat Free or 1% Cottage Cheese 1/2 cup 90 15   Part skim  ricotta cheese 1/2 cup 100 14   Part skim or reduced fat cheese slices 1 ounce 65-80 8     Mozzarella String Cheese 1 80 8   Canned tuna, chicken, crab or salmon  (canned in water)  1/2 cup 100 15-20   White fish (broiled, grilled, baked) 3 ounces 100 21   [place]/Tuna (broiled, grilled, baked) 3 ounces 150-180 21   Shrimp, Scallops, Lobster, Crab 3 ounces 100 21   Pork loin, Pork Tenderloin 3 ounces 150 21   Boneless, skinless chicken /turkey breast                          (broiled, grilled, baked) 3 ounces 120 21   [place], [place], [place], and Venison 3 ounces 120 21   Lean cuts of red meat and pork (sirloin,   round, tenderloin, flank, ground 93%-96%) 3 ounces 170 21   Lean or Extra Lean Ground Turkey 1/2 cup 150 20   90-95% Lean Tate Burger 1 alex 140-180 21   Low-fat casserole with lean meat 3/4 cup 200 17   Luncheon Meats                                                        (turkey, lean ham, roast beef, chicken) 3 ounces 100 21   Egg (boiled, poached, scrambled) 1 Egg 60 7   Egg Substitute 1/2 cup 70 10   Nuts (limit to 1 serving per day)  3 Tbsp. 150 7   Nut [place] (peanut, almond)  Limit to 1 serving or less daily 1 Tbsp. 90 4   Soy Burger (varies) 1  15   Garbanzo, Black, [place] Beans 1/2 cup 110 7   Refried Beans 1/2 cup 100 7   Kidney and Lima beans 1/2 cup 110 7   Tempeh 3 oz 175 18   Vegan crumbles 1/2 cup 100 14   Tofu 1/2 cup 110 14   Chili (beans and extra lean beef or turkey) 1 cup 200 23   Lentil Stew/Soup 1 cup 150 12   Black Bean Soup 1 cup 175 12       To help lose weight in a safe way and sustainable way, I'd like to start you on a 1300 calorie diet each day.  Understanding that every 3500 calorie deficit adds up to a pound of weight reduction, with the combination of light aerobic exercise, some modest weight training and diet, we should be able to lose around 1 to 2.5lbs weekly depending on your starting height and weight and exercise routine with this goal intake. Dropping abut 1%  total body weight weekly is exceptional weight loss and very sustainable once in a good routine.    Hunger and fatigue are the enemies of weight loss and behavior change in general.  We become much more reactive in our eating when we're hungry and tired and decrease our levels of self control.  It's not a personal failing, it's just body chemistry.   We can combat this by trying to avoid being tired and hungry at the same time.  To help this,  try to front load your calories in the first 10 hours of you day so you get into the fatigued evening hours reasonably full and you can control impulses/mindless eating a lot better and avoid those bedtime snacks/evening treats that the tired brain craves.  If you can getting your exercise in the beginning of your day has also been shown to have superior results (but anytime is better than none).  We want to build up to 150 minutes or more as you progress through the first 2-3 months of weight loss season (300 minutes weekly is ideal for maintaining weight loss for most after the end of weight loss season).     Weight loss goes through ups and downs and plateaus but if you stay on the program, you will enjoy success.   Commit to the process, try to be on track at least 19 days out of 20 and continue to think about why you're doing this and what you're working towards. If you haven't thought of your reward for hitting your weight loss goals, think about it now. Using these little victory bribes along the way helps a lot.    Finding a diet that is satisfying and repeatable-- day in and day out, improves success.  The following uses the concept of Daily Caloric Restriction, eating less every day.  An outline of how to break up the day's food is given below.  It is a starting point and example of what a 1300 calorie diet looks like.  You can modify the listed foods to suite your particular tastes, but pay attention to portions and protein content.   Do not skip breakfast on this  plan as it will leave you hungry and lead to overeating at some point during the rest of the day.  If you can make supper the last food for the day more days of the week then not, it will help a lot.  That means that the intake during those first 10-12 hours of the day and hitting your protein/intake targets for all three meals is vital to your success and evening hunger control.     Start reading labels so you know that you're getting what you think you are and start measuring foods so you can eventually look at a portion and understand how it will provide the fuel you want.    Prepping raw veggies after you buy them (washing and putting into bags/tupperware for easy access), cooking several chicken breasts/proteins for the next 2-3 lunches and generally being able to grab and go what will keep you on target when time is short will greatly aid your success.  Prepare and plan ahead and success will follow. It really only requires a couple days weekly to optimize the access to the right food at the right time of day.  Food delivery and stopping at fast food is a sign of reactive eating and usually will signal a stalling of your weight loss.    Read labels:  for protein portions/yogurt, protein bars etc looking for items with more than 10 grams of protein and less than 10 grams of sugar is very helpful.  Frozen meals should have at least 18 grams of protein, under 10 grams of sugar as well (typically around 300-380 calories).    Please note: if you've had previous bariatric surgery: wait 20-30 minutes before/after eating to drink your beverages to avoid early fullness/dumping syndrome/worsening malabsorption, early loss of fullness and hunger.  Start with eating your protein first, slow down your meals and chew thoroughly.          1300 calorie diet:  Think Big Breakfast, Medium Lunch, smaller dinner.  Note: it's OK to consolidate the calories/protein of the mid morning snack with breakfast and the midafternoon snack  with lunch if time doesn't allow or if your don't wish to have those snacks. No snacks recommended after supper. If you're prone to late afternoon nibbling, that is a great time to get your fitness in so you can get to supper without the extra.    Breakfast goal of 300 calories-350 calories (egg, 1/3 to 1/2 cup cooked old fashioned oatmeal or steel cut oats and berries,3-4oz greek yogurt.)Glass of water and if you like coffee (black) or tea.        Mid morning Snack or part of lunch, about 2-2.5 hrs later: 100 calories (cottage cheese/string cheese/ fruit or banana) and a handful of cruchy/green veggies (cucumber/celery/green peppers/broccoli).  Small glass of water    Lunch:  300 calories (3.5-4 oz tuna with little mcdaniel (no bread), apple, salad with drizzle of olive oil/balsamic vinegar for example)  Water    Snack:  100 calories.  4-5 oz Greek Yogurt with at least 17 grams of protein per serving.    Or Cottage cheese (lower sodium version preferable). Get this in about 2 hrs before you plan to have dinner. Protein drinks with at least 15-20 grams of protein and less than 6 grams of sugar could be used here to hit protein goals and decrease afternoon/evening hunger.  Glass of water    Dinner:  350 calories (4 oz meat or fish with cooked veggies or salad with minimal dressing, one piece of bread).  Glass of water or unsweetened tea with lemon  Dessert: 100 calories Medium Apple or Small handful of nuts, about 2/3 of an ounce (almonds/walnuts/cashews or pistachios are ideal).   Glass of water.    Try to avoid all soda and juice (low sodium V8 ok once a day).   You can do it! Embrace the healthier you and give up the inflammatory sugary treats that accelerate disease.    Choose an activity that is fun/interesting and available to you such as  Going for a swim for 15 minutes, walking 40 minutes, elliptical 20 minutes or cycling 20 minutes as many days a week as you can.  Having a walking or workout jose luis can make it  even more enjoyable and keep you on track the days your motivation/energy may be lower.  If those times are too long for your fitness level, start at 10 minutes of movement and each week try to increase by 2 minutes each week.  The first 70 minutes a week (10 minutes a day only) of exercise drops the mortality rate of a sedentary person 30%!!!!  Our goal is to work up to 150 minutes or more per week of moderate to vigorous exercise  to optimize metabolism and prevent weight regain during maintenance. If time is short and your fitness allows it, high intensity interval training can be a nice way to cut the workout time in half:  warm up 2-4 minutes then 3-6 intervals of increased intensity effort (70% of max heart rate) followed by an equal amount or more of recovery before repeating, then 1-3 minutes of cooldown.     10 minutes of weight lifting can be helpful as well or using some body weight exercises like wall squats, pushups (with assistance as needed or standing/wall pushups), seat presses, yoga moves. At least twice weekly helps maintain a good strength to weight ratio, more days is better.  [employer] is a great resource for free video demonstrations.    If you are into strenuous weight lifting or prolonged exercise, use an online calculator for how many calories you've burned and if your exercise is lasting over 60 minutes, replace 20-30% of those calories burned immediately after exercise .  For the more limited exercise (less than 500 calories burned), there is no need to add extra food unless you notice a lot of hunger on your food journal.  Usually  Even a  calorie load after longer workouts is more than adequate.  For longer efforts, hunger will increase if you don't refuel afterwards and can get meal plan off track due to hunger, so replenish immediately after the workout to keep on the diet plan and feeling good.    Exercise example:  If you burned 1000 calories during the exercise, immediately  "(within 30 minutes) have a snack/replacement beverage totaling 200-300 calories and ideally have a 3:1 ratio of carbohydrate grams to protein grams to keep muscles ready for exercise the next day.  Have your next, full meal within 2-3 hours of exercise.    Tips for success:  KEEP A FOOD JOURNAL and a log of daily weights.  Pencil and paper works fine for most. Otherwise, Smart Voicemail, Idea[employer], [employer].[email or web address], Big[employer]oat, [employer] are all good tracker apps/programs or websites for food/fitness.  Remembering to ask yourself, \"How did my nourishment affect me today\" and comparing \"good days\" to \"hungry days\" to solidify what helps your body, in your life, feel it's best while losing weight.    1.  Prepare proteins ahead of time (broil chicken breasts in bulk so you can grab and go), steel cut oats can be stored in casserole dish/bowl in the fridge for quick scoop in the morning and rewarm in microwave, make use of crock pot recipes (watch salt content).    2.  Drink a 8-12 oz glass of water every 2-3 hours when awake.  We often mistake hunger for thirst, especially when losing weight.    3. Remember your Reward and Motivation when things get hard.    4.  Weigh yourself every morning and record, you'll stay on track better and learn how your body loses weight. Don't worry about 1 or 2 day patterns, but when on track you'll notice good trend downward of weight over 3-4 day segments.    5. Call or use [employer] messaging if problems/concerns .    6.  Find a handful of meals/foods that keep you on track and get into a boring routine that is sustainable for you.    7.  Take a complete multivitamin just to make sure all micronutrients are adequate during weight loss.    8. If losing hair/brittle nails it often means you are not taking enough protein.  Minimum goal is 60 grams daily of protein, most people with normal kidneys do well with upwards of 100-120 grams/day of protein. Consider taking Biotin as supplement or a \"Hair and Nail\" " multivitamin.  If you are hypothyroid and losing hair, see you doctor for a check up of your levels if you haven't had one recently.    9.  Getting adequate sleep is very important for starting your day properly, when we are sleep deprived, our morning appetite is suppressed and without eating an adequate breakfast, we overeat later in the day when we're tired.  Our body heals in our sleep and our mental and immune health depends on this rest.  Aim for 7-8 hours of sleep nightly if possible.  If you sleep is disturbed, perform some introspection on stressors/depression/anxiety/PTSD events or possible sleep disorders and we can trouble shoot solutions/evaulations if issues persist.    Exercise during the day, meditative breathing before bed and after waking and removing the television from the bedroom are easy ways to improve quality of sleep.      10. Relaxation.  Controlled breathing exercises can lower stress levels in the brain.  One technique is 4, 7, 8 breathing:  Place the tip of your tongue behind your front teeth, breath in through your mouth for 4 seconds, Hold it for 7 seconds and breath out slowly, making a leaky tire noise for 8 seconds.  Repeat 4 times.  Ideally do at the start and end of your day or if feeling stressed.  It works and it's why meditation/yoga/martial arts are often very breath based activities.  There are breathing techniques for alertness as well as relaxation out there and they can be quite helpful.

## 2025-07-28 NOTE — PROGRESS NOTES
Bariatric Clinic Follow-Up Visit:    Myrna Brothers is a 39 year old  female with Body mass index is 25.2 kg/m .  presenting here today for follow-up on non-surgical efforts for weight loss. Original Intake visit occurred on 2/20/25 with a weight of 200 lbs and BMI of 33.3.  Along with diet and behavior changes, she has been using Mounjaro for dual benefit on her type II diabetes and to assist her weight loss goals.  See her intake visit notes for details on identified contributors to weight gain in the past. Chart review shows Dietician calculated RMR of 1440kcal/day and protein intake goal of 54-70g/day as of her 4/28/25 visit with our Dietician.     Weight:   Wt Readings from Last 5 Encounters:   07/28/25 69.8 kg (153 lb 12.8 oz)   05/14/25 76.7 kg (169 lb 3.2 oz)   04/28/25 77.1 kg (170 lb)   03/24/25 82.1 kg (181 lb 1.6 oz)   03/04/25 83.5 kg (184 lb)    pounds      Comorbidities:  Patient Active Problem List   Diagnosis    Moderate episode of recurrent major depressive disorder (H)    Generalized anxiety disorder    Encounter for surveillance of contraceptive pills    Fibrocystic disease of breast    Irregular menstrual cycle    RAD (reactive airway disease)    Restless sleeper    Temporomandibular dysfunction syndrome    Type 2 diabetes mellitus with hyperglycemia, without long-term current use of insulin (H)    Dysfunction of left eustachian tube       Current Outpatient Medications:     albuterol (PROAIR HFA/PROVENTIL HFA/VENTOLIN HFA) 108 (90 Base) MCG/ACT inhaler, Inhale 2 puffs into the lungs every 6 hours as needed for shortness of breath, wheezing or cough., Disp: 18 g, Rfl: 3    ARIPiprazole (ABILIFY) 5 MG tablet, Take 1.5 tablets (7.5 mg) by mouth daily., Disp: 135 tablet, Rfl: 3    buPROPion (WELLBUTRIN XL) 300 MG 24 hr tablet, Take 1 tablet (300 mg) by mouth every morning., Disp: 90 tablet, Rfl: 3    metFORMIN (GLUCOPHAGE) 500 MG tablet, Take 1 tablet (500 mg) by mouth daily (with breakfast).,  Disp: 90 tablet, Rfl: 3    Norethin-Eth Estrad-Fe Biphas (LO LOESTRIN FE) 1 MG-10 MCG / 10 MCG TABS, Take 1 tablet by mouth daily., Disp: 90 tablet, Rfl: 3    norethindrone-ethinyl estradiol (JUNEL FE 1/20) 1-20 MG-MCG tablet, Take 1 tablet by mouth daily., Disp: 84 tablet, Rfl: 3    rosuvastatin (CRESTOR) 5 MG tablet, Take 1 tablet (5 mg) by mouth daily., Disp: 90 tablet, Rfl: 1    Tirzepatide 10 MG/0.5ML SOAJ, Inject 0.5 mLs (10 mg) subcutaneously every 7 days., Disp: 6 mL, Rfl: 2    venlafaxine (EFFEXOR XR) 150 MG 24 hr capsule, Take 1 capsule (150 mg) by mouth daily Along with 75 mg, Disp: 90 capsule, Rfl: 3    venlafaxine (EFFEXOR XR) 75 MG 24 hr capsule, Take 1 capsule (75 mg) by mouth daily Along with 150 mg, Disp: 90 capsule, Rfl: 3      Interim: Since our last visit, she has been off her daily Frappacino habit and getting better protein at breakfast.   Doing 20 minute walks  in the after     Plan:   1.  Diet: Great work with dietary changes and phasing out high calorie beverages. To continue this weight loss trajectory and get weight stabilized in the 130s, I'd recommend continuing to aim for 1250-1300kcal/day with 67-89 grams of lean protein based on your FFM of 105.6 lbs.  Continue good hydration and separate beverages from meals to make sure you adequately nourish yourself at meals. Protein first approach works well.    2. Exercise: continue good walks. I'd recommend some strength work this Fall 2-3x weekly.     3. Medication: continue Mounjaro 10mg/week    4. Recheck A1c.     5. Goals: 47 lbs down, a 23.5% total body weight reduction. Aim to get BMI in the 22-23 range given your propensity towards diabetes at very low excess weight.       We discussed [facility]ariat[facility] Basics including:  -eating 3 meals daily  -reviewed metabolic needs for weight loss based on Resting Metabolic Rate  -protein goals supportive of healthy weight loss  -avoiding/limiting calorie containing beverages  -We discussed the  "importance of restorative sleep and stress management in maintaining a healthy weight.  -We discussed the National Weight Control Registry healthy weight maintenance strategies and ways to optimize metabolism.  -We discussed the importance of physical activity including cardiovascular and strength training in maintaining a healthier weight and explored viable options.      Most recent labs:  Lab Results   Component Value Date    WBC 7.4 08/03/2019    HGB 14.4 08/03/2019    HCT 44.1 08/03/2019    MCV 88 08/03/2019     08/03/2019     Lab Results   Component Value Date    CHOL 278 (H) 02/22/2025     Lab Results   Component Value Date    HDL 40 (L) 02/22/2025     No components found for: \"LDLCALC\"  Lab Results   Component Value Date    TRIG 161 (H) 02/22/2025     No results found for: \"CHOLHDL\"  Lab Results   Component Value Date    ALT 41 02/22/2025    AST 36 02/22/2025    ALKPHOS 130 02/22/2025     No results found for: \"HGBA1C\"  Lab Results   Component Value Date    B12 426 02/22/2025     No components found for: \"VITDT1\"  No results found for: \"FATOU\"  No results found for: \"PTHI\"  No results found for: \"ZN\"  No results found for: \"VIB1WB\"  Lab Results   Component Value Date    TSH 1.31 08/28/2024     No results found for: \"TEST\"    DIETARY HISTORY  Getting her       PHYSICAL ACTIVITY PATTERNS:  Cardiovascular: walking 20 minutes most days  Strength Training: none    REVIEW OF SYSTEMS  Feels well. Mild constipation. No GERD complaints. No pain. No vomiting. Mild nausea a day or two after her shot..  PHYSICAL EXAM:  Vitals: /64 (BP Location: Right arm, Patient Position: Sitting, Cuff Size: Adult Regular)   Ht 1.664 m (5' 5.5\")   Wt 69.8 kg (153 lb 12.8 oz)   BMI 25.20 kg/m    Weight:   Wt Readings from Last 3 Encounters:   07/28/25 69.8 kg (153 lb 12.8 oz)   05/14/25 76.7 kg (169 lb 3.2 oz)   04/28/25 77.1 kg (170 lb)         GEN: Pleasant, well groomed, in no acute distress  HEENT:  normal facies " .  NECK: No swelling.  HEART: RRR.  LUNGS: No respiratory difficulty noted. No cough. .  ABDOMEN: nontender. .  EXTREMITIES: No tremor. Ambulation is ..  NEURO: Alert and Oriented X3, fluent speech. .  SKIN: No visible rashes. .    Interim study results: much improved A1c today:  Lab Results   Component Value Date    A1C 5.5 07/28/2025    A1C 6.7 02/22/2025    A1C 7.0 08/28/2024     .      30 minutes spent by me on the date of the encounter doing chart review, history and exam, documentation and further activities per the note   Christiano Ivan MD  [facility]  7:54 AM  7/28/2025

## 2025-07-28 NOTE — LETTER
7/28/2025      Myrna Brothers  [address]  [place] [address]      Dear Colleague,    Thank you for referring your patient, Myrna Brothers, to the [facility] [place]. Please see a copy of my visit note below.    Bariatric Clinic Follow-Up Visit:    Myrna Brothers is a 39 year old  female with Body mass index is 25.2 kg/m .  presenting here today for follow-up on non-surgical efforts for weight loss. Original Intake visit occurred on 2/20/25 with a weight of 200 lbs and BMI of 33.3.  Along with diet and behavior changes, she has been using Mounjaro for dual benefit on her type II diabetes and to assist her weight loss goals.  See her intake visit notes for details on identified contributors to weight gain in the past. Chart review shows Dietician calculated RMR of 1440kcal/day and protein intake goal of 54-70g/day as of her 4/28/25 visit with our Dietician.     Weight:   Wt Readings from Last 5 Encounters:   07/28/25 69.8 kg (153 lb 12.8 oz)   05/14/25 76.7 kg (169 lb 3.2 oz)   04/28/25 77.1 kg (170 lb)   03/24/25 82.1 kg (181 lb 1.6 oz)   03/04/25 83.5 kg (184 lb)    pounds      Comorbidities:  Patient Active Problem List   Diagnosis     Moderate episode of recurrent major depressive disorder (H)     Generalized anxiety disorder     Encounter for surveillance of contraceptive pills     Fibrocystic disease of breast     Irregular menstrual cycle     RAD (reactive airway disease)     Restless sleeper     Temporomandibular dysfunction syndrome     Type 2 diabetes mellitus with hyperglycemia, without long-term current use of insulin (H)     Dysfunction of left eustachian tube       Current Outpatient Medications:      albuterol (PROAIR HFA/PROVENTIL HFA/VENTOLIN HFA) 108 (90 Base) MCG/ACT inhaler, Inhale 2 puffs into the lungs every 6 hours as needed for shortness of breath, wheezing or cough., Disp: 18 g, Rfl: 3     ARIPiprazole (ABILIFY) 5 MG  tablet, Take 1.5 tablets (7.5 mg) by mouth daily., Disp: 135 tablet, Rfl: 3     buPROPion (WELLBUTRIN XL) 300 MG 24 hr tablet, Take 1 tablet (300 mg) by mouth every morning., Disp: 90 tablet, Rfl: 3     metFORMIN (GLUCOPHAGE) 500 MG tablet, Take 1 tablet (500 mg) by mouth daily (with breakfast)., Disp: 90 tablet, Rfl: 3     Norethin-Eth Estrad-Fe Biphas (LO LOESTRIN FE) 1 MG-10 MCG / 10 MCG TABS, Take 1 tablet by mouth daily., Disp: 90 tablet, Rfl: 3     norethindrone-ethinyl estradiol (JUNEL FE 1/20) 1-20 MG-MCG tablet, Take 1 tablet by mouth daily., Disp: 84 tablet, Rfl: 3     rosuvastatin (CRESTOR) 5 MG tablet, Take 1 tablet (5 mg) by mouth daily., Disp: 90 tablet, Rfl: 1     Tirzepatide 10 MG/0.5ML SOAJ, Inject 0.5 mLs (10 mg) subcutaneously every 7 days., Disp: 6 mL, Rfl: 2     venlafaxine (EFFEXOR XR) 150 MG 24 hr capsule, Take 1 capsule (150 mg) by mouth daily Along with 75 mg, Disp: 90 capsule, Rfl: 3     venlafaxine (EFFEXOR XR) 75 MG 24 hr capsule, Take 1 capsule (75 mg) by mouth daily Along with 150 mg, Disp: 90 capsule, Rfl: 3      Interim: Since our last visit, she has been off her daily Frappacino habit and getting better protein at breakfast.   Doing 20 minute walks  in the after     Plan:   1.  Diet: Great work with dietary changes and phasing out high calorie beverages. To continue this weight loss trajectory and get weight stabilized in the 130s, I'd recommend continuing to aim for 1250-1300kcal/day with 67-89 grams of lean protein based on your FFM of 105.6 lbs.  Continue good hydration and separate beverages from meals to make sure you adequately nourish yourself at meals. Protein first approach works well.    2. Exercise: continue good walks. I'd recommend some strength work this Fall 2-3x weekly.     3. Medication: continue Mounjaro 10mg/week    4. Recheck A1c.     5. Goals: 47 lbs down, a 23.5% total body weight reduction. Aim to get BMI in the 22-23 range given your propensity towards  "diabetes at very low excess weight.       We discussed [facility]ariat[facility] Basics including:  -eating 3 meals daily  -reviewed metabolic needs for weight loss based on Resting Metabolic Rate  -protein goals supportive of healthy weight loss  -avoiding/limiting calorie containing beverages  -We discussed the importance of restorative sleep and stress management in maintaining a healthy weight.  -We discussed the National Weight Control Registry healthy weight maintenance strategies and ways to optimize metabolism.  -We discussed the importance of physical activity including cardiovascular and strength training in maintaining a healthier weight and explored viable options.      Most recent labs:  Lab Results   Component Value Date    WBC 7.4 08/03/2019    HGB 14.4 08/03/2019    HCT 44.1 08/03/2019    MCV 88 08/03/2019     08/03/2019     Lab Results   Component Value Date    CHOL 278 (H) 02/22/2025     Lab Results   Component Value Date    HDL 40 (L) 02/22/2025     No components found for: \"LDLCALC\"  Lab Results   Component Value Date    TRIG 161 (H) 02/22/2025     No results found for: \"CHOLHDL\"  Lab Results   Component Value Date    ALT 41 02/22/2025    AST 36 02/22/2025    ALKPHOS 130 02/22/2025     No results found for: \"HGBA1C\"  Lab Results   Component Value Date    B12 426 02/22/2025     No components found for: \"VITDT1\"  No results found for: \"FATOU\"  No results found for: \"PTHI\"  No results found for: \"ZN\"  No results found for: \"VIB1WB\"  Lab Results   Component Value Date    TSH 1.31 08/28/2024     No results found for: \"TEST\"    DIETARY HISTORY  Getting her       PHYSICAL ACTIVITY PATTERNS:  Cardiovascular: walking 20 minutes most days  Strength Training: none    REVIEW OF SYSTEMS  Feels well. Mild constipation. No GERD complaints. No pain. No vomiting. Mild nausea a day or two after her shot..  PHYSICAL EXAM:  Vitals: /64 (BP Location: Right arm, Patient Position: Sitting, Cuff Size: Adult " "Regular)   Ht 1.664 m (5' 5.5\")   Wt 69.8 kg (153 lb 12.8 oz)   BMI 25.20 kg/m    Weight:   Wt Readings from Last 3 Encounters:   07/28/25 69.8 kg (153 lb 12.8 oz)   05/14/25 76.7 kg (169 lb 3.2 oz)   04/28/25 77.1 kg (170 lb)         GEN: Pleasant, well groomed, in no acute distress  HEENT:  normal facies .  NECK: No swelling.  HEART: RRR.  LUNGS: No respiratory difficulty noted. No cough. .  ABDOMEN: nontender. .  EXTREMITIES: No tremor. Ambulation is ..  NEURO: Alert and Oriented X3, fluent speech. .  SKIN: No visible rashes. .    Interim study results: much improved A1c today:  Lab Results   Component Value Date    A1C 5.5 07/28/2025    A1C 6.7 02/22/2025    A1C 7.0 08/28/2024     .      30 minutes spent by me on the date of the encounter doing chart review, history and exam, documentation and further activities per the note   Christiano Ivan MD  [facility]  7:54 AM  7/28/2025    Again, thank you for allowing me to participate in the care of your patient.        Sincerely,        Christiano Ivan MD    Electronically signed"

## 2025-08-05 DIAGNOSIS — F33.1 MODERATE EPISODE OF RECURRENT MAJOR DEPRESSIVE DISORDER (H): ICD-10-CM

## 2025-08-05 DIAGNOSIS — F41.9 ANXIETY: ICD-10-CM

## 2025-08-06 RX ORDER — VENLAFAXINE HYDROCHLORIDE 75 MG/1
75 CAPSULE, EXTENDED RELEASE ORAL DAILY
Qty: 90 CAPSULE | Refills: 3 | Status: SHIPPED | OUTPATIENT
Start: 2025-08-06

## 2025-08-27 ENCOUNTER — TELEPHONE (OUTPATIENT)
Dept: PSYCHOLOGY | Facility: CLINIC | Age: 40
End: 2025-08-27
Payer: COMMERCIAL

## 2025-09-02 ENCOUNTER — MYC REFILL (OUTPATIENT)
Dept: FAMILY MEDICINE | Facility: CLINIC | Age: 40
End: 2025-09-02
Payer: COMMERCIAL

## 2025-09-02 DIAGNOSIS — F41.9 ANXIETY: ICD-10-CM

## 2025-09-02 DIAGNOSIS — F33.1 MODERATE EPISODE OF RECURRENT MAJOR DEPRESSIVE DISORDER (H): ICD-10-CM

## 2025-09-04 RX ORDER — VENLAFAXINE HYDROCHLORIDE 150 MG/1
150 CAPSULE, EXTENDED RELEASE ORAL DAILY
Qty: 90 CAPSULE | Refills: 3 | Status: SHIPPED | OUTPATIENT
Start: 2025-09-04

## (undated) DEVICE — BIOPSY VALVE BIOSHIELD 00711135

## (undated) DEVICE — KIT ENDO FIRST STEP DISINFECTANT 200ML W/POUCH EP-4

## (undated) DEVICE — CATH RETRIEVAL BALLOON EXTRACTOR PRO RX-S INJ ABOVE 9-12MM

## (undated) DEVICE — SUCTION MANIFOLD DORNOCH ULTRA CART UL-CL500

## (undated) DEVICE — ESU GROUND PAD ADULT W/CORD E7507

## (undated) DEVICE — ENDO DEVICE LOCKING AND BIOPSY CAP M00545261

## (undated) DEVICE — PACK ENDOSCOPY GI CUSTOM UMMC

## (undated) DEVICE — KIT CONNECTOR FOR OLYMPUS ENDOSCOPES DEFENDO 100310

## (undated) DEVICE — ENDO FUSION OMNI-TOME G31903

## (undated) DEVICE — WIRE GUIDE 0.025"X270CM STR VISIGLIDE G-240-2527S

## (undated) DEVICE — ENDO TUBING CO2 SMARTCAP STERILE DISP 100145CO2EXT

## (undated) DEVICE — SOL WATER IRRIG 1000ML BOTTLE 2F7114

## (undated) DEVICE — ENDO BITE BLOCK ADULT OMNI-BLOC

## (undated) RX ORDER — HYDROMORPHONE HYDROCHLORIDE 1 MG/ML
INJECTION, SOLUTION INTRAMUSCULAR; INTRAVENOUS; SUBCUTANEOUS
Status: DISPENSED
Start: 2019-07-24

## (undated) RX ORDER — INDOMETHACIN 50 MG/1
SUPPOSITORY RECTAL
Status: DISPENSED
Start: 2019-07-24

## (undated) RX ORDER — ONDANSETRON 2 MG/ML
INJECTION INTRAMUSCULAR; INTRAVENOUS
Status: DISPENSED
Start: 2019-07-24

## (undated) RX ORDER — SODIUM CHLORIDE, SODIUM LACTATE, POTASSIUM CHLORIDE, CALCIUM CHLORIDE 600; 310; 30; 20 MG/100ML; MG/100ML; MG/100ML; MG/100ML
INJECTION, SOLUTION INTRAVENOUS
Status: DISPENSED
Start: 2019-07-24

## (undated) RX ORDER — IOPAMIDOL 510 MG/ML
INJECTION, SOLUTION INTRAVASCULAR
Status: DISPENSED
Start: 2019-07-24

## (undated) RX ORDER — SIMETHICONE 40MG/0.6ML
SUSPENSION, DROPS(FINAL DOSAGE FORM)(ML) ORAL
Status: DISPENSED
Start: 2019-07-24

## (undated) RX ORDER — FENTANYL CITRATE 50 UG/ML
INJECTION, SOLUTION INTRAMUSCULAR; INTRAVENOUS
Status: DISPENSED
Start: 2019-07-24

## (undated) RX ORDER — PROPOFOL 10 MG/ML
INJECTION, EMULSION INTRAVENOUS
Status: DISPENSED
Start: 2019-07-24

## (undated) RX ORDER — DEXAMETHASONE SODIUM PHOSPHATE 4 MG/ML
INJECTION, SOLUTION INTRA-ARTICULAR; INTRALESIONAL; INTRAMUSCULAR; INTRAVENOUS; SOFT TISSUE
Status: DISPENSED
Start: 2019-07-24

## (undated) RX ORDER — LIDOCAINE HYDROCHLORIDE 20 MG/ML
INJECTION, SOLUTION EPIDURAL; INFILTRATION; INTRACAUDAL; PERINEURAL
Status: DISPENSED
Start: 2019-07-24